# Patient Record
Sex: FEMALE | Race: WHITE | NOT HISPANIC OR LATINO | Employment: STUDENT | URBAN - NONMETROPOLITAN AREA
[De-identification: names, ages, dates, MRNs, and addresses within clinical notes are randomized per-mention and may not be internally consistent; named-entity substitution may affect disease eponyms.]

---
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
## 2019-04-18 ENCOUNTER — OFFICE VISIT (OUTPATIENT)
Dept: URGENT CARE | Facility: CLINIC | Age: 19
End: 2019-04-18
Payer: COMMERCIAL

## 2019-04-18 ENCOUNTER — APPOINTMENT (OUTPATIENT)
Dept: RADIOLOGY | Facility: CLINIC | Age: 19
End: 2019-04-18
Payer: COMMERCIAL

## 2019-04-18 VITALS
OXYGEN SATURATION: 95 % | BODY MASS INDEX: 31.1 KG/M2 | HEART RATE: 95 BPM | RESPIRATION RATE: 18 BRPM | HEIGHT: 64 IN | TEMPERATURE: 99.2 F | SYSTOLIC BLOOD PRESSURE: 121 MMHG | DIASTOLIC BLOOD PRESSURE: 82 MMHG | WEIGHT: 182.2 LBS

## 2019-04-18 DIAGNOSIS — J06.9 URI (UPPER RESPIRATORY INFECTION): ICD-10-CM

## 2019-04-18 DIAGNOSIS — R05.9 COUGH: ICD-10-CM

## 2019-04-18 DIAGNOSIS — J20.8 VIRAL BRONCHITIS: Primary | ICD-10-CM

## 2019-04-18 PROCEDURE — 71046 X-RAY EXAM CHEST 2 VIEWS: CPT

## 2019-04-18 PROCEDURE — 99213 OFFICE O/P EST LOW 20 MIN: CPT | Performed by: PHYSICIAN ASSISTANT

## 2019-04-18 RX ORDER — ALBUTEROL SULFATE 90 UG/1
2 AEROSOL, METERED RESPIRATORY (INHALATION) EVERY 4 HOURS PRN
Qty: 1 INHALER | Refills: 0 | Status: SHIPPED | OUTPATIENT
Start: 2019-04-18 | End: 2020-06-16

## 2019-05-22 ENCOUNTER — OFFICE VISIT (OUTPATIENT)
Dept: FAMILY MEDICINE CLINIC | Facility: CLINIC | Age: 19
End: 2019-05-22
Payer: COMMERCIAL

## 2019-05-22 VITALS
HEART RATE: 68 BPM | WEIGHT: 184.2 LBS | DIASTOLIC BLOOD PRESSURE: 70 MMHG | BODY MASS INDEX: 31.45 KG/M2 | TEMPERATURE: 97.5 F | HEIGHT: 64 IN | SYSTOLIC BLOOD PRESSURE: 118 MMHG

## 2019-05-22 DIAGNOSIS — Z00.00 WELL ADULT EXAM: Primary | ICD-10-CM

## 2019-05-22 DIAGNOSIS — L73.9 FOLLICULITIS: ICD-10-CM

## 2019-05-22 DIAGNOSIS — E07.9 THYROID DISEASE: ICD-10-CM

## 2019-05-22 DIAGNOSIS — K13.0 CRACKED LIP: ICD-10-CM

## 2019-05-22 PROBLEM — E03.9 HYPOTHYROIDISM: Status: ACTIVE | Noted: 2018-07-18

## 2019-05-22 PROCEDURE — 99395 PREV VISIT EST AGE 18-39: CPT | Performed by: PHYSICIAN ASSISTANT

## 2019-05-22 RX ORDER — LEVOTHYROXINE SODIUM 0.03 MG/1
25 TABLET ORAL DAILY
Qty: 90 TABLET | Refills: 3 | Status: SHIPPED | OUTPATIENT
Start: 2019-05-22 | End: 2019-08-10 | Stop reason: SDUPTHER

## 2019-05-22 RX ORDER — CLOTRIMAZOLE AND BETAMETHASONE DIPROPIONATE 10; .64 MG/G; MG/G
CREAM TOPICAL 2 TIMES DAILY
Qty: 30 G | Refills: 0 | Status: SHIPPED | OUTPATIENT
Start: 2019-05-22 | End: 2020-06-16

## 2019-05-24 DIAGNOSIS — F32.4 MAJOR DEPRESSIVE DISORDER WITH SINGLE EPISODE, IN PARTIAL REMISSION (HCC): Primary | ICD-10-CM

## 2019-05-24 RX ORDER — TRAZODONE HYDROCHLORIDE 50 MG/1
50 TABLET ORAL
Qty: 30 TABLET | Refills: 1 | Status: SHIPPED | OUTPATIENT
Start: 2019-05-24 | End: 2019-09-17 | Stop reason: SDUPTHER

## 2019-08-08 ENCOUNTER — OFFICE VISIT (OUTPATIENT)
Dept: FAMILY MEDICINE CLINIC | Facility: CLINIC | Age: 19
End: 2019-08-08
Payer: COMMERCIAL

## 2019-08-08 VITALS
OXYGEN SATURATION: 99 % | BODY MASS INDEX: 30.01 KG/M2 | HEART RATE: 98 BPM | SYSTOLIC BLOOD PRESSURE: 121 MMHG | WEIGHT: 175.8 LBS | HEIGHT: 64 IN | DIASTOLIC BLOOD PRESSURE: 78 MMHG

## 2019-08-08 DIAGNOSIS — F41.1 ANXIETY STATE: ICD-10-CM

## 2019-08-08 DIAGNOSIS — E16.2 HYPOGLYCEMIA, UNSPECIFIED: ICD-10-CM

## 2019-08-08 DIAGNOSIS — F32.4 MAJOR DEPRESSIVE DISORDER WITH SINGLE EPISODE, IN PARTIAL REMISSION (HCC): ICD-10-CM

## 2019-08-08 DIAGNOSIS — L74.9 SWEATING ABNORMALITY: ICD-10-CM

## 2019-08-08 DIAGNOSIS — R63.1 POLYDIPSIA: ICD-10-CM

## 2019-08-08 DIAGNOSIS — L21.9 SEBORRHEIC DERMATITIS OF SCALP: ICD-10-CM

## 2019-08-08 DIAGNOSIS — E03.9 ACQUIRED HYPOTHYROIDISM: Primary | ICD-10-CM

## 2019-08-08 LAB — SL AMB POCT GLUCOSE BLD: 55

## 2019-08-08 PROCEDURE — 99214 OFFICE O/P EST MOD 30 MIN: CPT | Performed by: PHYSICIAN ASSISTANT

## 2019-08-08 PROCEDURE — 3008F BODY MASS INDEX DOCD: CPT | Performed by: PHYSICIAN ASSISTANT

## 2019-08-08 PROCEDURE — 1036F TOBACCO NON-USER: CPT | Performed by: PHYSICIAN ASSISTANT

## 2019-08-08 PROCEDURE — 82948 REAGENT STRIP/BLOOD GLUCOSE: CPT | Performed by: PHYSICIAN ASSISTANT

## 2019-08-08 RX ORDER — CLOBETASOL PROPIONATE 0.46 MG/ML
SOLUTION TOPICAL 2 TIMES DAILY
Qty: 50 ML | Refills: 0 | Status: SHIPPED | OUTPATIENT
Start: 2019-08-08 | End: 2020-03-24

## 2019-08-08 RX ORDER — SERTRALINE HYDROCHLORIDE 25 MG/1
25 TABLET, FILM COATED ORAL DAILY
Qty: 60 TABLET | Refills: 0 | Status: SHIPPED | OUTPATIENT
Start: 2019-08-08 | End: 2019-09-17 | Stop reason: SDUPTHER

## 2019-08-09 ENCOUNTER — TRANSCRIBE ORDERS (OUTPATIENT)
Dept: RADIOLOGY | Facility: MEDICAL CENTER | Age: 19
End: 2019-08-09

## 2019-08-09 ENCOUNTER — APPOINTMENT (OUTPATIENT)
Dept: LAB | Facility: MEDICAL CENTER | Age: 19
End: 2019-08-09
Payer: COMMERCIAL

## 2019-08-09 DIAGNOSIS — L74.9 SWEATING ABNORMALITY: ICD-10-CM

## 2019-08-09 DIAGNOSIS — E07.9 THYROID DISEASE: ICD-10-CM

## 2019-08-09 DIAGNOSIS — E16.2 HYPOGLYCEMIA, UNSPECIFIED: ICD-10-CM

## 2019-08-09 LAB
ALBUMIN SERPL BCP-MCNC: 4 G/DL (ref 3.5–5)
ALP SERPL-CCNC: 70 U/L (ref 46–384)
ALT SERPL W P-5'-P-CCNC: 21 U/L (ref 12–78)
ANION GAP SERPL CALCULATED.3IONS-SCNC: 9 MMOL/L (ref 4–13)
AST SERPL W P-5'-P-CCNC: 19 U/L (ref 5–45)
BILIRUB SERPL-MCNC: 0.24 MG/DL (ref 0.2–1)
BUN SERPL-MCNC: 7 MG/DL (ref 5–25)
CALCIUM SERPL-MCNC: 9.1 MG/DL (ref 8.3–10.1)
CHLORIDE SERPL-SCNC: 108 MMOL/L (ref 100–108)
CO2 SERPL-SCNC: 24 MMOL/L (ref 21–32)
CREAT SERPL-MCNC: 0.75 MG/DL (ref 0.6–1.3)
EST. AVERAGE GLUCOSE BLD GHB EST-MCNC: 97 MG/DL
GFR SERPL CREATININE-BSD FRML MDRD: 116 ML/MIN/1.73SQ M
GLUCOSE P FAST SERPL-MCNC: 80 MG/DL (ref 65–99)
HBA1C MFR BLD: 5 % (ref 4.2–6.3)
POTASSIUM SERPL-SCNC: 3.7 MMOL/L (ref 3.5–5.3)
PROT SERPL-MCNC: 7.6 G/DL (ref 6.4–8.2)
SODIUM SERPL-SCNC: 141 MMOL/L (ref 136–145)
TSH SERPL DL<=0.05 MIU/L-ACNC: 2.47 UIU/ML (ref 0.46–3.98)

## 2019-08-09 PROCEDURE — 36415 COLL VENOUS BLD VENIPUNCTURE: CPT

## 2019-08-09 PROCEDURE — 83036 HEMOGLOBIN GLYCOSYLATED A1C: CPT

## 2019-08-09 PROCEDURE — 84443 ASSAY THYROID STIM HORMONE: CPT

## 2019-08-09 PROCEDURE — 80053 COMPREHEN METABOLIC PANEL: CPT

## 2019-08-09 NOTE — PROGRESS NOTES
Assessment/Plan:    Problem List Items Addressed This Visit        Endocrine    Hypothyroidism - Primary       Other    Anxiety state, unspecified    Major depressive disorder with single episode, in partial remission (HCC)    Relevant Medications    sertraline (ZOLOFT) 25 mg tablet      Other Visit Diagnoses     Seborrheic dermatitis of scalp        Relevant Medications    clobetasol (TEMOVATE) 0 05 % external solution    Sweating abnormality        Relevant Orders    Comprehensive metabolic panel    POCT blood glucose (Completed)    Hypoglycemia, unspecified        Relevant Orders    HEMOGLOBIN A1C W/ EAG ESTIMATION    Polydipsia               Diagnoses and all orders for this visit:    Acquired hypothyroidism    Seborrheic dermatitis of scalp  -     clobetasol (TEMOVATE) 0 05 % external solution; Apply topically 2 (two) times a day    Sweating abnormality  -     Comprehensive metabolic panel; Future  -     POCT blood glucose    Anxiety state, unspecified    Major depressive disorder with single episode, in partial remission (HCC)  -     Discontinue: sertraline (ZOLOFT) 50 mg tablet; Take 0 5 tablets (25 mg total) by mouth daily  -     sertraline (ZOLOFT) 25 mg tablet; Take 1 tablet (25 mg total) by mouth daily    Hypoglycemia, unspecified  -     HEMOGLOBIN A1C W/ EAG ESTIMATION; Future    Polydipsia        Patient became symptomatic during appointment  POCT finger stick glucose was 55  She was given chocolate and symptoms did improve  I believe she is having hypoglycemic episodes  Recommended that she eat small, frequent meals  Will get labs  Recommended referral to endocrine  She will call when she gets her college schedule  Asked patient to call insurance to see which glucometer they will cover  I would like her to check sugars when she becomes symptomatic  Will recheck thyroid level  Gave directions on tapering down on zoloft  Script for clobetasol scalp treatment    Follow-up in 1-2 months or sooner if needed  Subjective:      Patient ID: Bharat Loo is a 22 y o  female  Yakelinemeka Taylor is a pleasant 22year old female who presents today with multiple concerns  First, she would like to taper off of her dose of zoloft  She feels as though her moods have been good  She was seeing a counselor and believes she has found ways to cope with her anxiety and depression  She is also concerned that her thyroid medication may be too high  She has noticed that she has looser bowel movements, she skipped her last period, and has been very sweaty  She has episodes where she feels sweaty, shaky, and develops numbness and tingling in her legs  She has not had any syncopal episodes, chest pains, or shortness of breath  She has found that if she eats something it helps improve the symptoms  She is also concerned because she has an increased amount of dandruff  She was prescribed clobetasol in the past, and is asking for a refill  The following portions of the patient's history were reviewed and updated as appropriate:   She has a past medical history of Environmental allergies  ,  does not have any pertinent problems on file  ,   has a past surgical history that includes Colonoscopy; Myringotomy; Tooth extraction; and Tonsillectomy  ,  family history includes Diabetes in her mother  ,   reports that she has never smoked  She has never used smokeless tobacco  She reports that she drinks alcohol  She reports that she does not use drugs  ,  has No Known Allergies     Current Outpatient Medications   Medication Sig Dispense Refill    albuterol (PROVENTIL HFA,VENTOLIN HFA) 90 mcg/act inhaler Inhale 2 puffs every 4 (four) hours as needed for wheezing 1 Inhaler 0    CAMRESE 0 15-0 03 &0 01 MG TABS   0    Cetirizine HCl (ZYRTEC ALLERGY) 10 MG CAPS Take by mouth      clotrimazole-betamethasone (LOTRISONE) 1-0 05 % cream Apply topically 2 (two) times a day 30 g 0    levothyroxine 25 mcg tablet Take 1 tablet (25 mcg total) by mouth daily 90 tablet 3    sertraline (ZOLOFT) 25 mg tablet Take 1 tablet (25 mg total) by mouth daily 60 tablet 0    traZODone (DESYREL) 50 mg tablet Take 1 tablet (50 mg total) by mouth daily at bedtime 30 tablet 1    clobetasol (TEMOVATE) 0 05 % external solution Apply topically 2 (two) times a day 50 mL 0    fluticasone (FLONASE) 50 mcg/act nasal spray 1 spray into each nostril daily       No current facility-administered medications for this visit  Review of Systems   Constitutional: Positive for diaphoresis  Negative for chills, fatigue and fever  HENT: Negative for congestion, ear pain, postnasal drip, rhinorrhea, sneezing, sore throat and trouble swallowing  Eyes: Negative for pain and visual disturbance  Respiratory: Negative for apnea, cough, shortness of breath and wheezing  Cardiovascular: Negative for chest pain and palpitations  Gastrointestinal: Positive for diarrhea  Negative for abdominal pain, constipation, nausea and vomiting  Endocrine: Positive for polydipsia and polyuria  Negative for polyphagia  Genitourinary: Negative for dysuria and hematuria  Musculoskeletal: Negative for arthralgias, gait problem and myalgias  Skin:        Dry scalp   Neurological: Negative for dizziness, syncope, weakness, light-headedness, numbness and headaches  Episodic shakiness   Psychiatric/Behavioral: Negative for suicidal ideas  The patient is not nervous/anxious  Objective:  Vitals:    08/08/19 1525   BP: 121/78   Pulse: 98   SpO2: 99%   Weight: 79 7 kg (175 lb 12 8 oz)   Height: 5' 4" (1 626 m)     Body mass index is 30 18 kg/m²  Physical Exam   Constitutional: She is oriented to person, place, and time  She appears well-developed and well-nourished  HENT:   Head: Normocephalic and atraumatic     Right Ear: Tympanic membrane, external ear and ear canal normal    Left Ear: Tympanic membrane, external ear and ear canal normal    Nose: Nose normal    Mouth/Throat: Oropharynx is clear and moist and mucous membranes are normal  No oropharyngeal exudate, posterior oropharyngeal edema or posterior oropharyngeal erythema  Eyes: Pupils are equal, round, and reactive to light  EOM are normal    Neck: Normal range of motion  Neck supple  Cardiovascular: Normal rate, regular rhythm and normal heart sounds  Exam reveals no gallop and no friction rub  No murmur heard  Pulmonary/Chest: Effort normal and breath sounds normal  No respiratory distress  She has no wheezes  She has no rales  Abdominal: Soft  Bowel sounds are normal  There is no tenderness  There is no rebound and no guarding  Musculoskeletal: Normal range of motion  Lymphadenopathy:     She has no cervical adenopathy  Neurological: She is alert and oriented to person, place, and time  Skin: Skin is warm  She is diaphoretic  Psychiatric: She has a normal mood and affect  Her behavior is normal  Judgment and thought content normal    Vitals reviewed

## 2019-08-10 DIAGNOSIS — E07.9 THYROID DISEASE: ICD-10-CM

## 2019-08-12 DIAGNOSIS — E16.2 HYPOGLYCEMIA, UNSPECIFIED: Primary | ICD-10-CM

## 2019-08-12 RX ORDER — LEVOTHYROXINE SODIUM 0.03 MG/1
TABLET ORAL
Qty: 90 TABLET | Refills: 3 | Status: SHIPPED | OUTPATIENT
Start: 2019-08-12 | End: 2020-08-06

## 2019-08-13 DIAGNOSIS — E16.2 HYPOGLYCEMIA, UNSPECIFIED: Primary | ICD-10-CM

## 2019-08-13 RX ORDER — BLOOD-GLUCOSE METER
EACH MISCELLANEOUS
Qty: 1 KIT | Refills: 0 | Status: SHIPPED | OUTPATIENT
Start: 2019-08-13 | End: 2019-08-14 | Stop reason: SDUPTHER

## 2019-08-14 ENCOUNTER — TELEPHONE (OUTPATIENT)
Dept: FAMILY MEDICINE CLINIC | Facility: CLINIC | Age: 19
End: 2019-08-14

## 2019-08-14 DIAGNOSIS — E16.2 HYPOGLYCEMIA, UNSPECIFIED: ICD-10-CM

## 2019-08-14 RX ORDER — BLOOD-GLUCOSE METER
EACH MISCELLANEOUS
Qty: 1 KIT | Refills: 0 | Status: SHIPPED | OUTPATIENT
Start: 2019-08-14 | End: 2020-08-20 | Stop reason: ALTCHOICE

## 2019-08-14 NOTE — TELEPHONE ENCOUNTER
PT CALLED FOR ACCU CHECK CHELA PLU, TEST STRIPS, AND LANCETS  WAS SENT TO [facility], SHE IS IN [place] AT SCHOOL  SHE CANCELED THE ORDER AND WANTS IT SENT TO [facility] IN [facility]  I ADDED THAT TO HER CHART IF YOU CAN SEND THEM IN FOR HER

## 2019-08-15 ENCOUNTER — TELEPHONE (OUTPATIENT)
Dept: FAMILY MEDICINE CLINIC | Facility: CLINIC | Age: 19
End: 2019-08-15

## 2019-09-12 ENCOUNTER — TELEPHONE (OUTPATIENT)
Dept: FAMILY MEDICINE CLINIC | Facility: CLINIC | Age: 19
End: 2019-09-12

## 2019-09-12 NOTE — TELEPHONE ENCOUNTER
PT DISCUSSED WITH YOU WEANING OFF ZOLOFT AND TO LET YOU KNOW IF SHE CHANGES HER MIND  SHE DOES NOT WANT TO GO OFF

## 2019-09-16 DIAGNOSIS — F32.4 MAJOR DEPRESSIVE DISORDER WITH SINGLE EPISODE, IN PARTIAL REMISSION (HCC): ICD-10-CM

## 2019-09-16 RX ORDER — SERTRALINE HYDROCHLORIDE 25 MG/1
25 TABLET, FILM COATED ORAL DAILY
Qty: 60 TABLET | Refills: 0 | Status: CANCELLED | OUTPATIENT
Start: 2019-09-16

## 2019-09-17 ENCOUNTER — TELEPHONE (OUTPATIENT)
Dept: FAMILY MEDICINE CLINIC | Facility: CLINIC | Age: 19
End: 2019-09-17

## 2019-09-17 DIAGNOSIS — F32.4 MAJOR DEPRESSIVE DISORDER WITH SINGLE EPISODE, IN PARTIAL REMISSION (HCC): ICD-10-CM

## 2019-09-17 RX ORDER — SERTRALINE HYDROCHLORIDE 25 MG/1
25 TABLET, FILM COATED ORAL DAILY
Qty: 90 TABLET | Refills: 0 | Status: SHIPPED | OUTPATIENT
Start: 2019-09-17 | End: 2019-12-20 | Stop reason: SDUPTHER

## 2019-09-17 RX ORDER — TRAZODONE HYDROCHLORIDE 50 MG/1
50 TABLET ORAL
Qty: 30 TABLET | Refills: 1 | Status: SHIPPED | OUTPATIENT
Start: 2019-09-17 | End: 2019-12-20 | Stop reason: SDUPTHER

## 2019-09-17 NOTE — TELEPHONE ENCOUNTER
Patient is going on to stay on the Zoloft  Asking for refill be sent to [facility]  Also asking if you can send a script in for Trazadone as well?

## 2019-09-18 DIAGNOSIS — F32.4 MAJOR DEPRESSIVE DISORDER WITH SINGLE EPISODE, IN PARTIAL REMISSION (HCC): ICD-10-CM

## 2019-09-20 ENCOUNTER — CONSULT (OUTPATIENT)
Dept: ENDOCRINOLOGY | Facility: CLINIC | Age: 19
End: 2019-09-20
Payer: COMMERCIAL

## 2019-09-20 VITALS
BODY MASS INDEX: 30.56 KG/M2 | SYSTOLIC BLOOD PRESSURE: 116 MMHG | HEART RATE: 85 BPM | HEIGHT: 64 IN | WEIGHT: 179 LBS | DIASTOLIC BLOOD PRESSURE: 70 MMHG

## 2019-09-20 DIAGNOSIS — E03.9 ACQUIRED HYPOTHYROIDISM: ICD-10-CM

## 2019-09-20 DIAGNOSIS — E16.2 HYPOGLYCEMIA, UNSPECIFIED: Primary | ICD-10-CM

## 2019-09-20 PROCEDURE — 99244 OFF/OP CNSLTJ NEW/EST MOD 40: CPT | Performed by: INTERNAL MEDICINE

## 2019-09-20 NOTE — PATIENT INSTRUCTIONS
Non-diabetic Hypoglycemia   WHAT YOU NEED TO KNOW:   What is non-diabetic hypoglycemia? Non-diabetic hypoglycemia is a condition that causes the sugar (glucose) in your blood to drop too low  This can happen in people who do not have diabetes  The 2 types of non-diabetic hypoglycemia are fasting hypoglycemia and reactive hypoglycemia  Fasting hypoglycemia often happens after the person goes without food for 8 hours or longer  Reactive hypoglycemia usually happens about 2 to 4 hours after a meal  When your blood sugar level is low, your muscles and brain cells do not have enough energy to work well  What causes non-diabetic hypoglycemia? · Fasting hypoglycemia:      ¨ Certain medicines or herbal supplements such as fenugreek, ginseng, or cinnamon    ¨ Alcohol     ¨ Exercise    ¨ Medical conditions such as liver disease, hypothyroidism, and tumors    ¨ Eating disorders or malnutrition    ¨ Stomach surgery or hemodialysis    · Reactive hypoglycemia:  The causes of reactive hypoglycemia may be unknown  ¨ Hyperinsulinism    ¨ Meals high in refined carbohydrates such as white bread or foods high in sugar    ¨ Prediabetes    ¨ Any surgery of the digestive system  What are the signs and symptoms of non-diabetic hypoglycemia? · Blurred vision or changes in vision    · Dizziness, lightheadedness, or shakiness    · Fatigue and weakness    · Fast or pounding heartbeat    · Sweating more than usual    · Headache     · Nausea or hunger    · Anxiety, Irritability, or confusion  How is non-diabetic hypoglycemia diagnosed? · Blood tests  are done to measure your blood sugar levels  These tests may also be done to find the cause of your hypoglycemia  · Fasting tests  may be done  You may have an overnight fasting test or a 72-hour fasting test  After you have fasted overnight, your blood sugar levels will be tested 2 times  For a 72-hour fasting test, you will not be given food for a period of up to 72 hours   During this time, healthcare providers will check to see if your blood sugar drops to a certain level  · An oral glucose tolerance test  may be done  After you have fasted for 8 hours, your blood sugar level is tested  You are then given a glucose drink  Your blood sugar level is checked after 1 hour and again after 2 hours  Healthcare providers look at how much your blood sugar level increases from the first check  How is non-diabetic hypoglycemia treated? · Keep food or drinks that contain carbohydrates on hand  Carbohydrates will raise your blood sugar level when you have hypoglycemia  Carbohydrates are found in bread, rice, cereal, fruits, juice, and milk  If you cannot eat or drink, your healthcare provider will give you glucose through an IV  An IV is a small tube placed in your vein  You may also receive a hormone called glucagon that helps raise your blood sugar level  · Treatment will depend on the cause of the hypoglycemia  For example, if a medicine you take is causing hypoglycemia, healthcare providers may change or stop giving you the medicine  If hypoglycemia is caused by low hormone levels, you may need to take hormones  How can I prevent hypoglycemia? You may need to change what and when you eat to prevent low blood sugar levels  Follow the meal plan that you and the dietitian have planned  The following guidelines may help you keep your blood sugar levels under control  · Eat 5 to 6 small meals each day instead of 3 large meals  Eat the same amount of carbohydrate at meals and snacks each day  Most people need about 3 to 4 servings of carbohydrate at meals and 1 to 2 servings for snacks  Do not skip meals  Carbohydrate counting can be used plan your meals  Ask your healthcare provider or dietitian for information about carbohydrate counting  · Limit refined carbohydrates  Examples are white bread, pastries (pies and cakes), regular sodas, syrups, and candy      · Do not have drinks or foods that contain caffeine  Examples are coffee, tea, and certain types of sodas  Caffeine may cause you to have the same symptoms as hypoglycemia, and may cause you to feel worse  · Limit or do not drink alcohol  Women should limit alcohol to 1 drink a day  Men should limit alcohol to 2 drinks a day  A drink of alcohol is 12 ounces of beer, 5 ounces of wine, or 1½ ounces of liquor  Do not drink alcohol on an empty stomach  Drink alcohol with meals to avoid hypoglycemia  · Include protein foods and vegetables in your meals  Some foods that are high in protein include beef, pork, fish, poultry (chicken and turkey), beans, and nuts  Eat a variety of vegetables with your meals  When should I contact my healthcare provider? · You have blurred vision or vision changes  · You feel very tired and weak  · You are sweating more than usual for you  · You have a fast heartbeat  · You feel dizzy, lightheaded, and shaky  · You have questions about your condition or care  When should I seek immediate care or call 911? · You have symptoms of hypoglycemia and cannot eat  · You have trouble thinking clearly  · You have a seizure or faint  CARE AGREEMENT:   You have the right to help plan your care  Learn about your health condition and how it may be treated  Discuss treatment options with your caregivers to decide what care you want to receive  You always have the right to refuse treatment  The above information is an  only  It is not intended as medical advice for individual conditions or treatments  Talk to your doctor, nurse or pharmacist before following any medical regimen to see if it is safe and effective for you  © 2017 [employer]  Information is for End User's use only and may not be sold, redistributed or otherwise used for commercial purposes   All illustrations and images included in CareNotes® are the copyrighted property of [employer]  or [employer]

## 2019-09-20 NOTE — PROGRESS NOTES
Rusty Waggoner 20 y o  female MRN: [id number]    Encounter: [id number]      Assessment/Plan     Assessment: This is a 20y o -year-old female with hypoglycemia and hypothyroidism  Plan:  1  Hypoglycemia-I suspect this is reactive  Have ordered a continuous glucose monitor to better understand the excursions in her blood sugar  If she truly has hypoglycemia, I will plan to send her to see a dietitian to make changes in her diet  2  Hypothyroidism-continue thyroid hormone supplementation  CC:   Hypoglycemia    History of Present Illness     HPI:  79-year-old woman with complaint of excessive sweating, tremor and not feeling well which improves with eating  She has had her blood sugar checked during one of these episodes at a physician's office and it was 55  She states that these episodes occur about twice a day  She has not noticed any relationship to eating  She denies any family history of hypoglycemia although there is family history of type 2 diabetes  Today in the office, she had her usual symptoms and blood sugar was in the 70s  She also has hypothyroidism for which she is on levothyroxine  She denies any symptoms of hypo or hyperthyroidism  Review of Systems   Constitutional: Negative for chills and fever  Respiratory: Negative for shortness of breath  Cardiovascular: Negative for chest pain  Gastrointestinal: Negative for constipation, diarrhea, nausea and vomiting  All other systems reviewed and are negative        Historical Information   Past Medical History:   Diagnosis Date    Environmental allergies     Last assessed: 1/18/17     Past Surgical History:   Procedure Laterality Date    COLONOSCOPY      Fiberoptic    MYRINGOTOMY      TONSILLECTOMY      TOOTH EXTRACTION       Social History   Social History     Substance and Sexual Activity   Alcohol Use Yes    Frequency: 2-3 times a week    Comment: occasionally      Social History     Substance and Sexual Activity   Drug Use No     Social History     Tobacco Use   Smoking Status Current Some Day Smoker   Smokeless Tobacco Never Used   Tobacco Comment    vape occasionally      Family History:   Family History   Problem Relation Age of Onset    Diabetes Mother         due to underlying condition with both eyes affected by proliferative retinopathy and traction retinal detatchments involving maculae, without long term use of insulin       Meds/Allergies   Current Outpatient Medications   Medication Sig Dispense Refill    albuterol (PROVENTIL HFA,VENTOLIN HFA) 90 mcg/act inhaler Inhale 2 puffs every 4 (four) hours as needed for wheezing 1 Inhaler 0    CAMRESE 0 15-0 03 &0 01 MG TABS   0    Cetirizine HCl (ZYRTEC ALLERGY) 10 MG CAPS Take by mouth      clobetasol (TEMOVATE) 0 05 % external solution Apply topically 2 (two) times a day 50 mL 0    clotrimazole-betamethasone (LOTRISONE) 1-0 05 % cream Apply topically 2 (two) times a day 30 g 0    levothyroxine 25 mcg tablet TAKE 1 TABLET DAILY 90 tablet 3    sertraline (ZOLOFT) 25 mg tablet Take 1 tablet (25 mg total) by mouth daily 90 tablet 0    traZODone (DESYREL) 50 mg tablet Take 1 tablet (50 mg total) by mouth daily at bedtime 30 tablet 1    Blood Glucose Monitoring Suppl (ACCU-CHEK CHELA PLUS) w/Device KIT Test blood sugars up to 3 times daily or as needed for fluctuating blood sugars  (Patient not taking: Reported on 9/20/2019) 1 kit 0    fluticasone (FLONASE) 50 mcg/act nasal spray 1 spray into each nostril daily      glucose blood (ACCU-CHEK CHELA PLUS) test strip Test blood sugars up to 3 times daily or as needed for fluctuating blood sugars  (Patient not taking: Reported on 9/20/2019) 100 each 2     No current facility-administered medications for this visit  No Known Allergies    Objective   Vitals: Blood pressure 116/70, pulse 85, height 5' 4" (1 626 m), weight 81 2 kg (179 lb)  Physical Exam   Constitutional: She is oriented to person, place, and time   She appears well-developed and well-nourished  No distress  HENT:   Head: Normocephalic and atraumatic  Mouth/Throat: Oropharynx is clear and moist and mucous membranes are normal  No oropharyngeal exudate  Eyes: Conjunctivae, EOM and lids are normal  Right eye exhibits no discharge  Left eye exhibits no discharge  No scleral icterus  Neck: Neck supple  No thyromegaly present  Cardiovascular: Normal rate, regular rhythm and normal heart sounds  Exam reveals no gallop and no friction rub  No murmur heard  Pulmonary/Chest: Effort normal and breath sounds normal  No respiratory distress  She has no wheezes  Abdominal: Soft  Bowel sounds are normal  She exhibits no distension  There is no tenderness  Musculoskeletal: Normal range of motion  She exhibits no edema, tenderness or deformity  Lymphadenopathy:        Head (right side): No occipital adenopathy present  Head (left side): No occipital adenopathy present  Right: No supraclavicular adenopathy present  Left: No supraclavicular adenopathy present  Neurological: She is alert and oriented to person, place, and time  No cranial nerve deficit  Skin: Skin is warm and intact  No rash noted  She is not diaphoretic  No erythema  Psychiatric: She has a normal mood and affect  Her behavior is normal    Vitals reviewed  The history was obtained from the review of the chart, patient  Lab Results:   Lab Results   Component Value Date/Time    [address] 2 470 08/09/2019 08:20 AM    [address] 2 510 12/18/2018 10:39 AM         I have personally reviewed pertinent reports  Portions of the record may have been created with voice recognition software  Occasional wrong word or "sound a like" substitutions may have occurred due to the inherent limitations of voice recognition software  Read the chart carefully and recognize, using context, where substitutions have occurred

## 2019-10-09 ENCOUNTER — OFFICE VISIT (OUTPATIENT)
Dept: DIABETES SERVICES | Facility: CLINIC | Age: 19
End: 2019-10-09

## 2019-10-09 ENCOUNTER — OFFICE VISIT (OUTPATIENT)
Dept: ENDOCRINOLOGY | Facility: CLINIC | Age: 19
End: 2019-10-09
Payer: COMMERCIAL

## 2019-10-09 DIAGNOSIS — E16.2 HYPOGLYCEMIA: ICD-10-CM

## 2019-10-09 DIAGNOSIS — E16.2 HYPOGLYCEMIA, UNSPECIFIED: ICD-10-CM

## 2019-10-09 PROCEDURE — 95250 CONT GLUC MNTR PHYS/QHP EQP: CPT | Performed by: INTERNAL MEDICINE

## 2019-10-09 PROCEDURE — DEXON

## 2019-10-09 NOTE — PROGRESS NOTES
Dexcom Professional Training    Met with Joan Cooks for [employer]  Training today included:    - Explained procedure to [facility] [address]  - Patient agreement signed  - Checked sensor expiration date; Sensor lot number: [id number], Transmitter Id: [id number]  - Patient has a blood glucose meter and strips  -  has been charged and unblinded  - Transmitter has been cleaned with alcohol and dried  - Discussed site selection; identified insertion site: left upper abdomin  - Cleansed the skin with alcohol  - Inserted sensor per instructions: smooth tape on the skin, insert sensor, withdraw needle, remove insertion tool  - Snapped in grey transmitter  - Verified transmitter fully snapped in on both sides (2clicks)  - Removed transmitter latch  - Disposed insertion tool in sharps container  - Started Sensor and verified communication with  (antenna symbol)    Patient instructions reviewed with patient:    - Perform 2 start up finger stick BG's 2 hours after insertion and calibrate  - Calibrate  every 12 hours using BG meter readings  - Fill out log sheets, one for each day  - Sensor is waterproof for showering and swimming, remove for hot tub, MRI  -  is not waterproof  - Remove if redness, bleeding, swelling, discomfort at site  - Removal is in 1 week with return instructions  Sensor inserted by: Michelle Arzola RN, Ron Watts will return in one week for sensor removal and data download        Maude Hernandez RN  [address] [place] [phone]

## 2019-10-09 NOTE — PATIENT INSTRUCTIONS
Patient instructions reviewed with patient:    - Perform 2 start up finger stick BG's 2 hours after insertion and calibrate  - Calibrate  every 12 hours using BG meter readings  - Fill out log sheets, one for each day  - Sensor is waterproof for showering and swimming, remove for hot tub, MRI  -  is not waterproof  - Remove if redness, bleeding, swelling, discomfort at site  - Removal is in 1 week with return instructions

## 2019-10-15 ENCOUNTER — OFFICE VISIT (OUTPATIENT)
Dept: DIABETES SERVICES | Facility: CLINIC | Age: 19
End: 2019-10-15

## 2019-10-15 DIAGNOSIS — E16.2 HYPOGLYCEMIA: ICD-10-CM

## 2019-10-15 PROCEDURE — DEXOFF

## 2019-10-15 NOTE — PROGRESS NOTES
Dexcom Professional Removal     [facility]  via [address] [facility] to provider for review   - Cleared Data    - Sanitized transmitter,  and case    Sensor Removal and     Download Performed by:  Pranay Garrison RN,CDE

## 2019-10-24 ENCOUNTER — TELEPHONE (OUTPATIENT)
Dept: ENDOCRINOLOGY | Facility: CLINIC | Age: 19
End: 2019-10-24

## 2019-10-24 DIAGNOSIS — E16.2 HYPOGLYCEMIA, UNSPECIFIED: Primary | ICD-10-CM

## 2019-10-24 NOTE — TELEPHONE ENCOUNTER
Pt notified that she needs to see a nutritionist   Pt is eating to many carbs    I will put in a referral to one of our nutritionist

## 2019-10-25 DIAGNOSIS — E03.9 ACQUIRED HYPOTHYROIDISM: Primary | ICD-10-CM

## 2019-10-25 DIAGNOSIS — E16.2 HYPOGLYCEMIA, UNSPECIFIED: ICD-10-CM

## 2019-11-04 DIAGNOSIS — E16.2 HYPOGLYCEMIA, UNSPECIFIED: ICD-10-CM

## 2019-11-04 NOTE — PROGRESS NOTES
Continous glucose monitoring dexcom placement     Date/Time 11/4/2019 2:16 PM     Performed by  [facility]     Authorized by Rue Cooks, MD      Universal Protocol Consent: Verbal consent obtained  Written consent obtained    Consent given by: patient  Patient understanding: patient states understanding of the procedure being performed  Patient consent: the patient's understanding of the procedure matches consent given  Procedure consent: procedure consent matches procedure scheduled  Relevant documents: relevant documents present and verified  Test results: test results available and properly labeled  Site marked: the operative site was marked  Patient identity confirmed: verbally with patient        Local anesthesia used: no     Anesthesia   Local anesthesia used: no     Sedation   Patient sedated: no        Specimen: no    Culture: no   Procedure Details   Procedure Notes: Dexcom placed  Patient tolerance: Patient tolerated the procedure well with no immediate complications

## 2019-11-29 ENCOUNTER — OFFICE VISIT (OUTPATIENT)
Dept: URGENT CARE | Facility: CLINIC | Age: 19
End: 2019-11-29
Payer: COMMERCIAL

## 2019-11-29 VITALS
WEIGHT: 176 LBS | HEIGHT: 64 IN | HEART RATE: 88 BPM | SYSTOLIC BLOOD PRESSURE: 135 MMHG | OXYGEN SATURATION: 99 % | DIASTOLIC BLOOD PRESSURE: 87 MMHG | BODY MASS INDEX: 30.05 KG/M2 | RESPIRATION RATE: 20 BRPM | TEMPERATURE: 98.6 F

## 2019-11-29 DIAGNOSIS — J06.9 ACUTE URI: ICD-10-CM

## 2019-11-29 DIAGNOSIS — R05.9 COUGH: Primary | ICD-10-CM

## 2019-11-29 PROCEDURE — 99214 OFFICE O/P EST MOD 30 MIN: CPT | Performed by: NURSE PRACTITIONER

## 2019-11-29 PROCEDURE — 87801 DETECT AGNT MULT DNA AMPLI: CPT | Performed by: NURSE PRACTITIONER

## 2019-11-29 RX ORDER — AZITHROMYCIN 250 MG/1
TABLET, FILM COATED ORAL
Qty: 6 TABLET | Refills: 0 | Status: SHIPPED | OUTPATIENT
Start: 2019-11-29 | End: 2019-12-03

## 2019-11-29 NOTE — PATIENT INSTRUCTIONS
You are being prescribed azithromycin - take as prescribed  You have an acute upper respiratory infection  You are to continue the over the counter relief symptoms as discussed  You have a pertussis culture pending - you will be notified if it is positive   If it is positive, it will be reported to the state as a communicable disease  Follow up with your PCP  Go to the ED if symptoms worsen    Pertussis   WHAT YOU NEED TO KNOW:   Pertussis, or whooping cough, is an infection of the nose, throat, and lungs  Your air passages get plugged with thick mucus, which causes coughing spells  Pertussis is usually less serious in adults and most serious in babies and young children  Pertussis is caused by bacteria  It is easily spread in the air when someone with pertussis coughs or sneezes  DISCHARGE INSTRUCTIONS:   Call 911 for the following:   · You have trouble breathing  Contact your healthcare provider if:   · You have a fever  · Your cough is getting worse  · You are vomiting and cannot keep anything down  · You are not sleeping or resting because of the cough  · You have a headache, dizziness, or confusion  · You have dry mouth or increased thirst     · You are urinating little or not at all  · You have questions or concerns about your condition or care  Medicines: You may need any of the following:  · NSAIDs , such as ibuprofen, help decrease swelling, pain, and fever  This medicine is available with or without a doctor's order  NSAIDs can cause stomach bleeding or kidney problems in certain people  If you take blood thinner medicine, always ask your healthcare provider if NSAIDs are safe for you  Always read the medicine label and follow directions  · Acetaminophen  decreases pain and fever  It is available without a doctor's order  Ask how much to take and how often to take it  Follow directions   Read the labels of all other medicines you are using to see if they also contain acetaminophen, or ask your doctor or pharmacist  Acetaminophen can cause liver damage if not taken correctly  Do not use more than 4 grams (4,000 milligrams) total of acetaminophen in one day  · Antibiotics  help treat or prevent a bacterial infection  · Take your medicine as directed  Contact your healthcare provider if you think your medicine is not helping or if you have side effects  Tell him or her if you are allergic to any medicine  Keep a list of the medicines, vitamins, and herbs you take  Include the amounts, and when and why you take them  Bring the list or the pill bottles to follow-up visits  Carry your medicine list with you in case of an emergency  Manage your symptoms:   · Drink liquids as directed  Ask how much liquid to drink each day and which liquids are best for you  You may need to drink small amounts of liquid every hour when awake  This will help prevent dehydration  Good liquids to drink are water, fruit juices, or sports drinks  Limit caffeine  · Eat a variety of healthy foods  Healthy foods include fruits, vegetables, whole-grain breads, low-fat dairy products, beans, lean meats, and fish  If you are not hungry, eat smaller amounts more often  Healthy foods may give you energy and help you feel better  · Rest  as much as possible until you begin to feel better  · Use a cool mist humidifier  to increase air moisture in your home  This may make it easier for you to breathe and help decrease your cough  · Do not smoke  or be around anyone who smokes  Your breathing and coughing may get worse if you are near smoke  Ask your healthcare provider for information if you currently smoke and need help quitting  Prevent the spread of pertussis:  Stay away from others to help prevent the spread of pertussis  Do not return to work until your healthcare provider says it is okay  Get vaccines as directed  Vaccines help protect you and others around you from infection     Follow up with your healthcare provider as directed:  Write down your questions so you remember to ask them during your visits  © 2017 [facility] Information is for End User's use only and may not be sold, redistributed or otherwise used for commercial purposes  All illustrations and images included in CareNotes® are the copyrighted property of [employer]  or [employer] Mckeon  The above information is an  only  It is not intended as medical advice for individual conditions or treatments  Talk to your doctor, nurse or pharmacist before following any medical regimen to see if it is safe and effective for you  Upper Respiratory Infection   WHAT YOU NEED TO KNOW:   An upper respiratory infection is also called the common cold  It is an infection that can affect your nose, throat, ears, and sinuses  For healthy people, the common cold is usually not serious and does not need special treatment  Cold symptoms are usually worst for the first 3 to 5 days  Most people get better in 7 to 14 days  You may continue to cough for 2 to 3 weeks  Colds are caused by viruses and do not get better with antibiotics  DISCHARGE INSTRUCTIONS:   Return to the emergency department if:   · You have chest pain or trouble breathing  Contact your healthcare provider if:   · You have a fever over 102ºF (39°C)  · Your sore throat gets worse or you see white or yellow spots in your throat  · Your symptoms get worse after 3 to 5 days or your cold is not better in 14 days  · You have a rash anywhere on your skin  · You have large, tender lumps in your neck  · You have thick, green or yellow drainage from your nose  · You cough up thick yellow, green, or bloody mucus  · You have vomiting for more than 24 hours and cannot keep fluids down  · You have a bad earache  · You have questions or concerns about your condition or care  Medicines:   You may need any of the following:  · Decongestants  help reduce nasal congestion and help you breathe more easily  If you take decongestant pills, they may make you feel restless or cause problems with your sleep  Do not use decongestant sprays for more than a few days  · Cough suppressants  help reduce coughing  Ask your healthcare provider which type of cough medicine is best for you  · NSAIDs , such as ibuprofen, help decrease swelling, pain, and fever  NSAIDs can cause stomach bleeding or kidney problems in certain people  If you take blood thinner medicine, always ask your healthcare provider if NSAIDs are safe for you  Always read the medicine label and follow directions  · Acetaminophen  decreases pain and fever  It is available without a doctor's order  Ask how much to take and how often to take it  Follow directions  Read the labels of all other medicines you are using to see if they also contain acetaminophen, or ask your doctor or pharmacist  Acetaminophen can cause liver damage if not taken correctly  Do not use more than 4 grams (4,000 milligrams) total of acetaminophen in one day  · Take your medicine as directed  Contact your healthcare provider if you think your medicine is not helping or if you have side effects  Tell him or her if you are allergic to any medicine  Keep a list of the medicines, vitamins, and herbs you take  Include the amounts, and when and why you take them  Bring the list or the pill bottles to follow-up visits  Carry your medicine list with you in case of an emergency  Follow up with your healthcare provider as directed:  Write down your questions so you remember to ask them during your visits  Self-care:   · Rest as much as possible  Slowly start to do more each day  · Drink more liquids as directed  Liquids will help thin and loosen mucus so you can cough it up  Liquids will also help prevent dehydration   Liquids that help prevent dehydration include water, fruit juice, and broth  Do not drink liquids that contain caffeine  Caffeine can increase your risk for dehydration  Ask your healthcare provider how much liquid to drink each day  · Soothe a sore throat  Gargle with warm salt water  This helps your sore throat feel better  Make salt water by dissolving ¼ teaspoon salt in 1 cup warm water  You may also suck on hard candy or throat lozenges  You may use a sore throat spray  · Use a humidifier or vaporizer  Use a cool mist humidifier or a vaporizer to increase air moisture in your home  This may make it easier for you to breathe and help decrease your cough  · Use saline nasal drops as directed  These help relieve congestion  · Apply petroleum-based jelly around the outside of your nostrils  This can decrease irritation from blowing your nose  · Do not smoke  Nicotine and other chemicals in cigarettes and cigars can make your symptoms worse  They can also cause infections such as bronchitis or pneumonia  Ask your healthcare provider for information if you currently smoke and need help to quit  E-cigarettes or smokeless tobacco still contain nicotine  Talk to your healthcare provider before you use these products  Prevent spreading your cold to others:   · Try to stay away from other people during the first 2 to 3 days of your cold when it is more easily spread  · Do not share food or drinks  · Do not share hand towels with household members  · Wash your hands often, especially after you blow your nose  Turn away from other people and cover your mouth and nose with a tissue when you sneeze or cough  © 2017 [address] Information is for End User's use only and may not be sold, redistributed or otherwise used for commercial purposes  All illustrations and images included in CareNotes® are the copyrighted property of A [employer]  or Albert Mckeon  The above information is an  only   It is not intended as medical advice for individual conditions or treatments  Talk to your doctor, nurse or pharmacist before following any medical regimen to see if it is safe and effective for you

## 2019-11-29 NOTE — PROGRESS NOTES
[facility]        NAME: Jenny Molina is a 20 y o  female  : 2000    MRN: [id number]  DATE: 2019  TIME: 2:27 PM    Assessment and Plan   Cough [R05]  1  Cough  Bordetella pertussis / parapertussis PCR    azithromycin (ZITHROMAX) 250 mg tablet    Bordetella pertussis / parapertussis PCR   2  Acute URI           Patient Instructions       Follow up with PCP in 3-5 days  Proceed to  ER if symptoms worsen  You are being prescribed azithromycin - take as prescribed  You have an acute upper respiratory infection  You are to continue the over the counter relief symptoms as discussed  You have a pertussis culture pending - you will be notified if it is positive   If it is positive, it will be reported to the state as a communicable disease  Follow up with your PCP  Go to the ED if symptoms worsen            Chief Complaint     Chief Complaint   Patient presents with    Cold Like Symptoms     x 2 weeks    Cough    Headache    Earache     C/O pressure in ears         History of Present Illness       This is a 20year 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 [employer] and states has been coughing x > 30 days  She states that she has has yellow, green mucous she is coughing up  She has body aches, chills, nausea, headache, ear pressure  She denies getting a flu shot this year     Cough   Associated symptoms include ear pain and headaches  Headache    Associated symptoms include coughing and ear pain  Earache    Associated symptoms include coughing and headaches  Review of Systems   Review of Systems   Constitutional: Positive for activity change and appetite change  HENT: Positive for congestion and ear pain  Eyes: Negative  Respiratory: Positive for cough  Cardiovascular: Negative  Gastrointestinal: Negative  Endocrine: Negative  Genitourinary: Negative  Musculoskeletal: Negative  Skin: Negative  Allergic/Immunologic: Negative      Neurological: Positive for headaches  Hematological: Negative  Psychiatric/Behavioral: Negative  Current Medications       Current Outpatient Medications:     albuterol (PROVENTIL HFA,VENTOLIN HFA) 90 mcg/act inhaler, Inhale 2 puffs every 4 (four) hours as needed for wheezing, Disp: 1 Inhaler, Rfl: 0    Blood Glucose Monitoring Suppl (ACCU-CHEK CHELA PLUS) w/Device KIT, Test blood sugars up to 3 times daily or as needed for fluctuating blood sugars  , Disp: 1 kit, Rfl: 0    CAMRESE 0 15-0 03 &0 01 MG TABS, , Disp: , Rfl: 0    Cetirizine HCl (ZYRTEC ALLERGY) 10 MG CAPS, Take by mouth, Disp: , Rfl:     clobetasol (TEMOVATE) 0 05 % external solution, Apply topically 2 (two) times a day, Disp: 50 mL, Rfl: 0    clotrimazole-betamethasone (LOTRISONE) 1-0 05 % cream, Apply topically 2 (two) times a day, Disp: 30 g, Rfl: 0    fluticasone (FLONASE) 50 mcg/act nasal spray, 1 spray into each nostril daily, Disp: , Rfl:     levothyroxine 25 mcg tablet, TAKE 1 TABLET DAILY, Disp: 90 tablet, Rfl: 3    sertraline (ZOLOFT) 25 mg tablet, Take 1 tablet (25 mg total) by mouth daily, Disp: 90 tablet, Rfl: 0    traZODone (DESYREL) 50 mg tablet, Take 1 tablet (50 mg total) by mouth daily at bedtime, Disp: 30 tablet, Rfl: 1    azithromycin (ZITHROMAX) 250 mg tablet, Take 2 tablets today then 1 tablet daily x 4 days, Disp: 6 tablet, Rfl: 0    glucose blood (ACCU-CHEK CHELA PLUS) test strip, Test blood sugars up to 3 times daily or as needed for fluctuating blood sugars   (Patient not taking: Reported on 9/20/2019), Disp: 100 each, Rfl: 2    Current Allergies     Allergies as of 11/29/2019    (No Known Allergies)            The following portions of the patient's history were reviewed and updated as appropriate: allergies, current medications, past family history, past medical history, past social history, past surgical history and problem list      Past Medical History:   Diagnosis Date    Anxiety     Depression     Environmental allergies     Last assessed: 1/18/17    Hypothyroidism        Past Surgical History:   Procedure Laterality Date    COLONOSCOPY      Fiberoptic    MYRINGOTOMY      TONSILLECTOMY      TOOTH EXTRACTION         Family History   Problem Relation Age of Onset    Diabetes Mother         due to underlying condition with both eyes affected by proliferative retinopathy and traction retinal detatchments involving maculae, without long term use of insulin    Sleep apnea Father          Medications have been verified  Objective   /87 (BP Location: Right arm, Patient Position: Sitting, Cuff Size: Standard)   Pulse 88   Temp 98 6 °F (37 °C) (Tympanic)   Resp 20   Ht 5' 4" (1 626 m)   Wt 79 8 kg (176 lb)   SpO2 99%   BMI 30 21 kg/m²        Physical Exam     Physical Exam   Constitutional: She is oriented to person, place, and time  She appears well-developed and well-nourished  No distress  HENT:   Head: Normocephalic and atraumatic  Right Ear: External ear normal    Left Ear: External ear normal    Nose: Nose normal    Mouth/Throat: Oropharynx is clear and moist    Oropharyngeal erythema and injected  B/L nares inflamed    Eyes: Pupils are equal, round, and reactive to light  EOM are normal    Neck: Normal range of motion  Neck supple  Cardiovascular: Normal rate, regular rhythm and normal heart sounds  Pulmonary/Chest: Effort normal and breath sounds normal    Abdominal: Soft  Bowel sounds are normal    Musculoskeletal: Normal range of motion  Neurological: She is alert and oriented to person, place, and time  Skin: Skin is warm and dry  Capillary refill takes less than 2 seconds  She is not diaphoretic  Psychiatric: She has a normal mood and affect  Her behavior is normal  Judgment and thought content normal    Nursing note and vitals reviewed

## 2019-12-01 LAB
B PARAPERT DNA SPEC QL NAA+PROBE: NOT DETECTED
B PERT DNA SPEC QL NAA+PROBE: NOT DETECTED

## 2019-12-18 ENCOUNTER — CLINICAL SUPPORT (OUTPATIENT)
Dept: NUTRITION | Facility: HOSPITAL | Age: 19
End: 2019-12-18
Attending: INTERNAL MEDICINE
Payer: COMMERCIAL

## 2019-12-18 DIAGNOSIS — E03.9 ACQUIRED HYPOTHYROIDISM: ICD-10-CM

## 2019-12-18 DIAGNOSIS — E16.2 HYPOGLYCEMIA, UNSPECIFIED: ICD-10-CM

## 2019-12-18 PROCEDURE — 97802 MEDICAL NUTRITION INDIV IN: CPT

## 2019-12-20 ENCOUNTER — OFFICE VISIT (OUTPATIENT)
Dept: FAMILY MEDICINE CLINIC | Facility: CLINIC | Age: 19
End: 2019-12-20
Payer: COMMERCIAL

## 2019-12-20 VITALS
HEART RATE: 103 BPM | BODY MASS INDEX: 30.63 KG/M2 | SYSTOLIC BLOOD PRESSURE: 118 MMHG | DIASTOLIC BLOOD PRESSURE: 72 MMHG | TEMPERATURE: 98.3 F | HEIGHT: 64 IN | OXYGEN SATURATION: 98 % | WEIGHT: 179.4 LBS

## 2019-12-20 VITALS — HEIGHT: 64 IN | BODY MASS INDEX: 30.61 KG/M2 | WEIGHT: 179.3 LBS

## 2019-12-20 DIAGNOSIS — H65.191 ACUTE EFFUSION OF RIGHT EAR: ICD-10-CM

## 2019-12-20 DIAGNOSIS — Z02.4 DRIVER'S PERMIT PHYSICAL EXAMINATION: Primary | ICD-10-CM

## 2019-12-20 DIAGNOSIS — F32.4 MAJOR DEPRESSIVE DISORDER WITH SINGLE EPISODE, IN PARTIAL REMISSION (HCC): ICD-10-CM

## 2019-12-20 PROCEDURE — 99214 OFFICE O/P EST MOD 30 MIN: CPT | Performed by: PHYSICIAN ASSISTANT

## 2019-12-20 RX ORDER — TRAZODONE HYDROCHLORIDE 50 MG/1
50 TABLET ORAL
Qty: 90 TABLET | Refills: 0 | Status: SHIPPED | OUTPATIENT
Start: 2019-12-20 | End: 2020-04-16 | Stop reason: SDUPTHER

## 2019-12-20 NOTE — PROGRESS NOTES
Assessment/Plan:    Problem List Items Addressed This Visit        Other    Major depressive disorder with single episode, in partial remission (HCC)    Relevant Medications    sertraline (ZOLOFT) 50 mg tablet    traZODone (DESYREL) 50 mg tablet      Other Visit Diagnoses     's permit physical examination    -  Primary         Diagnoses and all orders for this visit:    's permit physical examination    Major depressive disorder with single episode, in partial remission (HCC)  -     sertraline (ZOLOFT) 50 mg tablet; Take 1 tablet (50 mg total) by mouth daily  -     traZODone (DESYREL) 50 mg tablet; Take 1 tablet (50 mg total) by mouth daily at bedtime        She will continue same medications  Follow-up in 3 months or sooner if needed  Subjective:      Patient ID: Jaz Brenner is a 20 y o  female  Jacquie Fothergill is a pleasant 20year old female who is here for a 's license physical and to review her medications  She was considering stopping her zoloft, but school got stressful  She decided to continue it, and has been doing well  She is asking for a refill  She denies any suicidal or homicidal thoughts  She is also having some pressure behind her right ear  She was recently sick with an URI  She was put on an antibiotic at urgent care and is feeling better  She saw endocrinology because she was having hypoglycemic episodes  She met with a nutritionist, and has been doing well since she changed her diet to high protein with low carbs  She is up to date with her eye exams  She needs to schedule a dental cleaning  She is up to date with PAP smears  She does not have any other concerns or questions  The following portions of the patient's history were reviewed and updated as appropriate:   She has a past medical history of Anxiety, Depression, Environmental allergies, and Hypothyroidism  ,  does not have any pertinent problems on file  ,   has a past surgical history that includes Colonoscopy; Myringotomy; Tooth extraction; and Tonsillectomy  ,  family history includes Diabetes in her mother; Sleep apnea in her father  ,   reports that she has quit smoking  She has never used smokeless tobacco  She reports that she drank alcohol  She reports that she does not use drugs  ,  has No Known Allergies     Current Outpatient Medications   Medication Sig Dispense Refill    albuterol (PROVENTIL HFA,VENTOLIN HFA) 90 mcg/act inhaler Inhale 2 puffs every 4 (four) hours as needed for wheezing 1 Inhaler 0    CAMRESE 0 15-0 03 &0 01 MG TABS   0    Cetirizine HCl (ZYRTEC ALLERGY) 10 MG CAPS Take by mouth      clobetasol (TEMOVATE) 0 05 % external solution Apply topically 2 (two) times a day 50 mL 0    clotrimazole-betamethasone (LOTRISONE) 1-0 05 % cream Apply topically 2 (two) times a day 30 g 0    fluticasone (FLONASE) 50 mcg/act nasal spray 1 spray into each nostril daily      levothyroxine 25 mcg tablet TAKE 1 TABLET DAILY 90 tablet 3    sertraline (ZOLOFT) 50 mg tablet Take 1 tablet (50 mg total) by mouth daily 90 tablet 0    traZODone (DESYREL) 50 mg tablet Take 1 tablet (50 mg total) by mouth daily at bedtime 90 tablet 0    Blood Glucose Monitoring Suppl (ACCU-CHEK CHELA PLUS) w/Device KIT Test blood sugars up to 3 times daily or as needed for fluctuating blood sugars  (Patient not taking: Reported on 12/20/2019) 1 kit 0    glucose blood (ACCU-CHEK CHELA PLUS) test strip Test blood sugars up to 3 times daily or as needed for fluctuating blood sugars  (Patient not taking: Reported on 9/20/2019) 100 each 2     No current facility-administered medications for this visit  Review of Systems   Constitutional: Negative for chills, diaphoresis, fatigue and fever  HENT: Negative for congestion, ear pain, postnasal drip, rhinorrhea, sneezing, sore throat and trouble swallowing  Right sided ear pressure   Eyes: Negative for pain and visual disturbance     Respiratory: Negative for apnea, cough, shortness of breath and wheezing  Cardiovascular: Negative for chest pain and palpitations  Gastrointestinal: Negative for abdominal pain, constipation, diarrhea, nausea and vomiting  Genitourinary: Negative for dysuria and hematuria  Musculoskeletal: Negative for arthralgias, gait problem and myalgias  Neurological: Negative for dizziness, syncope, weakness, light-headedness, numbness and headaches  Psychiatric/Behavioral: Negative for suicidal ideas  The patient is nervous/anxious (improved)  Objective:  Vitals:    12/20/19 0819   BP: 118/72   Pulse: 103   Temp: 98 3 °F (36 8 °C)   SpO2: 98%   Weight: 81 4 kg (179 lb 6 4 oz)   Height: 5' 4" (1 626 m)     Body mass index is 30 79 kg/m²  Physical Exam   Constitutional: She is oriented to person, place, and time  She appears well-developed and well-nourished  HENT:   Head: Normocephalic and atraumatic  Right Ear: External ear and ear canal normal  A middle ear effusion is present  Left Ear: Tympanic membrane, external ear and ear canal normal    Nose: Nose normal    Mouth/Throat: Oropharynx is clear and moist and mucous membranes are normal  No oropharyngeal exudate, posterior oropharyngeal edema or posterior oropharyngeal erythema  Eyes: Pupils are equal, round, and reactive to light  EOM are normal    Neck: Normal range of motion  Neck supple  Cardiovascular: Normal rate, regular rhythm and normal heart sounds  Exam reveals no gallop and no friction rub  No murmur heard  Pulmonary/Chest: Effort normal and breath sounds normal  No respiratory distress  She has no wheezes  She has no rales  Abdominal: Soft  Bowel sounds are normal  There is no tenderness  There is no rebound and no guarding  Musculoskeletal: Normal range of motion  Lymphadenopathy:     She has no cervical adenopathy  Neurological: She is alert and oriented to person, place, and time  Skin: Skin is warm and dry     Psychiatric: She has a normal mood and affect  Her behavior is normal  Judgment and thought content normal    Vitals reviewed

## 2020-01-08 ENCOUNTER — TELEPHONE (OUTPATIENT)
Dept: FAMILY MEDICINE CLINIC | Facility: CLINIC | Age: 20
End: 2020-01-08

## 2020-01-08 NOTE — TELEPHONE ENCOUNTER
Patient called to report she went to take her drivers test and was advised because of being hypo-glycemic she needs addt'l paperwork filled out  Patient asking if she needs to schedule another appt or if she can just drop the forms off

## 2020-01-08 NOTE — TELEPHONE ENCOUNTER
That paperwork likely needs to be filled out by her specialist  She should call her endocriniologist

## 2020-01-16 ENCOUNTER — TELEPHONE (OUTPATIENT)
Dept: FAMILY MEDICINE CLINIC | Facility: CLINIC | Age: 20
End: 2020-01-16

## 2020-01-16 NOTE — TELEPHONE ENCOUNTER
Spoke to them, they said you are able to fill them out due to you did the physical  Please advise   Forms on your desk

## 2020-01-16 NOTE — TELEPHONE ENCOUNTER
I can't fill out half of the form  Dr Omar Nuñez needs to be the ones to complete it  If they don't want to drive down there and take time off they can always fax it to their office

## 2020-01-16 NOTE — TELEPHONE ENCOUNTER
Dr Mag Sanchez filled out the DM form but the other two forms were left blank due to he did not feel they needed to be filled out bc that was never an issue  She went to the [facility] and they wont accept them unless they are crossed out with "NA" written on them and sign by a provider  Mother is asking if you can do that so they do not have to miss any more work or school  Please advise

## 2020-03-11 ENCOUNTER — CONSULT (OUTPATIENT)
Dept: SURGERY | Facility: CLINIC | Age: 20
End: 2020-03-11
Payer: COMMERCIAL

## 2020-03-11 VITALS
WEIGHT: 180 LBS | DIASTOLIC BLOOD PRESSURE: 78 MMHG | HEIGHT: 64 IN | TEMPERATURE: 98.7 F | BODY MASS INDEX: 30.73 KG/M2 | HEART RATE: 106 BPM | SYSTOLIC BLOOD PRESSURE: 134 MMHG

## 2020-03-11 DIAGNOSIS — K59.1 FUNCTIONAL DIARRHEA: Primary | ICD-10-CM

## 2020-03-11 DIAGNOSIS — R10.30 LOWER ABDOMINAL PAIN: ICD-10-CM

## 2020-03-11 DIAGNOSIS — R10.9 ABDOMINAL PAIN: ICD-10-CM

## 2020-03-11 DIAGNOSIS — K80.20 CALCULUS OF GALLBLADDER WITHOUT CHOLECYSTITIS WITHOUT OBSTRUCTION: ICD-10-CM

## 2020-03-11 PROCEDURE — 99204 OFFICE O/P NEW MOD 45 MIN: CPT | Performed by: SURGERY

## 2020-03-11 RX ORDER — DICYCLOMINE HCL 20 MG
20 TABLET ORAL EVERY 6 HOURS
Status: CANCELLED | OUTPATIENT
Start: 2020-03-11

## 2020-03-11 RX ORDER — DICYCLOMINE HCL 20 MG
20 TABLET ORAL EVERY 6 HOURS
Qty: 60 TABLET | Refills: 2 | Status: SHIPPED | OUTPATIENT
Start: 2020-03-11 | End: 2020-08-03 | Stop reason: ALTCHOICE

## 2020-03-11 NOTE — PROGRESS NOTES
Assessment/Plan:    Lower abdominal pain  Hypogastric abdominal pain associated diarrhea of unknown etiology  Denies family history of ulcer colitis or Crohn's  Does admit to some urgency with her bowel movements after eating  Could be an element of irritable bowel syndrome  Will start her on Bentyl for the time being  Will also refer to GI for further workup  Her last colonoscopy was in the last 2 years which was unremarkable  Functional diarrhea  Unclear etiology of diarrhea  This is more less her main concern associated with abdominal pain  Recent celiac status was unremarkable  Recent colonoscopy in the last 2 years also unremarkable as per the patient  Does admit to some anal bleeding which is found only until the tissue suspect this is probably from irritation from excessive bowel movements and wiping versus potential hemorrhoids  Again will refer to GI for further workup  Calculus of gallbladder without cholecystitis without obstruction  Noted gallstone on recent ultrasound  Lab work unremarkable  Patient's in history physical examination do not necessarily correlate with gallbladder disease  Majority of her pain is lower abdomen and she also has significant diarrhea  Although she did have some mild right upper quadrant pain on palpation ultrasound did not show any evidence of acute cholecystitis  She states again that her pain is mostly lower abdomen after she eats  She also complains of some significant belching and potential heartburn symptoms  She is going to be started on antacid medication by her PCP  At this point will hold off on gallbladder removal as I am not completely convinced this is the source of her symptoms  However follow-up with me in 1 month to see how things are going  Diagnoses and all orders for this visit:    Functional diarrhea  -     Ambulatory referral to Gastroenterology; Future  -     dicyclomine (BENTYL) 20 mg tablet;  Take 1 tablet (20 mg total) by mouth every 6 (six) hours    Abdominal pain  -     Ambulatory referral to Gastroenterology; Future    Lower abdominal pain    Calculus of gallbladder without cholecystitis without obstruction    Other orders  -     Cancel: dicyclomine (BENTYL) 20 mg tablet; Take 1 tablet (20 mg total) by mouth every 6 (six) hours          Subjective:      Patient ID: Sonya Boggs is a 20 y o  female  A 20year-old very pleasant female who presents today for evaluation of abdominal pain associated diarrhea in addition to recent findings of cholelithiasis on ultrasound  Patient states for the last few months in fact even as far back in the summer she has been having intermittent diarrhea after eating  However recent months she states that the really has worsened she can be going anywhere from 8 to times a day  She denies any significant blood or mucus in the diarrhea  She does have occasional anal bleeding with wiping which she attributes to hemorrhoids versus just irritation from excessive wiping  She describes her pain is more crampy in nature and is mostly in the lower abdomen  The pain comes on about 15-20 minutes after eating and then is associated with diarrhea which can sometimes be very urgent causing the patient to run to the bathroom  Denies any specific foods that make the symptoms better or worse  Denies any right upper quadrant or upper abdominal pain  She also states that she feels bloated a lot after she eats  She also admits to some significant belching as well  It does complain of some heartburn in fact was supposed to be started on some anti acid medication by her PCP  She states she has seen a GI physician in the past and had a colonoscopy and EGD a few years back which did not show anything significant as per the patient  Currently denies any nausea vomiting  Denies any fevers  Does have some chills occasionally  No significant unintentional weight loss    No significant family history of Crohn's or ulcerative colitis, although she did say 1 of her family members had some sort of GI issue  Patient is currently a college student states that things right now are very stressful school  The following portions of the patient's history were reviewed and updated as appropriate:   She  has a past medical history of Anxiety, Depression, Environmental allergies, and Hypothyroidism  She   Patient Active Problem List    Diagnosis Date Noted    Functional diarrhea 03/11/2020    Calculus of gallbladder without cholecystitis without obstruction 03/11/2020    Dyslipidemia 12/19/2018    [facility] disorder with single episode[facility] 12/18/2018    Hypothyroidism 07/18/2018    Lower abdominal pain 05/24/2018    Other specified hypothyroidism 12/18/2017    Psoriasis 12/15/2017    Iron deficiency anemia 04/22/2015    Anxiety state, unspecified 11/19/2012    Allergic rhinitis 10/12/2010     She  has a past surgical history that includes Colonoscopy; Myringotomy; Tooth extraction; and Tonsillectomy  Her family history includes Diabetes in her mother; Sleep apnea in her father  She  reports that she has quit smoking  She has never used smokeless tobacco  She reports that she drank alcohol  She reports that she does not use drugs    Current Outpatient Medications   Medication Sig Dispense Refill    albuterol (PROVENTIL HFA,VENTOLIN HFA) 90 mcg/act inhaler Inhale 2 puffs every 4 (four) hours as needed for wheezing 1 Inhaler 0    CAMRESE 0 15-0 03 &0 01 MG TABS   0    Cetirizine HCl (ZYRTEC ALLERGY) 10 MG CAPS Take by mouth      clobetasol (TEMOVATE) 0 05 % external solution Apply topically 2 (two) times a day 50 mL 0    clotrimazole-betamethasone (LOTRISONE) 1-0 05 % cream Apply topically 2 (two) times a day 30 g 0    fluticasone (FLONASE) 50 mcg/act nasal spray 1 spray into each nostril daily      levothyroxine 25 mcg tablet TAKE 1 TABLET DAILY 90 tablet 3    sertraline (ZOLOFT) 50 mg tablet Take 1 tablet (50 mg total) by mouth daily 90 tablet 0    traZODone (DESYREL) 50 mg tablet Take 1 tablet (50 mg total) by mouth daily at bedtime 90 tablet 0    Blood Glucose Monitoring Suppl (ACCU-CHEK CHELA PLUS) w/Device KIT Test blood sugars up to 3 times daily or as needed for fluctuating blood sugars  (Patient not taking: Reported on 3/11/2020) 1 kit 0    dicyclomine (BENTYL) 20 mg tablet Take 1 tablet (20 mg total) by mouth every 6 (six) hours 60 tablet 2    glucose blood (ACCU-CHEK CHELA PLUS) test strip Test blood sugars up to 3 times daily or as needed for fluctuating blood sugars  (Patient not taking: Reported on 9/20/2019) 100 each 2     No current facility-administered medications for this visit  She has No Known Allergies       Review of Systems      A review of systems was completed, all negative except as noted above HPI  Objective:      /78   Pulse (!) 106   Temp 98 7 °F (37 1 °C)   Ht 5' 4" (1 626 m)   Wt 81 6 kg (180 lb)   BMI 30 90 kg/m²          Physical Exam   Constitutional: She is oriented to person, place, and time  She appears well-developed and well-nourished  No distress  HENT:   Head: Normocephalic and atraumatic  Eyes: No scleral icterus  Neck: Normal range of motion  No tracheal deviation present  Cardiovascular: Normal rate, regular rhythm and normal heart sounds  Exam reveals no gallop and no friction rub  No murmur heard  Pulmonary/Chest: Effort normal and breath sounds normal  No stridor  No respiratory distress  She has no wheezes  She has no rales  She exhibits no tenderness  Abdominal: Soft  She exhibits no distension and no mass  There is tenderness (Mild tenderness in the lower abdomen addition to the right upper quadrant on palpation  No rebound or guarding)  There is no rebound and no guarding  No hernia  Musculoskeletal: Normal range of motion  She exhibits no edema, tenderness or deformity     Neurological: She is alert and oriented to person, place, and time  No cranial nerve deficit  Skin: Skin is warm  No rash noted  She is not diaphoretic  No erythema  No pallor  Psychiatric: She has a normal mood and affect  Her behavior is normal    Vitals reviewed

## 2020-03-11 NOTE — ASSESSMENT & PLAN NOTE
Noted gallstone on recent ultrasound  Lab work unremarkable  Patient's in history physical examination do not necessarily correlate with gallbladder disease  Majority of her pain is lower abdomen and she also has significant diarrhea  Although she did have some mild right upper quadrant pain on palpation ultrasound did not show any evidence of acute cholecystitis  She states again that her pain is mostly lower abdomen after she eats  She also complains of some significant belching and potential heartburn symptoms  She is going to be started on antacid medication by her PCP  At this point will hold off on gallbladder removal as I am not completely convinced this is the source of her symptoms  However follow-up with me in 1 month to see how things are going

## 2020-03-11 NOTE — ASSESSMENT & PLAN NOTE
Hypogastric abdominal pain associated diarrhea of unknown etiology  Denies family history of ulcer colitis or Crohn's  Does admit to some urgency with her bowel movements after eating  Could be an element of irritable bowel syndrome  Will start her on Bentyl for the time being  Will also refer to GI for further workup  Her last colonoscopy was in the last 2 years which was unremarkable

## 2020-03-11 NOTE — ASSESSMENT & PLAN NOTE
Unclear etiology of diarrhea  This is more less her main concern associated with abdominal pain  Recent celiac status was unremarkable  Recent colonoscopy in the last 2 years also unremarkable as per the patient  Does admit to some anal bleeding which is found only until the tissue suspect this is probably from irritation from excessive bowel movements and wiping versus potential hemorrhoids  Again will refer to GI for further workup

## 2020-03-22 ENCOUNTER — NURSE TRIAGE (OUTPATIENT)
Dept: OTHER | Facility: OTHER | Age: 20
End: 2020-03-22

## 2020-03-22 NOTE — TELEPHONE ENCOUNTER
Regarding: Coronavirus  ----- Message from Usama Nickerson sent at 3/22/2020  2:40 PM EDT -----  Been feeling sick since January  Coughing up phlegm  SOB   No Fever

## 2020-03-22 NOTE — TELEPHONE ENCOUNTER
Reason for Disposition   [1] No COVID-19 EXPOSURE BUT [2] questions about    Additional Information   Negative: Severe difficulty breathing (e g , struggling for each breath, speak in single words, bluish lips)   Negative: Sounds like a life-threatening emergency to the triager   Negative: [1] Difficulty breathing (shortness of breath) occurs AND [2] onset > 14 days after COVID-19 EXPOSURE (Close Contact)   Negative: [1] Dry cough occurs AND [2] onset > 14 days after COVID-19 EXPOSURE   Negative: [1] Wet cough (i e , white-yellow, yellow, green, or maria colored sputum) AND [2] onset > 14 days after COVID-19 EXPOSURE   Negative: [1] Common cold symptoms AND [2] onset > 14 days after COVID-19 EXPOSURE   Negative: [1] Difficulty breathing occurs AND [2] within 14 days of COVID-19 EXPOSURE (Close Contact)   Negative: Patient sounds very sick or weak to the triager   Negative: [1] Fever or feeling feverish AND [2] within 14 Days of COVID-19 EXPOSURE (Close Contact)   Negative: [1] Cough occurs AND [2] within 14 days of COVID-19 EXPOSURE   Negative: [1] Fever (or feeling feverish) OR cough occurs AND [2] travel from or living in high risk area (identified by CDC) AND [3] within last 14 days   Negative: [1] COVID-19 EXPOSURE within last 14 days AND [2] mild body aches, chills, diarrhea, headache, runny nose, or sore throat occur   Negative: [1] COVID-19 EXPOSURE within last 14 days AND [2] NO cough, fever, or breathing difficulty AND [3] 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 recommended personal protective equipment (i e , a respirator-N95 mask, eye protection, gloves, and gown)   Negative: [1] COVID-19 EXPOSURE (Close Contact) within last 14 days AND [2] NO cough, fever, or breathing difficulty   Negative: [1] Travel from or living in high risk area (identified by CDC) AND [2] within last 14 days AND [3] NO cough or fever or breathing difficulty    Answer Assessment - Initial Assessment Questions  1  CONFIRMED CASE: "Who is the person with the confirmed COVID-19 infection that you were exposed to?"      none  2  PLACE of CONTACT: "Where were you when you were exposed to COVID-19  (coronavirus disease 2019)?" (e g , city, state, country)      none  3  TYPE of CONTACT: "How much contact was there?" (e g , live in same house, work in same office, same school)      none  4  DATE of CONTACT: "When did you have contact with a coronavirus patient?" (e g , days)      none  5  DURATION of CONTACT: "How long were you in contact with the COVID-19 (coronavirus disease) patient?" (e g , a few seconds, passed by person, a few minutes, live with the patient)      none  6  SYMPTOMS: "Do you have any symptoms?" (e g , fever, cough, breathing difficulty)      Cough, headache, sob, gastro symptoms getting worse  No fever  Caller does not appear sob  7  PREGNANCY OR POSTPARTUM: "Is there any chance you are pregnant?" "When was your last menstrual period?" "Did you deliver in the last 2 weeks?"      No, jan 1st, no  8  HIGH RISK: "Do you have any heart or lung problems?  Do you have a weakened immune system?" (e g , CHF, COPD, asthma, HIV positive, chemotherapy, renal failure, diabetes mellitus, sickle cell anemia)        Bronchitis, low blood sugar    Protocols used: CORONAVIRUS (COVID-19) EXPOSURE-ADULT-

## 2020-03-23 ENCOUNTER — TELEPHONE (OUTPATIENT)
Dept: FAMILY MEDICINE CLINIC | Facility: CLINIC | Age: 20
End: 2020-03-23

## 2020-03-23 NOTE — TELEPHONE ENCOUNTER
WANTS TO BE TESTED FOR COVID, DIARRHEA HAS BEEN ADDED TO THE LIST OF SYMPTOMS OF COVID  SHE HAS HAD DIARRHEA FOR TWO MONTHS AND HER APPT WITH GASTRO HAS BEEN CANCELED  SHE HAS HAD A COUGH FOR TWO MONTHS ALSO  NO FEVER HAD SOME SWEATING  SHE IS HOME FROM [employer] AND ONE OF HER FRIENDS IS BEING TESTED

## 2020-03-24 ENCOUNTER — TELEPHONE (OUTPATIENT)
Dept: FAMILY MEDICINE CLINIC | Facility: CLINIC | Age: 20
End: 2020-03-24

## 2020-03-24 ENCOUNTER — OFFICE VISIT (OUTPATIENT)
Dept: FAMILY MEDICINE CLINIC | Facility: CLINIC | Age: 20
End: 2020-03-24
Payer: COMMERCIAL

## 2020-03-24 VITALS
OXYGEN SATURATION: 98 % | SYSTOLIC BLOOD PRESSURE: 126 MMHG | HEIGHT: 64 IN | BODY MASS INDEX: 29.81 KG/M2 | HEART RATE: 112 BPM | WEIGHT: 174.6 LBS | DIASTOLIC BLOOD PRESSURE: 70 MMHG | TEMPERATURE: 99.1 F

## 2020-03-24 DIAGNOSIS — J30.9 ALLERGIC RHINITIS, UNSPECIFIED SEASONALITY, UNSPECIFIED TRIGGER: ICD-10-CM

## 2020-03-24 DIAGNOSIS — K21.9 GASTROESOPHAGEAL REFLUX DISEASE WITHOUT ESOPHAGITIS: Primary | ICD-10-CM

## 2020-03-24 DIAGNOSIS — R82.998 DARK URINE: ICD-10-CM

## 2020-03-24 LAB
SL AMB  POCT GLUCOSE, UA: NORMAL
SL AMB LEUKOCYTE ESTERASE,UA: NORMAL
SL AMB POCT BILIRUBIN,UA: NORMAL
SL AMB POCT BLOOD,UA: NORMAL
SL AMB POCT CLARITY,UA: CLEAR
SL AMB POCT COLOR,UA: CLEAR
SL AMB POCT KETONES,UA: NORMAL
SL AMB POCT NITRITE,UA: NORMAL
SL AMB POCT PH,UA: 6
SL AMB POCT SPECIFIC GRAVITY,UA: 1.01
SL AMB POCT URINE PROTEIN: NORMAL
SL AMB POCT UROBILINOGEN: NORMAL

## 2020-03-24 PROCEDURE — 1036F TOBACCO NON-USER: CPT | Performed by: PHYSICIAN ASSISTANT

## 2020-03-24 PROCEDURE — 3008F BODY MASS INDEX DOCD: CPT | Performed by: PHYSICIAN ASSISTANT

## 2020-03-24 PROCEDURE — 81002 URINALYSIS NONAUTO W/O SCOPE: CPT | Performed by: PHYSICIAN ASSISTANT

## 2020-03-24 PROCEDURE — 99213 OFFICE O/P EST LOW 20 MIN: CPT | Performed by: PHYSICIAN ASSISTANT

## 2020-03-24 RX ORDER — PANTOPRAZOLE SODIUM 40 MG/1
40 TABLET, DELAYED RELEASE ORAL DAILY
COMMUNITY
Start: 2020-03-17 | End: 2020-05-26 | Stop reason: SDUPTHER

## 2020-03-24 NOTE — TELEPHONE ENCOUNTER
Patient c/o ear ache, coughing up green phlegm, dizzy and fatigue and dark urine  Asking if something can be called in or if patient needs an appt?

## 2020-03-24 NOTE — PROGRESS NOTES
Assessment/Plan:    Problem List Items Addressed This Visit        Respiratory    Allergic rhinitis      Other Visit Diagnoses     Dark urine    -  Primary    Relevant Orders    POCT urine dip (Completed)    Gastroesophageal reflux disease without esophagitis        Relevant Medications    pantoprazole (PROTONIX) 40 mg tablet           Diagnoses and all orders for this visit:    Dark urine  -     POCT urine dip    Allergic rhinitis, unspecified seasonality, unspecified trigger    Gastroesophageal reflux disease without esophagitis    Other orders  -     pantoprazole (PROTONIX) 40 mg tablet; Take 40 mg by mouth daily        POCT urine dip was normal    Explained that symptoms are likely a combination of PND from allergies and may also be related to her GERD  I advised her to try switching from claritin to zyrtec  I encouraged her to reschedule her visit with Gastroenterology  She will let us know if symptoms do not improve or worsen  Subjective:      Patient ID: Mark Rivas is a 20 y o  female  Meño Chen is a 20year old female who is here today with multiple complaints  In December, she was seen at an Urgent Care at school for cold-like symptoms and tested for flu and pertussis, which were both negative  She was placed on zithromax  She states that the Zithromax did provide some relief  However, she is still coughing  The cough is occasionally productive  She quit smoking one week ago  She also suffers from allergies, which have been worse over the past few weeks  She is taking Claritin daily  She is also complaining of acid reflux  She saw a doctor at school who did labs and stool studies  She states that they started her on bentyl and Protonix and wanted her to follow up with GI  She states that her symptoms are worse in the middle of the night or after eating  She was supposed to see GI yesterday, but cancelled because she was scared with the virus   She is also complaining that she gets sweaty and clammy at times  She will sometimes wake up in the middle of the night sweating  She also admits that her anxiety has been worse since moving home with her family  She was living at school, but moved home when the virus epidemic started  She is supposed to start both individual and family counseling next week  She denies any fevers, chills, chest pains, or shortness of breath  The following portions of the patient's history were reviewed and updated as appropriate:   She has a past medical history of Anxiety, Depression, Environmental allergies, and Hypothyroidism  ,  does not have any pertinent problems on file  ,   has a past surgical history that includes Colonoscopy; Myringotomy; Tooth extraction; and Tonsillectomy  ,  family history includes Diabetes in her mother; Sleep apnea in her father  ,   reports that she has quit smoking  She has never used smokeless tobacco  She reports that she drank alcohol  She reports that she does not use drugs  ,  has No Known Allergies     Current Outpatient Medications   Medication Sig Dispense Refill    CAMRESE 0 15-0 03 &0 01 MG TABS   0    Cetirizine HCl (ZYRTEC ALLERGY) 10 MG CAPS Take by mouth      dicyclomine (BENTYL) 20 mg tablet Take 1 tablet (20 mg total) by mouth every 6 (six) hours 60 tablet 2    fluticasone (FLONASE) 50 mcg/act nasal spray 1 spray into each nostril daily      levothyroxine 25 mcg tablet TAKE 1 TABLET DAILY 90 tablet 3    pantoprazole (PROTONIX) 40 mg tablet Take 40 mg by mouth daily      sertraline (ZOLOFT) 50 mg tablet Take 1 tablet (50 mg total) by mouth daily 90 tablet 0    traZODone (DESYREL) 50 mg tablet Take 1 tablet (50 mg total) by mouth daily at bedtime 90 tablet 0    albuterol (PROVENTIL HFA,VENTOLIN HFA) 90 mcg/act inhaler Inhale 2 puffs every 4 (four) hours as needed for wheezing (Patient not taking: Reported on 3/24/2020) 1 Inhaler 0    Blood Glucose Monitoring Suppl (ACCU-CHEK CHELA PLUS) w/Device KIT Test blood sugars up to 3 times daily or as needed for fluctuating blood sugars  (Patient not taking: Reported on 3/11/2020) 1 kit 0    clotrimazole-betamethasone (LOTRISONE) 1-0 05 % cream Apply topically 2 (two) times a day (Patient not taking: Reported on 3/24/2020) 30 g 0    glucose blood (ACCU-CHEK CHELA PLUS) test strip Test blood sugars up to 3 times daily or as needed for fluctuating blood sugars  (Patient not taking: Reported on 9/20/2019) 100 each 2     No current facility-administered medications for this visit  Review of Systems   Constitutional: Positive for diaphoresis and fatigue  Negative for chills and fever  HENT: Positive for congestion  Negative for ear pain, postnasal drip, rhinorrhea, sneezing, sore throat and trouble swallowing  Eyes: Negative for pain and visual disturbance  Respiratory: Positive for cough  Negative for apnea, shortness of breath and wheezing  Cardiovascular: Negative for chest pain and palpitations  Gastrointestinal: Negative for abdominal pain, constipation, diarrhea, nausea and vomiting  +Loose stools  +Acid Reflux  +Sour taste in mouth   Genitourinary: Negative for dysuria and hematuria  +Dark urine   Musculoskeletal: Negative for arthralgias, gait problem and myalgias  Allergic/Immunologic: Positive for environmental allergies  Neurological: Negative for dizziness, syncope, weakness, light-headedness, numbness and headaches  Psychiatric/Behavioral: Positive for sleep disturbance  Negative for suicidal ideas  The patient is nervous/anxious  Objective:  Vitals:    03/24/20 1437   BP: 126/70   Pulse: (!) 112   Temp: 99 1 °F (37 3 °C)   SpO2: 98%   Weight: 79 2 kg (174 lb 9 6 oz)   Height: 5' 4" (1 626 m)     Body mass index is 29 97 kg/m²  Physical Exam   Constitutional: She is oriented to person, place, and time  She appears well-developed and well-nourished  HENT:   Head: Normocephalic and atraumatic     Right Ear: Tympanic membrane, external ear and ear canal normal    Left Ear: Tympanic membrane, external ear and ear canal normal    Nose: Nose normal    Mouth/Throat: Oropharynx is clear and moist and mucous membranes are normal  No oropharyngeal exudate, posterior oropharyngeal edema or posterior oropharyngeal erythema  Nasal mucosa is hyperemic   Eyes: Pupils are equal, round, and reactive to light  EOM are normal    Neck: Normal range of motion  Neck supple  Cardiovascular: Normal rate, regular rhythm and normal heart sounds  Exam reveals no gallop and no friction rub  No murmur heard  Pulmonary/Chest: Effort normal and breath sounds normal  No respiratory distress  She has no wheezes  She has no rales  Abdominal: Soft  Bowel sounds are normal  There is no tenderness  There is no rebound and no guarding  Musculoskeletal: Normal range of motion  Lymphadenopathy:     She has no cervical adenopathy  Neurological: She is alert and oriented to person, place, and time  Skin: Skin is warm and dry  Psychiatric: Her behavior is normal  Judgment and thought content normal  Her mood appears anxious  Vitals reviewed

## 2020-04-02 ENCOUNTER — TELEPHONE (OUTPATIENT)
Dept: PSYCHIATRY | Facility: CLINIC | Age: 20
End: 2020-04-02

## 2020-04-07 ENCOUNTER — TELEMEDICINE (OUTPATIENT)
Dept: GASTROENTEROLOGY | Facility: CLINIC | Age: 20
End: 2020-04-07
Payer: COMMERCIAL

## 2020-04-07 VITALS — BODY MASS INDEX: 29.71 KG/M2 | TEMPERATURE: 97.9 F | HEIGHT: 64 IN | WEIGHT: 174 LBS | HEART RATE: 68 BPM

## 2020-04-07 DIAGNOSIS — K80.20 CALCULUS OF GALLBLADDER WITHOUT CHOLECYSTITIS WITHOUT OBSTRUCTION: ICD-10-CM

## 2020-04-07 DIAGNOSIS — R10.9 ABDOMINAL PAIN: ICD-10-CM

## 2020-04-07 DIAGNOSIS — K21.9 GASTROESOPHAGEAL REFLUX DISEASE, ESOPHAGITIS PRESENCE NOT SPECIFIED: ICD-10-CM

## 2020-04-07 DIAGNOSIS — K59.1 FUNCTIONAL DIARRHEA: Primary | ICD-10-CM

## 2020-04-07 PROCEDURE — 99203 OFFICE O/P NEW LOW 30 MIN: CPT | Performed by: INTERNAL MEDICINE

## 2020-04-07 RX ORDER — FAMOTIDINE 40 MG/1
40 TABLET, FILM COATED ORAL
Qty: 30 TABLET | Refills: 3 | Status: SHIPPED | OUTPATIENT
Start: 2020-04-07 | End: 2021-08-25 | Stop reason: ALTCHOICE

## 2020-04-08 ENCOUNTER — TELEPHONE (OUTPATIENT)
Dept: GASTROENTEROLOGY | Facility: CLINIC | Age: 20
End: 2020-04-08

## 2020-04-09 DIAGNOSIS — F32.4 MAJOR DEPRESSIVE DISORDER WITH SINGLE EPISODE, IN PARTIAL REMISSION (HCC): ICD-10-CM

## 2020-04-16 ENCOUNTER — TELEMEDICINE (OUTPATIENT)
Dept: PSYCHIATRY | Facility: CLINIC | Age: 20
End: 2020-04-16
Payer: COMMERCIAL

## 2020-04-16 DIAGNOSIS — F32.4 MAJOR DEPRESSIVE DISORDER WITH SINGLE EPISODE, IN PARTIAL REMISSION (HCC): ICD-10-CM

## 2020-04-16 PROCEDURE — 99243 OFF/OP CNSLTJ NEW/EST LOW 30: CPT | Performed by: HOSPITALIST

## 2020-04-16 RX ORDER — TRAZODONE HYDROCHLORIDE 100 MG/1
100 TABLET ORAL
Qty: 30 TABLET | Refills: 2 | Status: SHIPPED | OUTPATIENT
Start: 2020-04-16 | End: 2020-07-21

## 2020-04-16 RX ORDER — SERTRALINE HYDROCHLORIDE 100 MG/1
100 TABLET, FILM COATED ORAL DAILY
Qty: 30 TABLET | Refills: 2 | Status: SHIPPED | OUTPATIENT
Start: 2020-04-16 | End: 2020-07-21 | Stop reason: SDUPTHER

## 2020-04-23 ENCOUNTER — LAB (OUTPATIENT)
Dept: LAB | Facility: MEDICAL CENTER | Age: 20
End: 2020-04-23
Payer: COMMERCIAL

## 2020-04-23 DIAGNOSIS — K59.1 FUNCTIONAL DIARRHEA: ICD-10-CM

## 2020-04-23 LAB
CRP SERPL QL: 4.4 MG/L
FOLATE SERPL-MCNC: >20 NG/ML (ref 3.1–17.5)
TSH SERPL DL<=0.05 MIU/L-ACNC: 1.71 UIU/ML (ref 0.46–3.98)
VIT B12 SERPL-MCNC: 357 PG/ML (ref 100–900)

## 2020-04-23 PROCEDURE — 82607 VITAMIN B-12: CPT

## 2020-04-23 PROCEDURE — 36415 COLL VENOUS BLD VENIPUNCTURE: CPT

## 2020-04-23 PROCEDURE — 84443 ASSAY THYROID STIM HORMONE: CPT

## 2020-04-23 PROCEDURE — 82746 ASSAY OF FOLIC ACID SERUM: CPT

## 2020-04-23 PROCEDURE — 86140 C-REACTIVE PROTEIN: CPT

## 2020-04-27 ENCOUNTER — TELEPHONE (OUTPATIENT)
Dept: GASTROENTEROLOGY | Facility: MEDICAL CENTER | Age: 20
End: 2020-04-27

## 2020-04-29 ENCOUNTER — TRANSCRIBE ORDERS (OUTPATIENT)
Dept: LAB | Facility: MEDICAL CENTER | Age: 20
End: 2020-04-29

## 2020-04-29 ENCOUNTER — APPOINTMENT (OUTPATIENT)
Dept: LAB | Facility: MEDICAL CENTER | Age: 20
End: 2020-04-29
Payer: COMMERCIAL

## 2020-04-29 ENCOUNTER — TRANSCRIBE ORDERS (OUTPATIENT)
Dept: ADMINISTRATIVE | Facility: HOSPITAL | Age: 20
End: 2020-04-29

## 2020-04-29 ENCOUNTER — TELEMEDICINE (OUTPATIENT)
Dept: FAMILY MEDICINE CLINIC | Facility: CLINIC | Age: 20
End: 2020-04-29
Payer: COMMERCIAL

## 2020-04-29 VITALS — BODY MASS INDEX: 29.88 KG/M2 | TEMPERATURE: 97.5 F | HEIGHT: 64 IN | WEIGHT: 175 LBS

## 2020-04-29 DIAGNOSIS — S01.331A COMPLICATION OF RIGHT EAR PIERCING, INITIAL ENCOUNTER: Primary | ICD-10-CM

## 2020-04-29 DIAGNOSIS — K59.1 FUNCTIONAL DIARRHEA: ICD-10-CM

## 2020-04-29 DIAGNOSIS — K59.1 FUNCTIONAL DIARRHEA: Primary | ICD-10-CM

## 2020-04-29 PROCEDURE — 82656 EL-1 FECAL QUAL/SEMIQ: CPT

## 2020-04-29 PROCEDURE — 87329 GIARDIA AG IA: CPT

## 2020-04-29 PROCEDURE — 87206 SMEAR FLUORESCENT/ACID STAI: CPT

## 2020-04-29 PROCEDURE — 82705 FATS/LIPIDS FECES QUAL: CPT

## 2020-04-29 PROCEDURE — 99213 OFFICE O/P EST LOW 20 MIN: CPT | Performed by: PHYSICIAN ASSISTANT

## 2020-04-29 PROCEDURE — 89055 LEUKOCYTE ASSESSMENT FECAL: CPT

## 2020-04-29 PROCEDURE — 87015 SPECIMEN INFECT AGNT CONCNTJ: CPT

## 2020-04-29 RX ORDER — CEPHALEXIN 500 MG/1
500 CAPSULE ORAL EVERY 8 HOURS SCHEDULED
Qty: 21 CAPSULE | Refills: 0 | Status: SHIPPED | OUTPATIENT
Start: 2020-04-29 | End: 2020-05-06

## 2020-05-01 LAB — G LAMBLIA AG STL QL IA: NEGATIVE

## 2020-05-04 LAB — ELASTASE PANC STL-MCNT: 428 UG ELAST./G

## 2020-05-14 ENCOUNTER — TELEMEDICINE (OUTPATIENT)
Dept: GASTROENTEROLOGY | Facility: CLINIC | Age: 20
End: 2020-05-14
Payer: COMMERCIAL

## 2020-05-14 ENCOUNTER — TELEMEDICINE (OUTPATIENT)
Dept: PSYCHIATRY | Facility: CLINIC | Age: 20
End: 2020-05-14
Payer: COMMERCIAL

## 2020-05-14 VITALS — TEMPERATURE: 97.9 F | HEIGHT: 64 IN | BODY MASS INDEX: 29.88 KG/M2 | WEIGHT: 175 LBS

## 2020-05-14 DIAGNOSIS — R10.13 DYSPEPSIA: ICD-10-CM

## 2020-05-14 DIAGNOSIS — K59.1 FUNCTIONAL DIARRHEA: Primary | ICD-10-CM

## 2020-05-14 DIAGNOSIS — K21.9 GASTROESOPHAGEAL REFLUX DISEASE, ESOPHAGITIS PRESENCE NOT SPECIFIED: ICD-10-CM

## 2020-05-14 DIAGNOSIS — F32.4 MAJOR DEPRESSIVE DISORDER WITH SINGLE EPISODE, IN PARTIAL REMISSION (HCC): Primary | ICD-10-CM

## 2020-05-14 DIAGNOSIS — F41.1 ANXIETY STATE: ICD-10-CM

## 2020-05-14 PROCEDURE — 3008F BODY MASS INDEX DOCD: CPT | Performed by: NURSE PRACTITIONER

## 2020-05-14 PROCEDURE — 99214 OFFICE O/P EST MOD 30 MIN: CPT | Performed by: INTERNAL MEDICINE

## 2020-05-14 PROCEDURE — 99214 OFFICE O/P EST MOD 30 MIN: CPT | Performed by: HOSPITALIST

## 2020-05-14 RX ORDER — LAMOTRIGINE 25 MG/1
TABLET ORAL
Qty: 60 TABLET | Refills: 1 | Status: SHIPPED | OUTPATIENT
Start: 2020-05-14 | End: 2020-06-30 | Stop reason: SINTOL

## 2020-05-14 RX ORDER — DEXLANSOPRAZOLE 60 MG/1
60 CAPSULE, DELAYED RELEASE ORAL DAILY
Qty: 90 CAPSULE | Refills: 3 | Status: SHIPPED | OUTPATIENT
Start: 2020-05-14 | End: 2020-08-03

## 2020-05-19 ENCOUNTER — TELEPHONE (OUTPATIENT)
Dept: GASTROENTEROLOGY | Facility: MEDICAL CENTER | Age: 20
End: 2020-05-19

## 2020-05-21 ENCOUNTER — TELEPHONE (OUTPATIENT)
Dept: GASTROENTEROLOGY | Facility: CLINIC | Age: 20
End: 2020-05-21

## 2020-05-26 ENCOUNTER — TELEPHONE (OUTPATIENT)
Dept: GASTROENTEROLOGY | Facility: CLINIC | Age: 20
End: 2020-05-26

## 2020-05-26 DIAGNOSIS — K21.9 GASTROESOPHAGEAL REFLUX DISEASE, ESOPHAGITIS PRESENCE NOT SPECIFIED: Primary | ICD-10-CM

## 2020-05-26 RX ORDER — OMEPRAZOLE 40 MG/1
40 CAPSULE, DELAYED RELEASE ORAL 2 TIMES DAILY
Qty: 60 CAPSULE | Refills: 1 | Status: CANCELLED | OUTPATIENT
Start: 2020-05-26

## 2020-05-26 RX ORDER — PANTOPRAZOLE SODIUM 40 MG/1
40 TABLET, DELAYED RELEASE ORAL
Qty: 60 TABLET | Refills: 3 | Status: SHIPPED | OUTPATIENT
Start: 2020-05-26 | End: 2022-06-07

## 2020-05-28 ENCOUNTER — TELEPHONE (OUTPATIENT)
Dept: PSYCHIATRY | Facility: CLINIC | Age: 20
End: 2020-05-28

## 2020-05-29 ENCOUNTER — TELEPHONE (OUTPATIENT)
Dept: FAMILY MEDICINE CLINIC | Facility: CLINIC | Age: 20
End: 2020-05-29

## 2020-06-04 ENCOUNTER — TELEMEDICINE (OUTPATIENT)
Dept: PSYCHIATRY | Facility: CLINIC | Age: 20
End: 2020-06-04
Payer: COMMERCIAL

## 2020-06-04 DIAGNOSIS — F32.4 MAJOR DEPRESSIVE DISORDER WITH SINGLE EPISODE, IN PARTIAL REMISSION (HCC): Primary | ICD-10-CM

## 2020-06-04 DIAGNOSIS — F41.1 ANXIETY STATE: Primary | ICD-10-CM

## 2020-06-04 DIAGNOSIS — F32.4 MAJOR DEPRESSIVE DISORDER WITH SINGLE EPISODE, IN PARTIAL REMISSION (HCC): ICD-10-CM

## 2020-06-04 PROCEDURE — 1036F TOBACCO NON-USER: CPT | Performed by: NURSE PRACTITIONER

## 2020-06-04 PROCEDURE — NOSHOW: Performed by: HOSPITALIST

## 2020-06-04 PROCEDURE — 99213 OFFICE O/P EST LOW 20 MIN: CPT | Performed by: NURSE PRACTITIONER

## 2020-06-04 RX ORDER — BENZTROPINE MESYLATE 0.5 MG/1
0.5 TABLET ORAL 2 TIMES DAILY
Qty: 30 TABLET | Refills: 0 | Status: SHIPPED | OUTPATIENT
Start: 2020-06-04 | End: 2021-08-25 | Stop reason: ALTCHOICE

## 2020-06-10 DIAGNOSIS — R10.13 DYSPEPSIA: ICD-10-CM

## 2020-06-10 DIAGNOSIS — K21.9 GASTROESOPHAGEAL REFLUX DISEASE, ESOPHAGITIS PRESENCE NOT SPECIFIED: ICD-10-CM

## 2020-06-10 PROCEDURE — U0003 INFECTIOUS AGENT DETECTION BY NUCLEIC ACID (DNA OR RNA); SEVERE ACUTE RESPIRATORY SYNDROME CORONAVIRUS 2 (SARS-COV-2) (CORONAVIRUS DISEASE [COVID-19]), AMPLIFIED PROBE TECHNIQUE, MAKING USE OF HIGH THROUGHPUT TECHNOLOGIES AS DESCRIBED BY CMS-2020-01-R: HCPCS

## 2020-06-12 LAB — SARS-COV-2 RNA SPEC QL NAA+PROBE: NOT DETECTED

## 2020-06-16 ENCOUNTER — ANESTHESIA EVENT (OUTPATIENT)
Dept: GASTROENTEROLOGY | Facility: HOSPITAL | Age: 20
End: 2020-06-16

## 2020-06-16 ENCOUNTER — HOSPITAL ENCOUNTER (OUTPATIENT)
Dept: GASTROENTEROLOGY | Facility: HOSPITAL | Age: 20
Setting detail: OUTPATIENT SURGERY
Discharge: HOME | End: 2020-06-16
Attending: INTERNAL MEDICINE | Admitting: INTERNAL MEDICINE
Payer: COMMERCIAL

## 2020-06-16 ENCOUNTER — ANESTHESIA (OUTPATIENT)
Dept: GASTROENTEROLOGY | Facility: HOSPITAL | Age: 20
End: 2020-06-16

## 2020-06-16 VITALS
OXYGEN SATURATION: 99 % | BODY MASS INDEX: 30.73 KG/M2 | TEMPERATURE: 99.9 F | SYSTOLIC BLOOD PRESSURE: 100 MMHG | RESPIRATION RATE: 18 BRPM | HEIGHT: 64 IN | DIASTOLIC BLOOD PRESSURE: 57 MMHG | WEIGHT: 180 LBS | HEART RATE: 93 BPM

## 2020-06-16 DIAGNOSIS — K21.9 GASTROESOPHAGEAL REFLUX DISEASE WITHOUT ESOPHAGITIS: ICD-10-CM

## 2020-06-16 DIAGNOSIS — R10.13 DYSPEPSIA: ICD-10-CM

## 2020-06-16 DIAGNOSIS — K21.9 GASTROESOPHAGEAL REFLUX DISEASE, ESOPHAGITIS PRESENCE NOT SPECIFIED: ICD-10-CM

## 2020-06-16 DIAGNOSIS — K31.89 RETAINED FOOD IN STOMACH: Primary | ICD-10-CM

## 2020-06-16 LAB
EXT PREGNANCY TEST URINE: NEGATIVE
EXT. CONTROL: NORMAL

## 2020-06-16 PROCEDURE — 43239 EGD BIOPSY SINGLE/MULTIPLE: CPT | Performed by: INTERNAL MEDICINE

## 2020-06-16 PROCEDURE — 88305 TISSUE EXAM BY PATHOLOGIST: CPT | Performed by: PATHOLOGY

## 2020-06-16 PROCEDURE — 81025 URINE PREGNANCY TEST: CPT | Performed by: INTERNAL MEDICINE

## 2020-06-16 RX ORDER — SODIUM CHLORIDE, SODIUM LACTATE, POTASSIUM CHLORIDE, CALCIUM CHLORIDE 600; 310; 30; 20 MG/100ML; MG/100ML; MG/100ML; MG/100ML
125 INJECTION, SOLUTION INTRAVENOUS CONTINUOUS
Status: DISCONTINUED | OUTPATIENT
Start: 2020-06-16 | End: 2020-06-20 | Stop reason: HOSPADM

## 2020-06-16 RX ORDER — LIDOCAINE HYDROCHLORIDE 20 MG/ML
SOLUTION OROPHARYNGEAL CODE/TRAUMA/SEDATION MEDICATION
Status: COMPLETED | OUTPATIENT
Start: 2020-06-16 | End: 2020-06-16

## 2020-06-16 RX ORDER — LIDOCAINE HYDROCHLORIDE 20 MG/ML
15 SOLUTION OROPHARYNGEAL DAILY
Status: DISCONTINUED | OUTPATIENT
Start: 2020-06-16 | End: 2020-06-17 | Stop reason: HOSPADM

## 2020-06-16 RX ORDER — LIDOCAINE HYDROCHLORIDE 20 MG/ML
15 SOLUTION OROPHARYNGEAL 4 TIMES DAILY PRN
Status: DISCONTINUED | OUTPATIENT
Start: 2020-06-16 | End: 2020-06-16

## 2020-06-16 RX ORDER — LIDOCAINE HYDROCHLORIDE 20 MG/ML
15 SOLUTION OROPHARYNGEAL 4 TIMES DAILY PRN
Status: DISCONTINUED | OUTPATIENT
Start: 2020-06-16 | End: 2020-06-20 | Stop reason: HOSPADM

## 2020-06-16 RX ORDER — LIDOCAINE HYDROCHLORIDE 20 MG/ML
INJECTION, SOLUTION EPIDURAL; INFILTRATION; INTRACAUDAL; PERINEURAL AS NEEDED
Status: DISCONTINUED | OUTPATIENT
Start: 2020-06-16 | End: 2020-06-16 | Stop reason: SURG

## 2020-06-16 RX ORDER — PROPOFOL 10 MG/ML
INJECTION, EMULSION INTRAVENOUS AS NEEDED
Status: DISCONTINUED | OUTPATIENT
Start: 2020-06-16 | End: 2020-06-16 | Stop reason: SURG

## 2020-06-16 RX ADMIN — LIDOCAINE HYDROCHLORIDE 15 ML: 20 SOLUTION ORAL; TOPICAL at 09:08

## 2020-06-16 RX ADMIN — SODIUM CHLORIDE, SODIUM LACTATE, POTASSIUM CHLORIDE, AND CALCIUM CHLORIDE 125 ML/HR: .6; .31; .03; .02 INJECTION, SOLUTION INTRAVENOUS at 08:44

## 2020-06-16 RX ADMIN — PROPOFOL 30 MG: 10 INJECTION, EMULSION INTRAVENOUS at 09:17

## 2020-06-16 RX ADMIN — PROPOFOL 30 MG: 10 INJECTION, EMULSION INTRAVENOUS at 09:15

## 2020-06-16 RX ADMIN — LIDOCAINE HYDROCHLORIDE 100 MG: 20 INJECTION, SOLUTION EPIDURAL; INFILTRATION; INTRACAUDAL; PERINEURAL at 09:13

## 2020-06-16 RX ADMIN — LIDOCAINE HYDROCHLORIDE 30 MG: 20 INJECTION, SOLUTION EPIDURAL; INFILTRATION; INTRACAUDAL; PERINEURAL at 09:15

## 2020-06-16 RX ADMIN — PROPOFOL 130 MG: 10 INJECTION, EMULSION INTRAVENOUS at 09:13

## 2020-06-16 RX ADMIN — LIDOCAINE HYDROCHLORIDE 30 MG: 20 INJECTION, SOLUTION EPIDURAL; INFILTRATION; INTRACAUDAL; PERINEURAL at 09:17

## 2020-06-17 ENCOUNTER — TELEPHONE (OUTPATIENT)
Dept: GASTROENTEROLOGY | Facility: CLINIC | Age: 20
End: 2020-06-17

## 2020-06-30 ENCOUNTER — HOSPITAL ENCOUNTER (OUTPATIENT)
Dept: RADIOLOGY | Facility: HOSPITAL | Age: 20
Discharge: HOME | End: 2020-06-30
Attending: INTERNAL MEDICINE
Payer: COMMERCIAL

## 2020-06-30 ENCOUNTER — TELEMEDICINE (OUTPATIENT)
Dept: PSYCHIATRY | Facility: CLINIC | Age: 20
End: 2020-06-30
Payer: COMMERCIAL

## 2020-06-30 DIAGNOSIS — K21.9 GASTROESOPHAGEAL REFLUX DISEASE WITHOUT ESOPHAGITIS: ICD-10-CM

## 2020-06-30 DIAGNOSIS — K31.89 RETAINED FOOD IN STOMACH: ICD-10-CM

## 2020-06-30 DIAGNOSIS — F32.0 CURRENT MILD EPISODE OF MAJOR DEPRESSIVE DISORDER WITHOUT PRIOR EPISODE (HCC): Primary | ICD-10-CM

## 2020-06-30 DIAGNOSIS — F32.4 MAJOR DEPRESSIVE DISORDER WITH SINGLE EPISODE, IN PARTIAL REMISSION (HCC): ICD-10-CM

## 2020-06-30 PROCEDURE — A9541 TC99M SULFUR COLLOID: HCPCS

## 2020-06-30 PROCEDURE — 78264 GASTRIC EMPTYING IMG STUDY: CPT

## 2020-06-30 PROCEDURE — 99213 OFFICE O/P EST LOW 20 MIN: CPT | Performed by: NURSE PRACTITIONER

## 2020-06-30 PROCEDURE — 1036F TOBACCO NON-USER: CPT | Performed by: NURSE PRACTITIONER

## 2020-06-30 RX ORDER — QUETIAPINE FUMARATE 25 MG/1
25 TABLET, FILM COATED ORAL
Qty: 30 TABLET | Refills: 0 | Status: SHIPPED | OUTPATIENT
Start: 2020-06-30 | End: 2020-07-21 | Stop reason: ALTCHOICE

## 2020-07-21 ENCOUNTER — TELEMEDICINE (OUTPATIENT)
Dept: PSYCHIATRY | Facility: CLINIC | Age: 20
End: 2020-07-21
Payer: COMMERCIAL

## 2020-07-21 DIAGNOSIS — F32.4 MAJOR DEPRESSIVE DISORDER WITH SINGLE EPISODE, IN PARTIAL REMISSION (HCC): Primary | ICD-10-CM

## 2020-07-21 PROCEDURE — 99213 OFFICE O/P EST LOW 20 MIN: CPT | Performed by: NURSE PRACTITIONER

## 2020-07-21 PROCEDURE — 1036F TOBACCO NON-USER: CPT | Performed by: NURSE PRACTITIONER

## 2020-07-21 RX ORDER — TRAZODONE HYDROCHLORIDE 100 MG/1
150 TABLET ORAL
Qty: 45 TABLET | Refills: 2 | Status: SHIPPED | OUTPATIENT
Start: 2020-07-21 | End: 2020-10-15 | Stop reason: SDUPTHER

## 2020-07-21 RX ORDER — ARIPIPRAZOLE 5 MG/1
5 TABLET ORAL
Qty: 30 TABLET | Refills: 0 | Status: SHIPPED | OUTPATIENT
Start: 2020-07-21 | End: 2020-08-27 | Stop reason: SDUPTHER

## 2020-07-21 RX ORDER — SERTRALINE HYDROCHLORIDE 100 MG/1
100 TABLET, FILM COATED ORAL DAILY
Qty: 30 TABLET | Refills: 2 | Status: SHIPPED | OUTPATIENT
Start: 2020-07-21 | End: 2020-10-15 | Stop reason: SDUPTHER

## 2020-07-21 NOTE — PSYCH
MEDICATION MANAGEMENT NOTE        [address]      Name and Date of Birth:  Catrachita Bowens 20 y o  2000 MRN: [id number]    Date of Visit: July 22, 2020    SUBJECTIVE (HPI):    Prince Bean is seen today for a follow up for depression and anxiety  She relates she never decreased her Zoloft because she does feel somewhat less depressed than she did before she started taking it  As far as anxiety, Seroquel was not helpful although she relates she has been sleeping better  Her appetite is within normal limits  She denies any suicidal or homicidal ideations at this time  She still wishes to decrease her anxiety and improve her mood  She is planning to return to campus at the end of August   She is looking for to being able to see her therapist at school again as well as her friends  She is overall no better or worse since her last visit and wishes to discontinue Seroquel  HPI ROS:               Medication Side Effects:  None     Depression (10 worst):  6/10    Anxiety (10 worst):  6/10    Safety concerns (SI, HI, etc):  No LUIS/AVH    Sleep:  good    Energy:  low    Appetite:  slightly increased    Weight Change:  gained 4 lbs        Review Of Systems:    Comprehensive physical health review of systems negative except those noted in HPI      Constitutional negative   Cardiovascular negative   Respiratory negative   Gastrointestinal negative   Musculoskeletal negative   Neurological negative   Endocrine negative       The following portions of the patient's history were reviewed and updated as appropriate: past family history, past medical history, past social history, past surgical history and problem list     Past Psychiatric History:         Past Medical History:    Past Medical History:   Diagnosis Date    Anxiety     Depression     Environmental allergies     Last assessed: 1/18/17    GERD (gastroesophageal reflux disease)     Hypothyroidism No past medical history pertinent negatives    Past Surgical History:   Procedure Laterality Date    COLONOSCOPY  2016 & 2018    Fiberoptic    EGD  2016    MYRINGOTOMY      TONSILLECTOMY      TOOTH EXTRACTION       No Known Allergies    Substance Abuse History:    Social History     Substance and Sexual Activity   Alcohol Use Not Currently    Comment: occasionally      Social History     Substance and Sexual Activity   Drug Use Yes    Frequency: 4 0 times per week    Types: Marijuana    Comment: recreational        Social History:    Social History     Socioeconomic History    Marital status: Single     Spouse name: Not on file    Number of children: Not on file    Years of education: Not on file    Highest education level: Not on file   Occupational History    Not on file   Social Needs    Financial resource strain: Not on file    Food insecurity:     Worry: Not on file     Inability: Not on file    Transportation needs:     Medical: Not on file     Non-medical: Not on file   Tobacco Use    Smoking status: Former Smoker    Smokeless tobacco: Never Used   Substance and Sexual Activity    Alcohol use: Not Currently     Comment: occasionally     Drug use: Yes     Frequency: 4 0 times per week     Types: Marijuana     Comment: recreational     Sexual activity: Not on file   Lifestyle    Physical activity:     Days per week: Not on file     Minutes per session: Not on file    Stress: Not on file   Relationships    Social connections:     Talks on phone: Not on file     Gets together: Not on file     Attends Church service: Not on file     Active member of club or organization: Not on file     Attends meetings of clubs or organizations: Not on file     Relationship status: Not on file    Intimate partner violence:     Fear of current or ex partner: Not on file     Emotionally abused: Not on file     Physically abused: Not on file     Forced sexual activity: Not on file   Other Topics Concern  Not on file   Social History Narrative    Not on file       Family Psychiatric History:     Family History   Problem Relation Age of Onset    Diabetes Mother         due to underlying condition with both eyes affected by proliferative retinopathy and traction retinal detatchments involving maculae, without long term use of insulin    Sleep apnea Father     Crohn's disease Paternal Grandfather                 OBJECTIVE:     Vital signs in last 24 hours: There were no vitals filed for this visit  Mental Status Evaluation:    Appearance age appropriate, casually dressed   Behavior cooperative, calm   Speech normal rate, normal volume, normal pitch   Mood depressed, anxious   Affect normal range and intensity, appropriate   Thought Processes organized, goal directed   Associations intact associations   Thought Content normal, no overt delusions   Perceptual Disturbances: no auditory hallucinations, no visual hallucinations   Abnormal Thoughts  Risk Potential Suicidal ideation - None  Homicidal ideation - None  Potential for aggression - No   Orientation oriented to person, place, time/date and situation   Memory recent and remote memory grossly intact   Consciousness alert and awake   Attention Span Concentration Span attention span and concentration are age appropriate   Intellect appears to be of average intelligence   Insight intact   Judgement intact       Motor activity no abnormal movements   Language no difficulty naming common objects, no difficulty repeating a phrase, no difficulty writing a sentence   Fund of Knowledge adequate knowledge of current events  adequate fund of knowledge regarding past history  adequate fund of knowledge regarding vocabulary    Pain none   Pain Scale 0       Laboratory Results: I have personally reviewed all pertinent laboratory/tests results  No results found for this or any previous visit        Confidential Assessment:    Major depressive disorder Assessment/Plan:       Diagnoses and all orders for this visit:    Major depressive disorder with single episode, in partial remission (HCC)  -     sertraline (ZOLOFT) 100 mg tablet; Take 1 tablet (100 mg total) by mouth daily  -     traZODone (DESYREL) 100 mg tablet; Take 1 5 tablets (150 mg total) by mouth daily at bedtime  -     ARIPiprazole (ABILIFY) 5 mg tablet; Take 1 tablet (5 mg total) by mouth daily at bedtime          Treatment Recommendations/Precautions/Plan:    Patient has been educated about their diagnosis and treatment modalities  They voiced understanding and agreement with the following plan:      Aware of 24 hour and weekend coverage for urgent situations accessed by calling [facility] main practice number    -Discussed self monitoring of symptoms, and symptom monitoring tools     -Patient has been informed of 24 hours and weekend coverage for urgent situations accessed by calling the main clinic phone number      -Completed and signed during the session: Not applicable - Treatment Plan not due at this session    Risks/Benefits      Risks, Benefits And Possible Side Effects Of Medications:    Risks, benefits, and possible side effects of medications explained to Sharon Thornton and she verbalizes understanding and agreement for treatment  Controlled Medication Discussion:     Not applicable    Psychotherapy Provided:     Individual psychotherapy provided: TRE Ortega 07/22/20    I have spent 15 minutes with Patient  today in which greater than 50% of this time was spent in counseling/coordination of care regarding Impressions

## 2020-08-02 NOTE — PROGRESS NOTES
Virtual Regular Visit      Assessment/Plan:    Problem List Items Addressed This Visit        Digestive    Functional diarrhea - Primary     All her diarrhea is completely resolved  She is now having 1-2 formed bowel movements per day  Other    Lower abdominal pain     She has had some lower abdominal cramping preceding a bowel movement relieved with a bowel movement  This is likely related to irritable bowel  She will keep an eye on her food choices  She is not using fiber at this time  I will send a prescription for hyoscyamine 0 125 mg sublingually or orally every 6 hours as needed for abdominal cramping  She does not need to take this on a daily basis  This may lead to dry eyes, dry mouth, and make her feel tired  Dyspepsia     Improved to some degree although still has some eructation with eating  Gastric emptying scan as noted below  4 hour emptying was normal,  Emptying in the 1st 3 hours was somewhat delayed  However overall normal emptying after 4 hours  I did suggested she try to eat smaller more frequent meals  Reason for visit is   Chief Complaint   Patient presents with    Virtual Regular Visit        Encounter provider Deloris Starkey DO    Provider located at [address] [place] [id number] [phone]      Recent Visits  No visits were found meeting these conditions  Showing recent visits within past 7 days and meeting all other requirements     Today's Visits  Date Type Provider Dept   08/03/20 Telemedicine DO Faisal De Anda   Showing today's visits and meeting all other requirements     Future Appointments  No visits were found meeting these conditions  Showing future appointments within next 150 days and meeting all other requirements        The patient was identified by name and date of birth   Babatunde Cobian was informed that this is a telemedicine visit and that the visit is being conducted through [facility] [place] and patient was informed that this is not a secure, HIPAA-complaint platform  She agrees to proceed     My office door was closed  No one else was in the room  She acknowledged consent and understanding of privacy and security of the video platform  The patient has agreed to participate and understands they can discontinue the visit at any time  Patient is aware this is a billable service  Rudi Leavitt is a 20 y o  female who I am seeing for a virtual tele health visit in follow-up of reflux as well as functional diarrhea and dyspeptic symptoms  Patient has moved back to [facility]  She is using pantoprazole 40 mg once in the morning and famotidine 20 mg in the evening  She is not really having much lot of acid reflux or heartburn  She does report frequent eructation especially after meals  She freely states she does seem to eat fast   She did have an episode of vomiting after fairly large meal and drinking some [place]  There is no hematemesis  She may occasionally have some nausea  She is no longer experiencing diarrhea  She is now having formed stool 1-2 bowel movements per day  She has been having though over last couple weeks some significant abdominal cramping preceding the bowel movement  Typically relieved with a bowel movement  She feels that this could be related to some dietary indiscretion  She feels he is not making good food choices at this time  There has been no unintentional weight loss  No significant diarrhea  No dysphagia  Chart reviewed noted below  06/16/2020 upper endoscopy  Duodenum was normal to the 2nd portion  Biopsy obtained  Biopsies were negative for celiac disease  Mild erythema in the antrum  Biopsies were obtained and were negative for H pylori  Mild chronic inactive gastritis noted    There was a large amount of food in the stomach which I cannot completely removed  This did limit the examination to a significant degree  Z-line appeared irregular at 40 cm  This irregularity did extend less than 1 cm  Esophagus otherwise normal      06/30/2020 Gastric emptying scan revealed decreased early rate of gastric emptying however this was normalized at 4 hours  Gastric emptying at 1/2 hour was 2% (normal less than 30%)  Gastric emptying at 1 hour  was 5% (normal 10-70%)  Gastric emptying 2 hours 16% (normal greater than 40%)  Gastric emptying 3 hours 49% (normal greater than 90%)  Gastric emptying at 4 hours 96 % (normal greater than 90%      HPI please see above    Past Medical History:   Diagnosis Date    Anxiety     Depression     Environmental allergies     Last assessed: 1/18/17    GERD (gastroesophageal reflux disease)     Hypoglycemia     Hypothyroidism        Past Surgical History:   Procedure Laterality Date    COLONOSCOPY  2016 & 2018    Fiberoptic    EGD  2016    EGD  06/2016    MYRINGOTOMY      TONSILLECTOMY      TOOTH EXTRACTION         Current Outpatient Medications   Medication Sig Dispense Refill    ARIPiprazole (ABILIFY) 5 mg tablet Take 1 tablet (5 mg total) by mouth daily at bedtime 30 tablet 0    Blood Glucose Monitoring Suppl (ACCU-CHEK CHELA PLUS) w/Device KIT Test blood sugars up to 3 times daily or as needed for fluctuating blood sugars  1 kit 0    CAMRESE 0 15-0 03 &0 01 MG TABS   0    Cetirizine HCl (ZYRTEC ALLERGY) 10 MG CAPS Take by mouth daily       famotidine (PEPCID) 40 MG tablet Take 1 tablet (40 mg total) by mouth daily at bedtime 30 tablet 3    fluticasone (FLONASE) 50 mcg/act nasal spray 1 spray into each nostril daily      glucose blood (ACCU-CHEK CHELA PLUS) test strip Test blood sugars up to 3 times daily or as needed for fluctuating blood sugars   100 each 2    levothyroxine 25 mcg tablet TAKE 1 TABLET DAILY 90 tablet 3    pantoprazole (PROTONIX) 40 mg tablet Take 1 tablet (40 mg total) by mouth 2 (two) times a day before meals 60 tablet 3    Probiotic Product (PROBIOTIC DAILY PO) Take by mouth      sertraline (ZOLOFT) 100 mg tablet Take 1 tablet (100 mg total) by mouth daily 30 tablet 2    traZODone (DESYREL) 100 mg tablet Take 1 5 tablets (150 mg total) by mouth daily at bedtime 45 tablet 2    benztropine (COGENTIN) 0 5 mg tablet Take 1 tablet (0 5 mg total) by mouth 2 (two) times a day for 15 days (Patient not taking: Reported on 8/3/2020) 30 tablet 0     No current facility-administered medications for this visit  No Known Allergies    Review of Systems   REVIEW OF SYSTEMS:    CONSTITUTIONAL: Denies any fever, chills, rigors, and weight loss  HEENT: No earache or tinnitus  Denies hearing loss or visual disturbances  CARDIOVASCULAR: No chest pain or palpitations  RESPIRATORY:  Occasional cough which she feels may be related to marijuana use   GASTROINTESTINAL: As noted in the History of Present Illness  GENITOURINARY: No problems with urination  Denies any hematuria or dysuria  NEUROLOGIC: No dizziness or vertigo, denies headaches  MUSCULOSKELETAL:  She does report some back pain, this is not new   SKIN: Denies skin rashes or itching  ENDOCRINE: Denies excessive thirst  Denies intolerance to heat or cold  PSYCHOSOCIAL:  She does report anxiety and depression that is well managed  Video Exam  PHYSICAL EXAMINATION:  Appearance and vitals taken from home devices    General Appearance:   Alert, cooperative, no distress   HEENT:  Normocephalic, atraumatic, anicteric  Neck supple, symmetrical, trachea midline  Lungs:   Equal chest rise and unlabored breathing, normal effort, no coughing  Cardiovascular:   Unable to assess   Abdomen:   No abdominal distension  Nontender when she palpates  Skin:   No jaundice, otherwise unable to assess   Musculoskeletal:   Normal range of motion visualized  Psych:  Normal affect and normal insight  Neuro:  Alert and appropriate           There were no vitals filed for this visit  Physical Exam please see above    I spent 30 minutes directly with the patient during this visit      VIRTUAL VISIT DISCLAIMER    Best Mcdonough acknowledges that she has consented to an online visit or consultation  She understands that the online visit is based solely on information provided by her, and that, in the absence of a face-to-face physical evaluation by the physician, the diagnosis she receives is both limited and provisional in terms of accuracy and completeness  This is not intended to replace a full medical face-to-face evaluation by the physician  Best Sharonda understands and accepts these terms

## 2020-08-03 ENCOUNTER — TELEMEDICINE (OUTPATIENT)
Dept: GASTROENTEROLOGY | Facility: CLINIC | Age: 20
End: 2020-08-03
Payer: COMMERCIAL

## 2020-08-03 DIAGNOSIS — R10.13 DYSPEPSIA: ICD-10-CM

## 2020-08-03 DIAGNOSIS — K59.1 FUNCTIONAL DIARRHEA: Primary | ICD-10-CM

## 2020-08-03 DIAGNOSIS — R10.30 LOWER ABDOMINAL PAIN: ICD-10-CM

## 2020-08-03 PROCEDURE — 1036F TOBACCO NON-USER: CPT | Performed by: INTERNAL MEDICINE

## 2020-08-03 PROCEDURE — 99214 OFFICE O/P EST MOD 30 MIN: CPT | Performed by: INTERNAL MEDICINE

## 2020-08-03 NOTE — ASSESSMENT & PLAN NOTE
Reflux imaging under very good control  She has managed to decrease her pantoprazole to 40 mg once a day  Continue famotidine 20 up to 40 mg about 1-2 hours prior to bed  Lifestyle modifications for gastroesophageal reflux disease were discussed and include limiting fried and fatty foods, mints, chocolates, carbonated and caffeinated beverages , and alcohol, etc   Avoid lying down for 2-3 hours after meals  If you have nighttime symptoms consider raising the head of the bed up on 4-6 inch blocks  Pillows typically are not useful  If you are overweight, weight loss will be helpful

## 2020-08-03 NOTE — ASSESSMENT & PLAN NOTE
She has had some lower abdominal cramping preceding a bowel movement relieved with a bowel movement  This is likely related to irritable bowel  She will keep an eye on her food choices  She is not using fiber at this time  I will send a prescription for hyoscyamine 0 125 mg sublingually or orally every 6 hours as needed for abdominal cramping  She does not need to take this on a daily basis  This may lead to dry eyes, dry mouth, and make her feel tired

## 2020-08-03 NOTE — ASSESSMENT & PLAN NOTE
Improved to some degree although still has some eructation with eating  Gastric emptying scan as noted below  4 hour emptying was normal,  Emptying in the 1st 3 hours was somewhat delayed  However overall normal emptying after 4 hours  I did suggested she try to eat smaller more frequent meals

## 2020-08-06 DIAGNOSIS — E07.9 THYROID DISEASE: ICD-10-CM

## 2020-08-06 RX ORDER — LEVOTHYROXINE SODIUM 0.03 MG/1
TABLET ORAL
Qty: 90 TABLET | Refills: 3 | Status: SHIPPED | OUTPATIENT
Start: 2020-08-06 | End: 2021-08-01 | Stop reason: SDUPTHER

## 2020-08-20 ENCOUNTER — TELEMEDICINE (OUTPATIENT)
Dept: PSYCHIATRY | Facility: CLINIC | Age: 20
End: 2020-08-20
Payer: COMMERCIAL

## 2020-08-20 DIAGNOSIS — F32.4 MAJOR DEPRESSIVE DISORDER WITH SINGLE EPISODE, IN PARTIAL REMISSION (HCC): ICD-10-CM

## 2020-08-20 DIAGNOSIS — F41.1 ANXIETY STATE: Primary | ICD-10-CM

## 2020-08-20 PROCEDURE — 99213 OFFICE O/P EST LOW 20 MIN: CPT | Performed by: NURSE PRACTITIONER

## 2020-08-20 PROCEDURE — 1036F TOBACCO NON-USER: CPT | Performed by: NURSE PRACTITIONER

## 2020-08-20 NOTE — PSYCH
MEDICATION MANAGEMENT NOTE        [address]      Name and Date of Birth:  Nikita Luong 23 y o  2000 MRN: [id number]    Date of Visit: August 20, 2020    SUBJECTIVE (HPI):    Romie Hendricks is seen today for a follow up for Major Depressive Disorder and anxiety  She is doing well since her last visit  She relates since beginning Abilify she is much less anxious and depressed  Her appetite and sleep are good  She denies any side effects  She will be returning to [facility] tomorrow and is looking forward to seeing her friends  She also intends to seek therapy because her [employer] provides free mental health services for students  Overall, she has improved  Her affect is bright  She wishes to continue virtual visits when she is away at college  HPI ROS:             ('was' notes: recent => remote)  Medication Side Effects:  None     Depression (10 worst):  3/10 Was   6/10   Anxiety (10 worst):  3/10 Was   6/10   Safety concerns (SI, HI, etc):  No LUIS/AVH  No change   Sleep:  Good  No change   Energy:  Good  No change   Appetite:  Good  No change   Weight Change:  Denies  Denies       Review Of Systems:    Pertinent symptoms are noted in the HPI  Comprehensive physical health review of systems negative  Constitutional negative   Cardiovascular negative   Respiratory negative   Gastrointestinal negative   Musculoskeletal negative   Neurological negative   Endocrine negative       The following portions of the patient's history were reviewed and updated as appropriate: past family history, past medical history, past social history, past surgical history and problem list     Past Psychiatric History:     Reviewed  No changes at this time      Past Medical History:    Past Medical History:   Diagnosis Date    Anxiety     Depression     Environmental allergies     Last assessed: 1/18/17    GERD (gastroesophageal reflux disease)     Hypoglycemia     Hypothyroidism      No past medical history pertinent negatives    Past Surgical History:   Procedure Laterality Date    COLONOSCOPY  2016 & 2018    Fiberoptic    EGD  2016    EGD  06/2016    MYRINGOTOMY      TONSILLECTOMY      TOOTH EXTRACTION       No Known Allergies    Substance Abuse History:    Social History     Substance and Sexual Activity   Alcohol Use Not Currently    Comment: occasionally      Social History     Substance and Sexual Activity   Drug Use Yes    Frequency: 4 0 times per week    Types: Marijuana    Comment: recreational        Social History:    Social History     Socioeconomic History    Marital status: Single     Spouse name: Not on file    Number of children: Not on file    Years of education: Not on file    Highest education level: Not on file   Occupational History    Not on file   Social Needs    Financial resource strain: Not on file    Food insecurity     Worry: Not on file     Inability: Not on file   [employer] needs     Medical: Not on file     Non-medical: Not on file   Tobacco Use    Smoking status: Former Smoker    Smokeless tobacco: Never Used   Substance and Sexual Activity    Alcohol use: Not Currently     Comment: occasionally     Drug use: Yes     Frequency: 4 0 times per week     Types: Marijuana     Comment: recreational     Sexual activity: Not on file   Lifestyle    Physical activity     Days per week: Not on file     Minutes per session: Not on file    Stress: Not on file   Relationships    Social connections     Talks on phone: Not on file     Gets together: Not on file     Attends Oriental orthodox service: Not on file     Active member of club or organization: Not on file     Attends meetings of clubs or organizations: Not on file     Relationship status: Not on file    Intimate partner violence     Fear of current or ex partner: Not on file     Emotionally abused: Not on file     Physically abused: Not on file     Forced sexual activity: Not on file   Other Topics Concern    Not on file   Social History Narrative    Not on file       Family Psychiatric History:     Family History   Problem Relation Age of Onset    Diabetes Mother         due to underlying condition with both eyes affected by proliferative retinopathy and traction retinal detatchments involving maculae, without long term use of insulin    Sleep apnea Father     Crohn's disease Paternal Grandfather                 OBJECTIVE:     Vital signs in last 24 hours: There were no vitals filed for this visit      Mental Status Evaluation:    Appearance age appropriate, casually dressed, bearded   Behavior cooperative, calm   Speech normal rate, normal volume, normal pitch   Mood improved, euthymic   Affect normal range and intensity, appropriate   Thought Processes organized, goal directed   Associations intact associations   Thought Content no overt delusions   Perceptual Disturbances: no auditory hallucinations, no visual hallucinations   Abnormal Thoughts  Risk Potential Suicidal ideation - None  Homicidal ideation - None  Potential for aggression - No   Orientation oriented to person, place, time/date and situation   Memory recent and remote memory grossly intact   Consciousness alert and awake   Attention Span Concentration Span attention span and concentration are age appropriate   Intellect appears to be of average intelligence   Insight intact   Judgement intact   Muscle Strength and  Gait normal muscle strength and normal muscle tone, normal gait and normal balance   Motor activity no abnormal movements   Language no difficulty naming common objects, no difficulty repeating a phrase, no difficulty writing a sentence   Fund of Knowledge adequate knowledge of current events  adequate fund of knowledge regarding past history  adequate fund of knowledge regarding vocabulary    Pain none   Pain Scale 0       Laboratory Results: I have personally reviewed all pertinent laboratory/tests results  No results found for this or any previous visit  Confidential Assessment:    Treatment Recommendations/Precautions/Plan:    Patient has been educated about their diagnosis and treatment modalities  They voiced understanding and agreement with the following plan:    Continue Zoloft 100 mg daily  Continue Abilify 5 mg daily  Continue trazodone 150 mg q h s   Follow-up medication check in 6-8 weeks/sooner if there are issues or concerns  Aware of 24 hour and weekend coverage for urgent situations accessed by calling [facility] main practice number    -Discussed self monitoring of symptoms, and symptom monitoring tools     -Patient has been informed of 24 hours and weekend coverage for urgent situations accessed by calling the main clinic phone number      -Completed and signed during the session: Not applicable - Treatment Plan not due at this session    Risks/Benefits      Risks, Benefits And Possible Side Effects Of Medications:    Risks, benefits, and possible side effects of medications explained to KELSEA Sheffield 19 and she verbalizes understanding and agreement for treatment  Controlled Medication Discussion:     Not applicable    Psychotherapy Provided:     Individual psychotherapy provided: No     [address] 08/20/20    I have spent 15 minutes with Patient  today in which greater than 50% of this time was spent in counseling/coordination of care regarding Impressions

## 2020-08-27 DIAGNOSIS — F32.4 MAJOR DEPRESSIVE DISORDER WITH SINGLE EPISODE, IN PARTIAL REMISSION (HCC): ICD-10-CM

## 2020-08-27 RX ORDER — ARIPIPRAZOLE 5 MG/1
5 TABLET ORAL
Qty: 30 TABLET | Refills: 1 | Status: SHIPPED | OUTPATIENT
Start: 2020-08-27 | End: 2020-10-15 | Stop reason: SDUPTHER

## 2020-10-13 ENCOUNTER — TELEPHONE (OUTPATIENT)
Dept: PSYCHIATRY | Facility: CLINIC | Age: 20
End: 2020-10-13

## 2020-10-15 ENCOUNTER — TELEMEDICINE (OUTPATIENT)
Dept: PSYCHIATRY | Facility: CLINIC | Age: 20
End: 2020-10-15
Payer: COMMERCIAL

## 2020-10-15 DIAGNOSIS — F32.4 MAJOR DEPRESSIVE DISORDER WITH SINGLE EPISODE, IN PARTIAL REMISSION (HCC): ICD-10-CM

## 2020-10-15 PROCEDURE — 99213 OFFICE O/P EST LOW 20 MIN: CPT | Performed by: NURSE PRACTITIONER

## 2020-10-15 RX ORDER — SERTRALINE HYDROCHLORIDE 100 MG/1
100 TABLET, FILM COATED ORAL DAILY
Qty: 30 TABLET | Refills: 2 | Status: SHIPPED | OUTPATIENT
Start: 2020-10-15 | End: 2020-12-31 | Stop reason: SDUPTHER

## 2020-10-15 RX ORDER — ARIPIPRAZOLE 5 MG/1
5 TABLET ORAL
Qty: 30 TABLET | Refills: 1 | Status: SHIPPED | OUTPATIENT
Start: 2020-10-15 | End: 2020-12-31 | Stop reason: SDUPTHER

## 2020-10-15 RX ORDER — TRAZODONE HYDROCHLORIDE 100 MG/1
150 TABLET ORAL
Qty: 45 TABLET | Refills: 2 | Status: SHIPPED | OUTPATIENT
Start: 2020-10-15 | End: 2020-12-31 | Stop reason: SDUPTHER

## 2020-11-09 ENCOUNTER — OFFICE VISIT (OUTPATIENT)
Dept: URGENT CARE | Facility: CLINIC | Age: 20
End: 2020-11-09
Payer: COMMERCIAL

## 2020-11-09 VITALS
HEIGHT: 64 IN | WEIGHT: 190 LBS | OXYGEN SATURATION: 96 % | BODY MASS INDEX: 32.44 KG/M2 | TEMPERATURE: 97.6 F | HEART RATE: 90 BPM | RESPIRATION RATE: 18 BRPM

## 2020-11-09 DIAGNOSIS — Z20.822 ENCOUNTER FOR LABORATORY TESTING FOR COVID-19 VIRUS: ICD-10-CM

## 2020-11-09 DIAGNOSIS — J06.9 VIRAL URI: Primary | ICD-10-CM

## 2020-11-09 PROCEDURE — U0003 INFECTIOUS AGENT DETECTION BY NUCLEIC ACID (DNA OR RNA); SEVERE ACUTE RESPIRATORY SYNDROME CORONAVIRUS 2 (SARS-COV-2) (CORONAVIRUS DISEASE [COVID-19]), AMPLIFIED PROBE TECHNIQUE, MAKING USE OF HIGH THROUGHPUT TECHNOLOGIES AS DESCRIBED BY CMS-2020-01-R: HCPCS | Performed by: EMERGENCY MEDICINE

## 2020-11-09 PROCEDURE — 99213 OFFICE O/P EST LOW 20 MIN: CPT | Performed by: EMERGENCY MEDICINE

## 2020-11-10 LAB — SARS-COV-2 RNA SPEC QL NAA+PROBE: NOT DETECTED

## 2020-12-24 ENCOUNTER — TELEPHONE (OUTPATIENT)
Dept: FAMILY MEDICINE CLINIC | Facility: CLINIC | Age: 20
End: 2020-12-24

## 2020-12-24 DIAGNOSIS — B34.9 VIRAL INFECTION, UNSPECIFIED: ICD-10-CM

## 2020-12-24 DIAGNOSIS — Z03.818 ENCOUNTER FOR OBSERVATION FOR SUSPECTED EXPOSURE TO OTHER BIOLOGICAL AGENTS RULED OUT: ICD-10-CM

## 2020-12-24 PROCEDURE — U0003 INFECTIOUS AGENT DETECTION BY NUCLEIC ACID (DNA OR RNA); SEVERE ACUTE RESPIRATORY SYNDROME CORONAVIRUS 2 (SARS-COV-2) (CORONAVIRUS DISEASE [COVID-19]), AMPLIFIED PROBE TECHNIQUE, MAKING USE OF HIGH THROUGHPUT TECHNOLOGIES AS DESCRIBED BY CMS-2020-01-R: HCPCS | Performed by: FAMILY MEDICINE

## 2020-12-27 LAB — SARS-COV-2 RNA SPEC QL NAA+PROBE: NOT DETECTED

## 2020-12-31 ENCOUNTER — TELEMEDICINE (OUTPATIENT)
Dept: PSYCHIATRY | Facility: CLINIC | Age: 20
End: 2020-12-31
Payer: COMMERCIAL

## 2020-12-31 DIAGNOSIS — F32.4 MAJOR DEPRESSIVE DISORDER WITH SINGLE EPISODE, IN PARTIAL REMISSION (HCC): ICD-10-CM

## 2020-12-31 DIAGNOSIS — F41.1 ANXIETY STATE: Primary | ICD-10-CM

## 2020-12-31 PROCEDURE — 99213 OFFICE O/P EST LOW 20 MIN: CPT | Performed by: NURSE PRACTITIONER

## 2020-12-31 RX ORDER — TRAZODONE HYDROCHLORIDE 100 MG/1
150 TABLET ORAL
Qty: 45 TABLET | Refills: 2 | Status: SHIPPED | OUTPATIENT
Start: 2020-12-31 | End: 2021-08-10 | Stop reason: SDUPTHER

## 2020-12-31 RX ORDER — ARIPIPRAZOLE 5 MG/1
5 TABLET ORAL
Qty: 30 TABLET | Refills: 2 | Status: SHIPPED | OUTPATIENT
Start: 2020-12-31 | End: 2021-08-25 | Stop reason: ALTCHOICE

## 2020-12-31 RX ORDER — SERTRALINE HYDROCHLORIDE 100 MG/1
100 TABLET, FILM COATED ORAL DAILY
Qty: 30 TABLET | Refills: 2 | Status: SHIPPED | OUTPATIENT
Start: 2020-12-31 | End: 2021-08-10 | Stop reason: SDUPTHER

## 2021-01-07 NOTE — TELEPHONE ENCOUNTER
Pt aware Requested Prescriptions     Pending Prescriptions Disp Refills    JANUMET  MG per tablet [Pharmacy Med Name: JANUMET 50-1,000 MG TABLET] 60 tablet 0     Sig: Take 1 tablet by mouth 2 times daily (with meals)     Patient does not want to go to [facility] and requests Rx to be sent to [facility]. Please resend. Rx canceled at [facility].

## 2021-08-01 DIAGNOSIS — E07.9 THYROID DISEASE: ICD-10-CM

## 2021-08-01 RX ORDER — LEVOTHYROXINE SODIUM 0.03 MG/1
TABLET ORAL
Qty: 90 TABLET | Refills: 3 | Status: SHIPPED | OUTPATIENT
Start: 2021-08-01

## 2021-08-09 DIAGNOSIS — F32.4 MAJOR DEPRESSIVE DISORDER WITH SINGLE EPISODE, IN PARTIAL REMISSION (HCC): ICD-10-CM

## 2021-08-09 RX ORDER — SERTRALINE HYDROCHLORIDE 100 MG/1
100 TABLET, FILM COATED ORAL DAILY
Qty: 30 TABLET | Refills: 2 | Status: CANCELLED | OUTPATIENT
Start: 2021-08-09

## 2021-08-09 RX ORDER — TRAZODONE HYDROCHLORIDE 100 MG/1
150 TABLET ORAL
Qty: 45 TABLET | Refills: 2 | Status: CANCELLED | OUTPATIENT
Start: 2021-08-09

## 2021-08-09 NOTE — TELEPHONE ENCOUNTER
This patient saw Olivia Deal    I have her scheduled to see Layla Lui on the 25th of August but she will be out of the pills before then

## 2021-08-10 ENCOUNTER — TELEPHONE (OUTPATIENT)
Dept: PSYCHIATRY | Facility: CLINIC | Age: 21
End: 2021-08-10

## 2021-08-10 DIAGNOSIS — F32.4 MAJOR DEPRESSIVE DISORDER WITH SINGLE EPISODE, IN PARTIAL REMISSION (HCC): ICD-10-CM

## 2021-08-10 RX ORDER — TRAZODONE HYDROCHLORIDE 100 MG/1
150 TABLET ORAL
Qty: 25 TABLET | Refills: 0 | Status: SHIPPED | OUTPATIENT
Start: 2021-08-10 | End: 2021-11-15 | Stop reason: SDUPTHER

## 2021-08-10 RX ORDER — SERTRALINE HYDROCHLORIDE 100 MG/1
100 TABLET, FILM COATED ORAL DAILY
Qty: 15 TABLET | Refills: 0 | Status: SHIPPED | OUTPATIENT
Start: 2021-08-10 | End: 2021-08-25 | Stop reason: SDUPTHER

## 2021-08-10 NOTE — TELEPHONE ENCOUNTER
Ana Valverde returned Pa-C's call  Ana Valverde last seen by Angie Dec 2020 and was given 2 refills on meds  States she is unsure how she is getting it but still taking zoloft and trazodone  Stopped abilify some time ago  Asking for zoloft and trazodone refill  Will give enough until appt 8/25/21 with Pabon Lady

## 2021-08-25 ENCOUNTER — OFFICE VISIT (OUTPATIENT)
Dept: PSYCHIATRY | Facility: CLINIC | Age: 21
End: 2021-08-25
Payer: COMMERCIAL

## 2021-08-25 DIAGNOSIS — F32.4 MAJOR DEPRESSIVE DISORDER WITH SINGLE EPISODE, IN PARTIAL REMISSION (HCC): ICD-10-CM

## 2021-08-25 PROCEDURE — 99214 OFFICE O/P EST MOD 30 MIN: CPT

## 2021-08-25 PROCEDURE — 1036F TOBACCO NON-USER: CPT

## 2021-08-25 RX ORDER — ARIPIPRAZOLE 5 MG/1
5 TABLET ORAL
Qty: 30 TABLET | Refills: 0 | Status: SHIPPED | OUTPATIENT
Start: 2021-08-25 | End: 2021-11-15 | Stop reason: SDUPTHER

## 2021-08-25 RX ORDER — SERTRALINE HYDROCHLORIDE 100 MG/1
100 TABLET, FILM COATED ORAL DAILY
Qty: 30 TABLET | Refills: 0 | Status: SHIPPED | OUTPATIENT
Start: 2021-08-25 | End: 2021-11-15 | Stop reason: SDUPTHER

## 2021-08-25 NOTE — BH TREATMENT PLAN
TREATMENT PLAN (Med)        [facility]    Name and Date of Birth:  Ami Pratt 24 y o  2000  Date of Treatment Plan: August 25, 2021  Diagnosis/Diagnoses:    1  Major depressive disorder with single episode, in partial remission [facility])      Strengths/Personal Resources for Self-Care: supportive family  Area/Areas of need (in own words): depressive symptoms  1  Long Term Goal: alleviate depression  Target Date:6 months - 2/25/2022  Person/Persons responsible for completion of goal: Marlena  2  Short Term Objective (s) - How will we reach this goal?:   A  Provider new recommended medication/dosage changes and/or continue medication(s): Pt stopped taking abilify, wants to restart it, continue all other medications Abilify  B  Avoid drugs   C  Eat a healthy diet  and monitor weight  Target Date:6 months - 2/25/2022  Person/Persons Responsible for Completion of Goal: Marlena and Oral Floor CRNP  Progress Towards Goals: continuing treatment  Treatment Modality: medication management every 3 weeks  Review due 180 days from date of this plan: 6 months - 2/25/2022  Expected length of service: ongoing treatment  My Physician/PA/NP and I have developed this plan together and I agree to work on the goals and objectives  I understand the treatment goals that were developed for my treatment

## 2021-08-25 NOTE — PSYCH
Regular Visit    Problem List Items Addressed This Visit        Other    Major depressive disorder with single episode, in partial remission ([facility] [address] )    Relevant Medications    ARIPiprazole (ABILIFY) 5 mg tablet    sertraline (ZOLOFT) 100 mg tablet             Encounter provider Marylen Manzanilla, CRNP    Provider located at   [address] [address] [phone]    Recent Visits  No visits were found meeting these conditions  Showing recent visits within past 7 days and meeting all other requirements  Today's Visits  Date Type Provider Dept   08/25/21 Office Visit Marylen Manzanilla, CRNP  [facility]   Showing today's visits and meeting all other requirements  Future Appointments  No visits were found meeting these conditions  Showing future appointments within next 150 days and meeting all other requirements       HPI     Current Outpatient Medications   Medication Sig Dispense Refill    ARIPiprazole (ABILIFY) 5 mg tablet Take 1 tablet (5 mg total) by mouth daily at bedtime 30 tablet 0    CAMRESE 0 15-0 03 &0 01 MG TABS   0    Cetirizine HCl (ZYRTEC ALLERGY) 10 MG CAPS Take by mouth daily       fluticasone (FLONASE) 50 mcg/act nasal spray 1 spray into each nostril daily      glucose blood (ACCU-CHEK CHELA PLUS) test strip Test blood sugars up to 3 times daily or as needed for fluctuating blood sugars   100 each 2    hyoscyamine (LEVSIN/SL) 0 125 mg SL tablet Take 1 tablet (0 125 mg total) by mouth every 6 (six) hours as needed for cramping (Take as needed for abdominal cramping) (Patient not taking: Reported on 11/9/2020) 30 tablet 3    levothyroxine 25 mcg tablet TAKE 1 TABLET DAILY 90 tablet 3    pantoprazole (PROTONIX) 40 mg tablet Take 1 tablet (40 mg total) by mouth 2 (two) times a day before meals 60 tablet 3    Probiotic Product (PROBIOTIC DAILY PO) Take by mouth      sertraline (ZOLOFT) 100 mg tablet Take 1 tablet (100 mg total) by mouth daily 30 tablet 0    traZODone (DESYREL) 100 mg tablet Take 1 5 tablets (150 mg total) by mouth daily at bedtime 25 tablet 0     No current facility-administered medications for this visit  Review of Systems    I spent 25 minutes directly with the patient during this visit      [address] [place] [address]    Name and Date of Birth:  Wes Russo 24 y o  2000 MRN: [id number]    Date of Visit: August 25, 2021    No Known Allergies  SUBJECTIVE:    Vonne Cushing is seen today for a follow up for Major Depressive Disorder  She reports that she has decompensated slightly since the last visit  Since last visit in December 2020, she has lost her therapist and stopped taking her prescribed Abilify because her friend told her it will make her fat  Pt reports feeling more depression with decreased energy, focus, motivation, and mood  Reports her low mood is worse in the morning  She rates her depression an 8/10 currently  She has not been working for the past several months and reports spending a lot of time in the house lately  She is starting her job back up with [employer] next week and is hopeful this will give her motivation to get out of bed and have a scheduled routine  She would like to start therapy again, states she benefited from therapy previously  Therapy referral placed and given information for other therapy options that could begin sooner  She also wanted to try Abilify again to augment Zoloft as she admitted she only tried it for a week or so when it was first prescribed  Pt educated on Abilify and its side effects  Discussed importance of a healthy diet and regular exercise as pt reports a 20 lb weight increase over the past several months  Patient has a goal to eat healthier and reduce her weight  She denies SI/HI and sabrina symptoms       She denies any side effects from medications  PLAN:  Re-start taking the prescribed Abilify 5mg daily  Continue Zoloft 100mg daily  Continue Trazodone 150mg daily at hs  Referral to therapy placed    Aware of 24 hour and weekend coverage for urgent situations accessed by calling [facility] main practice number  Referral for individual psychotherapy    Diagnoses and all orders for this visit:    Major depressive disorder with single episode, in partial remission (HCC)  -     ARIPiprazole (ABILIFY) 5 mg tablet; Take 1 tablet (5 mg total) by mouth daily at bedtime  -     sertraline (ZOLOFT) 100 mg tablet; Take 1 tablet (100 mg total) by mouth daily        Current Outpatient Medications on File Prior to Visit   Medication Sig Dispense Refill    CAMRESE 0 15-0 03 &0 01 MG TABS   0    Cetirizine HCl (ZYRTEC ALLERGY) 10 MG CAPS Take by mouth daily       fluticasone (FLONASE) 50 mcg/act nasal spray 1 spray into each nostril daily      glucose blood (ACCU-CHEK CHELA PLUS) test strip Test blood sugars up to 3 times daily or as needed for fluctuating blood sugars   100 each 2    hyoscyamine (LEVSIN/SL) 0 125 mg SL tablet Take 1 tablet (0 125 mg total) by mouth every 6 (six) hours as needed for cramping (Take as needed for abdominal cramping) (Patient not taking: Reported on 11/9/2020) 30 tablet 3    levothyroxine 25 mcg tablet TAKE 1 TABLET DAILY 90 tablet 3    pantoprazole (PROTONIX) 40 mg tablet Take 1 tablet (40 mg total) by mouth 2 (two) times a day before meals 60 tablet 3    Probiotic Product (PROBIOTIC DAILY PO) Take by mouth      traZODone (DESYREL) 100 mg tablet Take 1 5 tablets (150 mg total) by mouth daily at bedtime 25 tablet 0    [DISCONTINUED] ARIPiprazole (ABILIFY) 5 mg tablet Take 1 tablet (5 mg total) by mouth daily at bedtime 30 tablet 2    [DISCONTINUED] benztropine (COGENTIN) 0 5 mg tablet Take 1 tablet (0 5 mg total) by mouth 2 (two) times a day for 15 days (Patient not taking: Reported on 8/3/2020) 30 tablet 0    [DISCONTINUED] famotidine (PEPCID) 40 MG tablet Take 1 tablet (40 mg total) by mouth daily at bedtime (Patient not taking: Reported on 11/9/2020) 30 tablet 3    [DISCONTINUED] levothyroxine 25 mcg tablet TAKE 1 TABLET DAILY 90 tablet 3    [DISCONTINUED] sertraline (ZOLOFT) 100 mg tablet Take 1 tablet (100 mg total) by mouth daily 15 tablet 0     No current facility-administered medications on file prior to visit  Psychotherapy Provided:     Individual psychotherapy provided: Yes  Supportive counseling provided  Medication changes discussed with Marlena  Coping strategies reviewed with Marlena  Importance of medication and treatment compliance reviewed with Marlena  Educated on importance of medication and treatment compliance  Educated on Abilify and the need to take it as prescribed  HPI ROS Appetite Changes and Sleep:     She reports normal sleep, increased appetite, low energy   Patient denies suicidal or homicidal ideation    Review Of Systems:      HPI ROS:               Medication Side Effects:  denied     Depression (10 worst): 8/10 (Was )   Anxiety (10 worst): 6/10 (Was )   Safety concerns (SI, HI, etc): Denies (Was )   Sleep: Reports 8 hrs  (Was )   Energy: Low  (Was )   Appetite: Increased (Was)   Weight Change: Reports 20lb weight gain      General appetite disturbances   Personality no change in personality   Constitutional negative   ENT negative   Cardiovascular negative   Respiratory negative   Gastrointestinal hx GERD   Genitourinary negative   Musculoskeletal negative   Integumentary negative   Neurological negative   Endocrine negative   Other Symptoms none, all other systems are negative     Mental Status Evaluation:    Appearance Adequate hygiene and grooming   Behavior calm and cooperative   Mood depressed  Depression Scale - 8 of 10 (0 = No depression)  Anxiety Scale - 5 of 10 (0 = No anxiety)   Speech Normal rate and volume   Affect appropriate   Thought Processes Goal directed and coherent   Thought Content Does not verbalize delusional material   Associations Tightly connected   Perceptual Disturbances Denies hallucinations and does not appear to be responding to internal stimuli   Risk Potential Suicidal/Homicidal Ideation - No evidence of suicidal or homicidal ideation and patient does not verbalize suicidal or homicidal ideation  Risk of Violence - No evidence of risk for violence found on assessment  Risk of Self Mutilation - No evidence of risk for self mutilation found on assessment   Orientation oriented to person, place, time/date and situation   Memory recent and remote memory grossly intact   Consciousness alert and awake   Attention/Concentration attention span and concentration are age appropriate   Insight intact   Judgement intact   Muscle Strength and Gait normal muscle strength and normal muscle tone, normal gait/station and normal balance   Motor Activity no abnormal movements   Language no difficulty naming common objects, no difficulty repeating a phrase, no difficulty writing a sentence   Fund of Knowledge adequate knowledge of current events  adequate fund of knowledge regarding past history  adequate fund of knowledge regarding vocabulary      Past Psychiatric History Update:     Inpatient Psychiatric Admission Since Last Encounter:   no  Changes to Outpatient Psychiatric Treatment Team:    no  Suicide Attempt Or Self Mutilation Since Last Encounter:   no  Incidence of Violent Behavior Since Last Encounter:   no    Traumatic History Update:     New Onset of Abuse Since Last Encounter:   no  Traumatic Events Since Last Encounter:   no    Past Medical History:    Past Medical History:   Diagnosis Date    Anxiety     Depression     Environmental allergies     Last assessed: 1/18/17    GERD (gastroesophageal reflux disease)     Hypoglycemia     Hypothyroidism      No past medical history pertinent negatives    Past Surgical History:   Procedure Laterality Date    COLONOSCOPY  2016 & 2018    Fiberoptic    EGD  2016    EGD  06/2016    MYRINGOTOMY      TONSILLECTOMY      TOOTH EXTRACTION       No Known Allergies  Substance Abuse History:    Social History     Substance and Sexual Activity   Alcohol Use Not Currently    Comment: occasionally      Social History     Substance and Sexual Activity   Drug Use Yes    Frequency: 4 0 times per week    Types: Marijuana    Comment: recreational      Social History:    Social History     Socioeconomic History    Marital status: Single     Spouse name: Not on file    Number of children: Not on file    Years of education: Not on file    Highest education level: Not on file   Occupational History    Not on file   Tobacco Use    Smoking status: Former Smoker    Smokeless tobacco: Never Used   Vaping Use    Vaping Use: Former   Substance and Sexual Activity    Alcohol use: Not Currently     Comment: occasionally     Drug use: Yes     Frequency: 4 0 times per week     Types: Marijuana     Comment: recreational     Sexual activity: Not on file   Other Topics Concern    Not on file   Social History Narrative    Not on file     Social Determinants of Health     Financial Resource Strain:     Difficulty of Paying Living Expenses:    Food Insecurity:     Worried About Running Out of Food in the Last Year:     Ran Out of Food in the Last Year:    Transportation Needs:     Lack of Transportation (Medical):      Lack of Transportation (Non-Medical):    Physical Activity:     Days of Exercise per Week:     Minutes of Exercise per Session:    Stress:     Feeling of Stress :    Social Connections:     Frequency of Communication with Friends and Family:     Frequency of Social Gatherings with Friends and Family:     Attends Advent Services:     Active Member of Clubs or Organizations:     Attends Club or Organization Meetings:     Marital Status:    Intimate Partner Violence:     Fear of Current or Ex-Partner:     Emotionally Abused:     Physically Abused:     Sexually Abused:      Family Psychiatric History:     Family History   Problem Relation Age of Onset    Diabetes Mother         due to underlying condition with both eyes affected by proliferative retinopathy and traction retinal detatchments involving maculae, without long term use of insulin    Sleep apnea Father     Crohn's disease Paternal Grandfather      History Review: The following portions of the patient's history were reviewed and updated as appropriate: allergies, current medications, past family history, past medical history, past social history, past surgical history and problem list     OBJECTIVE:     Vital signs in last 24 hours: There were no vitals filed for this visit  Laboratory Results: I have personally reviewed all pertinent laboratory/tests results  Suicide/Homicide Risk Assessment:    Risk of Harm to Self:  The following ratings are based on assessment at the time of the interview  Based on today's assessment, Marlena presents the following risk of harm to self: low    Risk of Harm to Others: The following ratings are based on assessment at the time of the interview  Based on today's assessment, Marlena presents the following risk of harm to others: none    The following interventions are recommended: referral for psychotherapy    Medications Risks/Benefits:      Risks, Benefits And Possible Side Effects Of Medications:    Discussed risks and benefits of treatment with patient including risk of suicidality, serotonin syndrome and SIADH related to treatment with antidepressants; Risk of induction of manic symptoms in certain patient populations     Controlled Medication Discussion:     Not applicable - controlled prescriptions are not prescribed by this practice    Treatment Plan:    Due for update/Updated:   yes  Last treatment plan done on 8/25 by Donna Byrne  Treatment Plan due on 2/25/21      TRE Cardona 08/25/21    This note was shared with patient

## 2021-09-15 ENCOUNTER — TELEMEDICINE (OUTPATIENT)
Dept: PSYCHIATRY | Facility: CLINIC | Age: 21
End: 2021-09-15
Payer: COMMERCIAL

## 2021-09-15 ENCOUNTER — TELEPHONE (OUTPATIENT)
Dept: PSYCHIATRY | Facility: CLINIC | Age: 21
End: 2021-09-15

## 2021-09-15 DIAGNOSIS — F32.4 MAJOR DEPRESSIVE DISORDER WITH SINGLE EPISODE, IN PARTIAL REMISSION (HCC): Primary | ICD-10-CM

## 2021-09-15 PROCEDURE — 99213 OFFICE O/P EST LOW 20 MIN: CPT

## 2021-09-15 NOTE — PSYCH
Virtual Regular Visit    Verification of patient location:    Patient is located in the following state in which I hold an active license [place]    Problem List Items Addressed This Visit     None             Encounter provider TRE Ku    Provider located at   [address] [address] [phone]    Recent Visits  No visits were found meeting these conditions  Showing recent visits within past 7 days and meeting all other requirements  Future Appointments  No visits were found meeting these conditions  Showing future appointments within next 150 days and meeting all other requirements         The patient was identified by name and date of birth  Jhony Kohli was informed that this is a telemedicine visit and that the visit is being conducted through[employer] and patient was informed that this is a secure, HIPAA-compliant platform  She agrees to proceed     My office door was closed  No one else was in the room  She acknowledged consent and understanding of privacy and security of the video platform  The patient has agreed to participate and understands they can discontinue the visit at any time  Patient is aware this is a billable service  HPI     Current Outpatient Medications   Medication Sig Dispense Refill    ARIPiprazole (ABILIFY) 5 mg tablet Take 1 tablet (5 mg total) by mouth daily at bedtime 30 tablet 0    CAMRESE 0 15-0 03 &0 01 MG TABS   0    Cetirizine HCl (ZYRTEC ALLERGY) 10 MG CAPS Take by mouth daily       fluticasone (FLONASE) 50 mcg/act nasal spray 1 spray into each nostril daily      glucose blood (ACCU-CHEK CHELA PLUS) test strip Test blood sugars up to 3 times daily or as needed for fluctuating blood sugars   100 each 2    hyoscyamine (LEVSIN/SL) 0 125 mg SL tablet Take 1 tablet (0 125 mg total) by mouth every 6 (six) hours as needed for cramping (Take as needed for abdominal cramping) (Patient not taking: Reported on 11/9/2020) 30 tablet 3    levothyroxine 25 mcg tablet TAKE 1 TABLET DAILY 90 tablet 3    pantoprazole (PROTONIX) 40 mg tablet Take 1 tablet (40 mg total) by mouth 2 (two) times a day before meals 60 tablet 3    Probiotic Product (PROBIOTIC DAILY PO) Take by mouth      sertraline (ZOLOFT) 100 mg tablet Take 1 tablet (100 mg total) by mouth daily 30 tablet 0    traZODone (DESYREL) 100 mg tablet Take 1 5 tablets (150 mg total) by mouth daily at bedtime 25 tablet 0     No current facility-administered medications for this visit  Review of Systems  Video Exam    There were no vitals filed for this visit  Physical Exam   As a result of this visit, I have referred the patient for further respiratory evaluation  No    I spent 22 minutes directly with the patient during this visit  VIRTUAL VISIT DISCLAIMER    Jimmy Jean acknowledges that she has consented to an online visit or consultation  She understands that the online visit is based solely on information provided by her, and that, in the absence of a face-to-face physical evaluation by the physician, the diagnosis she receives is both limited and provisional in terms of accuracy and completeness  This is not intended to replace a full medical face-to-face evaluation by the physician  Jimmy Daylinky understands and accepts these terms  MEDICATION MANAGEMENT NOTE        [facility]    Name and Date of Birth:  Jimmy Jean 24 y o  2000 MRN: [id number]    Date of Visit: September 15, 2021    No Known Allergies  SUBJECTIVE:    Yuko Taylor is seen today for a follow up for Major Depressive Disorder  She continues to improve slowly since the last visit  Pt has been taking the prescribed 5mg of abilify everyday since the last visit and reports improved mood, rates her depression 4/10, was 8/10 last month   She is getting out of bed everyday, reporting increased motivation, increased interest in hobbies  She also left her job at [employer] that was causing much of her anxiety and contributing to depressive symptoms  She is starting a new job at a  today and is looking forward to it  Pt smiling and talking about how relieved she is to be done with the previous job and begin the new job  Appears to be in much better spirit overall and would like to continue her medication regimen  She denies any side effects from medications  PLAN:  Continue Abilify 5mg  Continue Zoloft 100mg daily  Continue Trazodone 150mg daily at hs    Aware of 24 hour and weekend coverage for urgent situations accessed by calling [facility] main practice number    There are no diagnoses linked to this encounter  Current Outpatient Medications on File Prior to Visit   Medication Sig Dispense Refill    ARIPiprazole (ABILIFY) 5 mg tablet Take 1 tablet (5 mg total) by mouth daily at bedtime 30 tablet 0    CAMRESE 0 15-0 03 &0 01 MG TABS   0    Cetirizine HCl (ZYRTEC ALLERGY) 10 MG CAPS Take by mouth daily       fluticasone (FLONASE) 50 mcg/act nasal spray 1 spray into each nostril daily      glucose blood (ACCU-CHEK CHELA PLUS) test strip Test blood sugars up to 3 times daily or as needed for fluctuating blood sugars   100 each 2    hyoscyamine (LEVSIN/SL) 0 125 mg SL tablet Take 1 tablet (0 125 mg total) by mouth every 6 (six) hours as needed for cramping (Take as needed for abdominal cramping) (Patient not taking: Reported on 11/9/2020) 30 tablet 3    levothyroxine 25 mcg tablet TAKE 1 TABLET DAILY 90 tablet 3    pantoprazole (PROTONIX) 40 mg tablet Take 1 tablet (40 mg total) by mouth 2 (two) times a day before meals 60 tablet 3    Probiotic Product (PROBIOTIC DAILY PO) Take by mouth      sertraline (ZOLOFT) 100 mg tablet Take 1 tablet (100 mg total) by mouth daily 30 tablet 0    traZODone (DESYREL) 100 mg tablet Take 1 5 tablets (150 mg total) by mouth daily at bedtime 25 tablet 0    [DISCONTINUED] levothyroxine 25 mcg tablet TAKE 1 TABLET DAILY 90 tablet 3     No current facility-administered medications on file prior to visit  Psychotherapy Provided:     Individual psychotherapy provided: Yes  Supportive counseling provided  HPI ROS Appetite Changes and Sleep:     She reports adequate number of sleep hours (8hrs hours), adequate appetite, adequate energy level   Patient denies suicidal or homicidal ideation    Review Of Systems:      HPI ROS:               Medication Side Effects:  denies     Depression (10 worst): 4 (Was 8)   Anxiety (10 worst): 4 (Was 7)   Safety concerns (SI, HI, etc): Denies SI/HI (Was denies)   Sleep: 8hrs (Was 8hrs)   Energy: adequate (Was low)   Appetite: adequate (Was higher)   Weight Change: denies      General normal    Personality no change in personality   Constitutional negative   ENT negative   Cardiovascular negative   Respiratory negative   Gastrointestinal negative   Genitourinary negative   Musculoskeletal negative   Integumentary negative   Neurological negative   Endocrine negative   Other Symptoms none, all other systems are negative     Mental Status Evaluation:    Appearance Adequate hygiene and grooming   Behavior cooperative   Mood euthymic  Depression Scale - 4 of 10 (0 = No depression)  Anxiety Scale - 4 of 10 (0 = No anxiety)   Speech Normal rate and volume   Affect appropriate   Thought Processes Goal directed and coherent   Thought Content Does not verbalize delusional material   Associations Tightly connected   Perceptual Disturbances Denies hallucinations and does not appear to be responding to internal stimuli   Risk Potential Suicidal/Homicidal Ideation - No evidence of suicidal or homicidal ideation and patient does not verbalize suicidal or homicidal ideation  Risk of Violence - No evidence of risk for violence found on assessment  Risk of Self Mutilation - No evidence of risk for self mutilation found on assessment   Orientation oriented to person, place, time/date and situation   Memory recent and remote memory grossly intact   Consciousness alert and awake   Attention/Concentration attention span and concentration are age appropriate   Insight improving   Judgement improving   Muscle Strength and Gait normal muscle strength and normal muscle tone, normal gait/station and normal balance   Motor Activity no abnormal movements   Language no difficulty naming common objects, no difficulty repeating a phrase, no difficulty writing a sentence   Fund of Knowledge adequate knowledge of current events  adequate fund of knowledge regarding past history  adequate fund of knowledge regarding vocabulary      Past Psychiatric History Update:     Inpatient Psychiatric Admission Since Last Encounter:   no  Changes to Outpatient Psychiatric Treatment Team:    no  Suicide Attempt Or Self Mutilation Since Last Encounter:   no  Incidence of Violent Behavior Since Last Encounter:   no    Traumatic History Update:     New Onset of Abuse Since Last Encounter:   no  Traumatic Events Since Last Encounter:   no    Past Medical History:    Past Medical History:   Diagnosis Date    Anxiety     Depression     Environmental allergies     Last assessed: 1/18/17    GERD (gastroesophageal reflux disease)     Hypoglycemia     Hypothyroidism      No past medical history pertinent negatives    Past Surgical History:   Procedure Laterality Date    COLONOSCOPY  2016 & 2018    Fiberoptic    EGD  2016    EGD  06/2016    MYRINGOTOMY      TONSILLECTOMY      TOOTH EXTRACTION       No Known Allergies  Substance Abuse History:    Social History     Substance and Sexual Activity   Alcohol Use Not Currently    Comment: occasionally      Social History     Substance and Sexual Activity   Drug Use Yes    Frequency: 4 0 times per week    Types: Marijuana    Comment: recreational      Social History:    Social History     Socioeconomic History    Marital status: Single     Spouse name: Not on file    Number of children: Not on file    Years of education: Not on file    Highest education level: Not on file   Occupational History    Not on file   Tobacco Use    Smoking status: Former Smoker    Smokeless tobacco: Never Used   Vaping Use    Vaping Use: Former   Substance and Sexual Activity    Alcohol use: Not Currently     Comment: occasionally     Drug use: Yes     Frequency: 4 0 times per week     Types: Marijuana     Comment: recreational     Sexual activity: Not on file   Other Topics Concern    Not on file   Social History Narrative    Not on file     Social Determinants of Health     Financial Resource Strain:     Difficulty of Paying Living Expenses:    Food Insecurity:     Worried About [address] in the Last Year:     [address] in the Last Year:    Transportation Needs:     Lack of Transportation (Medical):  Lack of Transportation (Non-Medical):    Physical Activity:     Days of Exercise per Week:     Minutes of Exercise per Session:    Stress:     Feeling of Stress :    Social Connections:     Frequency of Communication with Friends and Family:     Frequency of Social Gatherings with Friends and Family:     Attends Voodoo Services:     Active Member of Clubs or Organizations:     Attends Club or Organization Meetings:     Marital Status:    Intimate Partner Violence:     Fear of Current or Ex-Partner:     Emotionally Abused:     Physically Abused:     Sexually Abused:      Family Psychiatric History:     Family History   Problem Relation Age of Onset    Diabetes Mother         due to underlying condition with both eyes affected by proliferative retinopathy and traction retinal detatchments involving maculae, without long term use of insulin    Sleep apnea Father     Crohn's disease Paternal Grandfather      History Review: The following portions of the patient's history were reviewed and updated as appropriate: allergies, current medications, past family history, past medical history, past social history, past surgical history and problem list     OBJECTIVE:     Vital signs in last 24 hours: There were no vitals filed for this visit  Laboratory Results: I have personally reviewed all pertinent laboratory/tests results  Suicide/Homicide Risk Assessment:    Risk of Harm to Self:  Recent Specific Risk Factors include: current depressive symptoms, current anxiety symptoms  Protective Factors: no current suicidal ideation, compliant with medications, compliant with mental health treatment, contact with caregivers, effective coping skills, effective decision-making skills  Based on today's assessment, Marlena presents the following risk of harm to self: minimal    Risk of Harm to Others:  Based on today's assessment, Marlena presents the following risk of harm to others: none    The following interventions are recommended: no intervention changes needed    Medications Risks/Benefits:      Risks, Benefits And Possible Side Effects Of Medications:    Discussed risks and benefits of treatment with patient including risk of suicidality, serotonin syndrome and SIADH related to treatment with antidepressants; Risk of induction of manic symptoms in certain patient populations and risk of parkinsonian symptoms, metabolic syndrome, tardive dyskinesia and neuroleptic malignant syndrome related to treatment with antipsychotic medications     Controlled Medication Discussion:     Not applicable    Treatment Plan:    Due for update/Updated:   no  Last treatment plan done 8/25/21 by avel mckenzie  Treatment Plan due on 2/25/22    TRE Bailey 09/15/21    This note was shared with patient

## 2021-09-15 NOTE — TELEPHONE ENCOUNTER
Left message for patient to call the office to schedule her 6 week follow up  She did call back and she is scheduled for 10/27/2021

## 2021-11-15 DIAGNOSIS — F32.4 MAJOR DEPRESSIVE DISORDER WITH SINGLE EPISODE, IN PARTIAL REMISSION (HCC): ICD-10-CM

## 2021-11-15 RX ORDER — TRAZODONE HYDROCHLORIDE 100 MG/1
150 TABLET ORAL
Qty: 25 TABLET | Refills: 0 | Status: SHIPPED | OUTPATIENT
Start: 2021-11-15 | End: 2022-01-04 | Stop reason: SDUPTHER

## 2021-11-15 RX ORDER — SERTRALINE HYDROCHLORIDE 100 MG/1
100 TABLET, FILM COATED ORAL DAILY
Qty: 30 TABLET | Refills: 0 | Status: SHIPPED | OUTPATIENT
Start: 2021-11-15 | End: 2021-12-02 | Stop reason: SDUPTHER

## 2021-11-15 RX ORDER — ARIPIPRAZOLE 5 MG/1
5 TABLET ORAL
Qty: 30 TABLET | Refills: 0 | Status: SHIPPED | OUTPATIENT
Start: 2021-11-15 | End: 2022-01-04 | Stop reason: ALTCHOICE

## 2021-12-02 ENCOUNTER — TELEMEDICINE (OUTPATIENT)
Dept: PSYCHIATRY | Facility: CLINIC | Age: 21
End: 2021-12-02
Payer: COMMERCIAL

## 2021-12-02 DIAGNOSIS — F33.2 SEVERE EPISODE OF RECURRENT MAJOR DEPRESSIVE DISORDER, WITHOUT PSYCHOTIC FEATURES (HCC): Primary | ICD-10-CM

## 2021-12-02 DIAGNOSIS — F32.4 MAJOR DEPRESSIVE DISORDER WITH SINGLE EPISODE, IN PARTIAL REMISSION (HCC): ICD-10-CM

## 2021-12-02 PROCEDURE — 99213 OFFICE O/P EST LOW 20 MIN: CPT

## 2021-12-02 RX ORDER — SERTRALINE HYDROCHLORIDE 100 MG/1
150 TABLET, FILM COATED ORAL DAILY
Qty: 45 TABLET | Refills: 0 | Status: SHIPPED | OUTPATIENT
Start: 2021-12-02 | End: 2021-12-17 | Stop reason: SDUPTHER

## 2021-12-02 RX ORDER — HYDROXYZINE HYDROCHLORIDE 25 MG/1
25 TABLET, FILM COATED ORAL 2 TIMES DAILY PRN
Qty: 60 TABLET | Refills: 0 | Status: SHIPPED | OUTPATIENT
Start: 2021-12-02 | End: 2022-05-18 | Stop reason: SDUPTHER

## 2021-12-17 ENCOUNTER — TELEPHONE (OUTPATIENT)
Dept: PSYCHIATRY | Facility: CLINIC | Age: 21
End: 2021-12-17

## 2021-12-17 DIAGNOSIS — F32.4 MAJOR DEPRESSIVE DISORDER WITH SINGLE EPISODE, IN PARTIAL REMISSION (HCC): ICD-10-CM

## 2021-12-17 RX ORDER — SERTRALINE HYDROCHLORIDE 100 MG/1
150 TABLET, FILM COATED ORAL DAILY
Qty: 45 TABLET | Refills: 0 | Status: SHIPPED | OUTPATIENT
Start: 2021-12-17 | End: 2022-02-18 | Stop reason: SDUPTHER

## 2022-01-04 ENCOUNTER — TELEMEDICINE (OUTPATIENT)
Dept: PSYCHIATRY | Facility: CLINIC | Age: 22
End: 2022-01-04
Payer: COMMERCIAL

## 2022-01-04 DIAGNOSIS — F32.4 MAJOR DEPRESSIVE DISORDER WITH SINGLE EPISODE, IN PARTIAL REMISSION (HCC): ICD-10-CM

## 2022-01-04 DIAGNOSIS — F41.1 ANXIETY STATE: ICD-10-CM

## 2022-01-04 DIAGNOSIS — F39 MOOD DISORDER (HCC): Primary | ICD-10-CM

## 2022-01-04 PROCEDURE — 1036F TOBACCO NON-USER: CPT

## 2022-01-04 PROCEDURE — 99214 OFFICE O/P EST MOD 30 MIN: CPT

## 2022-01-04 RX ORDER — TRAZODONE HYDROCHLORIDE 100 MG/1
150 TABLET ORAL
Qty: 25 TABLET | Refills: 0 | Status: SHIPPED | OUTPATIENT
Start: 2022-01-04 | End: 2022-03-10

## 2022-01-04 RX ORDER — LAMOTRIGINE 25 MG/1
25 TABLET ORAL DAILY
Qty: 30 TABLET | Refills: 0 | Status: SHIPPED | OUTPATIENT
Start: 2022-01-04 | End: 2022-02-18 | Stop reason: SDUPTHER

## 2022-01-04 NOTE — PSYCH
Virtual Regular Visit    Verification of patient location:    Patient is located in the following state in which I hold an active license [place]    Problem List Items Addressed This Visit        Other    Anxiety state, unspecified    Relevant Medications    lamoTRIgine (LaMICtal) 25 mg tablet    Major depressive disorder with single episode, in partial remission ([facility] [address] )      Other Visit Diagnoses     Mood disorder ([facility] )    -  Primary    Relevant Medications    lamoTRIgine (LaMICtal) 25 mg tablet             Encounter provider TRE Dobson    Provider located at   [address] [address] [phone]    Recent Visits  No visits were found meeting these conditions  Showing recent visits within past 7 days and meeting all other requirements  Today's Visits  Date Type Provider Dept   01/04/22 Telemedicine TRE Dobson [facility]   Showing today's visits and meeting all other requirements  Future Appointments  No visits were found meeting these conditions  Showing future appointments within next 150 days and meeting all other requirements         The patient was identified by name and date of birth  Elza Gomez was informed that this is a telemedicine visit and that the visit is being conducted throughInstapageic Embedded and patient was informed this is a secure, HIPAA-complaint platform  She agrees to proceed     My office door was closed  No one else was in the room  She acknowledged consent and understanding of privacy and security of the video platform  The patient has agreed to participate and understands they can discontinue the visit at any time  Patient is aware this is a billable service       HPI     Current Outpatient Medications   Medication Sig Dispense Refill    CAMRESE 0 15-0 03 &0 01 MG TABS   0    Cetirizine HCl (ZYRTEC ALLERGY) 10 MG CAPS Take by mouth daily       fluticasone (FLONASE) 50 mcg/act nasal spray 1 spray into each nostril daily      glucose blood (ACCU-CHEK CHELA PLUS) test strip Test blood sugars up to 3 times daily or as needed for fluctuating blood sugars  100 each 2    hydrOXYzine HCL (ATARAX) 25 mg tablet Take 1 tablet (25 mg total) by mouth 2 (two) times a day as needed for anxiety 60 tablet 0    hyoscyamine (LEVSIN/SL) 0 125 mg SL tablet Take 1 tablet (0 125 mg total) by mouth every 6 (six) hours as needed for cramping (Take as needed for abdominal cramping) (Patient not taking: Reported on 11/9/2020) 30 tablet 3    lamoTRIgine (LaMICtal) 25 mg tablet Take 1 tablet (25 mg total) by mouth daily 30 tablet 0    levothyroxine 25 mcg tablet TAKE 1 TABLET DAILY 90 tablet 3    pantoprazole (PROTONIX) 40 mg tablet Take 1 tablet (40 mg total) by mouth 2 (two) times a day before meals 60 tablet 3    Probiotic Product (PROBIOTIC DAILY PO) Take by mouth      sertraline (ZOLOFT) 100 mg tablet Take 1 5 tablets (150 mg total) by mouth daily 45 tablet 0    traZODone (DESYREL) 100 mg tablet Take 1 5 tablets (150 mg total) by mouth daily at bedtime 25 tablet 0     No current facility-administered medications for this visit  Review of Systems  Video Exam    There were no vitals filed for this visit  Physical Exam   As a result of this visit, I have referred the patient for further respiratory evaluation  No    I spent 30 minutes directly with the patient during this visit  VIRTUAL VISIT DISCLAIMER    Eva Kong acknowledges that she has consented to an online visit or consultation  She understands that the online visit is based solely on information provided by her, and that, in the absence of a face-to-face physical evaluation by the physician, the diagnosis she receives is both limited and provisional in terms of accuracy and completeness  This is not intended to replace a full medical face-to-face evaluation by the physician   Eva Kong understands and accepts these terms  MEDICATION MANAGEMENT NOTE        [facility]    Name and Date of Birth:  Susanna Stone 24 y o  2000 MRN: [id number]    Date of Visit: January 4, 2022    No Known Allergies  SUBJECTIVE:    Christa Batista is seen today for a follow up for mood disorder  She reports that she continues to experience ongoing symptoms since the last visit  Patient continues to report 9/10 depression symptoms including lack of energy, low motivation, dysphoria  Patient reports a main stressor contributing to her depression is her current living situation, lives in apartment with 2 roommates who she does not get along with  She is trying to work at the  and save enough money to be able to move out and she believes this will improve her mental health  We discussed her increased wine intake during last visit and patient reports she decreased her alcohol intake to 3 days a week and is smoking marijuana about 4 times a week which is also a decrease from last visit  She was given resources to drug and alcohol rehab by this provider last visit and she was recommended the partial program   She has thought about both options but states she does not have the time in her life to attend either the partial or drug rehab at this time due to work commitments and having to earn paycheck to pay her bills  We discussed the possibility of moving back in with her parents who she reports are supportive but she reports she does not always get along with them either and does not prefer that option  Supportive counseling given and recommended making her mental health a priority in her life which she agreed to  Today, she admits to mood symptoms such as increased self-esteem, mood swings, periods of talking more than usual, irritability, and periods of sleeping 4 hours a night and feeling rested    States these symptoms last occurred about a month ago, due to mood symptoms described during today's visit Lamictal 25 mg was started and patient agreed to this plan  Explained to her the risk of a rash and to call provider immediately if this occurs  Patient denies suicidal thoughts  She denies any side effects from medications  PLAN:    Begin Lamictal 25mg for mood symptoms mentioned above, discussed side effects including a rash and pt will call provider if this occurs  Continue Zoloft to 150 mg daily  Continue Atarax 25 mg p r n  as needed for severe anxiety  Continue Trazodone 150mg daily      Aware of 24 hour and weekend coverage for urgent situations accessed by calling [facility] main practice number  Referral for individual psychotherapy    Diagnoses and all orders for this visit:    Mood disorder ([facility] [address] )  -     lamoTRIgine (LaMICtal) 25 mg tablet; Take 1 tablet (25 mg total) by mouth daily    Anxiety state, unspecified  -     lamoTRIgine (LaMICtal) 25 mg tablet; Take 1 tablet (25 mg total) by mouth daily    Major depressive disorder with single episode, in partial remission (HCC)        Current Outpatient Medications on File Prior to Visit   Medication Sig Dispense Refill    CAMRESE 0 15-0 03 &0 01 MG TABS   0    Cetirizine HCl (ZYRTEC ALLERGY) 10 MG CAPS Take by mouth daily       fluticasone (FLONASE) 50 mcg/act nasal spray 1 spray into each nostril daily      glucose blood (ACCU-CHEK CHELA PLUS) test strip Test blood sugars up to 3 times daily or as needed for fluctuating blood sugars   100 each 2    hydrOXYzine HCL (ATARAX) 25 mg tablet Take 1 tablet (25 mg total) by mouth 2 (two) times a day as needed for anxiety 60 tablet 0    hyoscyamine (LEVSIN/SL) 0 125 mg SL tablet Take 1 tablet (0 125 mg total) by mouth every 6 (six) hours as needed for cramping (Take as needed for abdominal cramping) (Patient not taking: Reported on 11/9/2020) 30 tablet 3    levothyroxine 25 mcg tablet TAKE 1 TABLET DAILY 90 tablet 3    pantoprazole (PROTONIX) 40 mg tablet Take 1 tablet (40 mg total) by mouth 2 (two) times a day before meals 60 tablet 3    Probiotic Product (PROBIOTIC DAILY PO) Take by mouth      sertraline (ZOLOFT) 100 mg tablet Take 1 5 tablets (150 mg total) by mouth daily 45 tablet 0    traZODone (DESYREL) 100 mg tablet Take 1 5 tablets (150 mg total) by mouth daily at bedtime 25 tablet 0    [DISCONTINUED] ARIPiprazole (ABILIFY) 5 mg tablet Take 1 tablet (5 mg total) by mouth daily at bedtime 30 tablet 0    [DISCONTINUED] levothyroxine 25 mcg tablet TAKE 1 TABLET DAILY 90 tablet 3     No current facility-administered medications on file prior to visit  Psychotherapy Provided:     Individual psychotherapy provided: Yes  Counseling was provided during the session today for 16 minutes  Supportive counseling provided  HPI ROS Appetite Changes and Sleep:     She reports adequate number of sleep hours (6-8 hrs hours), adequate appetite, low energy   Denies homicidal ideation, denies suicidal ideation    Review Of Systems:      HPI ROS:               Medication Side Effects:  denies    Depression (10 worst): 9/10    Anxiety (10 worst): 5/10    Safety concerns (SI, HI, etc): Denies SI    Sleep: Good with trazodone    Energy: low    Appetite: fair    Weight Change: denies        Mental Status Evaluation:    Appearance Adequate hygiene and grooming   Behavior calm and cooperative   Mood depressed  Depression Scale - 9 of 10 (0 = No depression)  Anxiety Scale - 5 of 10 (0 = No anxiety)   Speech Normal rate and volume   Affect appropriate   Thought Processes Goal directed and coherent   Thought Content Does not verbalize delusional material   Associations Tightly connected   Perceptual Disturbances Denies hallucinations and does not appear to be responding to internal stimuli   Risk Potential Suicidal/Homicidal Ideation - No evidence of suicidal or homicidal ideation and patient does not verbalize suicidal or homicidal ideation  Risk of Violence - No evidence of risk for violence found on assessment  Risk of Self Mutilation - No evidence of risk for self mutilation found on assessment   Orientation oriented to person, place, time/date and situation   Memory recent and remote memory grossly intact   Consciousness alert and awake   Attention/Concentration attention span and concentration are age appropriate   Insight partial   Judgement partial   Muscle Strength and Gait normal muscle strength and normal muscle tone, normal gait/station and normal balance   Motor Activity no abnormal movements   Language no difficulty naming common objects, no difficulty repeating a phrase, no difficulty writing a sentence   Fund of Knowledge adequate knowledge of current events  adequate fund of knowledge regarding past history  adequate fund of knowledge regarding vocabulary      Past Psychiatric History Update:     Inpatient Psychiatric Admission Since Last Encounter:   no  Changes to Outpatient Psychiatric Treatment Team:    no  Suicide Attempt Or Self Mutilation Since Last Encounter:   no  Incidence of Violent Behavior Since Last Encounter:   no    Traumatic History Update:     New Onset of Abuse Since Last Encounter:   no  Traumatic Events Since Last Encounter:   no    Past Medical History:    Past Medical History:   Diagnosis Date    Anxiety     Depression     Environmental allergies     Last assessed: 1/18/17    GERD (gastroesophageal reflux disease)     Hypoglycemia     Hypothyroidism      No past medical history pertinent negatives    Past Surgical History:   Procedure Laterality Date    COLONOSCOPY  2016 & 2018    Fiberoptic    EGD  2016    EGD  06/2016    MYRINGOTOMY      TONSILLECTOMY      TOOTH EXTRACTION       No Known Allergies  Substance Abuse History:    Social History     Substance and Sexual Activity   Alcohol Use Not Currently    Comment: occasionally      Social History     Substance and Sexual Activity   Drug Use Yes    Frequency: 4 0 times per week    Types: Marijuana    Comment: recreational      Social History:    Social History     Socioeconomic History    Marital status: Single     Spouse name: Not on file    Number of children: Not on file    Years of education: Not on file    Highest education level: Not on file   Occupational History    Not on file   Tobacco Use    Smoking status: Former Smoker    Smokeless tobacco: Never Used   Vaping Use    Vaping Use: Former   Substance and Sexual Activity    Alcohol use: Not Currently     Comment: occasionally     Drug use: Yes     Frequency: 4 0 times per week     Types: Marijuana     Comment: recreational     Sexual activity: Not on file   Other Topics Concern    Not on file   Social History Narrative    Not on file     Social Determinants of Health     Financial Resource Strain: Not on file   Food Insecurity: Not on file   Transportation Needs: Not on file   Physical Activity: Not on file   Stress: Not on file   Social Connections: Not on file   Intimate Partner Violence: Not on file   Housing Stability: Not on file     Family Psychiatric History:     Family History   Problem Relation Age of Onset    Diabetes Mother         due to underlying condition with both eyes affected by proliferative retinopathy and traction retinal detatchments involving maculae, without long term use of insulin    Sleep apnea Father     Crohn's disease Paternal Grandfather      History Review: The following portions of the patient's history were reviewed and updated as appropriate: allergies, current medications, past family history, past medical history, past social history, past surgical history and problem list     OBJECTIVE:     Vital signs in last 24 hours: There were no vitals filed for this visit  Laboratory Results: I have personally reviewed all pertinent laboratory/tests results      Suicide/Homicide Risk Assessment:    Risk of Harm to Self:  Protective Factors: no current suicidal ideation, access to mental health treatment, compliant with medications, compliant with mental health treatment  Based on today's assessment, Marlena presents the following risk of harm to self: minimal    Risk of Harm to Others:  Based on today's assessment, Marlena presents the following risk of harm to others: none    The following interventions are recommended: no intervention changes needed    Medications Risks/Benefits:      Risks, Benefits And Possible Side Effects Of Medications:    Discussed risks and benefits of treatment with patient including risk of suicidality, serotonin syndrome, increased QTc interval and SIADH related to treatment with antidepressants; Risk of induction of manic symptoms in certain patient populations and risk of rash related to treatment with Lamictal     Controlled Medication Discussion:     Not applicable    Treatment Plan:    Due for update/Updated:   no  Last treatment plan done 8/25/21 by Vi Bean  Treatment Plan due on 2/25/22  TRE Gomes 01/04/22    This note was shared with patient

## 2022-02-18 DIAGNOSIS — F41.1 ANXIETY STATE: ICD-10-CM

## 2022-02-18 DIAGNOSIS — F39 MOOD DISORDER (HCC): ICD-10-CM

## 2022-02-18 DIAGNOSIS — F32.4 MAJOR DEPRESSIVE DISORDER WITH SINGLE EPISODE, IN PARTIAL REMISSION (HCC): ICD-10-CM

## 2022-02-18 RX ORDER — SERTRALINE HYDROCHLORIDE 100 MG/1
150 TABLET, FILM COATED ORAL DAILY
Qty: 45 TABLET | Refills: 0 | Status: SHIPPED | OUTPATIENT
Start: 2022-02-18 | End: 2022-03-10 | Stop reason: SDUPTHER

## 2022-02-18 RX ORDER — LAMOTRIGINE 25 MG/1
25 TABLET ORAL DAILY
Qty: 30 TABLET | Refills: 0 | Status: SHIPPED | OUTPATIENT
Start: 2022-02-18 | End: 2022-03-10

## 2022-03-10 ENCOUNTER — TELEMEDICINE (OUTPATIENT)
Dept: PSYCHIATRY | Facility: CLINIC | Age: 22
End: 2022-03-10
Payer: COMMERCIAL

## 2022-03-10 DIAGNOSIS — F39 MOOD DISORDER (HCC): Primary | ICD-10-CM

## 2022-03-10 DIAGNOSIS — F41.1 ANXIETY STATE: ICD-10-CM

## 2022-03-10 DIAGNOSIS — F32.4 MAJOR DEPRESSIVE DISORDER WITH SINGLE EPISODE, IN PARTIAL REMISSION (HCC): ICD-10-CM

## 2022-03-10 PROCEDURE — 99213 OFFICE O/P EST LOW 20 MIN: CPT

## 2022-03-10 PROCEDURE — 1036F TOBACCO NON-USER: CPT

## 2022-03-10 RX ORDER — LAMOTRIGINE 25 MG/1
50 TABLET ORAL DAILY
Qty: 60 TABLET | Refills: 0 | Status: SHIPPED | OUTPATIENT
Start: 2022-03-10 | End: 2022-05-18 | Stop reason: SDUPTHER

## 2022-03-10 RX ORDER — TRAZODONE HYDROCHLORIDE 100 MG/1
100 TABLET ORAL
Qty: 30 TABLET | Refills: 1 | Status: SHIPPED | OUTPATIENT
Start: 2022-03-10 | End: 2022-05-18 | Stop reason: SDUPTHER

## 2022-03-10 RX ORDER — SERTRALINE HYDROCHLORIDE 100 MG/1
150 TABLET, FILM COATED ORAL DAILY
Qty: 45 TABLET | Refills: 0 | Status: SHIPPED | OUTPATIENT
Start: 2022-03-10 | End: 2022-05-18 | Stop reason: SDUPTHER

## 2022-03-10 NOTE — PSYCH
Virtual Regular Visit    Verification of patient location:    Patient is located in the following state in which I hold an active license [place]    Problem List Items Addressed This Visit        Other    Anxiety state, unspecified    Relevant Medications    lamoTRIgine (LaMICtal) 25 mg tablet    Major depressive disorder with single episode, in partial remission (HCC)    Relevant Medications    traZODone (DESYREL) 100 mg tablet    sertraline (ZOLOFT) 100 mg tablet      Other Visit Diagnoses     Mood disorder ([facility] [address] )    -  Primary    Relevant Medications    lamoTRIgine (LaMICtal) 25 mg tablet    traZODone (DESYREL) 100 mg tablet    sertraline (ZOLOFT) 100 mg tablet             Encounter provider TRE Mendez    Provider located at   [address] [address] [phone]    Recent Visits  No visits were found meeting these conditions  Showing recent visits within past 7 days and meeting all other requirements  Today's Visits  Date Type Provider Dept   03/10/22 Telemedicine TRE Mendez  [facility]   Showing today's visits and meeting all other requirements  Future Appointments  No visits were found meeting these conditions  Showing future appointments within next 150 days and meeting all other requirements         The patient was identified by name and date of birth  Hayden Verdugo was informed that this is a telemedicine visit and that [facility] and patient was informed this is a secure, HIPAA-complaint platform  She agrees to proceed     My office door was closed  No one else was in the room  She acknowledged consent and understanding of privacy and security of the video platform  The patient has agreed to participate and understands they can discontinue the visit at any time  Patient is aware this is a billable service       HPI     Current Outpatient Medications   Medication Sig Dispense Refill    CAMRESE 0 15-0 03 &0 01 MG TABS   0    Cetirizine HCl (ZYRTEC ALLERGY) 10 MG CAPS Take by mouth daily       fluticasone (FLONASE) 50 mcg/act nasal spray 1 spray into each nostril daily      glucose blood (ACCU-CHEK CHELA PLUS) test strip Test blood sugars up to 3 times daily or as needed for fluctuating blood sugars  100 each 2    hydrOXYzine HCL (ATARAX) 25 mg tablet Take 1 tablet (25 mg total) by mouth 2 (two) times a day as needed for anxiety 60 tablet 0    hyoscyamine (LEVSIN/SL) 0 125 mg SL tablet Take 1 tablet (0 125 mg total) by mouth every 6 (six) hours as needed for cramping (Take as needed for abdominal cramping) (Patient not taking: Reported on 11/9/2020) 30 tablet 3    lamoTRIgine (LaMICtal) 25 mg tablet Take 2 tablets (50 mg total) by mouth daily 60 tablet 0    levothyroxine 25 mcg tablet TAKE 1 TABLET DAILY 90 tablet 3    pantoprazole (PROTONIX) 40 mg tablet Take 1 tablet (40 mg total) by mouth 2 (two) times a day before meals 60 tablet 3    Probiotic Product (PROBIOTIC DAILY PO) Take by mouth      sertraline (ZOLOFT) 100 mg tablet Take 1 5 tablets (150 mg total) by mouth daily 45 tablet 0    traZODone (DESYREL) 100 mg tablet Take 1 tablet (100 mg total) by mouth daily at bedtime 30 tablet 1     No current facility-administered medications for this visit  Review of Systems  Video Exam    There were no vitals filed for this visit  Physical Exam   As a result of this visit, I have referred the patient for further respiratory evaluation  No    I spent 20 minutes directly with the patient during this visit  VIRTUAL VISIT DISCLAIMER    Palomo Gurrola acknowledges that she has consented to an online visit or consultation   She understands that the online visit is based solely on information provided by her, and that, in the absence of a face-to-face physical evaluation by the physician, the diagnosis she receives is both limited and provisional in terms of accuracy and completeness  This is not intended to replace a full medical face-to-face evaluation by the physician  Margi Haokayli understands and accepts these terms  MEDICATION MANAGEMENT NOTE        [address]    Name and Date of Birth:  Margi Krause 24 y o  2000 MRN: [id number]    Date of Visit: March 10, 2022    No Known Allergies  SUBJECTIVE:    Ailyn Hutton is seen today for a follow up for depression, mood disorder and anxiety  She states that she continues to do relatively well since the last visit  Patient feels the Lamictal 25 mg initiated during previous visit for irritability and mood swings has been helpful and feels she is doing much better at this time  She was struggling with decreased sleep, increased irritability, significant up and down in her moods, and increased marijuana and alcohol use  States most of the symptoms were related to stressors regarding her job at the  and her toxic living situation with her roommate in the apartment  Since this time, she has been promoted at the  and is now the  which has led to significant decrease in stress and anxiety for patient  Living situation remains same but states her lease is up in June and she is looking forward to moving out  Denies all side effects from the Lamictal including a rash and dosage was increased to 50 mg daily today  Patient continues to use marijuana about 4 times a week and has a glass of wine about 5 times a week but has decreased her binge drinking significantly over the past 2 months  Continued to encourage patient to cut down on her alcohol use which she agreed to  Patient was suggested the partial program in the past but has never been able to commit to it due to time constraints  Patient denies thoughts of self-harm or suicidal ideation  She denies any side effects from medications      PLAN:    Increase Lamictal to 50mg for mood symptoms mentioned above, discussed side effects including a rash and pt will call provider if this occurs  Continue Zoloft to 150 mg daily  Continue Atarax 25 mg p r n  as needed for severe anxiety  Continue Trazodone 100mg daily        Aware of 24 hour and weekend coverage for urgent situations accessed by calling [facility] main practice number  Referral for individual psychotherapy    Diagnoses and all orders for this visit:    Mood disorder ([address] )  -     lamoTRIgine (LaMICtal) 25 mg tablet; Take 2 tablets (50 mg total) by mouth daily    Anxiety state, unspecified  -     lamoTRIgine (LaMICtal) 25 mg tablet; Take 2 tablets (50 mg total) by mouth daily    Major depressive disorder with single episode, in partial remission (HCC)  -     traZODone (DESYREL) 100 mg tablet; Take 1 tablet (100 mg total) by mouth daily at bedtime  -     sertraline (ZOLOFT) 100 mg tablet; Take 1 5 tablets (150 mg total) by mouth daily        Current Outpatient Medications on File Prior to Visit   Medication Sig Dispense Refill    CAMRESE 0 15-0 03 &0 01 MG TABS   0    Cetirizine HCl (ZYRTEC ALLERGY) 10 MG CAPS Take by mouth daily       fluticasone (FLONASE) 50 mcg/act nasal spray 1 spray into each nostril daily      glucose blood (ACCU-CHEK CHELA PLUS) test strip Test blood sugars up to 3 times daily or as needed for fluctuating blood sugars   100 each 2    hydrOXYzine HCL (ATARAX) 25 mg tablet Take 1 tablet (25 mg total) by mouth 2 (two) times a day as needed for anxiety 60 tablet 0    hyoscyamine (LEVSIN/SL) 0 125 mg SL tablet Take 1 tablet (0 125 mg total) by mouth every 6 (six) hours as needed for cramping (Take as needed for abdominal cramping) (Patient not taking: Reported on 11/9/2020) 30 tablet 3    levothyroxine 25 mcg tablet TAKE 1 TABLET DAILY 90 tablet 3    pantoprazole (PROTONIX) 40 mg tablet Take 1 tablet (40 mg total) by mouth 2 (two) times a day before meals 60 tablet 3    Probiotic Product (PROBIOTIC DAILY PO) Take by mouth      [DISCONTINUED] lamoTRIgine (LaMICtal) 25 mg tablet Take 1 tablet (25 mg total) by mouth daily 30 tablet 0    [DISCONTINUED] sertraline (ZOLOFT) 100 mg tablet Take 1 5 tablets (150 mg total) by mouth daily 45 tablet 0    [DISCONTINUED] traZODone (DESYREL) 100 mg tablet Take 1 5 tablets (150 mg total) by mouth daily at bedtime 25 tablet 0     No current facility-administered medications on file prior to visit  Psychotherapy Provided:     Individual psychotherapy provided: Yes  Counseling was provided during the session today for 16 minutes  Supportive counseling provided  HPI ROS Appetite Changes and Sleep:     She reports difficulty falling asleep, adequate appetite, fluctuating energy levels   Denies homicidal ideation, denies suicidal ideation    Review Of Systems:      HPI ROS:               Medication Side Effects:  denies    Depression (10 worst): 5/10    Anxiety (10 worst): 5/10    Safety concerns (SI, HI, etc): Denies si    Sleep: Fair but fluctuates    Energy: fair    Appetite: good    Weight Change: denies        Mental Status Evaluation:    Appearance Adequate hygiene and grooming   Behavior calm and cooperative   Mood anxious and depressed  Depression Scale - 5 of 10 (0 = No depression)  Anxiety Scale - 5 of 10 (0 = No anxiety)   Speech Normal rate and volume   Affect appropriate   Thought Processes Goal directed and coherent   Thought Content Does not verbalize delusional material   Associations Tightly connected   Perceptual Disturbances Denies hallucinations and does not appear to be responding to internal stimuli   Risk Potential Suicidal/Homicidal Ideation - No evidence of suicidal or homicidal ideation and patient does not verbalize suicidal or homicidal ideation  Risk of Violence - No evidence of risk for violence found on assessment  Risk of Self Mutilation - No evidence of risk for self mutilation found on assessment   Orientation oriented to person, place, time/date and situation   Memory recent and remote memory grossly intact   Consciousness alert and awake   Attention/Concentration attention span and concentration are age appropriate   Insight partial   Judgement partial   Muscle Strength and Gait normal muscle strength and normal muscle tone, normal gait/station and normal balance   Motor Activity no abnormal movements   Language no difficulty naming common objects, no difficulty repeating a phrase, no difficulty writing a sentence   Fund of Knowledge adequate knowledge of current events  adequate fund of knowledge regarding past history  adequate fund of knowledge regarding vocabulary      Past Psychiatric History Update:     Inpatient Psychiatric Admission Since Last Encounter:   no  Changes to Outpatient Psychiatric Treatment Team:    no  Suicide Attempt Or Self Mutilation Since Last Encounter:   no  Incidence of Violent Behavior Since Last Encounter:   no    Traumatic History Update:     New Onset of Abuse Since Last Encounter:   no  Traumatic Events Since Last Encounter:   no    Past Medical History:    Past Medical History:   Diagnosis Date    Anxiety     Depression     Environmental allergies     Last assessed: 1/18/17    GERD (gastroesophageal reflux disease)     Hypoglycemia     Hypothyroidism      No past medical history pertinent negatives    Past Surgical History:   Procedure Laterality Date    COLONOSCOPY  2016 & 2018    Fiberoptic    EGD  2016    EGD  06/2016    MYRINGOTOMY      TONSILLECTOMY      TOOTH EXTRACTION       No Known Allergies  Substance Abuse History:    Social History     Substance and Sexual Activity   Alcohol Use Not Currently    Comment: occasionally      Social History     Substance and Sexual Activity   Drug Use Yes    Frequency: 4 0 times per week    Types: Marijuana    Comment: recreational      Social History:    Social History     Socioeconomic History    Marital status: Single Spouse name: Not on file    Number of children: Not on file    Years of education: Not on file    Highest education level: Not on file   Occupational History    Not on file   Tobacco Use    Smoking status: Former Smoker    Smokeless tobacco: Never Used   Vaping Use    Vaping Use: Former   Substance and Sexual Activity    Alcohol use: Not Currently     Comment: occasionally     Drug use: Yes     Frequency: 4 0 times per week     Types: Marijuana     Comment: recreational     Sexual activity: Not on file   Other Topics Concern    Not on file   Social History Narrative    Not on file     Social Determinants of Health     Financial Resource Strain: Not on file   Food Insecurity: Not on file   Transportation Needs: Not on file   Physical Activity: Not on file   Stress: Not on file   Social Connections: Not on file   Intimate Partner Violence: Not on file   Housing Stability: Not on file     Family Psychiatric History:     Family History   Problem Relation Age of Onset    Diabetes Mother         due to underlying condition with both eyes affected by proliferative retinopathy and traction retinal detatchments involving maculae, without long term use of insulin    Sleep apnea Father     Crohn's disease Paternal Grandfather      History Review: The following portions of the patient's history were reviewed and updated as appropriate: allergies, current medications, past family history, past medical history, past social history, past surgical history and problem list     OBJECTIVE:     Vital signs in last 24 hours: There were no vitals filed for this visit  Laboratory Results:   Recent Labs (last 2 months):   No visits with results within 2 Month(s) from this visit  Latest known visit with results is:   Orders Only on 12/24/2020   Component Date Value    SARS-CoV-2  12/24/2020 Not Detected      I have personally reviewed all pertinent laboratory/tests results      Suicide/Homicide Risk Assessment:    Risk of Harm to Self:  Protective Factors: no current suicidal ideation, access to mental health treatment, compliant with medications, compliant with mental health treatment, connection to community  Based on today's assessment, Marlena presents the following risk of harm to self: minimal    Risk of Harm to Others:  Based on today's assessment, Marlena presents the following risk of harm to others: minimal    The following interventions are recommended: referral for psychotherapy    Medications Risks/Benefits:      Risks, Benefits And Possible Side Effects Of Medications:    Discussed risks and benefits of treatment with patient including risk of suicidality, serotonin syndrome, increased QTc interval and SIADH related to treatment with antidepressants; Risk of induction of manic symptoms in certain patient populations and risk of rash related to treatment with Lamictal     Controlled Medication Discussion:     Not applicable    Treatment Plan:    Due for update/Updated:   no  Last treatment plan done 3/10/22 by Shelby Wei  Treatment Plan due on 9/10/22  TRE Marc 03/10/22    This note was shared with patient

## 2022-03-18 ENCOUNTER — TELEPHONE (OUTPATIENT)
Dept: PSYCHIATRY | Facility: CLINIC | Age: 22
End: 2022-03-18

## 2022-03-18 NOTE — TELEPHONE ENCOUNTER
LVM for pt to call intake dept to schedule for therapy   Did state in VM that appointments might not be availabe when she calls back

## 2022-05-18 DIAGNOSIS — F41.1 ANXIETY STATE: ICD-10-CM

## 2022-05-18 DIAGNOSIS — F39 MOOD DISORDER (HCC): ICD-10-CM

## 2022-05-18 DIAGNOSIS — F32.4 MAJOR DEPRESSIVE DISORDER WITH SINGLE EPISODE, IN PARTIAL REMISSION (HCC): ICD-10-CM

## 2022-05-18 RX ORDER — HYDROXYZINE HYDROCHLORIDE 25 MG/1
25 TABLET, FILM COATED ORAL 2 TIMES DAILY PRN
Qty: 60 TABLET | Refills: 0 | Status: SHIPPED | OUTPATIENT
Start: 2022-05-18 | End: 2022-07-01 | Stop reason: SDUPTHER

## 2022-05-18 RX ORDER — LAMOTRIGINE 25 MG/1
50 TABLET ORAL DAILY
Qty: 60 TABLET | Refills: 0 | Status: SHIPPED | OUTPATIENT
Start: 2022-05-18 | End: 2022-07-01

## 2022-05-18 RX ORDER — SERTRALINE HYDROCHLORIDE 100 MG/1
150 TABLET, FILM COATED ORAL DAILY
Qty: 45 TABLET | Refills: 0 | Status: SHIPPED | OUTPATIENT
Start: 2022-05-18 | End: 2022-07-01 | Stop reason: SDUPTHER

## 2022-05-18 RX ORDER — TRAZODONE HYDROCHLORIDE 100 MG/1
100 TABLET ORAL
Qty: 30 TABLET | Refills: 1 | Status: SHIPPED | OUTPATIENT
Start: 2022-05-18 | End: 2022-07-01 | Stop reason: SDUPTHER

## 2022-06-01 ENCOUNTER — TELEMEDICINE (OUTPATIENT)
Dept: PSYCHIATRY | Facility: CLINIC | Age: 22
End: 2022-06-01
Payer: COMMERCIAL

## 2022-06-01 DIAGNOSIS — F12.90 MARIJUANA USE, CONTINUOUS: ICD-10-CM

## 2022-06-01 DIAGNOSIS — F39 MOOD DISORDER (HCC): Primary | ICD-10-CM

## 2022-06-01 DIAGNOSIS — F32.4 MAJOR DEPRESSIVE DISORDER WITH SINGLE EPISODE, IN PARTIAL REMISSION (HCC): ICD-10-CM

## 2022-06-01 PROCEDURE — 99214 OFFICE O/P EST MOD 30 MIN: CPT

## 2022-06-01 NOTE — PSYCH
[address]    Name and Date of Birth:  Erick Mendoza 24 y o  2000    Date of Referral: June 1, 2022    Presenting Symptoms and Stressors:      Symptoms:  depression, depressive symptoms, worsening depression, anxiety, anxiety symptoms, mood swings, mood instability, unstable mood, increased irritability, alcohol abuse and substance abuse  Stressors:  drug use problems, alcohol use problems, occupational problems, job loss, job stress, problems at work, stress at work and difficulty holding job    Access to [employer]:  No    Smoking Status: occasionally    Substance Use:  Alcohol use: moderate: 3x/week  Cannabis: uses daily    Suicidal Ideation: None    Homicidal Ideation: None    Depressed Mood: Yes, depressed mood, anhedonia, sadness, hopelessness, helplessness, low energy, low motivation, decreased interest    Carine/Hypomania: increased irritability, mood swings, racing thoughts, poor concentration, difficulty sleeping, decreased need for sleep, difficulty controlling anger, agitation    Psychosis: None    Agitation: No    Appetite Changes: increased appetite    Sleep Disturbance: normal sleep, adequate with trazodone    Diagnoses:  1   Mood disorder     Current Psychiatrist or Therapist:    Psychiatrist: Nurse Practitioner Shandra Valencia with [address] [address] in [facility]  Therapist: None    Do they Require Ambulatory Assistance: No    Communication Assistance: not required     Legal Issues: None        TRE Weeks

## 2022-06-01 NOTE — BH TREATMENT PLAN
TREATMENT PLAN (Medication Management Only)        [facility]    Name and Date of Birth:  Maria Esther Malik 24 y o  2000  Date of Treatment Plan: June 1, 2022  Diagnosis/Diagnoses:    1  Mood disorder ([facility] )    2  Major depressive disorder with single episode, in partial remission ([facility] )    3  Marijuana use, continuous      Strengths/Personal Resources for Self-Care: supportive family  Area/Areas of need (in own words): depression, mood instability, mood swings, sleep problems  1  Long Term Goal: alleviate mood stability  Target Date:6 months - 12/1/2022  Person/Persons responsible for completion of goal: Marlena  2  Short Term Objective (s) - How will we reach this goal?:   A  Provider new recommended medication/dosage changes and/or continue medication(s): continue current medications as prescribed  B  Take psychiatric medications responsibly  Angela Jordan all scheduled appointments  Target Date:6 months - 12/1/2022  Person/Persons Responsible for Completion of Goal: Marlena and avel mckenzie  Progress Towards Goals: continuing treatment  Treatment Modality: medication management every 1 months  Review due 180 days from date of this plan: 6 months - 12/1/2022  Expected length of service: maintenance  My Physician/PA/NP and I have developed this plan together and I agree to work on the goals and objectives  I understand the treatment goals that were developed for my treatment

## 2022-06-01 NOTE — PSYCH
Virtual Regular Visit    Verification of patient location:    Patient is located in the following state in which I hold an active license [place]    Problem List Items Addressed This Visit        Other    Major depressive disorder with single episode, in partial remission ([facility] )      Other Visit Diagnoses     Mood disorder ([facility] )    -  Primary             Encounter provider TRE Garcia    Provider located at   [address] [address] [phone]    Recent Visits  No visits were found meeting these conditions  Showing recent visits within past 7 days and meeting all other requirements  Today's Visits  Date Type Provider Dept   06/01/22 Telemedicine TRE Garcia [facility]   Showing today's visits and meeting all other requirements  Future Appointments  No visits were found meeting these conditions  Showing future appointments within next 150 days and meeting all other requirements         The patient was identified by name and date of birth  Joan [place] was informed that this is a telemedicine visit and that the visit is being conducted [email or web address] Embedded and patient was informed this is a secure, HIPAA-complaint platform  She agrees to proceed     My office door was closed  The patient was notified the following individuals were present in the room Emeka Barnhart NP student  She acknowledged consent and understanding of privacy and security of the video platform  The patient has agreed to participate and understands they can discontinue the visit at any time  Patient is aware this is a billable service       HPI     Current Outpatient Medications   Medication Sig Dispense Refill    CAMRESE 0 15-0 03 &0 01 MG TABS   0    Cetirizine HCl (ZYRTEC ALLERGY) 10 MG CAPS Take by mouth daily       fluticasone (FLONASE) 50 mcg/act nasal spray 1 spray into each nostril daily      glucose blood (ACCU-CHEK CHELA PLUS) test strip Test blood sugars up to 3 times daily or as needed for fluctuating blood sugars  100 each 2    hydrOXYzine HCL (ATARAX) 25 mg tablet Take 1 tablet (25 mg total) by mouth as needed in the morning and 1 tablet (25 mg total) as needed in the evening for anxiety  60 tablet 0    hyoscyamine (LEVSIN/SL) 0 125 mg SL tablet Take 1 tablet (0 125 mg total) by mouth every 6 (six) hours as needed for cramping (Take as needed for abdominal cramping) (Patient not taking: Reported on 11/9/2020) 30 tablet 3    lamoTRIgine (LaMICtal) 25 mg tablet Take 2 tablets (50 mg total) by mouth in the morning  60 tablet 0    levothyroxine 25 mcg tablet TAKE 1 TABLET DAILY 90 tablet 3    pantoprazole (PROTONIX) 40 mg tablet Take 1 tablet (40 mg total) by mouth 2 (two) times a day before meals 60 tablet 3    Probiotic Product (PROBIOTIC DAILY PO) Take by mouth      sertraline (ZOLOFT) 100 mg tablet Take 1 5 tablets (150 mg total) by mouth in the morning  45 tablet 0    traZODone (DESYREL) 100 mg tablet Take 1 tablet (100 mg total) by mouth daily at bedtime 30 tablet 1     No current facility-administered medications for this visit  Review of Systems  Video Exam    There were no vitals filed for this visit  Physical Exam   As a result of this visit, I have referred the patient for further respiratory evaluation  No    I spent 28 minutes directly with the patient during this visit  VIRTUAL VISIT DISCLAIMER    King De Paz acknowledges that she has consented to an online visit or consultation  She understands that the online visit is based solely on information provided by her, and that, in the absence of a face-to-face physical evaluation by the physician, the diagnosis she receives is both limited and provisional in terms of accuracy and completeness  This is not intended to replace a full medical face-to-face evaluation by the physician   King De Paz understands and accepts these terms     MEDICATION MANAGEMENT NOTE        [facility]    Name and Date of Birth:  Merlin Thao 24 y o  2000 MRN: [id number]    Date of Visit: June 1, 2022    No Known Allergies  SUBJECTIVE:    Holly Arellano is seen today for a follow up for depression and mood disorder  She states that she continues to experience ongoing symptoms since the last visit  Patient continues to struggle with mental health, reports poor medication compliance, often forgets to take her medication on daily basis  She was initiated on Lamictal 25 mg back In January and was scheduled to increase the dosage to 50 mg back in March and states she has yet to do this  She has difficulty understanding why she is forgetting to take her medication, did state her parents do not necessarily want her on medications and that may be the reason she has not been compliant with daily medications but at the same time realizes she needs them and she would like to take them daily  Reports the trazodone at HS is very effective for insomnia when she does decide to take it  Reports p r n  Atarax for breakthrough anxiety is effective for anxiety when she takes it  Continue to struggle significantly with her emotions, mood lability, frustration, irritability which were previously improved with Lamictal 25 mg until she stopped taking daily and symptoms have returned  Recent mood symptoms include periods of increased self-esteem, mood swings, periods of talking more than usual, irritability, impulsivity with alcohol use, and would sleep sleeping 3 hours a night and feeling rested  New stressors in the past month include, not getting along with coworkers at the  she used to work at, she decided to quit the job and currently remains without work  States she is living off of her tax returns and is seeking a job but is not sure what she wants in life   States "I dont know what makes me happy and I am trying to find it"   Admits she has no daily routine or schedule in her life at this time which is contributing to dysphoria  Reports persistent chronic 5/10 depression symptoms of fatigue, lack of energy, lack of motivation, dysphoria, and fluctuating appetite where she is comfort eating  Feels her anxiety is managed for the most part with p r n  Atarax  Encouraged and educated patient to be compliant with daily medications and Lamictal was increased to 50 mg daily for mood stabilization which patient agreed to and feels medication was helpful in the past when she took it a daily basis  Patient was strongly recommended the partial program back in December 2021 for severe unrelieved depression associated with daily marijuana and alcohol use  She was given resources regarding the partial program but decided not to follow through, she was also given resources and numbers for alcohol anonymous meetings and states she really wanted to participate in these programs but never followed through  Was previously drinking on a daily basis, currently reports drinking "a few glasses of wine" 3x/week  Continue to encourage and educate patient regarding daily marijuana and frequent alcohol use and their effect on mental health symptoms  She agreed to continue cutting back on alcohol use  Today, she agrees she needs to take action and make an effort to improve her mental health, states she will attempt the partial program and was referred today  Patient has been trying to get into psychotherapy for an extended period time, she was called by intake back on March 18th to set up therapy appointments however patient never returned the voicemail  Continued to encourage patient to take an active role in her mental health which she agreed to  Patient denies suicidal thoughts  She denies any side effects from medications  PLAN:    Referred to partial program    Iincrease Lamictal to 50 mg for further mood stabilization   Lamotrigine PARQ completed including dizziness, headaches, ataxia, vision problems, somnolence, sleep changes, cognitive difficulties, rash (including Hall-Derek rash), and others, risk of teratogenicity for females  Continue Zoloft to 150 mg daily  Continue Atarax 25 mg p r n  as needed for severe anxiety  Continue Trazodone 150mg daily for insomnia          Aware of 24 hour and weekend coverage for urgent situations accessed by calling [facility] main practice number  Referral for individual psychotherapy  referred to partial    Diagnoses and all orders for this visit:    Mood disorder ([facility] )    Major depressive disorder with single episode, in partial remission ([facility] )        Current Outpatient Medications on File Prior to Visit   Medication Sig Dispense Refill    CAMRESE 0 15-0 03 &0 01 MG TABS   0    Cetirizine HCl (ZYRTEC ALLERGY) 10 MG CAPS Take by mouth daily       fluticasone (FLONASE) 50 mcg/act nasal spray 1 spray into each nostril daily      glucose blood (ACCU-CHEK CHELA PLUS) test strip Test blood sugars up to 3 times daily or as needed for fluctuating blood sugars  100 each 2    hydrOXYzine HCL (ATARAX) 25 mg tablet Take 1 tablet (25 mg total) by mouth as needed in the morning and 1 tablet (25 mg total) as needed in the evening for anxiety  60 tablet 0    hyoscyamine (LEVSIN/SL) 0 125 mg SL tablet Take 1 tablet (0 125 mg total) by mouth every 6 (six) hours as needed for cramping (Take as needed for abdominal cramping) (Patient not taking: Reported on 11/9/2020) 30 tablet 3    lamoTRIgine (LaMICtal) 25 mg tablet Take 2 tablets (50 mg total) by mouth in the morning   60 tablet 0    levothyroxine 25 mcg tablet TAKE 1 TABLET DAILY 90 tablet 3    pantoprazole (PROTONIX) 40 mg tablet Take 1 tablet (40 mg total) by mouth 2 (two) times a day before meals 60 tablet 3    Probiotic Product (PROBIOTIC DAILY PO) Take by mouth      sertraline (ZOLOFT) 100 mg tablet Take 1 5 tablets (150 mg total) by mouth in the morning  45 tablet 0    traZODone (DESYREL) 100 mg tablet Take 1 tablet (100 mg total) by mouth daily at bedtime 30 tablet 1     No current facility-administered medications on file prior to visit  Psychotherapy Provided:     Individual psychotherapy provided: Yes  Counseling was provided during the session today for 16 minutes  Supportive counseling provided  Medications, treatment progress and treatment plan reviewed with Marlena  Medication changes discussed with Marlena  Medication education provided to [facility]  Recent stressor including drug use problems and alcohol use problems discussed with Marlena  Importance of medication and treatment compliance reviewed with Marlena  HPI ROS Appetite Changes and Sleep:     She reports Improved sleep with trazodone, fluctuating appetite, low energy   Denies homicidal ideation, denies suicidal ideation    Review Of Systems:      HPI ROS:               Medication Side Effects:  denies    Depression (10 worst): 5/10    Anxiety (10 worst): 4/10    Safety concerns (SI, HI, etc): Denies si and hi    Sleep: Adequate with trazodone    Energy: Low    Appetite: Fluctuates, periods of comfort eating    Weight Change: denies        Mental Status Evaluation:    Appearance Marginal/poor hygiene   Behavior cooperative   Mood anxious and depressed  Depression Scale - 5 of 10 (0 = No depression)  Anxiety Scale - 4 of 10 (0 = No anxiety)   Speech Normal rate and volume   Affect constricted   Thought Processes Goal directed and coherent   Thought Content Does not verbalize delusional material   Associations Tightly connected   Perceptual Disturbances Denies hallucinations and does not appear to be responding to internal stimuli   Risk Potential Suicidal/Homicidal Ideation - No evidence of suicidal or homicidal ideation and patient does not verbalize suicidal or homicidal ideation  Risk of Violence - No evidence of risk for violence found on assessment  Risk of Self Mutilation - No evidence of risk for self mutilation found on assessment   Orientation oriented to person, place, time/date and situation   Memory recent and remote memory grossly intact   Consciousness alert and awake   Attention/Concentration attention span and concentration appear shorter than expected for age   Insight fair   Judgement fair   Muscle Strength and Gait normal muscle strength and normal muscle tone, normal gait/station and normal balance   Motor Activity no abnormal movements   Language no difficulty naming common objects, no difficulty repeating a phrase, no difficulty writing a sentence   Fund of Knowledge adequate knowledge of current events  adequate fund of knowledge regarding past history  adequate fund of knowledge regarding vocabulary      Past Psychiatric History Update:     Inpatient Psychiatric Admission Since Last Encounter:   no  Changes to Outpatient Psychiatric Treatment Team:    no  Suicide Attempt Or Self Mutilation Since Last Encounter:   no  Incidence of Violent Behavior Since Last Encounter:   no    Traumatic History Update:     New Onset of Abuse Since Last Encounter:   no  Traumatic Events Since Last Encounter:   no    Past Medical History:    Past Medical History:   Diagnosis Date    Anxiety     Depression     Environmental allergies     Last assessed: 1/18/17    GERD (gastroesophageal reflux disease)     Hypoglycemia     Hypothyroidism      No past medical history pertinent negatives    Past Surgical History:   Procedure Laterality Date    COLONOSCOPY  2016 & 2018    Fiberoptic    EGD  2016    EGD  06/2016    MYRINGOTOMY      TONSILLECTOMY      TOOTH EXTRACTION       No Known Allergies  Substance Abuse History:    Social History     Substance and Sexual Activity   Alcohol Use Not Currently    Comment: occasionally      Social History     Substance and Sexual Activity   Drug Use Yes    Frequency: 4 0 times per week    Types: Marijuana    Comment: recreational Social History:    Social History     Socioeconomic History    Marital status: Single     Spouse name: Not on file    Number of children: Not on file    Years of education: Not on file    Highest education level: Not on file   Occupational History    Not on file   Tobacco Use    Smoking status: Former Smoker    Smokeless tobacco: Never Used   Vaping Use    Vaping Use: Former   Substance and Sexual Activity    Alcohol use: Not Currently     Comment: occasionally     Drug use: Yes     Frequency: 4 0 times per week     Types: Marijuana     Comment: recreational     Sexual activity: Not on file   Other Topics Concern    Not on file   Social History Narrative    Not on file     Social Determinants of Health     Financial Resource Strain: Not on file   Food Insecurity: Not on file   Transportation Needs: Not on file   Physical Activity: Not on file   Stress: Not on file   Social Connections: Not on file   Intimate Partner Violence: Not on file   Housing Stability: Not on file     Family Psychiatric History:     Family History   Problem Relation Age of Onset    Diabetes Mother         due to underlying condition with both eyes affected by proliferative retinopathy and traction retinal detatchments involving maculae, without long term use of insulin    Sleep apnea Father     Crohn's disease Paternal Grandfather      History Review: The following portions of the patient's history were reviewed and updated as appropriate: allergies, current medications, past family history, past medical history, past social history, past surgical history and problem list     OBJECTIVE:     Vital signs in last 24 hours: There were no vitals filed for this visit  Laboratory Results:   Recent Labs (last 2 months):   No visits with results within 2 Month(s) from this visit     Latest known visit with results is:   Orders Only on 12/24/2020   Component Date Value    SARS-CoV-2  12/24/2020 Not Detected      I have personally reviewed all pertinent laboratory/tests results  Suicide/Homicide Risk Assessment:    Risk of Harm to Self:  Protective Factors: no current suicidal ideation, access to mental health treatment, compliant with medications, compliant with mental health treatment  Based on today's assessment, Marlena presents the following risk of harm to self: minimal    Risk of Harm to Others:  Based on today's assessment, Marlena presents the following risk of harm to others: minimal    The following interventions are recommended: referral for psychotherapy, refrred to Partial Program for intensive psychiatric monitoring    Medications Risks/Benefits:      Risks, Benefits And Possible Side Effects Of Medications:    Discussed risks and benefits of treatment with patient including risk of suicidality, serotonin syndrome, increased QTc interval and SIADH related to treatment with antidepressants; Risk of induction of manic symptoms in certain patient populations and risk of rash related to treatment with Lamictal     Controlled Medication Discussion:     Not applicable    Treatment Plan:    Due for update/Updated:   no  Last treatment plan done 6/1/22 by avel mckenzie  Treatment Plan due on 12/1/22  TRE Justice 06/01/22    This note was shared with patient

## 2022-06-07 ENCOUNTER — OFFICE VISIT (OUTPATIENT)
Dept: PSYCHOLOGY | Facility: CLINIC | Age: 22
End: 2022-06-07
Payer: COMMERCIAL

## 2022-06-07 ENCOUNTER — TELEMEDICINE (OUTPATIENT)
Dept: PSYCHIATRY | Facility: CLINIC | Age: 22
End: 2022-06-07
Payer: COMMERCIAL

## 2022-06-07 DIAGNOSIS — F33.2 SEVERE EPISODE OF RECURRENT MAJOR DEPRESSIVE DISORDER, WITHOUT PSYCHOTIC FEATURES (HCC): Primary | ICD-10-CM

## 2022-06-07 DIAGNOSIS — F41.1 ANXIETY STATE: ICD-10-CM

## 2022-06-07 PROCEDURE — S0201 PARTIAL HOSPITALIZATION SERV: HCPCS

## 2022-06-07 PROCEDURE — 90792 PSYCH DIAG EVAL W/MED SRVCS: CPT | Performed by: PSYCHIATRY & NEUROLOGY

## 2022-06-07 PROCEDURE — 90791 PSYCH DIAGNOSTIC EVALUATION: CPT

## 2022-06-07 RX ORDER — CLOTRIMAZOLE AND BETAMETHASONE DIPROPIONATE 10; .64 MG/G; MG/G
CREAM TOPICAL
COMMUNITY
Start: 2022-03-04

## 2022-06-07 RX ORDER — TRAZODONE HYDROCHLORIDE 50 MG/1
150 TABLET ORAL
COMMUNITY
End: 2022-07-01 | Stop reason: DRUGHIGH

## 2022-06-07 RX ORDER — AZELASTINE HYDROCHLORIDE 137 UG/1
SPRAY, METERED NASAL
COMMUNITY
Start: 2022-04-04

## 2022-06-07 NOTE — PSYCH
Virtual Regular Visit    Verification of patient location:    Patient is located in the following state in which I hold an active license [place]      Assessment/Plan:    Problem List Items Addressed This Visit        Other    Anxiety state, unspecified      Other Visit Diagnoses     Severe episode of recurrent major depressive disorder, without psychotic features ([facility] )    -  Primary          Goals addressed in session:           Reason for visit is PHP VIRTUAL GROUP DUE TO COVID-19      Encounter provider [address] PARTIAL [address]    Provider located at [address]  [address] [facility] [address]      Recent Visits  No visits were found meeting these conditions  Showing recent visits within past 7 days and meeting all other requirements  Today's Visits  Date Type Provider Dept   06/07/22 Office Visit [address] PARTIAL PSYCH GROUP THERAPY [facility]   Showing today's visits and meeting all other requirements  Future Appointments  No visits were found meeting these conditions  Showing future appointments within next 150 days and meeting all other requirements       The patient was identified by name and date of birth  Mark Rivas was informed that this is a telemedicine visit and that the visit is being conducted throughMicrosoft Teams and patient was informed that this is a secure, HIPAA-compliant platform  She agrees to proceed     My office door was closed  No one else was in the room  She acknowledged consent and understanding of privacy and security of the video platform  The patient has agreed to participate and understands they can discontinue the visit at any time  Patient is aware this is a billable service  Jevon Bonilla is a 24 y o  female         HPI     Past Medical History:   Diagnosis Date    Anxiety     Depression     Environmental allergies     Last assessed: 1/18/17    GERD (gastroesophageal reflux disease)     Hypoglycemia     Hypothyroidism        Past Surgical History:   Procedure Laterality Date    COLONOSCOPY  2016 & 2018    Fiberoptic    EGD  2016    EGD  06/2016    MYRINGOTOMY      TONSILLECTOMY      TOOTH EXTRACTION         Current Outpatient Medications   Medication Sig Dispense Refill    Azelastine HCl 137 MCG/SPRAY SOLN USE 1 TO 2 SPRAYS IN EACH NOSTRIL 2 TIMES A DAY FOR 14 DAYS AS DIRECTED      CAMRESE 0 15-0 03 &0 01 MG TABS   0    clotrimazole-betamethasone (LOTRISONE) 1-0 05 % cream       glucose blood (ACCU-CHEK CHELA PLUS) test strip Test blood sugars up to 3 times daily or as needed for fluctuating blood sugars  100 each 2    hydrOXYzine HCL (ATARAX) 25 mg tablet Take 1 tablet (25 mg total) by mouth as needed in the morning and 1 tablet (25 mg total) as needed in the evening for anxiety  60 tablet 0    lamoTRIgine (LaMICtal) 25 mg tablet Take 2 tablets (50 mg total) by mouth in the morning  60 tablet 0    levothyroxine 25 mcg tablet TAKE 1 TABLET DAILY 90 tablet 3    sertraline (ZOLOFT) 100 mg tablet Take 1 5 tablets (150 mg total) by mouth in the morning  45 tablet 0    traZODone (DESYREL) 100 mg tablet Take 1 tablet (100 mg total) by mouth daily at bedtime 30 tablet 1    traZODone (DESYREL) 50 mg tablet Take 150 mg by mouth       No current facility-administered medications for this visit  No Known Allergies    Review of Systems    Video Exam    There were no vitals filed for this visit  Physical Exam     I spent ONE GROUP HOURS PLUS CASE MANAGEMENT minutes with patient today in which greater than 50% of the time was spent in counseling/coordination of care regarding PHP - SEE NOTES  VIRTUAL VISIT DISCLAIMER    Kendybeto Rivera verbally agrees to participate in [employer]   Pt is aware that [employer] could be limited without vital signs or the ability to perform a full hands-on physical Jackson Carranza understands she or the provider may request at any time to terminate the video visit and request the patient to seek care or treatment in person

## 2022-06-07 NOTE — PSYCH
Assessment/Plan:      Diagnoses and all orders for this visit:    Severe episode of recurrent major depressive disorder, without psychotic features ([facility] [address] )    Anxiety state, unspecified          Subjective:     Patient ID: Concepcion Martinez is a 24 y o  female  [facility] Plan   AREAS OF NEED: Depression and anxiety as evidenced by lack of motivation and energy, +helplessness, intrusive thoughts, impulsive, sleep disturbance, +worrying, appetite disturbances due to financial, career, family, household, and weight gain stressors     Date Initiated: 06/07/22    Strengths: "communication, singing, compassionate, empathetic, creative"     LONG TERM GOAL:   Date Initiated: 06/07/22  1 0 I will identify and share three ways in which my day-to-day functioning has improved since attending program   Target Date: 07/05/2022  Completion Date:       SHORT TERM OBJECTIVES:     Date Initiated: 06/07/22  1 1 I will learn and practice daily a new coping technique to improve my day-to-day functioning  Revision Date:   Target Date: 06/17/2022  Completion Date:     Date Initiated: 06/07/22  1 2 I will identify and record three mindfulness and/or self-monitoring strategies that I can utilize on a daily basis to make rational and safe decisions, rather than impulsive  Revision Date:   Target Date: 06/17/2022  Completion Date:    Date Initiated: 06/07/22  1 3 I will take medications as prescribed and share questions and concerns if arise  Revision Date:  Target Date: 06/17/2022  Completion Date:     Date Initiated: 06/07/22  1 4 I will identify 3 ways my supports can assist in my recovery and agree to staff/support contact as indicated      Revision Date:  Target Date: 06/17/2022  Completion Date:          7 DAY REVISION:    Date Initiated:  Revision Date:   Target Date:   Completion Date:      PSYCHIATRY:  Date Initiated:  06/07/22  Medication Management and Education       Revision Date:       The person(s) responsible for carrying out the plan is Laine Sinclair MD; Sue Whiteside PA-C    NURSING/SYMPTOM EDUCATION:   Date Initiated: 06/07/22  1 1, 1 2  1 3, 1 4 This RN/Or Therapist will provide wellness/symptoms and skill education groups three to five days weekly to educate Sneha Connor on signs and symptoms of diagnoses, skills to manage, and medication questions that will be addressed by the treatment team     Revision date: The person(s) responsible for carrying out the plan is Natividad Diaz RN, BSN    PSYCHOLOGY:   Date Initiated: 06/07/22       1 1, 1 2, 1 4 Provide psychotherapy group 5 times per week to allow opportunity for Sneha Connor  to explore stressors and ways of coping  Revision Date:   The person(s) responsible for carrying out the plan is TOM Childress,LSW    ALLIED THERAPY:   Date Initiated: 06/07/22  1 1,1 2 Engage Sneha Connor in AT group 5 times weekly to encourage development and use of wellness tools to decrease symptoms and promote recovery through meaningful activity  Revision Date:       The person(s) responsible for carrying out the plan is BROCK Lopez     CASE MANAGEMENT:   Date Initiated: 06/07/22      1 0 This  will meet with Sneha Connor  3-4 times weekly to assess treatment progress, discharge planning, connection to community supports and UR as indicated  Revision Date:   The person(s) responsible for carrying out the plan is Connor STEVENSN RN    TREATMENT REVIEW/COMMENTS:     DISCHARGE CRITERIA: Identify 3 signs of progress and complete relapse prevention plan  DISCHARGE PLAN: Connect with identified outpatient providers  Estimated Length of Stay: 10 treatment days          Diagnosis and Treatment Plan explained to Austin Jason relates understanding diagnosis and is agreeable to Treatment Plan           CLIENT COMMENTS / Please share your thoughts, feelings, need and/or experiences regarding your treatment plan: _____________________________________________________________________________________________________________________________________________________________________________________________________________________________________________________________________________________________________________________ Date/Time: ______________     Patient Signature: _________________________________     Date/Time: ______________      Signature: _________________________________     Date/Time: ______________

## 2022-06-07 NOTE — PSYCH
Subjective:     Patient ID: Yanick Patel is a 24 y o  female  [facility] Clinical Progress Notes      Specialized Services Documentation  Therapist must complete separate progress note for each specific clinical activity in which the individual participated during the day  Group Psychotherapy  Life Skills (7524-1836) - Yanick Patel actively engaged in group focused on gratitude which was facilitated virtually in a private office using HIPAA Compliant and Approved Microsoft Teams  Marlena consented to the use of tele-video modality of treatment and was virtually present for group psychotherapy today  Group members discussed why it is important to think about what we are grateful for  Group members worked on a gratefulness worksheet and shared their responses in group  Clients also learned about the G L A D  technique for practice gratefulness  Ivory Bragg stated that she is grateful for her car and her grandparents   Group watched a short video about the 365 grateful project with speaker Jesús Soriano  We discussed the benefits of thinking, reflecting and talking about what they are grateful for  Ivory Bragg continues to make progress towards goals through verbal participation in group; to accomplish long term goals continue to utilize skills learned in programming  Continue with psychotherapy to educate and encourage use of wellness tools  Tx Plan Objective: 1 1,1 2 Therapist: Fabiola Jeff    Case Management Note    TOM Swann    Current suicide risk : Low     1401- Attempted case management to review treatment plan and discuss first day  Requested return phone call   Medications changes/added/denied? No    Treatment session number: 1    Individual Case Management Visit provided today?  Yes    [facility] follow up physician's orders: none at this time
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
## 2022-06-07 NOTE — PSYCH
Subjective:     Patient ID: Avril Garcia is a 24 y o  female  [facility] Clinical Progress Notes      Specialized Services Documentation  Therapist must complete separate progress note for each specific clinical activity in which the individual participated during the day  Subjective:     Patient ID: Avril Garcia is a 24 y o  female  [facility] Clinical Progress Notes      Specialized Services Documentation  Therapist must complete separate progress note for each specific clinical activity in which the individual participated during the day  Case Management Note    Fanta JEAN RN    Current suicide risk : Low        100-1100 Met with Avrilchuck Garcia via TEAMS  Reviewed program, initial paperwork reviewed: Consent for Treatment, PHP handbook, HIPPA, General Consent, Client Bill of Rights, and Smoking/Drug and Alcohol Policy  Release of Information obtained for emergency contact - Marcos Pacheco (mother) [phone] and PCP and Health Care Coordination Form  Avril Garcia has hard copies of all paperwork and verbally gave consent  Reviewed and given on call number  PCP notified of admission and health care coordination form sent  Completed initial psycho-social evaluation and initial treatment goals discussed  Medications changes/added/denied? No - See Dr Kasi Whelan Note    Treatment session number: Assessment only    Individual Case Management Visit provided today?  yes    [facility] follow up physician's orders: Admit to PHP - See Dr Kasi Whelan Note
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
## 2022-06-08 ENCOUNTER — OFFICE VISIT (OUTPATIENT)
Dept: PSYCHOLOGY | Facility: CLINIC | Age: 22
End: 2022-06-08
Payer: COMMERCIAL

## 2022-06-08 DIAGNOSIS — F41.1 ANXIETY STATE: ICD-10-CM

## 2022-06-08 DIAGNOSIS — F33.2 SEVERE EPISODE OF RECURRENT MAJOR DEPRESSIVE DISORDER, WITHOUT PSYCHOTIC FEATURES (HCC): Primary | ICD-10-CM

## 2022-06-08 PROCEDURE — S0201 PARTIAL HOSPITALIZATION SERV: HCPCS

## 2022-06-08 PROCEDURE — G0177 OPPS/PHP; TRAIN & EDUC SERV: HCPCS

## 2022-06-08 PROCEDURE — G0410 GRP PSYCH PARTIAL HOSP 45-50: HCPCS

## 2022-06-08 PROCEDURE — G0176 OPPS/PHP;ACTIVITY THERAPY: HCPCS

## 2022-06-08 NOTE — PSYCH
Virtual Regular Visit    Verification of patient location:    Patient is located in the following state in which I hold an active license [place]      Assessment/Plan:    Problem List Items Addressed This Visit        Other    Anxiety state, unspecified    Severe episode of recurrent major depressive disorder, without psychotic features ([facility] [address] ) - Primary          Goals addressed in session: Goal 1          Reason for visit is No chief complaint on file  Encounter provider [facility] PARTIAL PSYCH PROGRAM    Provider located at [address] [place] [address]   [id number] [place] [id number]      Recent Visits  Date Type Provider Dept   06/07/22 Office Visit [facility] PARTIAL PSYCH GROUP THERAPY [facility]   Showing recent visits within past 7 days and meeting all other requirements  Today's Visits  Date Type Provider Dept   06/08/22 Office Visit [facility] PARTIAL PSYCH GROUP THERAPY [facility]   Showing today's visits and meeting all other requirements  Future Appointments  No visits were found meeting these conditions  Showing future appointments within next 150 days and meeting all other requirements       The patient was identified by name and date of birth  Xavier Stefany was informed that this is a telemedicine visit and that the visit is being conducted throughMicrosoft Teams and patient was informed that this is a secure, HIPAA-compliant platform  She agrees to proceed     My office door was closed  No one else was in the room  She acknowledged consent and understanding of privacy and security of the video platform  The patient has agreed to participate and understands they can discontinue the visit at any time  Patient is aware this is a billable service  Channing Leon is a 24 y o  female         HPI     Past Medical History:   Diagnosis Date    Anxiety     Depression     Environmental allergies     Last assessed: 1/18/17  GERD (gastroesophageal reflux disease)     Hypoglycemia     Hypothyroidism        Past Surgical History:   Procedure Laterality Date    COLONOSCOPY  2016 & 2018    Fiberoptic    EGD  2016    EGD  06/2016    MYRINGOTOMY      TONSILLECTOMY      TOOTH EXTRACTION         Current Outpatient Medications   Medication Sig Dispense Refill    Azelastine HCl 137 MCG/SPRAY SOLN USE 1 TO 2 SPRAYS IN EACH NOSTRIL 2 TIMES A DAY FOR 14 DAYS AS DIRECTED      CAMRESE 0 15-0 03 &0 01 MG TABS   0    clotrimazole-betamethasone (LOTRISONE) 1-0 05 % cream       glucose blood (ACCU-CHEK CHELA PLUS) test strip Test blood sugars up to 3 times daily or as needed for fluctuating blood sugars  100 each 2    hydrOXYzine HCL (ATARAX) 25 mg tablet Take 1 tablet (25 mg total) by mouth as needed in the morning and 1 tablet (25 mg total) as needed in the evening for anxiety  60 tablet 0    lamoTRIgine (LaMICtal) 25 mg tablet Take 2 tablets (50 mg total) by mouth in the morning  60 tablet 0    levothyroxine 25 mcg tablet TAKE 1 TABLET DAILY 90 tablet 3    sertraline (ZOLOFT) 100 mg tablet Take 1 5 tablets (150 mg total) by mouth in the morning  45 tablet 0    traZODone (DESYREL) 100 mg tablet Take 1 tablet (100 mg total) by mouth daily at bedtime 30 tablet 1    traZODone (DESYREL) 50 mg tablet Take 150 mg by mouth       No current facility-administered medications for this visit  No Known Allergies    Review of Systems    Video Exam    There were no vitals filed for this visit  Physical Exam     I spent FOUR GROUP HOURS + CASE MANAGEMENT minutes directly with the patient during this visit    VIRTUAL VISIT DISCLAIMER    Ana Mraia verbally agrees to participate in [facility]   Pt is aware that [facility] could be limited without vital signs or the ability to perform a full hands-on physical Ninicarie Pardeep understands she or the provider may request at any time to terminate the video visit and request the patient to seek care or treatment in person

## 2022-06-08 NOTE — PSYCH
Subjective:     Patient ID: Cristhian Bolton is a 24 y o  female  [facility] Clinical Progress Notes      Specialized Services Documentation  Therapist must complete separate progress note for each specific clinical activity in which the individual participated during the day  Allied Therapy   Group was facilitated virtually in a private office using HIPAA compliant and approved [employer] Teams  Cristhian Bolton consented to the use of tele-video modality of treatment and was virtually present for group allied therapy  [id number] Cristhian Bolton shared in [facility] group focused on leisure skills  The group identified current leisure skills as well as learned how to determine between healthy and unhealthy leisure skills  The group engaged in a song discussion as well as a fill in the blank song writing exercise  Marlena discussed ways to incorporate leisure into daily life and will practice do yoga and stretch as a leisure skill  Good progress was made toward treatment goal  Continue AT to encourage development and practice of leisure skills     Tx Plan Objective: 1 1,1 2,1 4, Therapist:  BROCK Nathan

## 2022-06-08 NOTE — PSYCH
Subjective:     Patient ID: Avril Garcia is a 24 y o  female  [facility] Clinical Progress Notes      Specialized Services Documentation  Therapist must complete separate progress note for each specific clinical activity in which the individual participated during the day  GROUP PSYCHOTHERAPY ([id number]) Group was facilitated virtually in a private office using HIPAA Compliant and Approved Microsoft Teams  Marlena consented to the use of tele-video modality of treatment and was virtually present for group psychotherapy today  Group members engaged in processing the different ways that their attitudes impact their mental health  The PowerPoint presentation highlighted some of the common themes of harmful attitudes that may hinder individuals growth process  Different themes of attitude include: Know it all, Pessixtentialist, Everyones the problem, not me, I MUST resist, Exceptioner, and Should-ers   Members identified different ways their attitudes have negatively affected parts of their lives  We ended the session with a discussion on different ways to improve our attitudes  Jace Jose seemed very engaged throughout most of the group session  They discussed how she has started integrating more hopeful thoughts and replacing resisting negative thoughts as a way to help her attitude  Jace Jose is encouraged to make progress towards goals/objectives and will continue to attend psychotherapy group  TX Plan Objectives: 1 1, 1 2, 1 4   Therapist: Negro Beverly MA    Education Therapy   [id number] Avril Garcia actively shared in morning assessment and goal review  Presented as Receptive related to readiness to learn  Today is Marlena Wade's first day of program  did not present with any barriers to learning  [phone] Avril Garcia engaged throughout the treatment day  Was engaged in learning related to Illness, Aftercare and Wellness Tools  Staff utilized Verbal and A/V teaching methods    Avril Garcia shared area of learning and set a goal for outside of program to clean her room        Tx Plan Objective: 1 4, Therapist:  Karma Mireles MA

## 2022-06-09 ENCOUNTER — OFFICE VISIT (OUTPATIENT)
Dept: PSYCHOLOGY | Facility: CLINIC | Age: 22
End: 2022-06-09
Payer: COMMERCIAL

## 2022-06-09 DIAGNOSIS — F41.1 ANXIETY STATE: ICD-10-CM

## 2022-06-09 DIAGNOSIS — F32.4 MAJOR DEPRESSIVE DISORDER WITH SINGLE EPISODE, IN PARTIAL REMISSION (HCC): ICD-10-CM

## 2022-06-09 DIAGNOSIS — F33.2 SEVERE EPISODE OF RECURRENT MAJOR DEPRESSIVE DISORDER, WITHOUT PSYCHOTIC FEATURES (HCC): Primary | ICD-10-CM

## 2022-06-09 PROCEDURE — S0201 PARTIAL HOSPITALIZATION SERV: HCPCS

## 2022-06-09 PROCEDURE — G0410 GRP PSYCH PARTIAL HOSP 45-50: HCPCS

## 2022-06-09 PROCEDURE — G0176 OPPS/PHP;ACTIVITY THERAPY: HCPCS

## 2022-06-09 PROCEDURE — G0177 OPPS/PHP; TRAIN & EDUC SERV: HCPCS

## 2022-06-09 NOTE — PSYCH
Subjective:     Patient ID: Lorraine Hilario is a 24 y o  female  [facility] Clinical Progress Notes      Specialized Services Documentation  Therapist must complete separate progress note for each specific clinical activity in which the individual participated during the day  Group Psychotherapy   This group was facilitated virtually in a private office using HIPAA Compliant and Approved [employer] Teams  Lorraine Hilario consented to the use of tele-video modality of treatment  [address] [facility]  actively shared in [facility] psychotherapy group focused on managing anger and emotional regulation skills  Marlena engaged in task exploring effective versus ineffective ways in addition to skills to refocus in the moment  She voiced taking a new perspective of anger away from the session including that she often becomes angry related to situations that do not impact her  Group explored the benefits of positive choices with intense emotion and factors that increase emotional vulnerability  Some work toward goal noted   Continue psychotherapy to explore personal patterns, wellness tool awareness and practice     Tx Plan Objective: 1 2, Therapist:  Leeann Espinosa MT-BC

## 2022-06-09 NOTE — PSYCH
Subjective:     Patient ID: Amanda Hagen is a 24 y o  female  [facility] Clinical Progress Notes      Specialized Services Documentation  Therapist must complete separate progress note for each specific clinical activity in which the individual participated during the day  Group Psychotherapy Life Skills ([id number]) Amanda Hagen actively engaged in group focused on the labels which others put on us and the labels we put on ourselves which was facilitated virtually in a private office using HIPAA Compliant and Approved Microsoft Teams  Marlena consented to the use of tele-video modality of treatment and was virtually present for group psychotherapy today  Group members watched a ANGELA talk, The Power of Labels to Change Your Story  During the group members did an activity about the labels that people put on us and that we put on ourselves  Group members discussed the impact these labels have on their lives  The group explored which labels they want to get rid of and which they would like to believe more  Terence Reis stated the negative label they would like to give up and a positive label they would like to believe  Amanda Hagen would like to believe the label of being driven  We discussed how to get rid of the negative labels and how their lives would change if they stopped believing them  Amanda Xiao continues to make progress towards goals through verbal participation in group; to accomplish long term goals continue to utilize skills learned in programming  Continue with psychotherapy to educate and encourage use of wellness tools  Tx Plan Objective: 1 1,1 2 Therapist: Lolis Maciel       Education Therapy   [phone] Amanda Hagen actively shared in morning assessment and goal review  Presented as Receptive related to readiness to learn  Amanda How did complete goal from last treatment day identifying gaining responsibility  did not present with any barriers to learning       [id number] Amanda Hagen engaged throughout the treatment day  Was engaged in learning related to Illness, Medication, Aftercare, and Wellness Tools  Staff utilized Verbal, Written, A/V, and Demonstration teaching methods  Cindia Shoulders shared area of learning and set a goal for outside of program to make a crockpot dinner  Tx Plan Objective: 1 1,1 2,1 4 Therapist:  TOM Breen    Case Management Note    TOM Breen    Current suicide risk : [facility] in [place]     [phone]- Marlena reported liking groups and thinks have a schedule and routine is helpful and would like to come up with a routine prior to discharge  She reports taking a lot of notes while in group  Reviewed her treatment plan  Holly Arellano was agreeable and did not have any questions  Informed that  will reach out to her tomorrow  Medications changes/added/denied? No    Treatment session number: 2    Individual Case Management Visit provided today?  Yes     [facility] follow up physician's orders: None at this time

## 2022-06-09 NOTE — PSYCH
Subjective:     Patient ID: Carlita Rocha is a 24 y o  female  [facility] Clinical Progress Notes      Specialized Services Documentation  Therapist must complete separate progress note for each specific clinical activity in which the individual participated during the day  Allied Therapy   Group was facilitated virtually in a private office using HIPAA compliant and approved [employer] Teams  Carlita Rocha consented to the use of tele-video modality of treatment and was virtually present for group allied therapy  [id number] Carlita Rocha  participated in the [facility] group focused on being able to identify the difference between rational and irrational  Chantal Dawkins engaged in a paul analysis as well as a guided visualization  Group explored healthy ways to overcome anxiety related to irrational fears  Marlena shared irrational thoughts and ways discredit them and learned a five step process of overcoming anxiety relating to irrational fears  Good effort noted toward treatment goal  Continue AT to encourage the development and practice of challenging irrational fears     Tx Plan Objective: 1 1,1 2,1 4, Therapist:  BROCK Borja

## 2022-06-09 NOTE — PSYCH
Virtual Regular Visit    Verification of patient location:    Patient is located in the following state in which I hold an active license [place]      Assessment/Plan:    Problem List Items Addressed This Visit        Other    Anxiety state, unspecified    Major depressive disorder with single episode, in partial remission ([facility])    Severe episode of recurrent major depressive disorder, without psychotic features ([facility] [address] ) - Primary          Goals addressed in session: Goal 1          Reason for visit is No chief complaint on file  Encounter provider [facility] PARTIAL PSYCH PROGRAM    Provider located at [address] [place] [address]   [address] [facility] [id number]      Recent Visits  Date Type Provider Dept   06/08/22 Office Visit [facility] PARTIAL PSYCH GROUP THERAPY [facility]   06/07/22 Office Visit [facility] PARTIAL PSYCH GROUP TH[facility]APY [facility]   Showing recent visits within past 7 days and meeting all other requirements  Today's Visits  Date Type Provider Dept   06/09/22 Office Visit [facility] PARTIAL PSYCH GROUP THERAPY [facility]   Showing today's visits and meeting all other requirements  Future Appointments  No visits were found meeting these conditions  Showing future appointments within next 150 days and meeting all other requirements       The patient was identified by name and date of birth  Merlin Thao was informed that this is a telemedicine visit and that the visit is being conducted throughMicrosoft Teams and patient was informed that this is a secure, HIPAA-compliant platform  She agrees to proceed     My office door was closed  No one else was in the room  She acknowledged consent and understanding of privacy and security of the video platform  The patient has agreed to participate and understands they can discontinue the visit at any time  Patient is aware this is a billable service       Subjective  Merlin Rickettss is a 24 y o  female  HPI     Past Medical History:   Diagnosis Date    Anxiety     Depression     Environmental allergies     Last assessed: 1/18/17    GERD (gastroesophageal reflux disease)     Hypoglycemia     Hypothyroidism        Past Surgical History:   Procedure Laterality Date    COLONOSCOPY  2016 & 2018    Fiberoptic    EGD  2016    EGD  06/2016    MYRINGOTOMY      TONSILLECTOMY      TOOTH EXTRACTION         Current Outpatient Medications   Medication Sig Dispense Refill    Azelastine HCl 137 MCG/SPRAY SOLN USE 1 TO 2 SPRAYS IN EACH NOSTRIL 2 TIMES A DAY FOR 14 DAYS AS DIRECTED      CAMRESE 0 15-0 03 &0 01 MG TABS   0    clotrimazole-betamethasone (LOTRISONE) 1-0 05 % cream       glucose blood (ACCU-CHEK CHELA PLUS) test strip Test blood sugars up to 3 times daily or as needed for fluctuating blood sugars  100 each 2    hydrOXYzine HCL (ATARAX) 25 mg tablet Take 1 tablet (25 mg total) by mouth as needed in the morning and 1 tablet (25 mg total) as needed in the evening for anxiety  60 tablet 0    lamoTRIgine (LaMICtal) 25 mg tablet Take 2 tablets (50 mg total) by mouth in the morning  60 tablet 0    levothyroxine 25 mcg tablet TAKE 1 TABLET DAILY 90 tablet 3    sertraline (ZOLOFT) 100 mg tablet Take 1 5 tablets (150 mg total) by mouth in the morning  45 tablet 0    traZODone (DESYREL) 100 mg tablet Take 1 tablet (100 mg total) by mouth daily at bedtime 30 tablet 1    traZODone (DESYREL) 50 mg tablet Take 150 mg by mouth       No current facility-administered medications for this visit  No Known Allergies    Review of Systems    Video Exam    There were no vitals filed for this visit  Physical Exam     I spent FOUR GROUP HOURS + CASE MANAGEMENT minutes directly with the patient during this visit    VIRTUAL VISIT DISCLAIMER    Beti Gil verbally agrees to participate in [employer]   Pt is aware that Virtual Care Services could be limited without vital signs or the ability to perform a full hands-on physical America Duckworth understands she or the provider may request at any time to terminate the video visit and request the patient to seek care or treatment in person

## 2022-06-10 ENCOUNTER — OFFICE VISIT (OUTPATIENT)
Dept: PSYCHOLOGY | Facility: CLINIC | Age: 22
End: 2022-06-10
Payer: COMMERCIAL

## 2022-06-10 DIAGNOSIS — F33.2 SEVERE EPISODE OF RECURRENT MAJOR DEPRESSIVE DISORDER, WITHOUT PSYCHOTIC FEATURES (HCC): Primary | ICD-10-CM

## 2022-06-10 DIAGNOSIS — F41.1 ANXIETY STATE: ICD-10-CM

## 2022-06-10 PROCEDURE — S0201 PARTIAL HOSPITALIZATION SERV: HCPCS

## 2022-06-10 PROCEDURE — G0177 OPPS/PHP; TRAIN & EDUC SERV: HCPCS

## 2022-06-10 PROCEDURE — G0410 GRP PSYCH PARTIAL HOSP 45-50: HCPCS

## 2022-06-10 PROCEDURE — G0176 OPPS/PHP;ACTIVITY THERAPY: HCPCS

## 2022-06-10 NOTE — PSYCH
Subjective:     Patient ID: Elvira Pratt is a 24 y o  female  [facility] Clinical Progress Notes      Specialized Services Documentation  Therapist must complete separate progress note for each specific clinical activity in which the individual participated during the day  Group Psychotherapy(10:45-11:45) Group was facilitated virtually in a private office using HIPAA Compliant and Approved Microsoft Teams  Marlena Nathan consented to the use of tele-video modality of treatment and was virtually present for group psychotherapy today  This group was on making changes and the group began with a reflection about major life changes those in the group made in the past  Group members shared what factors contributed to making changes and what was helpful  The group then reflected on a passage, "Autobiography in [facility] [place]," by Uche Ennis, and what meaning it had to their lives, and habits  Lastly the group discussed some common defense mechanisms that contribute to resisting change  Erick Goyal discussed making a change by quitting a job where she was unhappy and how she is currently on working on not falling into the same habits that got her there in the first place       Tx Goal Objective: 1 1,1 2  Therapist: TOM Pineda

## 2022-06-10 NOTE — PSYCH
Subjective:     Patient ID: Ana Sifuentes is a 24 y o  female  [facility] Clinical Progress Notes      Specialized Services Documentation  Therapist must complete separate progress note for each specific clinical activity in which the individual participated during the day  Education Therapy   [id number] Ana Sifuentes actively shared in morning assessment and goal review  Presented as Receptive related to readiness to learn  Ana Sifuentes did complete goal from last treatment day identifying gaining responsibility   did not present with any barriers to learning  [id number] Ana Sifuentes engaged throughout the treatment day  Was engaged in learning related to Illness, Medication, Aftercare, and Wellness Tools  Staff utilized Verbal, Written, A/V, and Demonstration teaching methods  Ana Sifuentes shared area of learning and set a goal for outside of program to apply to more jobs        Tx Plan Objective: 1 1,1 2,1 4 Therapist:  TOM Young

## 2022-06-10 NOTE — PSYCH
Subjective:     Patient ID: Lee Ann Osorio is a 24 y o  female  [facility] Clinical Progress Notes      Specialized Services Documentation  Therapist must complete separate progress note for each specific clinical activity in which the individual participated during the day  GROUP PSYCHOTHERAPY (5045-1471) Group was facilitated virtually in a private office using HIPAA Compliant and Approved Microsoft Teams  Marlena consented to the use of tele-video modality of treatment and was virtually present for group psychotherapy today  The group engaged in education about codependency and what it entails  Each member filled out a codependency questionnaire to help them gain insight on whether their relationship patterns  Members learned the difference between the enabler and the dependent in a codependent relationship  They processed challenges they have faced due to being in codependent relationships and discussed the different factors that keep the codependent cycle going  The group then answered the following questions:  1  What have you learned about codependency? 2  Would you say you have more dependent or enabling tendencies? Why?  Reagan Borges seemed very engaged throughout the group session and asked questions  Reagan Borges stated that they have more enabling tendencies  Reagan Borges is encouraged to make progress towards goals and objectives through group participation and will continue to attend psychotherapy group    Tx plan objective: 1 1, 1 2   Therapist: Jake Adames MA

## 2022-06-10 NOTE — PSYCH
Virtual Regular Visit    Verification of patient location:    Patient is located in the following state in which I hold an active license [place]      Assessment/Plan:    Problem List Items Addressed This Visit        Other    Anxiety state, unspecified    Severe episode of recurrent major depressive disorder, without psychotic features ([facility] [address] ) - Primary          Goals addressed in session: Goal 1          Reason for visit is No chief complaint on file  Encounter provider [facility] PARTIAL PSYCH PROGRAM    Provider located at [address] [place] [address]   [address] [facility] [id number]      Recent Visits  Date Type Provider Dept   06/09/22 Office Visit [facility] PARTIAL PSYCH GROUP THERAPY [facility]   06/08/22 Office Visit [facility] PARTIAL PSYCH GROUP THERAPY [facility]   06/07/22 Office Visit [facility] PARTIAL PSYCH GROUP THERAP[facility]   Showing recent visits within past 7 days and meeting all other requirements  Today's Visits  Date Type Provider Dept   06/10/22 Office Visit [facility] PARTIAL PSYCH GROUP THERAPY [facility]   Showing today's visits and meeting all other requirements  Future Appointments  No visits were found meeting these conditions  Showing future appointments within next 150 days and meeting all other requirements       The patient was identified by name and date of birth  Yanick Patel was informed that this is a telemedicine visit and that the visit is being conducted throughMicrosoft Teams and patient was informed that this is a secure, HIPAA-compliant platform  She agrees to proceed     My office door was closed  No one else was in the room  She acknowledged consent and understanding of privacy and security of the video platform  The patient has agreed to participate and understands they can discontinue the visit at any time  Patient is aware this is a billable service       Subjective  Yanick Patel is a 24 y o  female   HPI     Past Medical History:   Diagnosis Date    Anxiety     Depression     Environmental allergies     Last assessed: 1/18/17    GERD (gastroesophageal reflux disease)     Hypoglycemia     Hypothyroidism        Past Surgical History:   Procedure Laterality Date    COLONOSCOPY  2016 & 2018    Fiberoptic    EGD  2016    EGD  06/2016    MYRINGOTOMY      TONSILLECTOMY      TOOTH EXTRACTION         Current Outpatient Medications   Medication Sig Dispense Refill    Azelastine HCl 137 MCG/SPRAY SOLN USE 1 TO 2 SPRAYS IN EACH NOSTRIL 2 TIMES A DAY FOR 14 DAYS AS DIRECTED      CAMRESE 0 15-0 03 &0 01 MG TABS   0    clotrimazole-betamethasone (LOTRISONE) 1-0 05 % cream       glucose blood (ACCU-CHEK CHELA PLUS) test strip Test blood sugars up to 3 times daily or as needed for fluctuating blood sugars  100 each 2    hydrOXYzine HCL (ATARAX) 25 mg tablet Take 1 tablet (25 mg total) by mouth as needed in the morning and 1 tablet (25 mg total) as needed in the evening for anxiety  60 tablet 0    lamoTRIgine (LaMICtal) 25 mg tablet Take 2 tablets (50 mg total) by mouth in the morning  60 tablet 0    levothyroxine 25 mcg tablet TAKE 1 TABLET DAILY 90 tablet 3    sertraline (ZOLOFT) 100 mg tablet Take 1 5 tablets (150 mg total) by mouth in the morning  45 tablet 0    traZODone (DESYREL) 100 mg tablet Take 1 tablet (100 mg total) by mouth daily at bedtime 30 tablet 1    traZODone (DESYREL) 50 mg tablet Take 150 mg by mouth       No current facility-administered medications for this visit  No Known Allergies    Review of Systems    Video Exam    There were no vitals filed for this visit  Physical Exam     I spent FOUR GROUP HOURS + CASE MANAGEMENT minutes directly with the patient during this visit    VIRTUAL VISIT DISCLAIMER    Davidfox Hamptonnery verbally agrees to participate in [employer]   Pt is aware that Virtual Care Services could be limited without vital signs or the ability to perform a full hands-on physical Ana Carpio understands she or the provider may request at any time to terminate the video visit and request the patient to seek care or treatment in person

## 2022-06-10 NOTE — PSYCH
Subjective:     Patient ID: Pau Mcadams is a 24 y o  female  [facility] Clinical Progress Notes      Specialized Services Documentation  Therapist must complete separate progress note for each specific clinical activity in which the individual participated during the day  Group Psychotherapy  This group was facilitated virtually in a private office using HIPAA Compliant and Approved [employer] Teams  Pau Mcadams consented to the use of tele-video modality of treatment  ([id number]) Pau Mcadams attended group on the Wellness Recovery Action Plan  Group members were educated on the background of the WRAP  Members were informed WRAP was emailed to them this AM   Writer explained the benefit of utilizing the WRAP prior to members initiating it  Members then focused on the WRAP: the wellness toolbox, daily plan, identification of triggers and stressors  Next the group began developing a wellness tool box for home and one for work  Group members shared pieces of information from this section and identified their importance  Writer encouraged the members of group to continue utilizing the packet to develop plans inside and outside of program  Good effort made displayed through participation and engagement in topic  Treatment Plan Objectives: 1 1, 1 2  Therapist: Bushra JEAN RN      Case Management Note  This case management session was facilitated virtually in a private office using HIPAA Compliant and Approved [employer] Teams  Pau Mcadams consented to the use of tele-video modality of treatment  Bushra JEAN RN    Current suicide risk : Low     [id number] Met with Pau Mcadams  Reported she was feeling ill today with fever and runny nose  Shared she enjoys group and is learning a lot, provides her with good structure, and feels accomplishment at end of day   She also share because of things she has learned in group she unblocked her father from her phone, set boundaries with him, shared with him the mental health struggles she's been dealing with, what she is learning in group and having better conversations with him being compassionate towards her  Progress noted in that KELSEA Garcia participates in group and is engaged  Barriers continue to be joblesshowever has put in 15 applications to jobs  Reviewed med check date and time for next week  Medications changes/added/denied? No    Treatment session number: 3    Individual Case Management Visit provided today?  No    [facility] follow up physician's orders: None at this time

## 2022-06-13 ENCOUNTER — OFFICE VISIT (OUTPATIENT)
Dept: PSYCHOLOGY | Facility: CLINIC | Age: 22
End: 2022-06-13
Payer: COMMERCIAL

## 2022-06-13 DIAGNOSIS — F33.2 SEVERE EPISODE OF RECURRENT MAJOR DEPRESSIVE DISORDER, WITHOUT PSYCHOTIC FEATURES (HCC): Primary | ICD-10-CM

## 2022-06-13 DIAGNOSIS — F41.1 ANXIETY STATE: ICD-10-CM

## 2022-06-13 PROCEDURE — S0201 PARTIAL HOSPITALIZATION SERV: HCPCS

## 2022-06-13 PROCEDURE — G0177 OPPS/PHP; TRAIN & EDUC SERV: HCPCS

## 2022-06-13 PROCEDURE — G0176 OPPS/PHP;ACTIVITY THERAPY: HCPCS

## 2022-06-13 PROCEDURE — G0410 GRP PSYCH PARTIAL HOSP 45-50: HCPCS

## 2022-06-13 NOTE — PSYCH
Subjective:     Patient ID: Kavita Cullen is a 24 y o  female  [facility] Clinical Progress Notes      Specialized Services Documentation  Therapist must complete separate progress note for each specific clinical activity in which the individual participated during the day  GROUP PSYCHOTHERAPY ([id number]) Group was facilitated virtually in a private office using HIPAA Compliant and Approved Microsoft Teams  Marlena consented to the use of tele-video modality of treatment and was virtually present for group psychotherapy today  The group engaged in education about self-sabotaging behavior  We specifically focused on self-sabotaging habits related to how they approach change  Each member was asked to answer the following questions after reading the following bullets: You expect yourself to succeed in making life changes without designating any time or mental space to accomplish them  You see your capacity to change as being dependent on other peoples behavior  For example, youd exercise more or make better spending choices if your spouse was more supportive and on board  Babatunde Courts a perfectionist who is dismissive of incremental improvements, and youre only satisfied when 100 percent of a problem is fixed  Which of the statements resonated with you the most? Why? How can you work on this? Jenn Salcido seemed very engaged throughout the group session  Marlena picked the third statement  Jenn Omari is encouraged to make progress towards goals and objectives through group participation and will continue to attend psychotherapy group       Tx plan objective: 1 1, 1 2   Therapist: John Banks MA

## 2022-06-13 NOTE — PSYCH
Virtual Regular Visit    Verification of patient location:    Patient is located in the following state in which I hold an active license [place]      Assessment/Plan:    Problem List Items Addressed This Visit        Other    Anxiety state, unspecified    Severe episode of recurrent major depressive disorder, without psychotic features ([facility] [address] ) - Primary          Goals addressed in session: Goal 1          Reason for visit is No chief complaint on file  Encounter provider [facility] PARTIAL PSYCH PROGRAM    Provider located at [address] [place] [address]   [address] [facility] [id number]      Recent Visits  Date Type Provider Dept   06/10/22 Office Visit [facility] PARTIAL PSYCH GROUP THERAPY [facility]   06/09/22 Office Visit [facility] PARTIAL PSYCH GROUP THERAP[facility]   06/08/22 Office Visit [facility] PARTIAL PSYCH GROUP THERAP[facility]   06/07/22 Office Visit [facility] PARTIAL PSYCH GROUP THERAP[facility]   Showing recent visits within past 7 days and meeting all other requirements  Today's Visits  Date Type Provider Dept   06/13/22 Office Visit [facility] PARTIAL PSYCH GROUP THERAPY [facility]   Showing today's visits and meeting all other requirements  Future Appointments  No visits were found meeting these conditions  Showing future appointments within next 150 days and meeting all other requirements       The patient was identified by name and date of birth  Kendy Rivera was informed that this is a telemedicine visit and that the visit is being conducted throughMicrosoft Teams and patient was informed that this is a secure, HIPAA-compliant platform  She agrees to proceed     My office door was closed  No one else was in the room  She acknowledged consent and understanding of privacy and security of the video platform   The patient has agreed to participate and understands they can discontinue the visit at any time     Patient is aware this is a billable service  Jevon Bonilla is a 24 y o  female  HPI     Past Medical History:   Diagnosis Date    Anxiety     Depression     Environmental allergies     Last assessed: 1/18/17    GERD (gastroesophageal reflux disease)     Hypoglycemia     Hypothyroidism        Past Surgical History:   Procedure Laterality Date    COLONOSCOPY  2016 & 2018    Fiberoptic    EGD  2016    EGD  06/2016    MYRINGOTOMY      TONSILLECTOMY      TOOTH EXTRACTION         Current Outpatient Medications   Medication Sig Dispense Refill    Azelastine HCl 137 MCG/SPRAY SOLN USE 1 TO 2 SPRAYS IN EACH NOSTRIL 2 TIMES A DAY FOR 14 DAYS AS DIRECTED      CAMRESE 0 15-0 03 &0 01 MG TABS   0    clotrimazole-betamethasone (LOTRISONE) 1-0 05 % cream       glucose blood (ACCU-CHEK CHELA PLUS) test strip Test blood sugars up to 3 times daily or as needed for fluctuating blood sugars  100 each 2    hydrOXYzine HCL (ATARAX) 25 mg tablet Take 1 tablet (25 mg total) by mouth as needed in the morning and 1 tablet (25 mg total) as needed in the evening for anxiety  60 tablet 0    lamoTRIgine (LaMICtal) 25 mg tablet Take 2 tablets (50 mg total) by mouth in the morning  60 tablet 0    levothyroxine 25 mcg tablet TAKE 1 TABLET DAILY 90 tablet 3    sertraline (ZOLOFT) 100 mg tablet Take 1 5 tablets (150 mg total) by mouth in the morning  45 tablet 0    traZODone (DESYREL) 100 mg tablet Take 1 tablet (100 mg total) by mouth daily at bedtime 30 tablet 1    traZODone (DESYREL) 50 mg tablet Take 150 mg by mouth       No current facility-administered medications for this visit  No Known Allergies    Review of Systems    Video Exam    There were no vitals filed for this visit  Physical Exam     I spent FOUR GROUP HOURS + CASE MANAGEMENT minutes directly with the patient during this visit    VIRTUAL VISIT DISCLAIMER    Mark Rivas verbally agrees to participate in [employer]  Pt is aware that [employer] could be limited without vital signs or the ability to perform a full hands-on physical Croft Barbour understands she or the provider may request at any time to terminate the video visit and request the patient to seek care or treatment in person

## 2022-06-13 NOTE — PSYCH
Subjective:     Patient ID: Amanda Hagen is a 24 y o  female  [facility] Clinical Progress Notes      Specialized Services Documentation  Therapist must complete separate progress note for each specific clinical activity in which the individual participated during the day  Group Psychotherapy Life Skills ([id number]) Amanda Hagen actively engaged in an open processing group which was facilitated virtually in a private office using HIPAA Compliant and Approved Microsoft Teams  Marlena consented to the use of tele-video modality of treatment and was virtually present for group psychotherapy today  The group discussed feeling overwhelmed, lack of motivation, and the struggle to implement coping skills and problem solved options  Terence Reis did share her thoughts related to the topics  Terence Reis continues to make progress towards goals through verbal participation in group; to accomplish long term goals continue to utilize skills learned in programming  Continue with psychotherapy to educate and encourage use of wellness tools   Tx Plan Objective: 1 1,1 2 Therapist: Lolis Maciel

## 2022-06-13 NOTE — PSYCH
How Severe Is Your Cyst?: moderate Subjective:     Patient ID: Jhon Felix is a 24 y o  female  [facility] Clinical Progress Notes      Specialized Services Documentation  Therapist must complete separate progress note for each specific clinical activity in which the individual participated during the day  Education Therapy   [id number] Jhon Felix engaged throughout the treatment day  Was engaged in learning related to Illness, Medication, Aftercare and Wellness Tools  Staff utilized Verbal, Written, A/V and Demonstration teaching methods  Jhon Felix shared area of learning and set a goal for outside of program to clean her room        Tx Plan Objective: 1 1,1 2,1 4, Therapist:  SUSY SuhBC Is This A New Presentation, Or A Follow-Up?: Cysts

## 2022-06-13 NOTE — PSYCH
Subjective:     Patient ID: Joan Mike is a 24 y o  female  [facility] Clinical Progress Notes      Specialized Services Documentation  Therapist must complete separate progress note for each specific clinical activity in which the individual participated during the day  Education Therapy   [id number] Joan Mike actively shared in morning assessment and goal review  Presented as Receptive related to readiness to learn  Joan Mike did complete goal from last treatment day identifying gaining hope, responsibility, and advocacy  did not present with any barriers to learning  Tx Plan Objective: 1 1,1 2,1 4 Therapist:  Chen JEAN RN       Case Management Note  This case management session was facilitated virtually in a private office using HIPAA Compliant and Approved [employer] Teams  Joan Mike consented to the use of tele-video modality of treatment  Chen Hendricks, RN, BSN    Current suicide risk : Low     [id number] Met with Joan Mike  Reported she had a good weekend and filled out more job applications, has housing and job interviews tomorrow and will be in group partial day   Progress noted in that Marlena participates and is working on relationship with parents, job and housing search  Blayne Pena is excited for the interviews and is hopeful  She stated she remains in contact with her father however she feels he doesn't think she needs therapy or medications and that she should "just be ok"  She has a realistic outlook that her father may never really understand her mental health and will try to not let that affect their relationship  Reviewed med check date and time for this week  Medications changes/added/denied? No    Treatment session number: 4    Individual Case Management Visit provided today?  Yes    [facility] follow up physician's orders: No new orders

## 2022-06-13 NOTE — PSYCH
Subjective:     Patient ID: Olympia Cooks is a 24 y o  female  [facility] Clinical Progress Notes      Specialized Services Documentation  Therapist must complete separate progress note for each specific clinical activity in which the individual participated during the day  Group Psychotherapy 10:45-11:45) This group was facilitated virtually in a private office using HIPAA Compliant and Approved [employer] Teams   Marlena Wade consented to the use of tele-video modality of treatment  This group was on self esteem  Participants read through a list of quotes together on self esteem selecting one that resonates with them and reflecting on it  Participants then participated in education and discussion on self esteem in relation to depression, signs, influences and impacts  Participants were asked to review a self esteem check up , rating each item on a scale of 1-lowest to 10 highest in regards to self esteem (I e  I Believe in myself, I respect myself, I like the way I look, I love myself even when others reject me  ) Participants were asked to share a lower item score and a higher item score with the group  Participants learned the F A S T  acronym on improving self esteem (Being Fair, not making Apologies, Sticking to one's values, and being Truthful)  Lastly participants shared responses to prompts related) to self esteem (I am proud of these traits    3 factors that crush my self esteem are    3 things I can do to handle the factors above  Guillermina Myles ) Marlena briefly read during the group but did not share in group discussion       Tx Goal Objective: 1 1,1 2  Therapist: TOM Gallardo

## 2022-06-14 ENCOUNTER — APPOINTMENT (OUTPATIENT)
Dept: PSYCHOLOGY | Facility: CLINIC | Age: 22
End: 2022-06-14
Payer: COMMERCIAL

## 2022-06-15 ENCOUNTER — OFFICE VISIT (OUTPATIENT)
Dept: PSYCHOLOGY | Facility: CLINIC | Age: 22
End: 2022-06-15
Payer: COMMERCIAL

## 2022-06-15 DIAGNOSIS — F33.2 SEVERE EPISODE OF RECURRENT MAJOR DEPRESSIVE DISORDER, WITHOUT PSYCHOTIC FEATURES (HCC): Primary | ICD-10-CM

## 2022-06-15 DIAGNOSIS — F41.1 ANXIETY STATE: ICD-10-CM

## 2022-06-15 PROCEDURE — S0201 PARTIAL HOSPITALIZATION SERV: HCPCS

## 2022-06-15 PROCEDURE — G0410 GRP PSYCH PARTIAL HOSP 45-50: HCPCS

## 2022-06-15 PROCEDURE — G0176 OPPS/PHP;ACTIVITY THERAPY: HCPCS

## 2022-06-15 NOTE — PSYCH
Subjective:     Patient ID: Herman Machado is a 24 y o  female  [facility] Clinical Progress Notes      Specialized Services Documentation  Therapist must complete separate progress note for each specific clinical activity in which the individual participated during the day  Group Psychotherapy  This group was facilitated virtually in a private office using HIPAA Compliant and Approved [employer] Teams  Herman Machado consented to the use of tele-video modality of treatment  ([id number]) Herman Machado actively engaged in psychoeducational group about medication management Part II reviewing    · Types of antidepressants  · Difference between antidepressants  · Antidepressant side effects  · Serotonin Syndrome  · Non-Pharmacological treatments for depression-Talk therapy, supplements, Electroconvulsive therapy, Transcranial magnetic stimulation    Group was encouraged to ask questions in an open forum at the end of group  Good effort displayed through engagement in topic  Treatment Plan Objective 1 1, 1 2, 1 3, 1 4 Therapist: Caro JEAN RN        Education Therapy     [id number] Herman Machado engaged throughout the treatment day  Was engaged in learning related to Illness, Medication, Aftercare, and Wellness Tools  Staff utilized Verbal, Written, A/V, and Demonstration teaching methods  Herman Machado shared area of learning and set a goal for outside of program to go to job interview  Tx Plan Objective: 1 1,1 2,1 4 Therapist:  Caro JEAN RN       Case Management Note  This case management session was facilitated virtually in a private office using HIPAA Compliant and Approved [employer] Teams  Herman Machado consented to the use of tele-video modality of treatment  Caro JEAN RN    Current suicide risk : Low     [id number] Met with Herman Machado  Reported she overslept this morning, she had her alarm set but must have turned it off   Yariel Granado reported she has a second job interview today to be a  also is awaiting call back regarding her housing interview from yesterday  She shared she continues to feel tired and is napping and sleeping longer during the night  Marlena and this CM discussed things she could do instead of napping  Wylesly De Paz agreed she will go for a mindful walk around town and possibly rejoin the gym  Reviewed healthy nutrition for mental health  Wylesly De Paz is going to look into healthy meals/recipe's that use nutrient dense foods  Wylesly Abbasiil reported she had a good conversation with her father yesterday, he was helping her to make up a budget  Progress noted in that Marlena participates in groups, acknowledges changes she needs to make in her life and is incrementally making changes and making better choices  Medications changes/added/denied? No    Treatment session number: 5    Individual Case Management Visit provided today?  Yes    [facility] follow up physician's orders: None at this time

## 2022-06-15 NOTE — PSYCH
Virtual Regular Visit    Verification of patient location:    Patient is located in the following state in which I hold an active license [place]      Assessment/Plan:    Problem List Items Addressed This Visit        Other    Anxiety state, unspecified    Severe episode of recurrent major depressive disorder, without psychotic features ([facility] [address] ) - Primary          Goals addressed in session:           Reason for visit is PHP VIRTUAL GROUP DUE TO COVID-19   Chief Complaint   Patient presents with    Virtual Regular Visit        Encounter provider Riley Jackson    Provider located at [address] [place] [id number]      Recent Visits  Date Type Provider Dept   06/13/22 Office Visit [address] PARTIAL PSYCH GROUP THERAPY [facility]   06/10/22 Office Visit [address] PARTIAL PSYCH GROUP THERAPY [facility]   06/09/22 Office Visit [address] PARTIAL PSYCH GROUP THERAPY [facility]   06/08/22 Office Visit [address] PARTIAL PSYCH GROUP THERAPY [facility]   Showing recent visits within past 7 days and meeting all other requirements  Today's Visits  Date Type Provider Dept   06/15/22 Office Visit [address] PARTIAL PSYCH GROUP THERAPY [facility]   Showing today's visits and meeting all other requirements  Future Appointments  No visits were found meeting these conditions  Showing future appointments within next 150 days and meeting all other requirements       The patient was identified by name and date of birth  Kavita Cullen was informed that this is a telemedicine visit and that the visit is being conducted throughMicrosoft Teams and patient was informed that this is a secure, HIPAA-compliant platform  She agrees to proceed     My office door was closed  No one else was in the room  She acknowledged consent and understanding of privacy and security of the video platform   The patient has agreed to participate and understands they can discontinue the visit at any time  Patient is aware this is a billable service  Elicia Garza is a 24 y o  female   HPI     Past Medical History:   Diagnosis Date    Anxiety     Depression     Environmental allergies     Last assessed: 1/18/17    GERD (gastroesophageal reflux disease)     Hypoglycemia     Hypothyroidism        Past Surgical History:   Procedure Laterality Date    COLONOSCOPY  2016 & 2018    Fiberoptic    EGD  2016    EGD  06/2016    MYRINGOTOMY      TONSILLECTOMY      TOOTH EXTRACTION         Current Outpatient Medications   Medication Sig Dispense Refill    Azelastine HCl 137 MCG/SPRAY SOLN USE 1 TO 2 SPRAYS IN EACH NOSTRIL 2 TIMES A DAY FOR 14 DAYS AS DIRECTED      CAMRESE 0 15-0 03 &0 01 MG TABS   0    clotrimazole-betamethasone (LOTRISONE) 1-0 05 % cream       glucose blood (ACCU-CHEK CHELA PLUS) test strip Test blood sugars up to 3 times daily or as needed for fluctuating blood sugars  100 each 2    hydrOXYzine HCL (ATARAX) 25 mg tablet Take 1 tablet (25 mg total) by mouth as needed in the morning and 1 tablet (25 mg total) as needed in the evening for anxiety  60 tablet 0    lamoTRIgine (LaMICtal) 25 mg tablet Take 2 tablets (50 mg total) by mouth in the morning  60 tablet 0    levothyroxine 25 mcg tablet TAKE 1 TABLET DAILY 90 tablet 3    sertraline (ZOLOFT) 100 mg tablet Take 1 5 tablets (150 mg total) by mouth in the morning  45 tablet 0    traZODone (DESYREL) 100 mg tablet Take 1 tablet (100 mg total) by mouth daily at bedtime 30 tablet 1    traZODone (DESYREL) 50 mg tablet Take 150 mg by mouth       No current facility-administered medications for this visit  No Known Allergies    Review of Systems    Video Exam    There were no vitals filed for this visit      Physical Exam     I spent FOUR GROUP HOURS PLUS CASE MANAGEMENT minutes with patient today in which greater than 50% of the time was spent in counseling/coordination of care regarding PHP - SEE NOTES  VIRTUAL VISIT DISCLAIMER    Lucas Gaines verbally agrees to participate in [employer]  Pt is aware that [employer] could be limited without vital signs or the ability to perform a full hands-on physical Dionisio Co understands she or the provider may request at any time to terminate the video visit and request the patient to seek care or treatment in person

## 2022-06-15 NOTE — PSYCH
Subjective:     Patient ID: Lee Ann Osorio is a 24 y o  female  [facility] Clinical Progress Notes      Specialized Services Documentation  Therapist must complete separate progress note for each specific clinical activity in which the individual participated during the day  GROUP PSYCHOTHERAPY ([id number]) Group was facilitated virtually in a private office using HIPAA Compliant and Approved [employer] Teams  Marlena consented to the use of tele-video modality of treatment and was virtually present for group psychotherapy today  The group engaged in education about anxiety and what anxiety consists of for each member  We evaluated different factors that trigger anxiety, physical symptoms, and thoughts experienced while members feel anxious  Group members were asked the following questions:  1  What are three things that trigger your anxiety? 2  What are three physical symptoms that you experience when you feel anxious? 3  What are three thoughts you tend to have when you feel anxious? 4  What are three things you can do to cope with your anxiety? Reagan Borges seemed very engaged throughout the group session  Reagan Borges stated that one of the things that trigger their anxiety is talking about finances or anything pertaining to money  Reagan Borges is encouraged to make progress towards goals and objectives through group participation and will continue to attend psychotherapy group       Tx plan objective: 1 1, 1 2   Therapist: Jake Adames MA

## 2022-06-15 NOTE — PSYCH
Subjective:     Patient ID: Beti Gil is a 24 y o  female  [facility] Clinical Progress Notes      Specialized Services Documentation  Therapist must complete separate progress note for each specific clinical activity in which the individual participated during the day  Group Psychotherapy [employer] ([id number])  Beti Gil actively engaged in group focused on core beliefs which was facilitated virtually in a private office using HIPAA Compliant and Approved [employer] Teams  Marlena consented to the use of tele-video modality of treatment and was virtually present for group psychotherapy today  Group members watched a short video, Healing Your Negative Core Beliefs  Clients learned and share about their core beliefs  The group discussed how core beliefs influence their thoughts and behaviors  During the activity the group learned about cognitive reframing and explored ways to reshape their negative core beliefs into positive beliefs  Group members wrote one of their negative core beliefs and had to provide three pieces of evidence that prove the negative core belief untrue  During the exercise, Chloe negative thoughts were challenged by three pieces of evidence , one of them being  I have the power to change the way I look   Discussed thought records and how to use them  Clients were asked to challenge one negative thought per day  Geraldine Gamino continues to make progress towards goals through verbal participation in group; to accomplish long term goals continue to utilize skills learned in programming  Continue with psychotherapy to educate and encourage use of wellness tools  Tx Plan Objective: 1 1,1 2 Therapist: Antoni Simon      Education Therapy   [id number] Beti Gil was not present for morning assessment           Tx Plan Objective: 1 1,1 2,1 4 Therapist:  TOM Mancera

## 2022-06-16 ENCOUNTER — TELEMEDICINE (OUTPATIENT)
Dept: PSYCHIATRY | Facility: CLINIC | Age: 22
End: 2022-06-16
Payer: COMMERCIAL

## 2022-06-16 ENCOUNTER — OFFICE VISIT (OUTPATIENT)
Dept: PSYCHOLOGY | Facility: CLINIC | Age: 22
End: 2022-06-16
Payer: COMMERCIAL

## 2022-06-16 DIAGNOSIS — F33.2 SEVERE EPISODE OF RECURRENT MAJOR DEPRESSIVE DISORDER, WITHOUT PSYCHOTIC FEATURES (HCC): Primary | ICD-10-CM

## 2022-06-16 DIAGNOSIS — F41.1 ANXIETY STATE: ICD-10-CM

## 2022-06-16 PROCEDURE — G0177 OPPS/PHP; TRAIN & EDUC SERV: HCPCS

## 2022-06-16 PROCEDURE — S0201 PARTIAL HOSPITALIZATION SERV: HCPCS

## 2022-06-16 PROCEDURE — G0176 OPPS/PHP;ACTIVITY THERAPY: HCPCS

## 2022-06-16 PROCEDURE — 99212 OFFICE O/P EST SF 10 MIN: CPT | Performed by: PHYSICIAN ASSISTANT

## 2022-06-16 PROCEDURE — G0410 GRP PSYCH PARTIAL HOSP 45-50: HCPCS

## 2022-06-16 NOTE — PSYCH
Virtual Regular Visit    Verification of patient location:    Patient is located in the following state in which I hold an active license [place]             Reason for visit is   Chief Complaint   Patient presents with    Virtual Regular Visit        Encounter provider Kathleen Ramirez PA-C    Provider located at [address]  [phone]      Recent Visits  No visits were found meeting these conditions  Showing recent visits within past 7 days and meeting all other requirements  Future Appointments  No visits were found meeting these conditions  Showing future appointments within next 150 days and meeting all other requirements       The patient was identified by name and date of birth  Lucas Gaines was informed that this is a telemedicine visit and that the visit is being conducted throughMicrosoft Teams and patient was informed that this is a secure, HIPAA-compliant platform  She agrees to proceed     My office door was closed  No one else was in the room  She acknowledged consent and understanding of privacy and security of the video platform  The patient has agreed to participate and understands they can discontinue the visit at any time  Patient is aware this is a billable service  VIRTUAL VISIT DISCLAIMER    Lucas Gaines verbally agrees to participate in [employer]  Pt is aware that [employer] could be limited without vital signs or the ability to perform a full hands-on physical Dionisio Co understands she or the provider may request at any time to terminate the video visit and request the patient to seek care or treatment in person  Progress Note - Behavioral Health   [facility], [facility]  Marlena Campos Hey 24 y o  female MRN: [id number]     Progress at partial program: some improvement  Marlena seen by Dr Arturo Campbell 6/7 at which time meds unchanged    She is still on lamictal 25 mg/d  Has been taking trazodone 100-150 mg 2-3x past week  Is taking zoloft 150 mg/d and prn atarax -sometimes uses atarax at bedtime instead of trazodone  Denies dizziness  Feels a little better- not as hopeless, more motivated and improved goal setting  Primary symptoms reported today: hypersomnia, fatigue, racing thoughts, need to stay busy; avoidance of thoughts and worries  Drinking alcohol 2-3 days of this past week- couple mixed drinks each time  Using THC daily  Denies blackouts                   ROS:   As above otherwise negative    Active Ambulatory Problems     Diagnosis Date Noted    Allergic rhinitis 10/12/2010    Other specified hypothyroidism 12/18/2017    Iron deficiency anemia 04/22/2015    Psoriasis 12/15/2017    Anxiety state, unspecified 11/19/2012    Lower abdominal pain 05/24/2018    Major depressive disorder with single episode, in partial remission ([address] [facility] ) 12/18/2018    Dyslipidemia 12/19/2018    Hypothyroidism 07/18/2018    Functional diarrhea 03/11/2020    Calculus of gallbladder without cholecystitis without obstruction 03/11/2020    GERD (gastroesophageal reflux disease) 04/07/2020    Dyspepsia 05/14/2020    Severe episode of recurrent major depressive disorder, without psychotic features ([address] ) 06/07/2022     Resolved Ambulatory Problems     Diagnosis Date Noted    No Resolved Ambulatory Problems     Past Medical History:   Diagnosis Date    Anxiety     Depression     Environmental allergies     Hypoglycemia      No Known Allergies       Mental Status Evaluation:    Appearance:  age appropriate   Behavior:  cooperative   Speech:  normal rate and volume   Mood:  mildly low   Affect:  mood-congruent   Thought Process:  goal directed   Associations: intact associations   Thought Content:  negative thinking   Perceptual Disturbances: none   Risk Potential: Suicidal ideation - None  Homicidal ideation - None   Sensorium:  oriented to person, place and time/date   Memory:  recent and remote memory grossly intact   Consciousness:  alert and awake   Attention: attention span and concentration are age appropriate   Insight:  intact   Judgment: intact   Gait/Station: unable to assess   Motor Activity: unable to assess today due to virtual visit       Laboratory results: no recent pertinent labs in EPIC      Assessment   Diagnoses and all orders for this visit:    Severe episode of recurrent major depressive disorder, without psychotic features ([address] )    Anxiety state, unspecified       Current Outpatient Medications   Medication Sig Dispense Refill    Azelastine HCl 137 MCG/SPRAY SOLN USE 1 TO 2 SPRAYS IN EACH NOSTRIL 2 TIMES A DAY FOR 14 DAYS AS DIRECTED      CAMRESE 0 15-0 03 &0 01 MG TABS   0    clotrimazole-betamethasone (LOTRISONE) 1-0 05 % cream       glucose blood (ACCU-CHEK CHELA PLUS) test strip Test blood sugars up to 3 times daily or as needed for fluctuating blood sugars  100 each 2    hydrOXYzine HCL (ATARAX) 25 mg tablet Take 1 tablet (25 mg total) by mouth as needed in the morning and 1 tablet (25 mg total) as needed in the evening for anxiety  60 tablet 0    lamoTRIgine (LaMICtal) 25 mg tablet Take 2 tablets (50 mg total) by mouth in the morning  60 tablet 0    levothyroxine 25 mcg tablet TAKE 1 TABLET DAILY 90 tablet 3    sertraline (ZOLOFT) 100 mg tablet Take 1 5 tablets (150 mg total) by mouth in the morning  45 tablet 0    traZODone (DESYREL) 100 mg tablet Take 1 tablet (100 mg total) by mouth daily at bedtime 30 tablet 1    traZODone (DESYREL) 50 mg tablet Take 150 mg by mouth       No current facility-administered medications for this visit  Plan    Planned medication and treatment changes:  Discussed need to stop alcohol and THC   No med change  All current active medications have been reviewed    Continue treatment with group therapy and partial program      Risks / Benefits of Treatment:    Risks, benefits, and possible side effects of medications explained to patient and patient verbalizes understanding and agrees to medications  Counseling / Coordination of Care:    Patient's progress discussed with staff in treatment team meeting  Medications, treatment progress and treatment plan reviewed with patient      Soheila Monsivais PA-C 06/16/22

## 2022-06-16 NOTE — PSYCH
Subjective:     Patient ID: Sonya Boggs is a 24 y o  female  [facility] Clinical Progress Notes      Specialized Services Documentation  Therapist must complete separate progress note for each specific clinical activity in which the individual participated during the day  Allied Therapy   Group was facilitated virtually in a private office using HIPAA compliant and approved [employer] Teams  Snoya Boggs  consented to the use of tele-video modality of treatment and was virtually present for group allied therapy  [address]  actively shared in [facility] group focused on stress management skills  Yoan Delgadoton was observed to be engaged in therapist led visualization as well as a progressive muscle relaxation  Group discussed symptoms of stress and seven tips for stress management with encouragement to use daily  Yoan Brothers identified visualizations as a stress management skill that they would implement  Good effort noted toward treatment goal  Continue AT to encourage development and practice of stress management skills     Tx Plan Objective: 1 1,1 2,1 4, Therapist:  BROCK Padilla

## 2022-06-16 NOTE — PSYCH
Subjective:     Patient ID: Sneha Connor is a 24 y o  female  [facility] Clinical Progress Notes      Specialized Services Documentation  Therapist must complete separate progress note for each specific clinical activity in which the individual participated during the day  Group Psychotherapy  This group was facilitated virtually in a private office using HIPAA Compliant and Approved [employer] Teams  Sneha Connor consented to the use of tele-video modality of treatment  ([id number]) Sneha Connor actively shared in psychotherapy group exploring the benefits of exercise to support mental wellness  Group explored healthy exercises, barriers to exercise, strategies to overcome barriers, and Qigong exercise for beginners  Someprogress toward goal noted  Continue psychotherapy to encourage self-awareness and home practice of skills to support wellness  Treatment Plan Objective: 1 1, 1 2   Therapist: Connor JEAN RN       Education Therapy   [id number] Sneha Connor actively shared in morning assessment and goal review  Presented as Receptive related to readiness to learn  Sneha Connor did complete goal from last treatment day identifying gaining responsibility  did not present with any barriers to learning  [id number] Sneha Connor engaged throughout the treatment day  Was engaged in learning related to Illness, Medication, Aftercare, and Wellness Tools  Staff utilized Verbal, Written, A/V, and Demonstration teaching methods  Sneha Connor shared area of learning and set a goal for outside of program to do visualization to help her sleep tonight  Tx Plan Objective: 1 1,1 2,1 4 Therapist:  Connor JEAN RN    Case Management Note    Connor JEAN RN    Current suicide risk : Low     A case management session is not scheduled today with Sneha Connor ; additionally, they did not request a CM meeting  Elenita Seals is aware of next 1:1     Medications changes/added/denied? No    Treatment session number: 6    Individual Case Management Visit provided today?  No    [facility] follow up physician's orders: None at this time

## 2022-06-16 NOTE — PSYCH
Subjective:     Patient ID: Marilyn Madden is a 24 y o  female  [facility] Clinical Progress Notes      Specialized Services Documentation  Therapist must complete separate progress note for each specific clinical activity in which the individual participated during the day  Group Psychotherapy (10:45-11:45) Marilyn Madden actively engaged in group focused on anxiety igniting thought which was facilitated virtually in a private office using HIPAA Compliant and Approved Microsoft Teams  Marlena consented to the use of tele-video modality of treatment and was virtually present for group psychotherapy today  This group began with a mindfulness meditation  Participants then dicussed anxiety overall and the impact of anxious thoughts on the brain  Participants then reviewed different types of anxious thinking, such as perfectionism, guilt/shame, obsessive thinking and pessimism  Participants discussed whether or not they struggle with each area and then completed an inventory scoring how many statements in each area they agreed with  Participants discussed ways of coping such as mindfulness, and coping statements  Dianna Ovalles was attentive and participated throughout this group  She read for the group and also shared that she thought of her gratitude towards her pet bunny and the iris he brings her during the meditation  Dianna Ovalles discussed how she guilherme with guilt and shame by extending forgiveness to others and seeks to show herself the same level of compassion  Dianna Ovalles is making good connections in group to meet her treatment goals      Tx Goal Objective: 1 1,1 2  Therapist: TOM Garcia

## 2022-06-16 NOTE — PSYCH
Virtual Regular Visit    Verification of patient location:    Patient is located in the following state in which I hold an active license [place]      Assessment/Plan:    Problem List Items Addressed This Visit        Other    Anxiety state, unspecified    Severe episode of recurrent major depressive disorder, without psychotic features ([facility] [address] ) - Primary          Goals addressed in session:           Reason for visit is PHP VIRTUAL GROUP DUE TO COVID-19      Encounter provider Riley Jackson    Provider located at [address] [place] [id number]      Recent Visits  Date Type Provider Dept   06/15/22 Office Visit [address] PARTIAL PSYCH GROUP THERAPY [facility]   06/13/22 Office Visit [address] PARTIAL PSYCH GROUP THERAPY [facility]   06/10/22 Office Visit [address] PARTIAL PSYCH GROUP THERAPY [facility]   06/09/22 Office Visit [address] PARTIAL PSYCH GROUP THERAPY [facility]   Showing recent visits within past 7 days and meeting all other requirements  Today's Visits  Date Type Provider Dept   06/16/22 Office Visit [address] PARTIAL PSYCH GROUP THERAPY [facility]   Showing today's visits and meeting all other requirements  Future Appointments  No visits were found meeting these conditions  Showing future appointments within next 150 days and meeting all other requirements       The patient was identified by name and date of birth  Marilyn Madden was informed that this is a telemedicine visit and that the visit is being conducted throughMicrosoft Teams and patient was informed that this is a secure, HIPAA-compliant platform  She agrees to proceed     My office door was closed  No one else was in the room  She acknowledged consent and understanding of privacy and security of the video platform   The patient has agreed to participate and understands they can discontinue the visit at any time     Patient is aware this is a billable service  Elicia Garza is a 24 y o  female   HPI     Past Medical History:   Diagnosis Date    Anxiety     Depression     Environmental allergies     Last assessed: 1/18/17    GERD (gastroesophageal reflux disease)     Hypoglycemia     Hypothyroidism        Past Surgical History:   Procedure Laterality Date    COLONOSCOPY  2016 & 2018    Fiberoptic    EGD  2016    EGD  06/2016    MYRINGOTOMY      TONSILLECTOMY      TOOTH EXTRACTION         Current Outpatient Medications   Medication Sig Dispense Refill    Azelastine HCl 137 MCG/SPRAY SOLN USE 1 TO 2 SPRAYS IN EACH NOSTRIL 2 TIMES A DAY FOR 14 DAYS AS DIRECTED      CAMRESE 0 15-0 03 &0 01 MG TABS   0    clotrimazole-betamethasone (LOTRISONE) 1-0 05 % cream       glucose blood (ACCU-CHEK CHELA PLUS) test strip Test blood sugars up to 3 times daily or as needed for fluctuating blood sugars  100 each 2    hydrOXYzine HCL (ATARAX) 25 mg tablet Take 1 tablet (25 mg total) by mouth as needed in the morning and 1 tablet (25 mg total) as needed in the evening for anxiety  60 tablet 0    lamoTRIgine (LaMICtal) 25 mg tablet Take 2 tablets (50 mg total) by mouth in the morning  60 tablet 0    levothyroxine 25 mcg tablet TAKE 1 TABLET DAILY 90 tablet 3    sertraline (ZOLOFT) 100 mg tablet Take 1 5 tablets (150 mg total) by mouth in the morning  45 tablet 0    traZODone (DESYREL) 100 mg tablet Take 1 tablet (100 mg total) by mouth daily at bedtime 30 tablet 1    traZODone (DESYREL) 50 mg tablet Take 150 mg by mouth       No current facility-administered medications for this visit  No Known Allergies    Review of Systems    Video Exam    There were no vitals filed for this visit  Physical Exam     I spent FOUR GROUP HOURS PLUS CASE MANAGEMENT minutes with patient today in which greater than 50% of the time was spent in counseling/coordination of care regarding PHP - SEE NOTES  VIRTUAL VISIT DISCLAIMER    Joan Mike verbally agrees to participate in [employer]  Pt is aware that [employer] could be limited without vital signs or the ability to perform a full hands-on physical Ana [facility] understands she or the provider may request at any time to terminate the video visit and request the patient to seek care or treatment in person

## 2022-06-17 ENCOUNTER — OFFICE VISIT (OUTPATIENT)
Dept: PSYCHOLOGY | Facility: CLINIC | Age: 22
End: 2022-06-17
Payer: COMMERCIAL

## 2022-06-17 DIAGNOSIS — F41.1 ANXIETY STATE: ICD-10-CM

## 2022-06-17 DIAGNOSIS — F33.2 SEVERE EPISODE OF RECURRENT MAJOR DEPRESSIVE DISORDER, WITHOUT PSYCHOTIC FEATURES (HCC): Primary | ICD-10-CM

## 2022-06-17 NOTE — PSYCH
Assessment/Plan:      Diagnoses and all orders for this visit:    Severe episode of recurrent major depressive disorder, without psychotic features ([facility] [address] )    Anxiety state, unspecified          Subjective:     Patient ID: Xavier Mccall is a 24 y o  female  [facility] Treatment Plan   AREAS OF NEED: Depression and anxiety as evidenced by lack of motivation and energy, +helplessness, intrusive thoughts, impulsive, sleep disturbance, +worrying, appetite disturbances due to financial, career, family, household, and weight gain stressors     Date Initiated: 06/07/22     Strengths: "communication, singing, compassionate, empathetic, creative"           LONG TERM GOAL:   Date Initiated: 06/07/22  1 0 I will identify and share three ways in which my day-to-day functioning has improved since attending program   Target Date: 07/05/2022  Completion Date:         SHORT TERM OBJECTIVES:      Date Initiated: 06/07/22  1 1 I will learn and practice daily a new coping technique to improve my day-to-day functioning  Revision Date: 06/17/22 continue  Target Date: 06/17/2022  Completion Date:      Date Initiated: 06/07/22  1 2 I will identify and record three mindfulness and/or self-monitoring strategies that I can utilize on a daily basis to make rational and safe decisions, rather than impulsive  Revision Date: 06/17/22 continue  Target Date: 06/17/2022  Completion Date:     Date Initiated: 06/07/22  1 3 I will take medications as prescribed and share questions and concerns if arise  Revision Date: 06/17/22 continue  Target Date: 06/17/2022  Completion Date:      Date Initiated: 06/07/22  1 4 I will identify 3 ways my supports can assist in my recovery and agree to staff/support contact as indicated  Revision Date: 06/17/22 continue  Target Date: 06/17/2022  Completion Date:            7 DAY REVISION:     Date Initiated: 06/17/22   1 5 I will make phone calls to set up outpatient provider appointment by 06/20/2022    Revision Date:   Target Date: 06/29/2022  Completion Date:        PSYCHIATRY:  Date Initiated:  06/07/22  Medication Management and Education       Revision Date: 06/17/22        1 3 Continue medication management        The person(s) responsible for carrying out the plan is Peter Lyn MD; Vinh Tabares PA-C     NURSING/SYMPTOM EDUCATION:   Date Initiated: 06/07/22  1 1, 1 2  1 3, 1 4 This RN/Or Therapist will provide wellness/symptoms and skill education groups three to five days weekly to educate Concepcion Martinez on signs and symptoms of diagnoses, skills to manage, and medication questions that will be addressed by the treatment team     Revision date: 06/17/22  1 1,1 2,1 3,1 4,1 5 Continue to encourage Concepcion Martinez to participate in wellness/symptoms and skills education groups daily to learn about symptoms, coping strategies and warning signs to promote relapse prevention  The person(s) responsible for carrying out the plan is Romi Mason RN, BSN     PSYCHOLOGY:   Date Initiated: 06/07/22       1 1, 1 2, 1 4 Provide psychotherapy group 5 times per week to allow opportunity for Concepcion Martinez  to explore stressors and ways of coping  Revision Date: 06/17/22   1 1,1 2,1 4,1 5  Continue to provide psychotherapy group daily to Concepcion Martinez and encourage sharing of stressors, skills and positive change  The person(s) responsible for carrying out the plan is TOM Duggan,LSW     ALLIED THERAPY:   Date Initiated: 06/07/22  1 1,1 2 Engage Concepcion Martinez in AT group 5 times weekly to encourage development and use of wellness tools to decrease symptoms and promote recovery through meaningful activity  Revision Date: 06/17/22       1 1,1 2,1 5 Continue to engage Concepcion Martinez to participate in AT group to practice wellness tools within program and transfer to home sharing successes and barriers through healthy      task involvement              The person(s) responsible for carrying out the plan is BROCK Rollins or Haydee Wei [facility]     CASE MANAGEMENT:   Date Initiated: 06/07/22      1 0 This  will meet with Jenny Molina  3-4 times weekly to assess treatment progress, discharge planning, connection to community supports and UR as indicated  Revision Date: 06/17/22   1 0 Continue to meet with Jenny Molina 3-4 times weekly to assess growth, work toward goals, continued treatment needs, dc planning and use of supports  The person(s) responsible for carrying out the plan is Dillan JEAN RN     TREATMENT REVIEW/COMMENTS:      DISCHARGE CRITERIA: Identify 3 signs of progress and complete relapse prevention plan  DISCHARGE PLAN: Connect with identified outpatient providers  Estimated Length of Stay: 10 treatment days        Diagnosis and Treatment Plan explained to Darlene Natalie relates understanding diagnosis and is agreeable to Treatment Plan           CLIENT COMMENTS / Please share your thoughts, feelings, need and/or experiences regarding your treatment plan: _____________________________________________________________________________________________________________________________________________________________________________________________________________________________________________________________________________________________________________________ Date/Time: ______________     Patient Signature: _________________________________     Date/Time: ______________      Signature: _________________________________     Date/Time: ______________

## 2022-06-17 NOTE — PSYCH
Subjective:     Patient ID: James Bazan is a 24 y o  female  [facility] Clinical Progress Notes      Specialized Services Documentation  Therapist must complete separate progress note for each specific clinical activity in which the individual participated during the day  Group Psychotherapy (9:30-10:30)Group was facilitated virtually in a private office using HIPAA compliant and approved Microsoft Teams  James Bazan  consented to the use of tele-video modality of treatment and was virtually present for this group  This  Group was on creating daily structure  Participants began with a reflection about what they spent the most time on the previous day during their time in group and whether or not it was planned or unplanned, if they are happy with how they spent their time or if they wished they would have spent their time differently incorporating something else  Participants then discussed the impacts of not having structure and shared times in life where the structure in their life may have changed and the impacts  Participants discussed how lack of structure can be associated with poor sleep, feelings of being emotionally and physically drained, increased worry, and physical deterioration  The benefits of structure for mental health and when in recovery from drugs and alcohol were discussed  Participants reflected on the negative impacts of boredom and how it could lead to negative emotions and unhealthy or risky behaviors  Participants discussed how structure can lead to a sense of control and predictability and increased calmness  The group discussed internal and external sources or distraction that can destroy structure, such as social media, TV programs, other people's interruptions (external) and one's own fears, doubts, reflection on what they should be doing, and dwelling on not knowing where to start (internal)   Participants discussed the importance of including components of a typical day and those more mundane tasks in their schedule  The group reflected on how those tasks, such as cooking and laundry, can seem overwhelming but the importance of allotting time for them to be more productive and free up time for things one enjoys or pursuit of goals  Lastly participants review an example time blocked schedule and produced their own for Saturday, including things they have scheduled already, things they want and need to get done  Participants were challenged to keep to the schedule as best as they could but more importantly log what actually occurs to observe how they may be distracted from their plans and where they may need to make adjustments  Reagan Borges was attentive and shared throughout the group  Reagan Borges shared she spent a lot of time the previous day sleeping  The group discussed allotting time for rest when needed and trying to schedule an amount of time for it so that other tasks can also get completed and it does not detail the day  Reagan Borges continues to engage in groups and is open to skills to meet her treatment goals     Tx Goal Objective: 1 1,1 2  Therapist: TOM Nassar

## 2022-06-17 NOTE — PSYCH
Subjective:     Patient ID: Maria Esther Malik is a 24 y o  female  [facility] Clinical Progress Notes      Specialized Services Documentation  Therapist must complete separate progress note for each specific clinical activity in which the individual participated during the day  GROUP PSYCHOTHERAPY ([id number]) Group was facilitated virtually in a private office using HIPAA Compliant and Approved Microsoft Teams  Marlena consented to the use of tele-video modality of treatment and was virtually present for group psychotherapy today  The group received education about adverse childhood experiences and the different ways these experiences impact physical and mental health in the long-term  Members watched a Jose A Talk by Dr Susan Bowen, who discusses the different physical conditions that people are possibly at a higher risk for when they face an increased amount of adversity  Members were asked to complete the ACEs questionnaire and then processed the different ways those early events have affected their current lives  Members were asked the following question:  1  What is one thing youve learned about adverse childhood experiences? Meño Hampton was not present during this session       [facility] Objectives: n/a  Therapist: Cat Inman MA

## 2022-06-17 NOTE — PSYCH
Subjective:     Patient ID: Marilyn Madden is a 24 y o  female  [facility] Clinical Progress Notes      Specialized Services Documentation  Therapist must complete separate progress note for each specific clinical activity in which the individual participated during the day  Allied Therapy   Group was facilitated virtually in a private office using HIPAA compliant and approved Microsoft Teams  Marilyn Madden  consented to the use of tele-video modality of treatment and was virtually present for group allied therapy  [address]  Did not attend group on wellness assessment, which evaluates progress on several different areas of wellness/wellbeing: physical, emotional, cognitive, vocational, social and spiritual  Clients rated their progress and discussed areas that need work  By completing and discussing areas of progress and challenges, members are connected and reminded that, in their mental health struggle, they are not alone  Topics of discussion revolved around positive experiences within each area of wellness as well as the challenging aspects to wellness within their past week  Minimal progress towards goals through participation in group activity and personal disclosures  Continue AT to encourage the development and practice of reflecting on their mental health journey  to alleviate symptoms and support wellness  Tx Plan Objective: 1 1,1 2,1 4, Therapist:  BROCK Gutierrez    Education Therapy   [id number] Marilyn Madden actively shared in morning assessment and goal review  Presented as Receptive related to readiness to learn  Marilyn Madden did not complete goal from last treatment day identifying the hope to gain peace  [facility] did not present with any barriers to learning  [id number] Marilyn Madden did not attend    Tx Plan Objective: 1 1,1 2,1 4, Therapist:  BROCK Gutierrez

## 2022-06-17 NOTE — PSYCH
Subjective:     Patient ID: Olympia Cooks is a 24 y o  female  [facility] Clinical Progress Notes      Specialized Services Documentation  Therapist must complete separate progress note for each specific clinical activity in which the individual participated during the day  Case Management Note  This case management session was facilitated virtually in a private office using HIPAA Compliant and Approved Microsoft Teams  Olympia Cooks consented to the use of tele-video modality of treatment  Kelvin Calvert RN, BSN    Current suicide risk : Low     [id number] Met with Olympia Cooks and she stated,  "I'm a bit down, I didn't get the job I was hoping for or the housing  Now I need to start looking for another apartment and job, I need to go to the store to have my computer looked at today, I have a lot to do"  Support and encouragement provided  Veloz Colleen stated, "Do you do any alcohol or drug abuse therapy? I think maybe I should see a therapist who does DBT as well"  Discussed provider list this CM sent to her earlier this week and Roselia Macias will begin to make phone calls to obtain a therapist by 06/21/2022  Drug and Alcohol provider list emailed to Roselia Mendiola I would assist in finding a therapist who does DBT  Progress noted in that Roselia Macias was able to advocate for herself for what it is she needs with therapy  Barriers continue to be chronic mental health, use of alcohol and marijuana  Reviewed treatment plan update  Veloz Colleen stated she felt encouraged to know there are resources she can look into  [phone] Received call from Crissy Braden, Olympia Cooks mother who also is her emergency contact  Yudi Hutton stated, "I just talked with Roselia Macias,  and that Velozfox Macias thinks there must be something other than depression and anxiety wrong with her, and that she just can't do this anymore, she doesn't feel she is getting any help with substance abuse and that she know's the coping skills discussed in groups with your facility"   Yudi Hutton shared she knows Mal Reyes would not harm her self and denied that Mal Reyes expressed any thoughts of SI or passive death wishes  Discussed DBT therapy with [facility] and shared I would share resources with Marlena  [facility] also asked if this writer thinks she should give $2000 to Mal Reyes for an apartment in [place] or have her move up near [place]- near [facility] and Marlena's father  I advised for them to have a family meeting to make that decision  [facility] also shared her past medical history and how Marlena could impact her health negatively  [facility] questioned drug and alcohol type rehab's- Email sent to [facility] with Drug and Alcohol provider list  [facility] is aware of Crisis and numbers if needed  [facility] was satisfied with discussion and outcome of discussion  At approximately [address] with Mal Reyes, regarding call from her mother and discussed her mothers concern  Mal Reyes was receptive to having a family meeting to discuss her housing concerns  Denied SI/HI or thoughts of self harm  Mal Reyes is future oriented  Mal Reyes stated she was going to [employer] to  a DBT workbook and plans to start working on it over the weekend  Additional resource, DBT workbook sent via email to Mal Reyes  Mal Reyes also shared she want's to apply to other jobs over the weekend  Medications changes/added/denied? No    Treatment session number: 7    Individual Case Management Visit provided today?  Yes     [facility] follow up physician's orders: No new orders

## 2022-06-17 NOTE — PSYCH
Virtual Regular Visit    Verification of patient location:    Patient is located in the following state in which I hold an active license [place]      Assessment/Plan:    Problem List Items Addressed This Visit        Other    Anxiety state, unspecified    Severe episode of recurrent major depressive disorder, without psychotic features ([facility] [address] ) - Primary          Goals addressed in session:           Reason for visit is PHP VIRTUAL GROUP DUE TO COVID-19      Encounter provider Riley Jackson    Provider located at [address] [place] [id number]      Recent Visits  Date Type Provider Dept   06/16/22 Office Visit [facility] PARTIAL PSYCH GROUP THERAPY [facility]   06/15/22 Office Visit [facility] PARTIAL PSYCH GROUP THERAPY [facility]   06/13/22 Office Visit [facility] PARTIAL PSYCH GROUP THERAPY [facility]   06/10/22 Office Visit [facility] PARTIAL PSYCH GROUP THERAPY [facility]   Showing recent visits within past 7 days and meeting all other requirements  Future Appointments  No visits were found meeting these conditions  Showing future appointments within next 150 days and meeting all other requirements       The patient was identified by name and date of birth  Williechelsey Downs was informed that this is a telemedicine visit and that the visit is being conducted throughMicrosoft Teams and patient was informed that this is a secure, HIPAA-compliant platform  She agrees to proceed     My office door was closed  No one else was in the room  She acknowledged consent and understanding of privacy and security of the video platform  The patient has agreed to participate and understands they can discontinue the visit at any time  Patient is aware this is a billable service  Nirav Yusuf is a 24 y o  female         HPI     Past Medical History:   Diagnosis Date    Anxiety     Depression     Environmental allergies     Last assessed: 1/18/17    GERD (gastroesophageal reflux disease)     Hypoglycemia     Hypothyroidism        Past Surgical History:   Procedure Laterality Date    COLONOSCOPY  2016 & 2018    Fiberoptic    EGD  2016    EGD  06/2016    MYRINGOTOMY      TONSILLECTOMY      TOOTH EXTRACTION         Current Outpatient Medications   Medication Sig Dispense Refill    Azelastine HCl 137 MCG/SPRAY SOLN USE 1 TO 2 SPRAYS IN EACH NOSTRIL 2 TIMES A DAY FOR 14 DAYS AS DIRECTED      CAMRESE 0 15-0 03 &0 01 MG TABS   0    clotrimazole-betamethasone (LOTRISONE) 1-0 05 % cream       glucose blood (ACCU-CHEK CHELA PLUS) test strip Test blood sugars up to 3 times daily or as needed for fluctuating blood sugars  100 each 2    hydrOXYzine HCL (ATARAX) 25 mg tablet Take 1 tablet (25 mg total) by mouth as needed in the morning and 1 tablet (25 mg total) as needed in the evening for anxiety  60 tablet 0    lamoTRIgine (LaMICtal) 25 mg tablet Take 2 tablets (50 mg total) by mouth in the morning  60 tablet 0    levothyroxine 25 mcg tablet TAKE 1 TABLET DAILY 90 tablet 3    sertraline (ZOLOFT) 100 mg tablet Take 1 5 tablets (150 mg total) by mouth in the morning  45 tablet 0    traZODone (DESYREL) 100 mg tablet Take 1 tablet (100 mg total) by mouth daily at bedtime 30 tablet 1    traZODone (DESYREL) 50 mg tablet Take 150 mg by mouth       No current facility-administered medications for this visit  No Known Allergies    Review of Systems    Video Exam    There were no vitals filed for this visit  Physical Exam     I spent FOUR GROUP HOURS PLUS CASE MANAGEMENT minutes with patient today in which greater than 50% of the time was spent in counseling/coordination of care regarding PHP - SEE NOTES  VIRTUAL VISIT DISCLAIMER    Pau Mcadams verbally agrees to participate in [employer]   Pt is aware that [employer] could be limited without vital signs or the ability to perform a full hands-on physical Emma Pham understands she or the provider may request at any time to terminate the video visit and request the patient to seek care or treatment in person

## 2022-06-20 ENCOUNTER — OFFICE VISIT (OUTPATIENT)
Dept: PSYCHOLOGY | Facility: CLINIC | Age: 22
End: 2022-06-20
Payer: COMMERCIAL

## 2022-06-20 DIAGNOSIS — F41.1 ANXIETY STATE: ICD-10-CM

## 2022-06-20 DIAGNOSIS — F33.2 SEVERE EPISODE OF RECURRENT MAJOR DEPRESSIVE DISORDER, WITHOUT PSYCHOTIC FEATURES (HCC): Primary | ICD-10-CM

## 2022-06-20 PROCEDURE — S0201 PARTIAL HOSPITALIZATION SERV: HCPCS

## 2022-06-20 PROCEDURE — G0410 GRP PSYCH PARTIAL HOSP 45-50: HCPCS

## 2022-06-20 PROCEDURE — G0177 OPPS/PHP; TRAIN & EDUC SERV: HCPCS

## 2022-06-20 NOTE — PSYCH
Subjective:     Patient ID: Jhon Felix is a 24 y o  female  [facility] Clinical Progress Notes      Specialized Services Documentation  Therapist must complete separate progress note for each specific clinical activity in which the individual participated during the day  Case Management Note  This case management session was facilitated virtually in a private office using HIPAA Compliant and Approved Microsoft Teams  Jhon Felix consented to the use of tele-video modality of treatment  Tatianna Bernard RN, BSN    Current suicide risk : Low     [id number] Met with Jhon Felix  Reported she was feeling better  Mal Reyes stated, most of her anxiety was coming from not having money to pay next months rent but parents now gave her the money  She also reports she had an interview today with a  and has a second interview tomorrow  Mal Reyes has not made any phone calls to make outpatient therapy appointments, a provider list was sent to her on 06/13/2022  She will call [facility] today to schedule an outpatient appointment  Mal Reyes has an reexisting appointment set up with Margareth TOLEDO/[facility] on 07/01/2022  Mal Reyes is requesting to be discharged [facility] (Tuesday 06/21/2022) as she knows most of what is being taught  Marlena reports, "I picked up a DBT workbook and since doing some of the exercises and readings I'm in a much better place"  Progress noted in that Mal Reyes is participating in groups and reports a decrease in her anxiety and depression  Mal Reyes denies SI/HI or thoughts of self harm  Medications changes/added/denied? No    Treatment session number: 8    Individual Case Management Visit provided today?  Yes     [facility] follow up physician's orders: No new orders

## 2022-06-20 NOTE — PSYCH
Subjective:     Patient ID: Beti Gil is a 24 y o  female  [facility] Clinical Progress Notes      Specialized Services Documentation  Therapist must complete separate progress note for each specific clinical activity in which the individual participated during the day  Allied Therapy   Group was facilitated virtually in a private office using HIPAA compliant and approved [employer] Teams  Beti Gil  consented to the use of tele-video modality of treatment and was virtually present for group allied therapy  [id number] Beti Gil  actively shared in [facility] group focused on triggers and warning signs  Geraldine Gamino was observed to be engaged in therapist led discussion on how these present themselves, recognizing physical and mental effects, and learning coping strategies when they do arise  Group engaged in a paul analysis and identified getting angry as a personal trigger or warning sign and going for a walk as a coping skill they could use  Good effort noted toward treatment goal  Continue AT to encourage development and practice of recognizing and coping with triggers and warning signs  Tx Plan Objective: 1 1,1 2,1 4, Therapist:  BROCK York    Education Therapy   [id number] Beti Gil actively shared in morning assessment and goal review  Presented as Receptive related to readiness to learn  Beti Gil did complete goal from last treatment day identifying gaining responsibility  Geraldine Gamino did not present with any barriers to learning  [id number] Beti Gil engaged throughout the treatment day  Was engaged in learning related to Illness, Medication, Aftercare and Wellness Tools  Staff utilized Verbal, Written, A/V and Demonstration teaching methods  Beti Gil shared area of learning and set a goal for outside of program to get colored pencils to start getting creative again        Tx Plan Objective: 1 1,1 2,1 4, Therapist:  BROCK York

## 2022-06-20 NOTE — PSYCH
Virtual Regular Visit    Verification of patient location:    Patient is located in the following state in which I hold an active license [place]      Assessment/Plan:    Problem List Items Addressed This Visit        Other    Anxiety state, unspecified    Severe episode of recurrent major depressive disorder, without psychotic features ([facility] [address] ) - Primary          Goals addressed in session:           Reason for visit is PHP VIRTUAL GROUP DUE TO COVID-19      Encounter provider Riley Jackson    Provider located at [address] [place] [phone]      Recent Visits  Date Type Provider Dept   06/17/22 Office Visit [facility] PARTIAL PSYCH GROUP THERAPY [facility]   06/16/22 Office Visit [facility] PARTIAL PSYCH GROUP THERAPY [facility]   06/15/22 Office Visit [facility] PARTIAL PSYCH GROUP THERAPY [facility]   06/13/22 Office Visit [facility] PARTIAL PSYCH GROUP THERAPY [facility]   Showing recent visits within past 7 days and meeting all other requirements  Future Appointments  No visits were found meeting these conditions  Showing future appointments within next 150 days and meeting all other requirements       The patient was identified by name and date of birth  Doris Hernandez was informed that this is a telemedicine visit and that the visit is being conducted throughMicrosoft Teams and patient was informed that this is a secure, HIPAA-compliant platform  She agrees to proceed     My office door was closed  No one else was in the room  She acknowledged consent and understanding of privacy and security of the video platform  The patient has agreed to participate and understands they can discontinue the visit at any time  Patient is aware this is a billable service  Donaldo Lozano is a 24 y o  female         HPI     Past Medical History:   Diagnosis Date    Anxiety     Depression     Environmental allergies     Last assessed: 1/18/17    GERD (gastroesophageal reflux disease)     Hypoglycemia     Hypothyroidism        Past Surgical History:   Procedure Laterality Date    COLONOSCOPY  2016 & 2018    Fiberoptic    EGD  2016    EGD  06/2016    MYRINGOTOMY      TONSILLECTOMY      TOOTH EXTRACTION         Current Outpatient Medications   Medication Sig Dispense Refill    Azelastine HCl 137 MCG/SPRAY SOLN USE 1 TO 2 SPRAYS IN EACH NOSTRIL 2 TIMES A DAY FOR 14 DAYS AS DIRECTED      CAMRESE 0 15-0 03 &0 01 MG TABS   0    clotrimazole-betamethasone (LOTRISONE) 1-0 05 % cream       glucose blood (ACCU-CHEK CHELA PLUS) test strip Test blood sugars up to 3 times daily or as needed for fluctuating blood sugars  100 each 2    hydrOXYzine HCL (ATARAX) 25 mg tablet Take 1 tablet (25 mg total) by mouth as needed in the morning and 1 tablet (25 mg total) as needed in the evening for anxiety  60 tablet 0    lamoTRIgine (LaMICtal) 25 mg tablet Take 2 tablets (50 mg total) by mouth in the morning  60 tablet 0    levothyroxine 25 mcg tablet TAKE 1 TABLET DAILY 90 tablet 3    sertraline (ZOLOFT) 100 mg tablet Take 1 5 tablets (150 mg total) by mouth in the morning  45 tablet 0    traZODone (DESYREL) 100 mg tablet Take 1 tablet (100 mg total) by mouth daily at bedtime 30 tablet 1    traZODone (DESYREL) 50 mg tablet Take 150 mg by mouth       No current facility-administered medications for this visit  No Known Allergies    Review of Systems    Video Exam    There were no vitals filed for this visit  Physical Exam     I spent FOUR GROUP HOURS PLUS CASE MANAGEMENT minutes with patient today in which greater than 50% of the time was spent in counseling/coordination of care regarding PHP - SEE NOTES  VIRTUAL VISIT DISCLAIMER    Pierce Mathis verbally agrees to participate in [employer]   Pt is aware that [employer] could be limited without vital signs or the ability to perform a full hands-on physical Carine Caldwellpao understands she or the provider may request at any time to terminate the video visit and request the patient to seek care or treatment in person

## 2022-06-20 NOTE — PSYCH
Subjective:     Patient ID: Doris Hernandez is a 24 y o  female  [facility] Clinical Progress Notes      Specialized Services Documentation  Therapist must complete separate progress note for each specific clinical activity in which the individual participated during the day  GROUP PSYCHOTHERAPY ([id number]) Group was facilitated virtually in a private office using HIPAA Compliant and Approved Microsoft Teams  Marlena consented to the use of tele-video modality of treatment and was virtually present for group psychotherapy today  The group engaged in education about the vicious cycle of mental health disorders  The worksheet discusses how a condition leads to symptoms, which then leads to compensatory behaviors, which then leads to reactions  Members processed their feelings regarding their own vicious cycle  Each member was asked to share what their cycle looks like and how their behaviors worsen their mental illness  Lanesborogladis Wigginsia seemed fairly attentive throughout the group session and seemed to be in agreement with things others were sharing  Keyur House stated that when they struggle with their mental health symptoms, their compensatory behavior is giving up on her goals  Keyur House is encouraged to make progress towards goals and objectives through group participation  They will continue to attend psychotherapy group       Tx plan objective: 1 1, 1 2   Therapist: [address], [place]

## 2022-06-20 NOTE — PSYCH
Assessment/Plan:      Diagnoses and all orders for this visit:    Severe episode of recurrent major depressive disorder, without psychotic features ([facility] [address] )    Anxiety state, unspecified          Subjective:     Patient ID: Jhon Felix is a 24 y o  female  [facility] Treatment Plan   AREAS OF NEED: Depression and anxiety as evidenced by lack of motivation and energy, +helplessness, intrusive thoughts, impulsive, sleep disturbance, +worrying, appetite disturbances due to financial, career, family, household, and weight gain stressors     Date Initiated: 06/07/22     Strengths: "communication, singing, compassionate, empathetic, creative"           LONG TERM GOAL:   Date Initiated: 06/07/22  1 0 I will identify and share three ways in which my day-to-day functioning has improved since attending program   Target Date: 07/05/2022  Completion Date: 06/21/2022 Discharged per patient request        SHORT TERM OBJECTIVES:      Date Initiated: 06/07/22  1 1 I will learn and practice daily a new coping technique to improve my day-to-day functioning  Revision Date: 06/17/22 continue  Target Date: 06/17/2022  Completion Date: 06/21/2022 Discharged per patient request     Date Initiated: 06/07/22  1 2 I will identify and record three mindfulness and/or self-monitoring strategies that I can utilize on a daily basis to make rational and safe decisions, rather than impulsive    Revision Date: 06/17/22 continue  Target Date: 06/17/2022  Completion Date: 06/21/2022 Discharged per patient request     Date Initiated: 06/07/22  1 3 I will take medications as prescribed and share questions and concerns if arise     Revision Date: 06/17/22 continue  Target Date: 06/17/2022  Completion Date: 06/21/2022 Discharged per patient request     Date Initiated: 06/07/22  1 4 I will identify 3 ways my supports can assist in my recovery and agree to staff/support contact as indicated     Revision Date: 06/17/22 continue  Target Date: 06/17/2022  Completion Date: 06/21/2022 Discharged per patient request            7 DAY REVISION:     Date Initiated: 06/17/22   1 5 I will make phone calls to set up outpatient provider appointment by 06/20/2022  Revision Date:   Target Date: 06/29/2022  Completion Date: 06/21/2022 Discharged per patient request        PSYCHIATRY:  Date Initiated:  06/07/22  Medication Management and Education       Revision Date: 06/17/22 06/21/2022 Discharged per patient request       1 3 Continue medication management        The person(s) responsible for carrying out the plan Xavier Ontiveros MD; Terrie Arguelles PA-C     NURSING/SYMPTOM EDUCATION:   Date Initiated: 06/07/22 06/21/2022 Discharged per patient request  1 1, 1 2  1 3, 1 4 This RN/Or Therapist will provide wellness/symptoms and skill education groups three to five days weekly to [facility] on signs and symptoms of diagnoses, skills to manage, and medication questions that will be addressed by the treatment team     Revision date: 06/17/22  1 1,1 2,1 3,1 4,1 5 Continue to encourage Willie Brisk to participate in wellness/symptoms and skills education groups daily to learn about symptoms, coping strategies and warning signs to promote relapse prevention  The person(s) responsible for carrying out the plan is Mare Gonzales RN, BSN     PSYCHOLOGY:   Date Initiated: 06/07/22       1 1, 1 2, 1 4 Provide psychotherapy group 5 times per week to allow opportunity for Newell Fleischer explore stressors and ways of coping  Revision Date: 06/17/22 06/21/2022 Discharged per patient request  1 1,1 2,1 4,1 5  Continue to provide psychotherapy group daily to Willie Brisk and encourage sharing of stressors, skills and positive change      The person(s) responsible for carrying out the plan is Ilir Abreu MSW,LSW     ALLIED THERAPY:   Date Initiated: 06/07/22  1 1,1 2 Engage Marlena Wade in AT group 5 times weekly to encourage development and use of wellness tools to decrease symptoms and promote recovery through meaningful activity  Revision Date: 06/17/22 06/21/2022 Discharged per patient request      1 1,1 2,1 5 Continue to engage Sukhjinder Mireles to participate in AT group to practice wellness tools within program and transfer to home sharing successes and barriers through healthy      task involvement             The person(s) responsible for carrying out the plan is BROCK lPunkett or Danna GUTIÉRREZ     CASE MANAGEMENT:   Date Initiated: 06/07/22      1 0 This  will meet with Samira Daigle times weekly to assess treatment progress, discharge planning, connection to community supports and UR as indicated  Revision Date: 06/17/22 06/21/2022 Discharged per patient request  1 0 Continue to meet with Sukhjinder Mireles 3-4 times weekly to assess growth, work toward goals, continued treatment needs, dc planning and use of supports  The person(s) responsible for carrying out the plan is Rashi JEAN RN     TREATMENT REVIEW/COMMENTS:      DISCHARGE CRITERIA: Identify 3 signs of progress and complete relapse prevention plan     DISCHARGE PLAN: Connect with identified outpatient providers              Estimated Length of Stay: 10 treatment days

## 2022-06-20 NOTE — PSYCH
Subjective:     Patient ID: Joan Mike is a 24 y o  female  [facility] Clinical Progress Notes      Specialized Services Documentation  Therapist must complete separate progress note for each specific clinical activity in which the individual participated during the day  Group Psychotherapy  (10:45-11:45)Group was facilitated virtually in a private office using HIPAA Compliant and Approved Microsoft Teams  Marlena consented to the use of tele-video modality of treatment She  Was not virtually present for group psychotherapy today  This group was on living a purposeful life  Participants participated in a brief mindfulness meditation about setting their intent for the day and the week  Participants then reflected on the importance of living with intent and purpose and on ways one's purpose in life can get shaken, lost of rejected  Participants were asked to think back to a time where their purpose felt more clear and how they were thinking, feeling and acting like at that time  Participants then completed prompts to assist them in identifying their core values, such as reflecting on those they respect most and their qualities, the things they dislike most in the world or about other people, and the values they think will be relevant 100 years from now  Participants were asked to reflect on any themes or things that stood out to them in this reflection and them moved on to choosing their own top six core values and or/push values those qualities that can get one to be motivated   Lastly participants took time to complete some journal prompts about living with purpose, reflecting on the things they love about life, their victories and successes, etc      Tx goal Objective: 1 1,1 2  Therapist: TOM Dickinson

## 2022-06-20 NOTE — PSYCH
[facility] Discharge Instructions: Discharge per patient request     Disposition: home  Address: [address] [place] [address]     Diagnosis:  1  Severe episode of recurrent major depressive disorder, without psychotic features ([address] )     2  Anxiety state, unspecified        Allergies (Drug/Food): No Known Allergies  Activity: No recommendations  Diet:no recommendations  Smoking Cessation: The best thing you can do to improve your health is to stop using tobacco  Diagnostic/Laboratory Orders: No labs or diagnostics ordered    Vaccines: If you received a vaccine, please notify your family physician on your next visit  For more information, please call [phone]  Follow-up appointments/Referrals:     Medication Management/Psych Appointment 07/01/2022 at 3:30 PM  [facility]  [address] [place] [address]  [address]  Phone: [phone]    Therapist Virtual appointment June 28, 2022 at [address] [place] [address] with Adrián Ocampo [address] [place] [address]  [address]  Phone: [phone]    PCP - Follow up as needed  Jorge Hagen DO   [address] [place] [address]   Phone: [phone]  Fax: [phone]         [address] [facility]: [facility] [phone]  Intake/Referral/Evaluation (Non-Emergency) *NON INSURED FOR FUNDING: [facility]: [phone], [facility]: [phone], [facility]: [phone] and [address] [place] [address]: [phone]  Crisis Intervention (Emergency) [address]: [facility] [phone]  National Crisis Intervention Hotline: 3-440.948.9478  National Suicide Crisis Hotline: 7-656.807.2409  I, the undersigned, have received and understand the above instructions          Patient/Rep Signature: __________________________________       Date/Time: ______________         Relationship: __________________________________________ Date/Time: ______________         Physician Signature: ____________________________________      Date/Time: ______________               Signature: ________________________________       Date/Time: ______________

## 2022-06-21 ENCOUNTER — OFFICE VISIT (OUTPATIENT)
Dept: PSYCHOLOGY | Facility: CLINIC | Age: 22
End: 2022-06-21
Payer: COMMERCIAL

## 2022-06-21 ENCOUNTER — TELEMEDICINE (OUTPATIENT)
Dept: PSYCHIATRY | Facility: CLINIC | Age: 22
End: 2022-06-21
Payer: COMMERCIAL

## 2022-06-21 DIAGNOSIS — F41.1 ANXIETY STATE: ICD-10-CM

## 2022-06-21 DIAGNOSIS — F33.2 SEVERE EPISODE OF RECURRENT MAJOR DEPRESSIVE DISORDER, WITHOUT PSYCHOTIC FEATURES (HCC): Primary | ICD-10-CM

## 2022-06-21 PROCEDURE — 99212 OFFICE O/P EST SF 10 MIN: CPT | Performed by: PHYSICIAN ASSISTANT

## 2022-06-21 PROCEDURE — G0410 GRP PSYCH PARTIAL HOSP 45-50: HCPCS

## 2022-06-21 PROCEDURE — S0201 PARTIAL HOSPITALIZATION SERV: HCPCS

## 2022-06-21 NOTE — PSYCH
Subjective:     Patient ID: Carlita Rocha is a 24 y o  female  [facility] Clinical Progress Notes      Specialized Services Documentation  Therapist must complete separate progress note for each specific clinical activity in which the individual participated during the day  Education Therapy   [id number] Carlita Rocha engaged throughout the treatment day  Was engaged in learning related to Illness, Medication, Aftercare and Wellness Tools  Staff utilized Verbal, Written, A/V and Demonstration teaching methods  Carlita Rocha did not share a goal as she was not in wrap up      Tx Plan Objective: 1 1,1 2,1 4, Therapist:  BROCK Gomez
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
## 2022-06-21 NOTE — PSYCH
Subjective:     Patient ID: Kary Juarez is a 24 y o  female  [facility] Clinical Progress Notes      Specialized Services Documentation  Therapist must complete separate progress note for each specific clinical activity in which the individual participated during the day  Group Psychotherapy  This group was facilitated virtually in a private office using HIPAA Compliant and Approved Microsoft Teams  Kary Juarez  consented to the use of tele-video modality of treatment  ([id number]) Kary Juarez quietly participated in psychoeducation group this afternoon which focused on sleep hygiene  Group then identified sleep hygiene habits that are currently effective and habits they wish to incorporate into their night time routine to promote sleep hygiene  This writer explained the importance of quality sleep in relation to wellness  Sleep diary and additional tips on sleep hygiene were discussed  Then, a video titled "How to Improve Your Sleep" was viewed  Good progress toward goal observed  Continue psychoeducation group to increase awareness of good sleeping habits to promote wellness  Tx Plan Objectives: 1 1, 1 2      Fanta JEAN RN        Education Therapy   [id number] Kary Juarez actively shared in morning assessment and goal review  Presented as Receptive related to readiness to learn  Kary Juarez did complete goal from last treatment day identifying gaining responsibility and advocacy  did not present with any barriers to learning  Tx Plan Objective: 1 1,1 2,1 4 Therapist:  Angie JEAN RN     Case Management Note    Angie JEAN RN    Current suicide risk : Low     ([id number]) CM reviewed Relapse Prevention Plan, discharge instructions, medication list and crisis information with Kary Juarez  See discharge summary for treatment details  Aftercare providers to receive discharge summary  Dayana Green denied any questions regarding discharge   Dayana Peter has an job interview at 2pm today  Medications changes/added/denied? No    Treatment session number: 9    Individual Case Management Visit provided today? Yes     [facility] follow up physician's orders: Discharge per patient request to outpatient care   Dr Sylvie Tompkins

## 2022-06-21 NOTE — PSYCH
Virtual Regular Visit    Verification of patient location:    Patient is located in the following state in which I hold an active license [place]             Reason for visit is   Chief Complaint   Patient presents with    Virtual Regular Visit        Encounter provider Vinh Tabares PA-C    Provider located at [address] [address] [phone]      Recent Visits  Date Type Provider Dept   06/16/22 Chrissy Reece PA-C [address] recent visits within past 7 days and meeting all other requirements  Future Appointments  No visits were found meeting these conditions  Showing future appointments within next 150 days and meeting all other requirements       The patient was identified by name and date of birth  Concepcion Martinez was informed that this is a telemedicine visit and that the visit is being conducted throughMicrosoft Teams and patient was informed that this is a secure, HIPAA-compliant platform  She agrees to proceed     My office door was closed  No one else was in the room  She acknowledged consent and understanding of privacy and security of the video platform  The patient has agreed to participate and understands they can discontinue the visit at any time  Patient is aware this is a billable service  VIRTUAL VISIT DISCLAIMER    Concepcion Martinez verbally agrees to participate in [employer]  Pt is aware that [employer] could be limited without vital signs or the ability to perform a full hands-on physical Augustus Clive understands she or the provider may request at any time to terminate the video visit and request the patient to seek care or treatment in person  Progress Note - Behavioral Health   [facility]  Marlena Haywood 24 y o  female MRN: [id number]     Progress at partial program: some improvement        Marlena barnes today for discharge  Feels she is ready  No meds were changed as Marlena was recommended to stop use of THC and alcohol first   Was given info for D&A f/u but has not called per tx team    Plans to f/u with Jennie for therapy and cont to f/u with Yomaira Suresh for meds     Does not need any refills -is only taking 25 mg lamictal   Denies SI          ROS:   As above otherwise negative    Active Ambulatory Problems     Diagnosis Date Noted    Allergic rhinitis 10/12/2010    Other specified hypothyroidism 12/18/2017    Iron deficiency anemia 04/22/2015    Psoriasis 12/15/2017    Anxiety state, unspecified 11/19/2012    Lower abdominal pain 05/24/2018    Major depressive disorder with single episode, [facility] partial [facility] 12/18/2018    Dyslipidemia 12/19/2018    Hypothyroidism 07/18/2018    Functional diarrhea 03/11/2020    Calculus of gallbladder without cholecystitis without obstruction 03/11/2020    GERD (gastroesophageal reflux disease) 04/07/2020    Dyspepsia 05/14/2020    Severe episode of recurrent major depressive disorder, without psychotic features ([facility] ) 06/07/2022     Resolved Ambulatory Problems     Diagnosis Date Noted    No Resolved Ambulatory Problems     Past Medical History:   Diagnosis Date    Anxiety     Depression     Environmental allergies     Hypoglycemia      No Known Allergies       Mental Status Evaluation:    Appearance:  age appropriate   Behavior:  cooperative   Speech:  normal rate and volume   Mood:  improved   Affect:  normal range and intensity   Thought Process:  goal directed   Associations: intact associations   Thought Content:  no overt delusions   Perceptual Disturbances: none   Risk Potential: Suicidal ideation - None  Homicidal ideation - None   Sensorium:  oriented to person, place and time/date   Memory:  recent and remote memory grossly intact   Consciousness:  alert and awake   Attention: attention span and concentration are age appropriate   Insight:  fair Judgment: intact   Gait/Station: unable to assess   Motor Activity: unable to assess today due to virtual visit       Laboratory results: I have personally reviewed all pertinent laboratory/tests results  Assessment   Diagnoses and all orders for this visit:    Severe episode of recurrent major depressive disorder, without psychotic features ([facility] )    Anxiety state, unspecified       Current Outpatient Medications   Medication Sig Dispense Refill    Azelastine HCl 137 MCG/SPRAY SOLN USE 1 TO 2 SPRAYS IN EACH NOSTRIL 2 TIMES A DAY FOR 14 DAYS AS DIRECTED      CAMRESE 0 15-0 03 &0 01 MG TABS   0    clotrimazole-betamethasone (LOTRISONE) 1-0 05 % cream       glucose blood (ACCU-CHEK CHELA PLUS) test strip Test blood sugars up to 3 times daily or as needed for fluctuating blood sugars  100 each 2    hydrOXYzine HCL (ATARAX) 25 mg tablet Take 1 tablet (25 mg total) by mouth as needed in the morning and 1 tablet (25 mg total) as needed in the evening for anxiety  60 tablet 0    lamoTRIgine (LaMICtal) 25 mg tablet Take 2 tablets (50 mg total) by mouth in the morning  60 tablet 0    levothyroxine 25 mcg tablet TAKE 1 TABLET DAILY 90 tablet 3    sertraline (ZOLOFT) 100 mg tablet Take 1 5 tablets (150 mg total) by mouth in the morning  45 tablet 0    traZODone (DESYREL) 100 mg tablet Take 1 tablet (100 mg total) by mouth daily at bedtime 30 tablet 1    traZODone (DESYREL) 50 mg tablet Take 150 mg by mouth       No current facility-administered medications for this visit  Plan    Planned medication and treatment changes:  No med change  [id number] Susie Pelayo for discharge  Will f/u with Shandra TOLEDO 7/1 and Jennie 6/28  Recommend D&A f/u    All current active medications have been reviewed  Discharge planning      Risks / Benefits of Treatment:    Risks, benefits, and possible side effects of medications explained to patient and patient verbalizes understanding and agrees to medications       Counseling / Coordination of Care:    Patient's progress discussed with staff in treatment team meeting  Medications, treatment progress and treatment plan reviewed with patient      Beto Brown PA-C 06/21/22

## 2022-06-21 NOTE — PSYCH
Subjective:     Patient ID: Herman Machado is a 24 y o  female  [facility] Clinical Progress Notes      Specialized Services Documentation  Therapist must complete separate progress note for each specific clinical activity in which the individual participated during the day  GROUP PSYCHOTHERAPY ([id number]) Group was facilitated virtually in a private office using HIPAA Compliant and Approved Microsoft Teams  Marlena consented to the use of tele-video modality of treatment and was virtually present for group psychotherapy today  The group engaged in education on the tasks of mourning, which include: accepting the reality of the loss, processing the pain of grief, adjusting to the world without the , and finding a way to remember the  while moving on in life  The Task of Mourning handout helps to normalize grief reactions and empower clients to view grief as an active process they can work through, rather than a passive process that happens to them  Members were asked to respond to the followin  When you think of the person or people youve lost, which task do you currently find yourself in?  2  Which of these tasks do you find to be the most difficult? Yariel Granado was not present in this session and will be discharged today       Tx plan objective: n/a  Therapist: Hema Sharif MA

## 2022-06-21 NOTE — PSYCH
Virtual Regular Visit    Verification of patient location:    Patient is located in the following state in which I hold an active license [place]      Assessment/Plan:    Problem List Items Addressed This Visit        Other    Anxiety state, unspecified    Severe episode of recurrent major depressive disorder, without psychotic features ([facility] ) - Primary          Goals addressed in session:           Reason for visit is PHP VIRTUAL GROUP DUE TO COVID-19      Encounter provider Riley Jackson    Provider located at [address] [place] [id number]      Recent Visits  Date Type Provider Dept   06/20/22 Office Visit [address] PARTIAL PSYCH GROUP THERAPY [facility]   06/17/22 Office Visit [address] PARTIAL PSYCH GROUP THERAPY [facility]   06/16/22 Office Visit [address] PARTIAL PSYCH GROUP THERAPY [facility]   06/15/22 Office Visit [address] PARTIAL PSYCH GROUP THERAPY [facility]   Showing recent visits within past 7 days and meeting all other requirements  Today's Visits  Date Type Provider Dept   06/21/22 Office Visit [address] PARTIAL PSYCH GROUP THERAPY [facility]   Showing today's visits and meeting all other requirements  Future Appointments  No visits were found meeting these conditions  Showing future appointments within next 150 days and meeting all other requirements       The patient was identified by name and date of birth  Jaz Brenner was informed that this is a telemedicine visit and that the visit is being conducted throughMicrosoft Teams and patient was informed that this is a secure, HIPAA-compliant platform  She agrees to proceed     My office door was closed  No one else was in the room  She acknowledged consent and understanding of privacy and security of the video platform   The patient has agreed to participate and understands they can discontinue the visit at any time     Patient is aware this is a billable service  Willim Gitelman is a 24 y o  female   HPI     Past Medical History:   Diagnosis Date    Anxiety     Depression     Environmental allergies     Last assessed: 1/18/17    GERD (gastroesophageal reflux disease)     Hypoglycemia     Hypothyroidism        Past Surgical History:   Procedure Laterality Date    COLONOSCOPY  2016 & 2018    Fiberoptic    EGD  2016    EGD  06/2016    MYRINGOTOMY      TONSILLECTOMY      TOOTH EXTRACTION         Current Outpatient Medications   Medication Sig Dispense Refill    Azelastine HCl 137 MCG/SPRAY SOLN USE 1 TO 2 SPRAYS IN EACH NOSTRIL 2 TIMES A DAY FOR 14 DAYS AS DIRECTED      CAMRESE 0 15-0 03 &0 01 MG TABS   0    clotrimazole-betamethasone (LOTRISONE) 1-0 05 % cream       glucose blood (ACCU-CHEK CHELA PLUS) test strip Test blood sugars up to 3 times daily or as needed for fluctuating blood sugars  100 each 2    hydrOXYzine HCL (ATARAX) 25 mg tablet Take 1 tablet (25 mg total) by mouth as needed in the morning and 1 tablet (25 mg total) as needed in the evening for anxiety  60 tablet 0    lamoTRIgine (LaMICtal) 25 mg tablet Take 2 tablets (50 mg total) by mouth in the morning  60 tablet 0    levothyroxine 25 mcg tablet TAKE 1 TABLET DAILY 90 tablet 3    sertraline (ZOLOFT) 100 mg tablet Take 1 5 tablets (150 mg total) by mouth in the morning  45 tablet 0    traZODone (DESYREL) 100 mg tablet Take 1 tablet (100 mg total) by mouth daily at bedtime 30 tablet 1    traZODone (DESYREL) 50 mg tablet Take 150 mg by mouth       No current facility-administered medications for this visit  No Known Allergies    Review of Systems    Video Exam    There were no vitals filed for this visit  Physical Exam     I spent FOUR GROUP HOURS PLUS CASE MANAGEMENT minutes with patient today in which greater than 50% of the time was spent in counseling/coordination of care regarding PHP - SEE NOTES  VIRTUAL VISIT DISCLAIMER    Herman Machado verbally agrees to participate in [employer]  Pt is aware that [employer] could be limited without vital signs or the ability to perform a full hands-on physical Smita Verduzco understands she or the provider may request at any time to terminate the video visit and request the patient to seek care or treatment in person

## 2022-06-22 ENCOUNTER — APPOINTMENT (OUTPATIENT)
Dept: PSYCHOLOGY | Facility: CLINIC | Age: 22
End: 2022-06-22
Payer: COMMERCIAL

## 2022-06-23 ENCOUNTER — APPOINTMENT (OUTPATIENT)
Dept: PSYCHOLOGY | Facility: CLINIC | Age: 22
End: 2022-06-23
Payer: COMMERCIAL

## 2022-06-24 ENCOUNTER — APPOINTMENT (OUTPATIENT)
Dept: PSYCHOLOGY | Facility: CLINIC | Age: 22
End: 2022-06-24
Payer: COMMERCIAL

## 2022-07-01 ENCOUNTER — TELEMEDICINE (OUTPATIENT)
Dept: PSYCHIATRY | Facility: CLINIC | Age: 22
End: 2022-07-01
Payer: COMMERCIAL

## 2022-07-01 DIAGNOSIS — F39 MOOD DISORDER (HCC): ICD-10-CM

## 2022-07-01 DIAGNOSIS — F32.4 MAJOR DEPRESSIVE DISORDER WITH SINGLE EPISODE, IN PARTIAL REMISSION (HCC): ICD-10-CM

## 2022-07-01 DIAGNOSIS — F12.90 MARIJUANA USE, CONTINUOUS: ICD-10-CM

## 2022-07-01 DIAGNOSIS — F33.2 SEVERE EPISODE OF RECURRENT MAJOR DEPRESSIVE DISORDER, WITHOUT PSYCHOTIC FEATURES (HCC): Primary | ICD-10-CM

## 2022-07-01 DIAGNOSIS — F41.1 ANXIETY STATE: ICD-10-CM

## 2022-07-01 PROCEDURE — 99214 OFFICE O/P EST MOD 30 MIN: CPT

## 2022-07-01 RX ORDER — TRAZODONE HYDROCHLORIDE 100 MG/1
100 TABLET ORAL
Qty: 90 TABLET | Refills: 0 | Status: SHIPPED | OUTPATIENT
Start: 2022-07-01 | End: 2022-10-12 | Stop reason: SDUPTHER

## 2022-07-01 RX ORDER — HYDROXYZINE HYDROCHLORIDE 25 MG/1
25 TABLET, FILM COATED ORAL 2 TIMES DAILY PRN
Qty: 90 TABLET | Refills: 0 | Status: SHIPPED | OUTPATIENT
Start: 2022-07-01 | End: 2022-10-12

## 2022-07-01 RX ORDER — LAMOTRIGINE 25 MG/1
25 TABLET ORAL DAILY
Qty: 90 TABLET | Refills: 0 | Status: SHIPPED | OUTPATIENT
Start: 2022-07-01 | End: 2022-10-12 | Stop reason: SDUPTHER

## 2022-07-01 RX ORDER — SERTRALINE HYDROCHLORIDE 100 MG/1
150 TABLET, FILM COATED ORAL DAILY
Qty: 135 TABLET | Refills: 0 | Status: SHIPPED | OUTPATIENT
Start: 2022-07-01 | End: 2022-10-12 | Stop reason: SDUPTHER

## 2022-07-01 NOTE — PSYCH
Virtual Regular Visit    Verification of patient location:    Patient is located in the following state in which I hold an active license [place]    Problem List Items Addressed This Visit        Other    Severe episode of recurrent major depressive disorder, without psychotic features ([facility] [address] ) - Primary      Other Visit Diagnoses     Mood disorder ([facility] [address] )        Marijuana use, continuous                 Encounter provider TRE Levin    Provider located at   [address] [address] [phone]    Recent Visits  No visits were found meeting these conditions  Showing recent visits within past 7 days and meeting all other requirements  Today's Visits  Date Type Provider Dept   07/01/22 Telemedicine TRE Levin [facility]   Showing today's visits and meeting all other requirements  Future Appointments  No visits were found meeting these conditions  Showing future appointments within next 150 days and meeting all other requirements         The patient was identified by name and date of birth  Arelis Mao was informed that this is a telemedicine visit and that the visit is being conducted through[employer] Emb[employer] and patient was informed this is a secure, HIPAA-complaint platform  She agrees to proceed     My office door was closed  No one else was in the room  She acknowledged consent and understanding of privacy and security of the video platform  The patient has agreed to participate and understands they can discontinue the visit at any time  Patient is aware this is a billable service       HPI     Current Outpatient Medications   Medication Sig Dispense Refill    Azelastine HCl 137 MCG/SPRAY SOLN USE 1 TO 2 SPRAYS IN EACH NOSTRIL 2 TIMES A DAY FOR 14 DAYS AS DIRECTED      CAMRESE 0 15-0 03 &0 01 MG TABS   0    clotrimazole-betamethasone (LOTRISONE) 1-0 05 % cream       glucose blood (ACCU-CHEK CHELA PLUS) test strip Test blood sugars up to 3 times daily or as needed for fluctuating blood sugars  100 each 2    hydrOXYzine HCL (ATARAX) 25 mg tablet Take 1 tablet (25 mg total) by mouth as needed in the morning and 1 tablet (25 mg total) as needed in the evening for anxiety  60 tablet 0    lamoTRIgine (LaMICtal) 25 mg tablet Take 2 tablets (50 mg total) by mouth in the morning  60 tablet 0    levothyroxine 25 mcg tablet TAKE 1 TABLET DAILY 90 tablet 3    sertraline (ZOLOFT) 100 mg tablet Take 1 5 tablets (150 mg total) by mouth in the morning  45 tablet 0    traZODone (DESYREL) 100 mg tablet Take 1 tablet (100 mg total) by mouth daily at bedtime 30 tablet 1    traZODone (DESYREL) 50 mg tablet Take 150 mg by mouth       No current facility-administered medications for this visit  Review of Systems  Video Exam    There were no vitals filed for this visit  Physical Exam   As a result of this visit, I have referred the patient for further respiratory evaluation  No    I spent 25 minutes directly with the patient during this visit  VIRTUAL VISIT DISCLAIMER    Catrachita Bowens acknowledges that she has consented to an online visit or consultation  She understands that the online visit is based solely on information provided by her, and that, in the absence of a face-to-face physical evaluation by the physician, the diagnosis she receives is both limited and provisional in terms of accuracy and completeness  This is not intended to replace a full medical face-to-face evaluation by the physician  Catrachita Bowens understands and accepts these terms  MEDICATION MANAGEMENT NOTE        [facility]    Name and Date of Birth:  Catrachita Bowens 24 y o  2000 MRN: [id number]    Date of Visit: July 1, 2022    No Known Allergies  SUBJECTIVE:    Prince Bean is seen today for a follow up for Major Depressive Disorder   She reports that she continues to do fairly well since beginning PHP  Pt recently completed PHP at [facility], reports learning helpful coping techniques to use going forward  She was again encouraged to seek drug and alcohol rehab  Has not taken action yet  Reports decreased use of marijuana because "its too expensive"  Shares that she recently moved to new apartment and feels it is a good upgrade from previous living situation  Happy to share she recently received a job offer at a  facility and is looking forward to beginning  Provider has been continuing to encourage psychotherapy and patient finally had a recent intake with [facility] and is scheduled for 1st therapy session shortly  We discussed patient's possible borderline personality disorder versus mood disorder and does appear patient fits criteria for borderline personality disorder more so than bipolar  Borderline symptoms include intense emotional relationships to the point where she feels afraid of abandonment, does not like to be alone, fearful of being judged, and hourly mood swings  Explained how psychotherapy and specifically DBT is first-line treatment for such symptoms and patient reports recently buying a DBT workbook from the store is currently working on it  Encouraged her to work closely with therapist   No medication changes today she denies all side effects  Denies suicidal ideation and homicidal ideation  She denies any side effects from psychiatric medications  PLAN:    Conitnue Lamictal to 25 mg for mood stabilization  Lamotrigine PARQ completed including dizziness, headaches, ataxia, vision problems, somnolence, sleep changes, cognitive difficulties, rash (including Hall-Derek rash), and others, risk of teratogenicity for females     Continue Zoloft to 150 mg daily  Continue Trazodone 150mg daily for insomnia          Aware of 24 hour and weekend coverage for urgent situations accessed by calling [facility] main practice number  Referral for individual psychotherapy    Diagnoses and all orders for this visit:    Severe episode of recurrent major depressive disorder, without psychotic features ([facility] [address] )    [facility]    Marijuana use, continuous        Current Outpatient Medications on File Prior to Visit   Medication Sig Dispense Refill    Azelastine HCl 137 MCG/SPRAY SOLN USE 1 TO 2 SPRAYS IN EACH NOSTRIL 2 TIMES A DAY FOR 14 DAYS AS DIRECTED      CAMRESE 0 15-0 03 &0 01 MG TABS   0    clotrimazole-betamethasone (LOTRISONE) 1-0 05 % cream       glucose blood (ACCU-CHEK CHELA PLUS) test strip Test blood sugars up to 3 times daily or as needed for fluctuating blood sugars  100 each 2    hydrOXYzine HCL (ATARAX) 25 mg tablet Take 1 tablet (25 mg total) by mouth as needed in the morning and 1 tablet (25 mg total) as needed in the evening for anxiety  60 tablet 0    lamoTRIgine (LaMICtal) 25 mg tablet Take 2 tablets (50 mg total) by mouth in the morning  60 tablet 0    levothyroxine 25 mcg tablet TAKE 1 TABLET DAILY 90 tablet 3    sertraline (ZOLOFT) 100 mg tablet Take 1 5 tablets (150 mg total) by mouth in the morning  45 tablet 0    traZODone (DESYREL) 100 mg tablet Take 1 tablet (100 mg total) by mouth daily at bedtime 30 tablet 1    traZODone (DESYREL) 50 mg tablet Take 150 mg by mouth       No current facility-administered medications on file prior to visit  Psychotherapy Provided:     Individual psychotherapy provided: Yes  Counseling was provided during the session today for 16 minutes  Supportive counseling provided  HPI ROS Appetite Changes and Sleep:     She reports adequate number of sleep hours (6-7 hrs hours), adequate appetite, adequate energy level   Denies homicidal ideation, denies suicidal ideation    Review Of Systems:      HPI ROS:               Medication Side Effects:  denies    Depression (10 worst): 5/10    Anxiety (10 worst): 5/10    Safety concerns (SI, HI, etc): Denies si and hi    Sleep: Fair 7 hrs Energy: fair    Appetite: 2-3 meals     Weight Change: denies        Mental Status Evaluation:    Appearance Adequate hygiene and grooming   Behavior cooperative   Mood anxious and depressed  Depression Scale - 5 of 10 (0 = No depression)  Anxiety Scale - 5 of 10 (0 = No anxiety)   Speech Sparse   Affect appropriate   Thought Processes Goal directed and coherent   Thought Content Does not verbalize delusional material   Associations Tightly connected   Perceptual Disturbances Denies hallucinations and does not appear to be responding to internal stimuli   Risk Potential Suicidal/Homicidal Ideation - No evidence of suicidal or homicidal ideation and patient does not verbalize suicidal or homicidal ideation  Risk of Violence - No evidence of risk for violence found on assessment  Risk of Self Mutilation - No evidence of risk for self mutilation found on assessment   Orientation oriented to person, place, time/date and situation   Memory recent and remote memory grossly intact   Consciousness alert and awake   Attention/Concentration attention span and concentration are age appropriate   Insight fair   Judgement fair   Muscle Strength and Gait normal muscle strength and normal muscle tone, normal gait/station and normal balance   Motor Activity no abnormal movements   Language no difficulty naming common objects, no difficulty repeating a phrase, no difficulty writing a sentence   Fund of Knowledge adequate knowledge of current events  adequate fund of knowledge regarding past history  adequate fund of knowledge regarding vocabulary      Past Psychiatric History Update:     Inpatient Psychiatric Admission Since Last Encounter:   no  Changes to Outpatient Psychiatric Treatment Team:    no  Suicide Attempt Or Self Mutilation Since Last Encounter:   no  Incidence of Violent Behavior Since Last Encounter:   no    Traumatic History Update:     New Onset of Abuse Since Last Encounter:   no  Traumatic Events Since Last Encounter:   no    Past Medical History:    Past Medical History:   Diagnosis Date    Anxiety     Depression     Environmental allergies     Last assessed: 1/18/17    GERD (gastroesophageal reflux disease)     Hypoglycemia     Hypothyroidism         Past Surgical History:   Procedure Laterality Date    COLONOSCOPY  2016 & 2018    Fiberoptic    EGD  2016    EGD  06/2016    MYRINGOTOMY      TONSILLECTOMY      TOOTH EXTRACTION       No Known Allergies  Substance Abuse History:    Social History     Substance and Sexual Activity   Alcohol Use Yes    Alcohol/week: 10 0 standard drinks    Types: 10 Glasses of wine per week    Comment: every 2-3 days, more on weekends     Social History     Substance and Sexual Activity   Drug Use Yes    Frequency: 4 0 times per week    Types: Marijuana    Comment: recreational      Social History:    Social History     Socioeconomic History    Marital status: Single     Spouse name: Not on file    Number of children: 0    Years of education: Not on file    Highest education level: Bachelor's degree (e g , BA, AB, BS)   Occupational History    Occupation: UNEMPLOYED   Tobacco Use    Smoking status: Current Some Day Smoker     Types: Cigarettes    Smokeless tobacco: Never Used    Tobacco comment: 1/2 pack per month   Vaping Use    Vaping Use: Every day    Substances: Nicotine   Substance and Sexual Activity    Alcohol use:  Yes     Alcohol/week: 10 0 standard drinks     Types: 10 Glasses of wine per week     Comment: every 2-3 days, more on weekends    Drug use: Yes     Frequency: 4 0 times per week     Types: Marijuana     Comment: recreational     Sexual activity: Not on file   Other Topics Concern    Not on file   Social History Narrative    Not on file     Social Determinants of Health     Financial Resource Strain: Not on file   Food Insecurity: Not on file   Transportation Needs: Not on file   Physical Activity: Not on file   Stress: Not on file   Social Connections: Not on file   Intimate Partner Violence: Not on file   Housing Stability: Not on file     Family Psychiatric History:     Family History   Problem Relation Age of Onset    Anxiety disorder Mother     Diabetes Mother         due to underlying condition with both eyes affected by proliferative retinopathy and traction retinal detatchments involving maculae, without long term use of insulin    Depression Father     Anxiety disorder Father     Alcohol abuse Father     Sleep apnea Father     Suicide Attempts Maternal Grandfather     Suicide Attempts Maternal Grandmother     Crohn's disease Paternal Grandfather      History Review: The following portions of the patient's history were reviewed and updated as appropriate: allergies, current medications, past family history, past medical history, past social history, past surgical history and problem list     OBJECTIVE:     Vital signs in last 24 hours: There were no vitals filed for this visit  Laboratory Results: I have personally reviewed all pertinent laboratory/tests results  Suicide/Homicide Risk Assessment:    Risk of Harm to Self:  Protective Factors: no current suicidal ideation, access to mental health treatment, compliant with medications, compliant with mental health treatment  Based on today's assessment, Marlena presents the following risk of harm to self: minimal    Risk of Harm to Others:  Based on today's assessment, Marlena presents the following risk of harm to others: minimal    The following interventions are recommended: referral for psychotherapy    Medications Risks/Benefits:      Risks, Benefits And Possible Side Effects Of Medications:    Discussed risks and benefits of treatment with patient including risk of suicidality, serotonin syndrome, increased QTc interval and SIADH related to treatment with antidepressants;  Risk of induction of manic symptoms in certain patient populations and risk of rash related to treatment with Lamictal     Controlled Medication Discussion:     Not applicable    Treatment Plan:    Due for update/Updated:   no  Last treatment plan done 6/1/22 by Trenton William  Treatment Plan due on 12/1/22  TRE Mendez 07/01/22    This note was shared with patient

## 2022-07-31 ENCOUNTER — TELEPHONE (OUTPATIENT)
Dept: OTHER | Facility: OTHER | Age: 22
End: 2022-07-31

## 2022-10-11 ENCOUNTER — TELEPHONE (OUTPATIENT)
Dept: PSYCHIATRY | Facility: CLINIC | Age: 22
End: 2022-10-11

## 2022-10-12 ENCOUNTER — TELEMEDICINE (OUTPATIENT)
Dept: PSYCHIATRY | Facility: CLINIC | Age: 22
End: 2022-10-12
Payer: COMMERCIAL

## 2022-10-12 DIAGNOSIS — F32.4 MAJOR DEPRESSIVE DISORDER WITH SINGLE EPISODE, IN PARTIAL REMISSION (HCC): ICD-10-CM

## 2022-10-12 DIAGNOSIS — F41.1 ANXIETY STATE: ICD-10-CM

## 2022-10-12 DIAGNOSIS — F39 MOOD DISORDER (HCC): ICD-10-CM

## 2022-10-12 DIAGNOSIS — F33.2 SEVERE EPISODE OF RECURRENT MAJOR DEPRESSIVE DISORDER, WITHOUT PSYCHOTIC FEATURES (HCC): Primary | ICD-10-CM

## 2022-10-12 PROBLEM — F17.210 CIGARETTE SMOKER: Status: ACTIVE | Noted: 2021-08-07

## 2022-10-12 PROBLEM — Z87.42 HISTORY OF ABNORMAL CERVICAL PAP SMEAR: Status: ACTIVE | Noted: 2020-12-21

## 2022-10-12 PROCEDURE — 99213 OFFICE O/P EST LOW 20 MIN: CPT

## 2022-10-12 RX ORDER — SERTRALINE HYDROCHLORIDE 100 MG/1
150 TABLET, FILM COATED ORAL DAILY
Qty: 135 TABLET | Refills: 1 | Status: SHIPPED | OUTPATIENT
Start: 2022-10-12 | End: 2023-04-10

## 2022-10-12 RX ORDER — PREDNISONE 20 MG/1
TABLET ORAL
COMMUNITY
Start: 2022-08-24

## 2022-10-12 RX ORDER — TRAZODONE HYDROCHLORIDE 100 MG/1
100 TABLET ORAL
Qty: 90 TABLET | Refills: 1 | Status: SHIPPED | OUTPATIENT
Start: 2022-10-12

## 2022-10-12 RX ORDER — LAMOTRIGINE 25 MG/1
25 TABLET ORAL DAILY
Qty: 90 TABLET | Refills: 1 | Status: SHIPPED | OUTPATIENT
Start: 2022-10-12

## 2022-10-12 RX ORDER — HYDROXYZINE 50 MG/1
50 TABLET, FILM COATED ORAL 2 TIMES DAILY PRN
Qty: 90 TABLET | Refills: 1 | Status: SHIPPED | OUTPATIENT
Start: 2022-10-12

## 2022-10-12 NOTE — PSYCH
Virtual Regular Visit    Verification of patient location:    Patient is located in the following state in which I hold an active license [place]    Problem List Items Addressed This Visit        Other    Anxiety state, unspecified    Relevant Medications    lamoTRIgine (LaMICtal) 25 mg tablet    Major depressive disorder with single episode, in partial remission (HCC)    Relevant Medications    hydrOXYzine HCL (ATARAX) 50 mg tablet    traZODone (DESYREL) 100 mg tablet    sertraline (ZOLOFT) 100 mg tablet    Severe episode of recurrent major depressive disorder, without psychotic features (HCC) - Primary    Relevant Medications    hydrOXYzine HCL (ATARAX) 50 mg tablet    traZODone (DESYREL) 100 mg tablet    sertraline (ZOLOFT) 100 mg tablet      Other Visit Diagnoses     Mood disorder (HCC)        Relevant Medications    hydrOXYzine HCL (ATARAX) 50 mg tablet    traZODone (DESYREL) 100 mg tablet    lamoTRIgine (LaMICtal) 25 mg tablet    sertraline (ZOLOFT) 100 mg tablet             Encounter provider TRE Dia    Provider located at    [address] [place] [address]  [address] [facility] [address] [phone]    Recent Visits  No visits were found meeting these conditions  Showing recent visits within past 7 days and meeting all other requirements  Today's Visits  Date Type Provider Dept   10/12/22 Telemedicine TRE Dia  [facility]   Showing today's visits and meeting all other requirements  Future Appointments  No visits were found meeting these conditions  Showing future appointments within next 150 days and meeting all other requirements         The patient was identified by name and date of birth  Aan Miller was informed that this is a telemedicine visit and [facility] and patient was informed this is a secure, HIPAA-complaint platform  She agrees to proceed     My office door was closed  No one else was in the room  She acknowledged consent and understanding of privacy and security of the video platform  The patient has agreed to participate and understands they can discontinue the visit at any time  Patient is aware this is a billable service  HPI     Current Outpatient Medications   Medication Sig Dispense Refill   • hydrOXYzine HCL (ATARAX) 50 mg tablet Take 1 tablet (50 mg total) by mouth 2 (two) times a day as needed for anxiety 90 tablet 1   • lamoTRIgine (LaMICtal) 25 mg tablet Take 1 tablet (25 mg total) by mouth daily 90 tablet 1   • sertraline (ZOLOFT) 100 mg tablet Take 1 5 tablets (150 mg total) by mouth daily 135 tablet 1   • traZODone (DESYREL) 100 mg tablet Take 1 tablet (100 mg total) by mouth daily at bedtime 90 tablet 1   • Azelastine HCl 137 MCG/SPRAY SOLN USE 1 TO 2 SPRAYS IN EACH NOSTRIL 2 TIMES A DAY FOR 14 DAYS AS DIRECTED     • CAMRESE 0 15-0 03 &0 01 MG TABS   0   • clotrimazole-betamethasone (LOTRISONE) 1-0 05 % cream      • glucose blood (ACCU-CHEK CHELA PLUS) test strip Test blood sugars up to 3 times daily or as needed for fluctuating blood sugars  100 each 2   • levothyroxine 25 mcg tablet TAKE 1 TABLET DAILY 90 tablet 3   • predniSONE 20 mg tablet take 2 tablets by mouth once daily for 4 days       No current facility-administered medications for this visit  Review of Systems  Video Exam    There were no vitals filed for this visit  Physical Exam   As a result of this visit, I have referred the patient for further respiratory evaluation  No    I spent 23 minutes directly with the patient during this visit  VIRTUAL VISIT DISCLAIMER    Yeni Mao acknowledges that she has consented to an online visit or consultation   She understands that the online visit is based solely on information provided by her, and that, in the absence of a face-to-face physical evaluation by the physician, the diagnosis she receives is both limited and provisional in terms of accuracy and completeness  This is not intended to replace a full medical face-to-face evaluation by the physician  Alexandro Ramsey understands and accepts these terms  MEDICATION MANAGEMENT NOTE        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  Describes this job is much less stress compared to previous jobs at  she used to work  Expresses how her family wishes she made more money but she is happy where she is at this time  Supportive counseling provided  Also living in a new apartment which has been an upgrade on her previous one  Appears goal oriented and optimistic for the future  Has decreased her alcohol use and currently is only drinking socially about once per week  This is a big decrease from her having a drink or two on a daily basis earlier this year  The decrease in stress appears to be cutting her desire to use alcohol  Continues THC daily, reports helps with anxiety  Provider explained how marijuana use can decrease her energy + motivation and she verbalized understanding  Irritability that was significantly present previously appears to have stabilized and is not affecting her daily life at this point  Continue to encourage decreased marijuana use  No medication changes made today, patient feels stable at this time  Will consider discontinuing Lamictal if tolerable in the near future  Due to her new job, patient is unable to attend weekly psychotherapy and has not been going  Denies SI          She denies any side effects from medications  PLAN:    Conitnue Lamictal to 25 mg for mood stabilization  Lamotrigine PARQ completed including dizziness, headaches, ataxia, vision problems, somnolence, sleep changes, cognitive difficulties, rash (including Hall-Derek rash), and others, risk of teratogenicity for females  Continue Zoloft to 150 mg daily  Continue Trazodone 150mg daily for insomnia  Continue prn atarax 50mg daily for breakthrough anxiety              Aware of 24 hour and weekend coverage for urgent situations accessed by calling [facility] main practice number  Referral for individual psychotherapy    Diagnoses and all orders for this visit:    Severe episode of recurrent major depressive disorder, without psychotic features ([address] )    Major depressive disorder with single episode, in partial remission (HCC)  -     hydrOXYzine HCL (ATARAX) 50 mg tablet; Take 1 tablet (50 mg total) by mouth 2 (two) times a day as needed for anxiety  -     traZODone (DESYREL) 100 mg tablet; Take 1 tablet (100 mg total) by mouth daily at bedtime  -     sertraline (ZOLOFT) 100 mg tablet; Take 1 5 tablets (150 mg total) by mouth daily    Mood disorder (HCC)  -     lamoTRIgine (LaMICtal) 25 mg tablet; Take 1 tablet (25 mg total) by mouth daily    Anxiety state, unspecified  -     lamoTRIgine (LaMICtal) 25 mg tablet; Take 1 tablet (25 mg total) by mouth daily    Other orders  -     predniSONE 20 mg tablet; take 2 tablets by mouth once daily for 4 days        Current Outpatient Medications on File Prior to Visit   Medication Sig Dispense Refill   • Azelastine HCl 137 MCG/SPRAY SOLN USE 1 TO 2 SPRAYS IN EACH NOSTRIL 2 TIMES A DAY FOR 14 DAYS AS DIRECTED     • CAMRESE 0 15-0 03 &0 01 MG TABS   0   • clotrimazole-betamethasone (LOTRISONE) 1-0 05 % cream      • glucose blood (ACCU-CHEK CHELA PLUS) test strip Test blood sugars up to 3 times daily or as needed for fluctuating blood sugars   100 each 2   • levothyroxine 25 mcg tablet TAKE 1 TABLET DAILY 90 tablet 3   • predniSONE 20 mg tablet take 2 tablets by mouth once daily for 4 days     • [DISCONTINUED] hydrOXYzine HCL (ATARAX) 25 mg tablet Take 1 tablet (25 mg total) by mouth 2 (two) times a day as needed for anxiety 90 tablet 0   • [DISCONTINUED] lamoTRIgine (LaMICtal) 25 mg tablet Take 1 tablet (25 mg total) by mouth daily 90 tablet 0   • [DISCONTINUED] sertraline (ZOLOFT) 100 mg tablet Take 1 5 tablets (150 mg total) by mouth daily 135 tablet 0   • [DISCONTINUED] traZODone (DESYREL) 100 mg tablet Take 1 tablet (100 mg total) by mouth daily at bedtime 90 tablet 0     No current facility-administered medications on file prior to visit  Psychotherapy Provided:     Individual psychotherapy provided: Yes  Counseling was provided during the session today for 16 minutes  Supportive counseling provided  HPI ROS Appetite Changes and Sleep:     She reports normal sleep, adequate appetite, low energy   Denies homicidal ideation, denies suicidal ideation    Review Of Systems:      HPI ROS:               Medication Side Effects:  denies    Depression (10 worst): 4/10    Anxiety (10 worst): 4/10    Safety concerns (SI, HI, etc): Denies si and hi    Sleep: 7 hrs    Energy: Fair to low     Appetite: fair    Weight Change: denies        Mental Status Evaluation:    Appearance Adequate hygiene and grooming   Behavior calm and cooperative   Mood anxious and depressed  Depression Scale - 4 of 10 (0 = No depression)  Anxiety Scale - 4 of 10 (0 = No anxiety)   Speech Normal rate and volume   Affect appropriate   Thought Processes Goal directed and coherent   Thought Content Does not verbalize delusional material   Associations Tightly connected   Perceptual Disturbances Denies hallucinations and does not appear to be responding to internal stimuli   Risk Potential Suicidal/Homicidal Ideation - No evidence of suicidal or homicidal ideation and patient does not verbalize suicidal or homicidal ideation  Risk of Violence - No evidence of risk for violence found on assessment  Risk of Self Mutilation - No evidence of risk for self mutilation found on assessment   Orientation oriented to person, place, time/date and situation   Memory recent and remote memory grossly intact   Consciousness alert and awake   Attention/Concentration attention span and concentration are age appropriate   Insight intact   Judgement intact   Muscle Strength and Gait normal muscle strength and normal muscle tone, normal gait/station and normal balance   Motor Activity no abnormal movements   Language no difficulty naming common objects, no difficulty repeating a phrase, no difficulty writing a sentence   Fund of Knowledge adequate knowledge of current events  adequate fund of knowledge regarding past history  adequate fund of knowledge regarding vocabulary      Past Psychiatric History Update:     Inpatient Psychiatric Admission Since Last Encounter:   no  Changes to Outpatient Psychiatric Treatment Team:    no  Suicide Attempt Or Self Mutilation Since Last Encounter:   no  Incidence of Violent Behavior Since Last Encounter:   no    Traumatic History Update:     New Onset of Abuse Since Last Encounter:   no  Traumatic Events Since Last Encounter:   no    Past Medical History:    Past Medical History:   Diagnosis Date   • Anxiety    • Depression    • Environmental allergies     Last assessed: 1/18/17   • GERD (gastroesophageal reflux disease)    • Hypoglycemia    • Hypothyroidism         Past Surgical History:   Procedure Laterality Date   • COLONOSCOPY  2016 & 2018    Fiberoptic   • EGD  2016   • EGD  06/2016   • MYRINGOTOMY     • TONSILLECTOMY     • TOOTH EXTRACTION       No Known Allergies  Substance Abuse History:    Social History     Substance and Sexual Activity   Alcohol Use Yes   • Alcohol/week: 10 0 standard drinks   • Types: 10 Glasses of wine per week    Comment: every 2-3 days, more on weekends     Social History Substance and Sexual Activity   Drug Use Yes   • Frequency: 4 0 times per week   • Types: Marijuana    Comment: recreational      Social History:    Social History     Socioeconomic History   • Marital status: Single     Spouse name: Not on file   • Number of children: 0   • Years of education: Not on file   • Highest education level: Bachelor's degree (e g , BA, AB, BS)   Occupational History   • Occupation: UNEMPLOYED   Tobacco Use   • Smoking status: Current Some Day Smoker     Types: Cigarettes   • Smokeless tobacco: Never Used   • Tobacco comment: 1/2 pack per month   Vaping Use   • Vaping Use: Every day   • Substances: Nicotine   Substance and Sexual Activity   • Alcohol use: Yes     Alcohol/week: 10 0 standard drinks     Types: 10 Glasses of wine per week     Comment: every 2-3 days, more on weekends   • Drug use: Yes     Frequency: 4 0 times per week     Types: Marijuana     Comment: recreational    • Sexual activity: Not on file   Other Topics Concern   • Not on file   Social History Narrative   • Not on file     Social Determinants of Health     Financial Resource Strain: Not on file   Food Insecurity: Not on file   Transportation Needs: Not on file   Physical Activity: Not on file   Stress: Not on file   Social Connections: Not on file   Intimate Partner Violence: Not on file   Housing Stability: Not on file     Family Psychiatric History:     Family History   Problem Relation Age of Onset   • Anxiety disorder Mother    • Diabetes Mother         due to underlying condition with both eyes affected by proliferative retinopathy and traction retinal detatchments involving maculae, without long term use of insulin   • Depression Father    • Anxiety disorder Father    • Alcohol abuse Father    • Sleep apnea Father    • Suicide Attempts Maternal Grandfather    • Suicide Attempts Maternal Grandmother    • Crohn's disease Paternal Grandfather      History Review: The following portions of the patient's history were reviewed and updated as appropriate: allergies, current medications, past family history, past medical history, past social history, past surgical history and problem list     OBJECTIVE:     Vital signs in last 24 hours: There were no vitals filed for this visit  Laboratory Results:   Recent Labs (last 2 months):   No visits with results within 2 Month(s) from this visit  Latest known visit with results is:   Orders Only on 12/24/2020   Component Date Value   • SARS-CoV-2  12/24/2020 Not Detected      I have personally reviewed all pertinent laboratory/tests results  Suicide/Homicide Risk Assessment:    Risk of Harm to Self:  Protective Factors: no current suicidal ideation, access to mental health treatment, compliant with medications, compliant with mental health treatment, connection to community  Based on today's assessment, Marlena presents the following risk of harm to self: minimal    Risk of Harm to Others:  Based on today's assessment, Marlena presents the following risk of harm to others: none    The following interventions are recommended: referral for psychotherapy    Medications Risks/Benefits:      Risks, Benefits And Possible Side Effects Of Medications:    Discussed risks and benefits of treatment with patient including risk of suicidality, serotonin syndrome, increased QTc interval and SIADH related to treatment with antidepressants; Risk of induction of manic symptoms in certain patient populations and risk of rash related to treatment with Lamictal     Controlled Medication Discussion:     Not applicable    Treatment Plan:    Due for update/Updated:   no  Last treatment plan done 6/1/22 by avel mckenzie  Treatment Plan due on 12/1/22  TRE Aparicio 10/12/22    This note was shared with patient

## 2022-11-01 ENCOUNTER — TELEPHONE (OUTPATIENT)
Dept: PSYCHIATRY | Facility: CLINIC | Age: 22
End: 2022-11-01

## 2022-11-01 NOTE — TELEPHONE ENCOUNTER
Returned patients voice message asked her to return call to the [facility] for further assistance

## 2022-12-05 ENCOUNTER — OFFICE VISIT (OUTPATIENT)
Dept: URGENT CARE | Facility: CLINIC | Age: 22
End: 2022-12-05

## 2022-12-05 VITALS
OXYGEN SATURATION: 97 % | BODY MASS INDEX: 37.56 KG/M2 | HEIGHT: 64 IN | HEART RATE: 112 BPM | TEMPERATURE: 101.4 F | RESPIRATION RATE: 16 BRPM | DIASTOLIC BLOOD PRESSURE: 72 MMHG | SYSTOLIC BLOOD PRESSURE: 122 MMHG | WEIGHT: 220 LBS

## 2022-12-05 DIAGNOSIS — R05.1 ACUTE COUGH: Primary | ICD-10-CM

## 2022-12-05 DIAGNOSIS — R68.89 FLU-LIKE SYMPTOMS: ICD-10-CM

## 2022-12-05 RX ORDER — OSELTAMIVIR PHOSPHATE 75 MG/1
75 CAPSULE ORAL 2 TIMES DAILY
Qty: 10 CAPSULE | Refills: 0 | Status: SHIPPED | OUTPATIENT
Start: 2022-12-05 | End: 2022-12-10

## 2022-12-05 RX ORDER — DOXYCYCLINE 100 MG/1
100 TABLET ORAL 2 TIMES DAILY
COMMUNITY
Start: 2022-11-22

## 2022-12-05 NOTE — PROGRESS NOTES
[facility]        NAME: Noel Gallagher is a 20 y o  female  : 2000    MRN: [id number]  DATE: 2022  TIME: 2:57 PM    Assessment and Plan   Acute cough [R05 1]  1  Acute cough  Cov/Flu-Collected at [facility]      2  Flu-like symptoms  oseltamivir (TAMIFLU) 75 mg capsule        Covid/flu collected  Results via [facility]   Start tamiflu   Ok to stop if flu negative  Work note given  Patient Instructions     Follow up with PCP in 3-5 days  Proceed to  ER if symptoms worsen  Chief Complaint     Chief Complaint   Patient presents with   • Cold Like Symptoms     Pt states that starting last night she began to experience chills, productive cough (green and clear), muscle aches, sore throat, and generalized weakness  Has not COVID tested at home  Currently taking doxycycline for chest infection prescribed by [facility] in [place]  History of Present Illness   Noel Gallagher presents to the clinic c/o    Cold Like Symptoms (Pt states that starting last night she began to experience chills, productive cough (green and clear), muscle aches, sore throat, and generalized weakness  Has not COVID tested at home  Currently taking doxycycline for chest infection prescribed by [facility] [place]  )  Has been working at a  since august -   Has been sick almost daily since   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      Review of Systems   Review of Systems   All other systems reviewed and are negative          Current Medications     Long-Term Medications   Medication Sig Dispense Refill   • CAMRESE 0 15-0 03 &0 01 MG TABS   0   • hydrOXYzine HCL (ATARAX) 50 mg tablet Take 1 tablet (50 mg total) by mouth 2 (two) times a day as needed for anxiety 90 tablet 1   • lamoTRIgine (LaMICtal) 25 mg tablet Take 1 tablet (25 mg total) by mouth daily 90 tablet 1   • levothyroxine 25 mcg tablet TAKE 1 TABLET DAILY 90 tablet 3   • sertraline (ZOLOFT) 100 mg tablet Take 1 5 tablets (150 mg total) by mouth daily 135 tablet 1   • traZODone (DESYREL) 100 mg tablet Take 1 tablet (100 mg total) by mouth daily at bedtime 90 tablet 1       Current Allergies     Allergies as of 12/05/2022   • (No Known Allergies)            The following portions of the patient's history were reviewed and updated as appropriate: allergies, current medications, past family history, past medical history, past social history, past surgical history and problem list     Objective   /72 (BP Location: Left arm, Patient Position: Sitting, Cuff Size: Large)   Pulse (!) 112   Temp (!) 101 4 °F (38 6 °C) (Tympanic)   Resp 16   Ht 5' 4" (1 626 m)   Wt 99 8 kg (220 lb)   LMP 11/07/2022   SpO2 97%   BMI 37 76 kg/m²        Physical Exam     Physical Exam  Vitals and nursing note reviewed  Constitutional:       General: She is active  Vital signs are normal       Appearance: Normal appearance  She is well-developed and well-nourished  HENT:      Head: Normocephalic and atraumatic  Right Ear: Hearing, tympanic membrane, ear canal and external ear normal       Left Ear: Hearing, tympanic membrane, ear canal and external ear normal       Nose: Mucosal edema and congestion present  Right Sinus: No maxillary sinus tenderness or frontal sinus tenderness  Left Sinus: No maxillary sinus tenderness or frontal sinus tenderness  Mouth/Throat:      Mouth: Mucous membranes are moist    Eyes:      General: Lids are normal       Extraocular Movements: Extraocular movements intact  Conjunctiva/sclera: Conjunctivae normal       Pupils: Pupils are equal, round, and reactive to light  Cardiovascular:      Rate and Rhythm: Normal rate and regular rhythm  Pulses: Normal pulses  Heart sounds: Normal heart sounds, S1 normal and S2 normal    Pulmonary:      Effort: Pulmonary effort is normal       Breath sounds: Normal breath sounds     Abdominal:      General: Abdomen is flat  Musculoskeletal:      Cervical back: Normal range of motion and neck supple  Skin:     General: Skin is warm and dry  Neurological:      Mental Status: She is alert and oriented to person, place, and time  Psychiatric:         Mood and Affect: Mood and affect normal          Speech: Speech normal          Behavior: Behavior normal  Behavior is cooperative  Thought Content:  Thought content normal          Cognition and Memory: Cognition and memory normal          Judgment: Judgment normal

## 2022-12-05 NOTE — LETTER
[place] [address]  [address] [place] [id number]  Dept: [phone]    December 5, 2022    Patient: Aggie Joseph  YOB: 2000    Aggie Joseph was seen and evaluated at our [facility]  Please note if Covid and Flu tests are negative, they may return to work when fever free for 24 hours without the use of a fever reducing agent  If Covid or Flu test is positive, they may return to work on 12/12/22, as this is 5 days from the onset of symptoms  Upon return, they must then adhere to strict masking for an additional 5 days      Sincerely,    TRE Clifford

## 2022-12-05 NOTE — PATIENT INSTRUCTIONS
Viral Syndrome   AMBULATORY CARE:   Viral syndrome  is a term used for symptoms of an infection caused by a virus  Viruses are spread easily from person to person through the air and on shared items  Signs and symptoms  may start slowly or suddenly and last hours to days  They can be mild to severe and can change over days or hours  You may have any of the following:  Fever and chills    A runny or stuffy nose    Cough, sore throat, or hoarseness    Headache, or pain and pressure around your eyes    Muscle aches and joint pain    Shortness of breath or wheezing    Abdominal pain, cramps, and diarrhea    Nausea, vomiting, or loss of appetite    Call your local emergency number (911 in the [facility]) or have someone else call if:   You have a seizure  You cannot be woken  You have chest pain or trouble breathing  Seek care immediately if:   You have a stiff neck, a bad headache, and sensitivity to light  You feel weak, dizzy, or confused  You stop urinating or urinate a lot less than usual     You cough up blood or thick yellow or green mucus  You have severe abdominal pain or your abdomen is larger than usual     Call your doctor if:   Your symptoms do not get better with treatment or get worse after 10 days  You have a rash or ear pain  You have burning when you urinate  You have questions or concerns about your condition or care  Treatment for viral syndrome  may include medicines to manage your symptoms  Antibiotics are not given for a viral infection  You may  need any of the following:  Acetaminophen  decreases pain and fever  It is available without a doctor's order  Ask how much to take and how often to take it  Follow directions  Read the labels of all other medicines you are using to see if they also contain acetaminophen, or ask your doctor or pharmacist  Acetaminophen can cause liver damage if not taken correctly   Do not use more than 4 grams (4,000 milligrams) total of acetaminophen in one day  NSAIDs , such as ibuprofen, help decrease swelling, pain, and fever  NSAIDs can cause stomach bleeding or kidney problems in certain people  If you take blood thinner medicine, always ask your healthcare provider if NSAIDs are safe for you  Always read the medicine label and follow directions  Cold medicine  helps decrease swelling, control a cough, and relieve chest or nasal congestion  Saline nasal spray  helps decrease nasal congestion  Manage your symptoms:   Drink liquids as directed to prevent dehydration  Ask how much liquid to drink each day and which liquids are best for you  Ask if you should drink an oral rehydration solution (ORS)  An ORS has the right amounts of water, salts, and sugar you need to replace body fluids  This may help prevent dehydration caused by vomiting or diarrhea  Do not drink liquids with caffeine  Liquids with caffeine can make dehydration worse  Get plenty of rest to help your body heal   Take naps throughout the day  Ask your healthcare provider when you can return to work and your normal activities  Use a cool mist humidifier to help you breathe easier  Ask your healthcare provider how to use a cool mist humidifier  Eat honey or use cough drops for a sore throat  Cough drops are available without a doctor's order  Follow directions for taking cough drops  Do not smoke or be close to anyone who is smoking  Nicotine and other chemicals in cigarettes and cigars can cause lung damage  Smoking can also delay healing  Ask your healthcare provider for information if you currently smoke and need help to quit  E-cigarettes or smokeless tobacco still contain nicotine  Talk to your healthcare provider before you use these products  Prevent the spread of germs:       Wash your hands often  Wash your hands several times each day  Wash after you use the bathroom, change a child's diaper, and before you prepare or eat food   Use soap and water every time  Rub your soapy hands together, lacing your fingers  Wash the front and back of your hands, and in between your fingers  Use the fingers of one hand to scrub under the fingernails of the other hand  Wash for at least 20 seconds  Rinse with warm, running water for several seconds  Then dry your hands with a clean towel or paper towel  Use hand  that contains alcohol if soap and water are not available  Do not touch your eyes, nose, or mouth without washing your hands first          Cover a sneeze or cough  Use a tissue that covers your mouth and nose  Throw the tissue away in a trash can right away  Use the bend of your arm if a tissue is not available  Wash your hands well with soap and water or use a hand   Stay away from others while you are sick  Avoid crowds as much as possible  Ask about vaccines you may need  Talk to your healthcare provider about your vaccine history  He or she will tell you which vaccines you need, and when to get them  Get the influenza (flu) vaccine as soon as recommended each year  The flu vaccine is available starting in September or October  Flu viruses change, so it is important to get a flu vaccine every year  Get the pneumonia vaccine if recommended  This vaccine is usually recommended every 5 years  Your provider will tell you when to get this vaccine, if needed  Follow up with your doctor as directed:  Write down your questions so you remember to ask them during your visits  © Copyright [employer] 2022 Information is for End User's use only and may not be sold, redistributed or otherwise used for commercial purposes  All illustrations and images included in CareNotes® are the copyrighted property of [employer]  or Kelle Lopez  The above information is an  only  It is not intended as medical advice for individual conditions or treatments   Talk to your doctor, nurse or pharmacist before following any medical regimen to see if it is safe and effective for you

## 2022-12-06 LAB
FLUAV RNA RESP QL NAA+PROBE: POSITIVE
FLUBV RNA RESP QL NAA+PROBE: NEGATIVE
SARS-COV-2 RNA RESP QL NAA+PROBE: NEGATIVE

## 2022-12-07 ENCOUNTER — TELEMEDICINE (OUTPATIENT)
Dept: PSYCHIATRY | Facility: CLINIC | Age: 22
End: 2022-12-07

## 2022-12-07 DIAGNOSIS — F12.90 MARIJUANA USE, CONTINUOUS: ICD-10-CM

## 2022-12-07 DIAGNOSIS — F32.4 MAJOR DEPRESSIVE DISORDER WITH SINGLE EPISODE, IN PARTIAL REMISSION (HCC): ICD-10-CM

## 2022-12-07 DIAGNOSIS — F39 MOOD DISORDER (HCC): ICD-10-CM

## 2022-12-07 DIAGNOSIS — F33.2 SEVERE EPISODE OF RECURRENT MAJOR DEPRESSIVE DISORDER, WITHOUT PSYCHOTIC FEATURES (HCC): Primary | ICD-10-CM

## 2022-12-07 DIAGNOSIS — F41.1 ANXIETY STATE: ICD-10-CM

## 2022-12-07 RX ORDER — ALBUTEROL SULFATE 90 UG/1
2 AEROSOL, METERED RESPIRATORY (INHALATION) EVERY 4 HOURS PRN
COMMUNITY
Start: 2022-11-07

## 2022-12-07 RX ORDER — TRAZODONE HYDROCHLORIDE 100 MG/1
100 TABLET ORAL
Qty: 90 TABLET | Refills: 1 | Status: SHIPPED | OUTPATIENT
Start: 2022-12-07

## 2022-12-07 RX ORDER — HYDROXYZINE 50 MG/1
50 TABLET, FILM COATED ORAL 2 TIMES DAILY PRN
Qty: 90 TABLET | Refills: 1 | Status: SHIPPED | OUTPATIENT
Start: 2022-12-07

## 2022-12-07 RX ORDER — LAMOTRIGINE 25 MG/1
25 TABLET ORAL DAILY
Qty: 90 TABLET | Refills: 1 | Status: SHIPPED | OUTPATIENT
Start: 2022-12-07

## 2022-12-07 RX ORDER — SERTRALINE HYDROCHLORIDE 100 MG/1
150 TABLET, FILM COATED ORAL DAILY
Qty: 135 TABLET | Refills: 1 | Status: SHIPPED | OUTPATIENT
Start: 2022-12-07 | End: 2023-06-05

## 2022-12-07 NOTE — PSYCH
Regular Visit    Problem List Items Addressed This Visit        Other    Major depressive disorder with single episode, in partial remission ([facility] )    Severe episode of recurrent major depressive disorder, without psychotic features ([facility] ) - Primary   Other Visit Diagnoses     Mood disorder ([facility] )        Marijuana use, continuous                 Encounter provider TRE Mendez    Provider located at    [address] [place] [address]  [address] [facility] [place] [address] [address] [phone]    Recent Visits  No visits were found meeting these conditions  Showing recent visits within past 7 days and meeting all other requirements  Today's Visits  Date Type Provider Dept   12/07/22 Telemedicine TRE Mendez  [facility]   Showing today's visits and meeting all other requirements  Future Appointments  No visits were found meeting these conditions  Showing future appointments within next 150 days and meeting all other requirements       HPI     Current Outpatient Medications   Medication Sig Dispense Refill   • Azelastine HCl 137 MCG/SPRAY SOLN USE 1 TO 2 SPRAYS IN EACH NOSTRIL 2 TIMES A DAY FOR 14 DAYS AS DIRECTED (Patient not taking: Reported on 12/5/2022)     • CAMRESE 0 15-0 03 &0 01 MG TABS   0   • clotrimazole-betamethasone (LOTRISONE) 1-0 05 % cream      • doxycycline (ADOXA) 100 MG tablet Take 100 mg by mouth 2 (two) times a day     • glucose blood (ACCU-CHEK CHELA PLUS) test strip Test blood sugars up to 3 times daily or as needed for fluctuating blood sugars   100 each 2   • hydrOXYzine HCL (ATARAX) 50 mg tablet Take 1 tablet (50 mg total) by mouth 2 (two) times a day as needed for anxiety 90 tablet 1   • lamoTRIgine (LaMICtal) 25 mg tablet Take 1 tablet (25 mg total) by mouth daily 90 tablet 1   • levothyroxine 25 mcg tablet TAKE 1 TABLET DAILY 90 tablet 3   • oseltamivir (TAMIFLU) 75 mg capsule Take 1 capsule (75 mg total) by mouth 2 (two) times a day for 5 days 10 capsule 0   • predniSONE 20 mg tablet take 2 tablets by mouth once daily for 4 days (Patient not taking: Reported on 12/5/2022)     • sertraline (ZOLOFT) 100 mg tablet Take 1 5 tablets (150 mg total) by mouth daily 135 tablet 1   • traZODone (DESYREL) 100 mg tablet Take 1 tablet (100 mg total) by mouth daily at bedtime 90 tablet 1     No current facility-administered medications for this visit  Review of Systems        MEDICATION MANAGEMENT NOTE        Dayron Wallace    Name and Date of Birth:  Jamie Bain 20 y o  2000 MRN: [id number]    Date of Visit: December 7, 2022    No Known Allergies  SUBJECTIVE:    Lucio Course is seen today for a follow up for Major Depressive Disorder, mood disorder and Generalized Anxiety Disorder  She continues to do fairly well since the last visit  Pt feeling physically unwell due to being dx with Influenza-off from work until next week, receiving medical treatment  Continues poor compliance with psych meds-takes 4x per week and forgetting other days  Recently bought a pillbox and is hopeful that will increase compliance  Periods of irritability and dysphoria persist, worsen when forgetting meds  Recent DUI during thanksgiving break, states she has cut down on drinking significantly but went out one night with friends and went through a checkpoint  She is taking it as a learning experience and is grateful she nor anybody else get injured  Given resources to drug and alcohol resources in the past, has not complied  Continues to use marijuana frequently  Encouraged pt to cut down  Continues to work at   States she has some good days and bad days-believes good days will occur more often if she is more compliant with treatment  Sleeps well with trazodone  No med changes  Denies si  She denies any side effects from medications      PLAN:    -Conitnue Lamictal to 25 mg for mood stabilization/Irritability  Lamotrigine PARQ completed including dizziness, headaches, ataxia, vision problems, somnolence, sleep changes, cognitive difficulties, rash (including Hall-Derek rash), and others, risk of teratogenicity for females    -Continue Zoloft to 150 mg daily   PARQ completed including serotonin syndrome, SIADH, worsening depression, suicidality, induction of sabrina, GI upset, headaches, activation, sexual side effects, sedation, potential drug interactions, and others     -Continue Trazodone 150mg daily for insomnia  -Continue prn atarax 50mg daily for breakthrough anxiety           Aware of 24 hour and weekend coverage for urgent situations accessed by calling [facility] main practice number  Referral for individual psychotherapy    Diagnoses and all orders for this visit:    Severe episode of recurrent major depressive disorder, without psychotic features ([facility] )    Major depressive disorder with single episode, in partial remission ([facility] )    Mood disorder ([place] [address] )    Marijuana use, continuous        Current Outpatient Medications on File Prior to Visit   Medication Sig Dispense Refill   • Azelastine HCl 137 MCG/SPRAY SOLN USE 1 TO 2 SPRAYS IN EACH NOSTRIL 2 TIMES A DAY FOR 14 DAYS AS DIRECTED (Patient not taking: Reported on 12/5/2022)     • CAMRESE 0 15-0 03 &0 01 MG TABS   0   • clotrimazole-betamethasone (LOTRISONE) 1-0 05 % cream      • doxycycline (ADOXA) 100 MG tablet Take 100 mg by mouth 2 (two) times a day     • glucose blood (ACCU-CHEK CHELA PLUS) test strip Test blood sugars up to 3 times daily or as needed for fluctuating blood sugars   100 each 2   • hydrOXYzine HCL (ATARAX) 50 mg tablet Take 1 tablet (50 mg total) by mouth 2 (two) times a day as needed for anxiety 90 tablet 1   • lamoTRIgine (LaMICtal) 25 mg tablet Take 1 tablet (25 mg total) by mouth daily 90 tablet 1   • levothyroxine 25 mcg tablet TAKE 1 TABLET DAILY 90 tablet 3   • oseltamivir (TAMIFLU) 75 mg capsule Take 1 capsule (75 mg total) by mouth 2 (two) times a day for 5 days 10 capsule 0   • predniSONE 20 mg tablet take 2 tablets by mouth once daily for 4 days (Patient not taking: Reported on 12/5/2022)     • sertraline (ZOLOFT) 100 mg tablet Take 1 5 tablets (150 mg total) by mouth daily 135 tablet 1   • traZODone (DESYREL) 100 mg tablet Take 1 tablet (100 mg total) by mouth daily at bedtime 90 tablet 1     No current facility-administered medications on file prior to visit  HPI ROS Appetite Changes and Sleep:     She reports difficulty falling asleep, adequate appetite, adequate energy level   Denies homicidal ideation, denies suicidal ideation    Review Of Systems:      HPI ROS:               Medication Side Effects:  denies    Depression (10 worst): 5/10    Anxiety (10 worst): 5/10    Safety concerns (SI, HI, etc): Denies si and hi    Sleep: 5-6 hrs    Energy: Fair to low    Appetite: fair    Weight Change: denies        Mental Status Evaluation:    Appearance Adequate hygiene and grooming   Behavior calm and cooperative   Mood anxious and depressed  Depression Scale - 5 of 10 (0 = No depression)  Anxiety Scale - 5 of 10 (0 = No anxiety)   Speech Normal rate and volume   Affect mood-congruent   Thought Processes Goal directed and coherent   Thought Content Does not verbalize delusional material   Associations Tightly connected   Perceptual Disturbances Denies hallucinations and does not appear to be responding to internal stimuli   Risk Potential Suicidal/Homicidal Ideation - No evidence of suicidal or homicidal ideation and patient does not verbalize suicidal or homicidal ideation  Risk of Violence - No evidence of risk for violence found on assessment  Risk of Self Mutilation - No evidence of risk for self mutilation found on assessment   Orientation oriented to person, place, time/date and situation   Memory recent and remote memory grossly intact   Consciousness alert and awake Attention/Concentration attention span and concentration appear shorter than expected for age   Insight intact   Judgement intact   Muscle Strength and Gait normal muscle strength and normal muscle tone, normal gait/station and normal balance   Motor Activity no abnormal movements   Language no difficulty naming common objects, no difficulty repeating a phrase, no difficulty writing a sentence   Fund of Knowledge adequate knowledge of current events  adequate fund of knowledge regarding past history  adequate fund of knowledge regarding vocabulary      Past Psychiatric History Update:     Inpatient Psychiatric Admission Since Last Encounter:   no  Changes to Outpatient Psychiatric Treatment Team:    no  Suicide Attempt Or Self Mutilation Since Last Encounter:   no  Incidence of Violent Behavior Since Last Encounter:   no    Traumatic History Update:     New Onset of Abuse Since Last Encounter:   no  Traumatic Events Since Last Encounter:   no    Past Medical History:    Past Medical History:   Diagnosis Date   • Anxiety    • Depression    • Environmental allergies     Last assessed: 1/18/17   • GERD (gastroesophageal reflux disease)    • Hypoglycemia    • Hypothyroidism         Past Surgical History:   Procedure Laterality Date   • COLONOSCOPY  2016 & 2018    Fiberoptic   • EGD  2016   • EGD  06/2016   • MYRINGOTOMY     • TONSILLECTOMY     • TOOTH EXTRACTION       No Known Allergies  Substance Abuse History:    Social History     Substance and Sexual Activity   Alcohol Use Yes   • Alcohol/week: 10 0 standard drinks   • Types: 10 Glasses of wine per week    Comment: every 2-3 days, more on weekends     Social History     Substance and Sexual Activity   Drug Use Yes   • Frequency: 4 0 times per week   • Types: Marijuana    Comment: recreational      Social History:    Social History     Socioeconomic History   • Marital status: Single     Spouse name: Not on file   • Number of children: 0   • Years of education: Not on file   • Highest education level: Bachelor's degree (e g , BA, AB, BS)   Occupational History   • Occupation: UNEMPLOYED   Tobacco Use   • Smoking status: Some Days     Types: Cigarettes   • Smokeless tobacco: Never   • Tobacco comments:     1/2 pack per month   Vaping Use   • Vaping Use: Every day   • Substances: Nicotine   Substance and Sexual Activity   • Alcohol use: Yes     Alcohol/week: 10 0 standard drinks     Types: 10 Glasses of wine per week     Comment: every 2-3 days, more on weekends   • Drug use: Yes     Frequency: 4 0 times per week     Types: Marijuana     Comment: recreational    • Sexual activity: Not on file   Other Topics Concern   • Not on file   Social History Narrative   • Not on file     Social Determinants of Health     Financial Resource Strain: Not on file   Food Insecurity: Not on file   Transportation Needs: Not on file   Physical Activity: Not on file   Stress: Not on file   Social Connections: Not on file   Intimate Partner Violence: Not on file   Housing Stability: Not on file     Family Psychiatric History:     Family History   Problem Relation Age of Onset   • Anxiety disorder Mother    • Diabetes Mother         due to underlying condition with both eyes affected by proliferative retinopathy and traction retinal detatchments involving maculae, without long term use of insulin   • Depression Father    • Anxiety disorder Father    • Alcohol abuse Father    • Sleep apnea Father    • Suicide Attempts Maternal Grandfather    • Suicide Attempts Maternal Grandmother    • Crohn's disease Paternal Grandfather      History Review: The following portions of the patient's history were reviewed and updated as appropriate: allergies, current medications, past family history, past medical history, past social history, past surgical history and problem list     OBJECTIVE:     Vital signs in last 24 hours: There were no vitals filed for this visit    Laboratory Results:   Recent Labs (last 2 months):   Office Visit on 12/05/2022   Component Date Value   • SARS-CoV-2 12/05/2022 Negative    • INFLUENZA A PCR 12/05/2022 Positive (A)    • INFLUENZA B PCR 12/05/2022 Negative      I have personally reviewed all pertinent laboratory/tests results  Suicide/Homicide Risk Assessment:    Risk of Harm to Self:  Protective Factors: no current suicidal ideation, access to mental health treatment, having a desire to be alive, having a sense of purpose or meaning in life  Based on today's assessment, Marlena presents the following risk of harm to self: minimal    Risk of Harm to Others:  Based on today's assessment, Marlena presents the following risk of harm to others: minimal    The following interventions are recommended: referral for psychotherapy    Medications Risks/Benefits:      Risks, Benefits And Possible Side Effects Of Medications:    Discussed risks and benefits of treatment with patient including risk of suicidality, serotonin syndrome, increased QTc interval and SIADH related to treatment with antidepressants; Risk of induction of manic symptoms in certain patient populations and risk of rash related to treatment with Lamictal     Controlled Medication Discussion:     Not applicable    Treatment Plan:    Due for update/Updated:   no  Last treatment plan done 6/1/22 by avel mckenzie  Treatment Plan due on 12/1/22      TRE Bradley 12/07/22  Visit Time    Visit Start Time: 8am  Visit Stop Time: 509  Total Visit Duration: 20 minutes

## 2023-03-08 ENCOUNTER — OFFICE VISIT (OUTPATIENT)
Dept: URGENT CARE | Facility: MEDICAL CENTER | Age: 23
End: 2023-03-08

## 2023-03-08 VITALS
RESPIRATION RATE: 18 BRPM | OXYGEN SATURATION: 98 % | DIASTOLIC BLOOD PRESSURE: 94 MMHG | SYSTOLIC BLOOD PRESSURE: 131 MMHG | HEART RATE: 102 BPM | TEMPERATURE: 99.9 F

## 2023-03-08 DIAGNOSIS — J06.9 UPPER RESPIRATORY TRACT INFECTION, UNSPECIFIED TYPE: ICD-10-CM

## 2023-03-08 DIAGNOSIS — H66.001 NON-RECURRENT ACUTE SUPPURATIVE OTITIS MEDIA OF RIGHT EAR WITHOUT SPONTANEOUS RUPTURE OF TYMPANIC MEMBRANE: Primary | ICD-10-CM

## 2023-03-08 RX ORDER — AMOXICILLIN 875 MG/1
875 TABLET, COATED ORAL 2 TIMES DAILY
Qty: 10 TABLET | Refills: 0 | Status: SHIPPED | OUTPATIENT
Start: 2023-03-08 | End: 2023-03-13

## 2023-03-08 RX ORDER — BENZONATATE 200 MG/1
200 CAPSULE ORAL 3 TIMES DAILY PRN
Qty: 20 CAPSULE | Refills: 0 | Status: SHIPPED | OUTPATIENT
Start: 2023-03-08

## 2023-03-08 NOTE — PROGRESS NOTES
[facility]        NAME: Meagan Mcallister is a 20 y o  female  : 2000    MRN: [id number]  DATE: 2023  TIME: 7:09 PM    Assessment and Plan   Non-recurrent acute suppurative otitis media of right ear without spontaneous rupture of tympanic membrane [H66 001]  1  Non-recurrent acute suppurative otitis media of right ear without spontaneous rupture of tympanic membrane  amoxicillin (AMOXIL) 875 mg tablet      2  Upper respiratory tract infection, unspecified type  benzonatate (TESSALON) 200 MG capsule            Patient Instructions   Antibiotics and Tessalon Pearles sent to pharmacy  • For decongestion:  o Claritin-D or Zyrtec-D  o Flonase  • For Cough:  o Salt water gurgle  o Honey  o Throat lozenges  o Tessalon Pearles     Follow up with PCP in 3-5 days  Proceed to  ER if symptoms worsen  Chief Complaint     Chief Complaint   Patient presents with   • Earache     Pt c/o  feeling like fluid in right ear for 3-4 days  Pain started today  Body aches, congestion, dark yellow thick mucous - started over one week ago  History of Present Illness       Patient reports cough, congestion, nasal drainage for about 7 days  States starting 3 days ago she developed pain in right ear that has since evolved to a fluid filled feeling  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  Earache   There is pain in the right ear  This is a new problem  The current episode started in the past 7 days  Progression since onset: Changed from pain to fluid filled feeling  There has been no fever  Associated symptoms include coughing and a sore throat  Pertinent negatives include no abdominal pain, diarrhea, hearing loss or vomiting  She has tried nothing for the symptoms  URI   This is a new problem  The current episode started 1 to 4 weeks ago  The problem has been unchanged  There has been no fever   Associated symptoms include congestion, coughing, ear pain, a sore throat and swollen glands  Pertinent negatives include no abdominal pain, chest pain, diarrhea, sinus pain or vomiting  Review of Systems   Review of Systems   HENT: Positive for congestion, ear pain, postnasal drip and sore throat  Negative for hearing loss, mouth sores, sinus pain and trouble swallowing  Respiratory: Positive for cough  Negative for chest tightness and shortness of breath  Cardiovascular: Negative for chest pain  Gastrointestinal: Negative for abdominal pain, diarrhea and vomiting  Neurological: Negative for dizziness and light-headedness           Current Medications       Current Outpatient Medications:   •  albuterol (PROVENTIL HFA,VENTOLIN HFA) 90 mcg/act inhaler, Inhale 2 puffs every 4 (four) hours as needed, Disp: , Rfl:   •  amoxicillin (AMOXIL) 875 mg tablet, Take 1 tablet (875 mg total) by mouth 2 (two) times a day for 5 days, Disp: 10 tablet, Rfl: 0  •  benzonatate (TESSALON) 200 MG capsule, Take 1 capsule (200 mg total) by mouth 3 (three) times a day as needed for cough, Disp: 20 capsule, Rfl: 0  •  CAMRESE 0 15-0 03 &0 01 MG TABS, , Disp: , Rfl: 0  •  hydrOXYzine HCL (ATARAX) 50 mg tablet, Take 1 tablet (50 mg total) by mouth 2 (two) times a day as needed for anxiety, Disp: 90 tablet, Rfl: 1  •  lamoTRIgine (LaMICtal) 25 mg tablet, Take 1 tablet (25 mg total) by mouth daily, Disp: 90 tablet, Rfl: 1  •  levothyroxine 25 mcg tablet, TAKE 1 TABLET DAILY, Disp: 90 tablet, Rfl: 3  •  sertraline (ZOLOFT) 100 mg tablet, Take 1 5 tablets (150 mg total) by mouth daily, Disp: 135 tablet, Rfl: 1  •  traZODone (DESYREL) 100 mg tablet, Take 1 tablet (100 mg total) by mouth daily at bedtime, Disp: 90 tablet, Rfl: 1  •  Azelastine HCl 137 MCG/SPRAY SOLN, USE 1 TO 2 SPRAYS IN EACH NOSTRIL 2 TIMES A DAY FOR 14 DAYS AS DIRECTED (Patient not taking: Reported on 12/5/2022), Disp: , Rfl:   •  clotrimazole-betamethasone (LOTRISONE) 1-0 05 % cream, , Disp: , Rfl:   •  doxycycline (ADOXA) 100 MG tablet, Take 100 mg by mouth 2 (two) times a day (Patient not taking: Reported on 3/8/2023), Disp: , Rfl:   •  glucose blood (ACCU-CHEK CHELA PLUS) test strip, Test blood sugars up to 3 times daily or as needed for fluctuating blood sugars  (Patient not taking: Reported on 3/8/2023), Disp: 100 each, Rfl: 2  •  predniSONE 20 mg tablet, take 2 tablets by mouth once daily for 4 days (Patient not taking: Reported on 12/5/2022), Disp: , Rfl:     Current Allergies     Allergies as of 03/08/2023   • (No Known Allergies)            The following portions of the patient's history were reviewed and updated as appropriate: allergies, current medications, past family history, past medical history, past social history, past surgical history and problem list      Past Medical History:   Diagnosis Date   • Anxiety    • Depression    • Environmental allergies     Last assessed: 1/18/17   • GERD (gastroesophageal reflux disease)    • Hypoglycemia    • Hypothyroidism        Past Surgical History:   Procedure Laterality Date   • COLONOSCOPY  2016 & 2018    Fiberoptic   • EGD  2016   • EGD  06/2016   • MYRINGOTOMY     • TONSILLECTOMY     • TOOTH EXTRACTION         Family History   Problem Relation Age of Onset   • Anxiety disorder Mother    • Diabetes Mother         due to underlying condition with both eyes affected by proliferative retinopathy and traction retinal detatchments involving maculae, without long term use of insulin   • Depression Father    • Anxiety disorder Father    • Alcohol abuse Father    • Sleep apnea Father    • Suicide Attempts Maternal Grandfather    • Suicide Attempts Maternal Grandmother    • Crohn's disease Paternal Grandfather          Medications have been verified  Objective   /94   Pulse 102   Temp 99 9 °F (37 7 °C) (Tympanic)   Resp 18   SpO2 98%   No LMP recorded  Physical Exam     Physical Exam  Constitutional:       Appearance: Normal appearance     HENT:      Right Ear: External ear normal  Decreased hearing noted  A middle ear effusion is present  Tympanic membrane is bulging  Left Ear: Hearing and external ear normal       Ears:      Comments: Large amounts of cerumen in right ear, visible part of right TM is red and bulging  Nose: Congestion and rhinorrhea present  Mouth/Throat:      Mouth: Mucous membranes are moist    Eyes:      Pupils: Pupils are equal, round, and reactive to light  Cardiovascular:      Rate and Rhythm: Normal rate  Pulses: Normal pulses  Heart sounds: Normal heart sounds  Pulmonary:      Effort: Pulmonary effort is normal       Breath sounds: Wheezing present  Abdominal:      Palpations: Abdomen is soft  Tenderness: There is no abdominal tenderness  Skin:     General: Skin is warm and dry  Neurological:      Mental Status: She is alert

## 2023-03-08 NOTE — PATIENT INSTRUCTIONS
Antibiotics and Tessalon Pearles sent to pharmacy  For decongestion:  Claritin-D or Zyrtec-D  Flonase  For Cough:  Salt water gurgle  Honey  Throat lozenges  Tessalon Pearles   Follow up with PCP in 3-5 days  Proceed to ER if symptoms worsen  Upper Respiratory Infection   WHAT YOU NEED TO KNOW:   An upper respiratory infection is also called a cold  It can affect your nose, throat, ears, and sinuses  Cold symptoms are usually worst for the first 3 to 5 days  Most people get better in 7 to 14 days  You may continue to cough for 2 to 3 weeks  Colds are caused by viruses and do not get better with antibiotics  DISCHARGE INSTRUCTIONS:   Call your local emergency number (911 in the [address] [facility]) if:   You have chest pain or trouble breathing  Return to the emergency department if:   You have a fever over 102ºF (39ºC)  Call your doctor if:   You have a low fever  Your sore throat gets worse or you see white or yellow spots in your throat  Your symptoms get worse after 3 to 5 days or are not better in 14 days  You have a rash anywhere on your skin  You have large, tender lumps in your neck  You have thick, green, or yellow drainage from your nose  You cough up thick yellow, green, or bloody mucus  You have a bad earache  You have questions or concerns about your condition or care  Medicines: You may need any of the following:  Decongestants  help reduce nasal congestion and help you breathe more easily  If you take decongestant pills, they may make you feel restless or cause problems with your sleep  Do not use decongestant sprays for more than a few days  Cough suppressants  help reduce coughing  Ask your healthcare provider which type of cough medicine is best for you  NSAIDs , such as ibuprofen, help decrease swelling, pain, and fever  NSAIDs can cause stomach bleeding or kidney problems in certain people   If you take blood thinner medicine, always ask your healthcare provider if NSAIDs are safe for you  Always read the medicine label and follow directions  Acetaminophen  decreases pain and fever  It is available without a doctor's order  Ask how much to take and how often to take it  Follow directions  Read the labels of all other medicines you are using to see if they also contain acetaminophen, or ask your doctor or pharmacist  Acetaminophen can cause liver damage if not taken correctly  Take your medicine as directed  Contact your healthcare provider if you think your medicine is not helping or if you have side effects  Tell your provider if you are allergic to any medicine  Keep a list of the medicines, vitamins, and herbs you take  Include the amounts, and when and why you take them  Bring the list or the pill bottles to follow-up visits  Carry your medicine list with you in case of an emergency  Self-care:   Rest as much as possible  Slowly start to do more each day  Drink more liquids as directed  Liquids will help thin and loosen mucus so you can cough it up  Liquids will also help prevent dehydration  Liquids that help prevent dehydration include water, fruit juice, and broth  Do not drink liquids that contain caffeine  Caffeine can increase your risk for dehydration  Ask your healthcare provider how much liquid to drink each day  Soothe a sore throat  Gargle with warm salt water  Make salt water by dissolving ¼ teaspoon salt in 1 cup warm water  You may also suck on hard candy or throat lozenges  You may use a sore throat spray  Use a humidifier or vaporizer  Use a cool mist humidifier or a vaporizer to increase air moisture in your home  This may make it easier for you to breathe and help decrease your cough  Use saline nasal drops as directed  These help relieve congestion  Apply petroleum-based jelly around the outside of your nostrils  This can decrease irritation from blowing your nose  Do not smoke    Nicotine and other chemicals in cigarettes and cigars can make your symptoms worse  They can also cause infections such as bronchitis or pneumonia  Ask your healthcare provider for information if you currently smoke and need help to quit  E-cigarettes or smokeless tobacco still contain nicotine  Talk to your healthcare provider before you use these products  Prevent a cold: Wash your hands often  Use soap and water every time you wash your hands  Rub your soapy hands together, lacing your fingers  Use the fingers of one hand to scrub under the nails of the other hand  Wash for at least 20 seconds  Rinse with warm, running water for several seconds  Then dry your hands  Use hand  gel if soap and water are not available  Do not touch your eyes or mouth without washing your hands first          Cover a sneeze or cough  Use a tissue that covers your mouth and nose  Put the used tissue in the trash right away  Use the bend of your arm if a tissue is not available  Wash your hands well with soap and water or use a hand   Do not stand close to anyone who is sneezing or coughing  Try to stay away from others while you are sick  This is especially important during the first 2 to 3 days when the virus is more easily spread  Wait until a fever, cough, or other symptoms are gone before you return to work or other regular activities  Do not share items while you are sick  This includes food, drinks, eating utensils, and dishes  Follow up with your doctor as directed:  Write down your questions so you remember to ask them during your visits  © Copyright [employer] 2022 Information is for End User's use only and may not be sold, redistributed or otherwise used for commercial purposes  The above information is an  only  It is not intended as medical advice for individual conditions or treatments   Talk to your doctor, nurse or pharmacist before following any medical regimen to see if it is safe and effective for you   Ear Infection   WHAT YOU NEED TO KNOW:   An ear infection is also called otitis media  Blocked or swollen eustachian tubes can cause an infection  Eustachian tubes connect the middle ear to the back of the nose and throat  They drain fluid from the middle ear  You may have a buildup of fluid in your ear  Germs build up in the fluid and infection develops  DISCHARGE INSTRUCTIONS:   Return to the emergency department if:   You have clear fluid coming from your ear  You have a stiff neck, headache, and a fever  Call your doctor if:   You see blood or pus draining from your ear  Your ear pain gets worse or does not go away, even after treatment  The outside of your ear is red or swollen  You are vomiting or have diarrhea  You have questions or concerns about your condition or care  Medicines: You may  need any of the following:  Acetaminophen  decreases pain and fever  It is available without a doctor's order  Ask how much to take and how often to take it  Follow directions  Read the labels of all other medicines you are using to see if they also contain acetaminophen, or ask your doctor or pharmacist  Acetaminophen can cause liver damage if not taken correctly  NSAIDs , such as ibuprofen, help decrease swelling, pain, and fever  This medicine is available with or without a doctor's order  NSAIDs can cause stomach bleeding or kidney problems in certain people  If you take blood thinner medicine, always ask your healthcare provider if NSAIDs are safe for you  Always read the medicine label and follow directions  Ear drops  may contain medicine to decrease pain and inflammation  Antibiotics  help treat a bacterial infection  Take your medicine as directed  Contact your healthcare provider if you think your medicine is not helping or if you have side effects  Tell your provider if you are allergic to any medicine  Keep a list of the medicines, vitamins, and herbs you take  Include the amounts, and when and why you take them  Bring the list or the pill bottles to follow-up visits  Carry your medicine list with you in case of an emergency  Self-care:   Apply heat  on your ear for 15 to 20 minutes, 3 to 4 times a day or as directed  You can apply heat with an electric heating pad, hot water bottle, or warm compress  Always put a cloth between your skin and the heat pack to prevent burns  Heat helps decrease pain  Apply ice  on your ear for 15 to 20 minutes, 3 to 4 times a day for 2 days or as directed  Use an ice pack, or put crushed ice in a plastic bag  Cover it with a towel before you apply it to your ear  Ice decreases swelling and pain  Prevent an ear infection:   Wash your hands often  to help prevent the spread of germs  Ask everyone in your house to wash their hands with soap and water  Ask them to wash after they use the bathroom or change a diaper  Remind them to wash before they prepare or eat food  Stay away from people who are ill  Some germs spread easily and quickly through contact  Follow up with your doctor as directed:  Write down your questions so you remember to ask them during your visits  © Copyright [facility] 2022 Information is for End User's use only and may not be sold, redistributed or otherwise used for commercial purposes  The above information is an  only  It is not intended as medical advice for individual conditions or treatments  Talk to your doctor, nurse or pharmacist before following any medical regimen to see if it is safe and effective for you

## 2023-03-08 NOTE — LETTER
March 8, 2023     Patient: Cathi Bain   YOB: 2000   Date of Visit: 3/8/2023       To Whom it May Concern:    Cathi Bain was seen in my clinic on 3/8/2023  She may return to work on 3/10/2023  If you have any questions or concerns, please don't hesitate to call           Sincerely,          TRE Diana        CC: No Recipients

## 2023-04-18 NOTE — TELEPHONE ENCOUNTER
"2 months if symptoms are not consistent with having COVID  If these were new symptoms for her, then we would consider testing

## 2023-05-03 ENCOUNTER — APPOINTMENT (OUTPATIENT)
Age: 23
End: 2023-05-03

## 2023-05-03 ENCOUNTER — OFFICE VISIT (OUTPATIENT)
Age: 23
End: 2023-05-03

## 2023-05-03 VITALS
HEIGHT: 63 IN | TEMPERATURE: 97.8 F | DIASTOLIC BLOOD PRESSURE: 88 MMHG | WEIGHT: 230.82 LBS | SYSTOLIC BLOOD PRESSURE: 128 MMHG | RESPIRATION RATE: 18 BRPM | HEART RATE: 119 BPM | BODY MASS INDEX: 40.9 KG/M2 | OXYGEN SATURATION: 95 %

## 2023-05-03 DIAGNOSIS — J06.9 VIRAL URI WITH COUGH: Primary | ICD-10-CM

## 2023-05-03 DIAGNOSIS — J30.2 SEASONAL ALLERGIES: ICD-10-CM

## 2023-05-03 DIAGNOSIS — R06.02 SOB (SHORTNESS OF BREATH): ICD-10-CM

## 2023-05-03 LAB
SARS-COV-2 AG UPPER RESP QL IA: NEGATIVE
VALID CONTROL: NORMAL

## 2023-05-03 RX ORDER — ALBUTEROL SULFATE 90 UG/1
2 AEROSOL, METERED RESPIRATORY (INHALATION) EVERY 6 HOURS PRN
Qty: 8.5 G | Refills: 0 | Status: SHIPPED | OUTPATIENT
Start: 2023-05-03

## 2023-05-03 RX ORDER — PREDNISONE 10 MG/1
TABLET ORAL
Qty: 24 TABLET | Refills: 0 | Status: SHIPPED | OUTPATIENT
Start: 2023-05-03

## 2023-05-03 NOTE — PATIENT INSTRUCTIONS
You have been diagnosed with a Viral Upper Respiratory infection and your symptoms should resolve over the next 7 to 10 days with the treatments recommended today  If they do not, it is possible that you have developed a bacterial infection and you should return  If you were to take an antibiotic while you are still in the viral stage, you will not get better any faster, but could kill off the good germs in your body as well as make the germs in you resistant to the antibiotic  Take an expectorant - guaifenesin should be the only ingredient - during the day, and the cough suppressant (ex  Robitussin DM or Tessalon) if needed at night only  Take Zinc 50 mg every 12 hours for the next week (the dose is important so do not just take a multivitamin with zinc or an over-the-counter cold med with zinc such as Airborne or Zicam, as that will not give you the sufficient dose)  You should also take Quercetin 500 mg twice daily with it (again dose is important)  You should also take Vitamin D3 5000 i u s per day for the next 1 week, and Vitamin-C 1 g twice daily (and again dosages are important, do not think you are getting enough vitamin C just by drinking extra orange juice)  You may use Flonase as discussed  You may also take a decongestant like Sudafed, unless you have hypertension or cardiac disease  If you are diabetic you should adhere strictly to your diabetic diet and monitor blood sugar closely while on prednisone and you should discontinue the prednisone if blood sugar becomes significantly elevated  Avoid nonsteroidal anti-inflammatories like Advil or Aleve while on prednisone  Upper Respiratory Infection   AMBULATORY CARE:   An upper respiratory infection  is also called a cold  Your nose, throat, ears, and sinuses may be affected  You are more likely to get a cold in the winter  Your risk of getting a cold may be increased if you smoke cigarettes or have allergies, such as hay fever    What causes a cold?  A cold is caused by a virus  Many viruses can cause a cold, and each is contagious  This means the virus can be easily spread to another person when the sick person coughs or sneezes  The virus can also be spread if you touch an object the virus is on and then touch your eyes, mouth, or nose  Cold symptoms  are usually worst for the first 3 to 5 days  You may have any of the following:  Runny or stuffy nose    Sneezing and coughing    Sore throat or hoarseness    Red, watery, and sore eyes    Fatigue (you feel more tired than usual)    Chills and fever    Headache, body aches, or sore muscles    Call your local emergency number (911 in the [address] [facility]) if:   You have chest pain or trouble breathing  Seek care immediately if:   You have a fever over 102ºF (39ºC)  Call your doctor if:   You have a low fever  Your sore throat gets worse or you see white or yellow spots in your throat  Your symptoms get worse after 3 to 5 days or are not better in 14 days  You have a rash anywhere on your skin  You have large, tender lumps in your neck  You have thick, green, or yellow drainage from your nose  You cough up thick yellow, green, or bloody mucus  You have a bad earache  You have questions or concerns about your condition or care  Treatment:  Colds are caused by viruses and do not get better with antibiotics  Most people get better in 7 to 14 days  You may continue to cough for 2 to 3 weeks  The following may help decrease your symptoms:  Decongestants  help reduce nasal congestion and help you breathe more easily  If you take decongestant pills, they may make you feel restless or not able to sleep  Do not use decongestant sprays for more than a few days  Cough suppressants  help reduce coughing  Ask your healthcare provider which type of cough medicine is best for you  NSAIDs , such as ibuprofen, help decrease swelling, pain, and fever   NSAIDs can cause stomach bleeding or kidney problems in certain people  If you take blood thinner medicine, always ask your healthcare provider if NSAIDs are safe for you  Always read the medicine label and follow directions  Acetaminophen  decreases pain and fever  It is available without a doctor's order  Ask how much to take and how often to take it  Follow directions  Read the labels of all other medicines you are using to see if they also contain acetaminophen, or ask your doctor or pharmacist  Acetaminophen can cause liver damage if not taken correctly  Do not use more than 4 grams (4,000 milligrams) total of acetaminophen in one day  Manage a cold:   Rest as much as possible  Slowly start to do more each day  Drink more liquids as directed  Liquids will help thin and loosen mucus so you can cough it up  Liquids will also help prevent dehydration  Liquids that help prevent dehydration include water, fruit juice, and broth  Do not drink liquids that contain caffeine  Caffeine can increase your risk for dehydration  Ask your healthcare provider how much liquid to drink each day  Soothe a sore throat  Gargle with warm salt water  Make salt water by dissolving ¼ teaspoon salt in 1 cup warm water  You may also suck on hard candy or throat lozenges  You may use a sore throat spray  Use a humidifier or vaporizer  Use a cool mist humidifier or a vaporizer to increase air moisture in your home  This may make it easier for you to breathe and help decrease your cough  Use saline nasal drops as directed  These help relieve congestion  Apply petroleum-based jelly around the outside of your nostrils  This can decrease irritation from blowing your nose  Do not smoke  Nicotine and other chemicals in cigarettes and cigars can make your symptoms worse  They can also cause infections such as bronchitis or pneumonia  Ask your healthcare provider for information if you currently smoke and need help to quit   E-cigarettes or smokeless tobacco still contain nicotine  Talk to your healthcare provider before you use these products  Prevent a cold: Wash your hands often  Use soap and water every time you wash your hands  Rub your soapy hands together, lacing your fingers  Use the fingers of one hand to scrub under the nails of the other hand  Wash for at least 20 seconds  Rinse with warm, running water for several seconds  Then dry your hands  Use germ-killing gel if soap and water are not available  Do not touch your eyes or mouth without washing your hands first      Cover a sneeze or cough  Use a tissue that covers your mouth and nose  Put the used tissue in the trash right away  Use the bend of your arm if a tissue is not available  Wash your hands well with soap and water or use a hand   Do not stand close to anyone who is sneezing or coughing  Try to stay away from others while you are sick  This is especially important during the first 2 to 3 days when the virus is more easily spread  Wait until a fever, cough, or other symptoms are gone before you return to work or other regular activities  Do not share items while you are sick  This includes food, drinks, eating utensils, and dishes  Follow up with your doctor as directed:  Write down your questions so you remember to ask them during your visits  © [employer] 2022 Information is for End User's use only and may not be sold, redistributed or otherwise used for commercial purposes  All illustrations and images included in CareNotes® are the copyrighted property of [employer]  or Kelle Mitchell   The above information is an  only  It is not intended as medical advice for individual conditions or treatments  Talk to your doctor, nurse or pharmacist before following any medical regimen to see if it is safe and effective for you  Allergies   WHAT YOU NEED TO KNOW:   Allergies are an immune system reaction to a substance called an allergen  Your immune system sees the allergen as harmful and attacks it  An allergic reaction can be mild or life-threatening  A life-threatening reaction is called anaphylaxis  Anaphylaxis is a sudden, life-threatening reaction that needs immediate treatment  DISCHARGE INSTRUCTIONS:   Call 911 for signs or symptoms of anaphylaxis,  such as trouble breathing, swelling in your mouth or throat, or wheezing  You may also have itching, a rash, hives, or feel like you are going to faint  Return to the emergency department if:   You have tingling in your hands or feet  Your skin is red or flushed  Contact your healthcare provider if:   You have questions or concerns about your condition or care  Medicines:   Antihistamines  help decrease itching, sneezing, and swelling  You may take them as a pill or use drops in your nose or eyes  Decongestants  help your nose feel less stuffy  Topical treatments  help decrease itching or swelling  You also may be given nasal sprays or eyedrops  Epinephrine  is used to treat a severe allergic reaction such as anaphylaxis  Take your medicine as directed  Contact your healthcare provider if you think your medicine is not helping or if you have side effects  Tell your provider if you are allergic to any medicine  Keep a list of the medicines, vitamins, and herbs you take  Include the amounts, and when and why you take them  Bring the list or the pill bottles to follow-up visits  Carry your medicine list with you in case of an emergency  Steps to take for signs or symptoms of anaphylaxis:   Immediately  give 1 shot of epinephrine only into the outer thigh muscle  Leave the shot in place  as directed  Your healthcare provider may recommend you leave it in place for up to 10 seconds before you remove it  This helps make sure all of the epinephrine is delivered  Call 911 and go to the emergency department,  even if the shot improved symptoms   Do not drive yourself  Bring the used epinephrine shot with you  Safety precautions to take if you are at risk for anaphylaxis:   Keep 2 shots of epinephrine with you at all times  You may need a second shot, because epinephrine only works for about 20 minutes and symptoms may return  Your healthcare provider can show you and family members how to give the shot  Check the expiration date every month and replace it before it expires  Create an action plan  Your healthcare provider can help you create a written plan that explains the allergy and an emergency plan to treat a reaction  The plan explains when to give a second epinephrine shot if symptoms return or do not improve after the first  Give copies of the action plan and emergency instructions to family members and work staff  Show them how to give a shot of epinephrine  Be careful when you exercise  If you have had exercise-induced anaphylaxis, do not exercise right after you eat  Stop exercising right away if you start to develop any signs or symptoms of anaphylaxis  You may first feel tired, warm, or have itchy skin  Hives, swelling, and severe breathing problems may develop if you continue to exercise  Carry medical alert identification  Wear medical alert jewelry or carry a card that explains the allergy  Ask your healthcare provider where to get these items  Inform all healthcare providers of the allergy  This includes dentists, nurses, doctors, and surgeons  Manage allergies:   Use nasal rinses as directed  Rinse with a saline solution daily  This will help clear allergens out of your nose  Use distilled water if possible  You can also boil tap water and let it cool before you use it  Do not use tap water that has not been boiled  Do not smoke  Allergy symptoms may decrease if you are not around smoke  Nicotine and other chemicals in cigarettes and cigars can cause lung damage   Ask your healthcare provider for information if you currently smoke and need help to quit  E-cigarettes or smokeless tobacco still contain nicotine  Talk to your healthcare provider before you use these products  Prevent allergic reactions:   Do not go outside when pollen counts are high if you have seasonal allergies  Your symptoms may be better if you go outside only in the morning or evening  Use your air conditioner, and change air filters often  Avoid dust, fur, and mold  Dust and vacuum your home often  You may want to wear a mask when you vacuum  Keep pets in certain rooms, and bathe them often  Use a dehumidifier (machine that decreases moisture) to help prevent mold  Do not use products that contain latex if you have a latex allergy  Use nonlatex gloves if you work in healthcare or in food preparation  Always tell healthcare providers about a latex allergy  Avoid areas that attract insects if you have an insect bite or sting allergy  Areas include trash cans, gardens, and picnics  Do not wear bright clothing or strong scents when you will be outside  Prevent an allergic reaction caused by food  You may have a reaction if your food is not prepared safely  For example, you could be served food that touched your trigger food during preparation  This is called cross-contamination  Kitchen tools can also cause cross-contamination  You may also eat baked foods that contain a trigger food you do not know about  Ask if the food contains your trigger food before you handle or eat it  Follow up with your healthcare provider as directed:  Write down your questions so you remember to ask them during your visits  When you have an allergic reaction, write down everything you were exposed to in the 2 hours before the reaction  Take that information to your next visit  © Copyright [employer] 2022 Information is for End User's use only and may not be sold, redistributed or otherwise used for commercial purposes    The above information is an  only  It is not intended as medical advice for individual conditions or treatments  Talk to your doctor, nurse or pharmacist before following any medical regimen to see if it is safe and effective for you

## 2023-05-03 NOTE — PROGRESS NOTES
[facility]        NAME: Quang Medrano is a 20 y o  female  : 2000    MRN: [id number]  DATE: May 3, 2023  TIME: 10:01 AM    Assessment and Plan   Viral URI with cough [J06 9]  1  Viral URI with cough  XR chest pa & lateral    Poct Covid 19 Rapid Antigen Test    predniSONE 10 mg tablet    albuterol (ProAir HFA) 90 mcg/act inhaler      2  Seasonal allergies  predniSONE 10 mg tablet    albuterol (ProAir HFA) 90 mcg/act inhaler            Patient Instructions     Patient Instructions     You have been diagnosed with a Viral Upper Respiratory infection and your symptoms should resolve over the next 7 to 10 days with the treatments recommended today  If they do not, it is possible that you have developed a bacterial infection and you should return  If you were to take an antibiotic while you are still in the viral stage, you will not get better any faster, but could kill off the good germs in your body as well as make the germs in you resistant to the antibiotic  Take an expectorant - guaifenesin should be the only ingredient - during the day, and the cough suppressant (ex  Robitussin DM or Tessalon) if needed at night only  Take Zinc 50 mg every 12 hours for the next week (the dose is important so do not just take a multivitamin with zinc or an over-the-counter cold med with zinc such as Airborne or Zicam, as that will not give you the sufficient dose)  You should also take Quercetin 500 mg twice daily with it (again dose is important)  You should also take Vitamin D3 5000 i u s per day for the next 1 week, and Vitamin-C 1 g twice daily (and again dosages are important, do not think you are getting enough vitamin C just by drinking extra orange juice)  You may use Flonase as discussed  You may also take a decongestant like Sudafed, unless you have hypertension or cardiac disease    If you are diabetic you should adhere strictly to your diabetic diet and monitor blood sugar closely while on prednisone and you should discontinue the prednisone if blood sugar becomes significantly elevated  Avoid nonsteroidal anti-inflammatories like Advil or Aleve while on prednisone  Upper Respiratory Infection   AMBULATORY CARE:   An upper respiratory infection  is also called a cold  Your nose, throat, ears, and sinuses may be affected  You are more likely to get a cold in the winter  Your risk of getting a cold may be increased if you smoke cigarettes or have allergies, such as hay fever  What causes a cold? A cold is caused by a virus  Many viruses can cause a cold, and each is contagious  This means the virus can be easily spread to another person when the sick person coughs or sneezes  The virus can also be spread if you touch an object the virus is on and then touch your eyes, mouth, or nose  Cold symptoms  are usually worst for the first 3 to 5 days  You may have any of the following:  · Runny or stuffy nose    · Sneezing and coughing    · Sore throat or hoarseness    · Red, watery, and sore eyes    · Fatigue (you feel more tired than usual)    · Chills and fever    · Headache, body aches, or sore muscles    Call your local emergency number (911 in the [address] [facility]) if:   · You have chest pain or trouble breathing  Seek care immediately if:   · You have a fever over 102ºF (39ºC)  Call your doctor if:   · You have a low fever  · Your sore throat gets worse or you see white or yellow spots in your throat  · Your symptoms get worse after 3 to 5 days or are not better in 14 days  · You have a rash anywhere on your skin  · You have large, tender lumps in your neck  · You have thick, green, or yellow drainage from your nose  · You cough up thick yellow, green, or bloody mucus  · You have a bad earache  · You have questions or concerns about your condition or care  Treatment:  Colds are caused by viruses and do not get better with antibiotics  Most people get better in 7 to 14 days  You may continue to cough for 2 to 3 weeks  The following may help decrease your symptoms:  · Decongestants  help reduce nasal congestion and help you breathe more easily  If you take decongestant pills, they may make you feel restless or not able to sleep  Do not use decongestant sprays for more than a few days  · Cough suppressants  help reduce coughing  Ask your healthcare provider which type of cough medicine is best for you  · NSAIDs , such as ibuprofen, help decrease swelling, pain, and fever  NSAIDs can cause stomach bleeding or kidney problems in certain people  If you take blood thinner medicine, always ask your healthcare provider if NSAIDs are safe for you  Always read the medicine label and follow directions  · Acetaminophen  decreases pain and fever  It is available without a doctor's order  Ask how much to take and how often to take it  Follow directions  Read the labels of all other medicines you are using to see if they also contain acetaminophen, or ask your doctor or pharmacist  Acetaminophen can cause liver damage if not taken correctly  Do not use more than 4 grams (4,000 milligrams) total of acetaminophen in one day  Manage a cold:   · Rest as much as possible  Slowly start to do more each day  · Drink more liquids as directed  Liquids will help thin and loosen mucus so you can cough it up  Liquids will also help prevent dehydration  Liquids that help prevent dehydration include water, fruit juice, and broth  Do not drink liquids that contain caffeine  Caffeine can increase your risk for dehydration  Ask your healthcare provider how much liquid to drink each day  · Soothe a sore throat  Gargle with warm salt water  Make salt water by dissolving ¼ teaspoon salt in 1 cup warm water  You may also suck on hard candy or throat lozenges  You may use a sore throat spray  · Use a humidifier or vaporizer    Use a cool mist humidifier or a vaporizer to increase air moisture in your home  This may make it easier for you to breathe and help decrease your cough  · Use saline nasal drops as directed  These help relieve congestion  · Apply petroleum-based jelly around the outside of your nostrils  This can decrease irritation from blowing your nose  · Do not smoke  Nicotine and other chemicals in cigarettes and cigars can make your symptoms worse  They can also cause infections such as bronchitis or pneumonia  Ask your healthcare provider for information if you currently smoke and need help to quit  E-cigarettes or smokeless tobacco still contain nicotine  Talk to your healthcare provider before you use these products  Prevent a cold:   · Wash your hands often  Use soap and water every time you wash your hands  Rub your soapy hands together, lacing your fingers  Use the fingers of one hand to scrub under the nails of the other hand  Wash for at least 20 seconds  Rinse with warm, running water for several seconds  Then dry your hands  Use germ-killing gel if soap and water are not available  Do not touch your eyes or mouth without washing your hands first      · Cover a sneeze or cough  Use a tissue that covers your mouth and nose  Put the used tissue in the trash right away  Use the bend of your arm if a tissue is not available  Wash your hands well with soap and water or use a hand   Do not stand close to anyone who is sneezing or coughing  · Try to stay away from others while you are sick  This is especially important during the first 2 to 3 days when the virus is more easily spread  Wait until a fever, cough, or other symptoms are gone before you return to work or other regular activities  · Do not share items while you are sick  This includes food, drinks, eating utensils, and dishes  Follow up with your doctor as directed:  Write down your questions so you remember to ask them during your visits    © Copyright [employer] 2022 Information is for End User's use only and may not be sold, redistributed or otherwise used for commercial purposes  All illustrations and images included in CareNotes® are the copyrighted property of [employer]  or Kelle Lopez  The above information is an  only  It is not intended as medical advice for individual conditions or treatments  Talk to your doctor, nurse or pharmacist before following any medical regimen to see if it is safe and effective for you  Allergies   WHAT YOU NEED TO KNOW:   Allergies are an immune system reaction to a substance called an allergen  Your immune system sees the allergen as harmful and attacks it  An allergic reaction can be mild or life-threatening  A life-threatening reaction is called anaphylaxis  Anaphylaxis is a sudden, life-threatening reaction that needs immediate treatment  DISCHARGE INSTRUCTIONS:   Call 911 for signs or symptoms of anaphylaxis,  such as trouble breathing, swelling in your mouth or throat, or wheezing  You may also have itching, a rash, hives, or feel like you are going to faint  Return to the emergency department if:    You have tingling in your hands or feet   Your skin is red or flushed  Contact your healthcare provider if:   Buzzy Marker You have questions or concerns about your condition or care  Medicines:    Antihistamines  help decrease itching, sneezing, and swelling  You may take them as a pill or use drops in your nose or eyes   Decongestants  help your nose feel less stuffy   Topical treatments  help decrease itching or swelling  You also may be given nasal sprays or eyedrops   Epinephrine  is used to treat a severe allergic reaction such as anaphylaxis   Take your medicine as directed  Contact your healthcare provider if you think your medicine is not helping or if you have side effects  Tell your provider if you are allergic to any medicine  Keep a list of the medicines, vitamins, and herbs you take   Include the amounts, and when and why you take them  Bring the list or the pill bottles to follow-up visits  Carry your medicine list with you in case of an emergency  Steps to take for signs or symptoms of anaphylaxis:    Immediately  give 1 shot of epinephrine only into the outer thigh muscle   Leave the shot in place  as directed  Your healthcare provider may recommend you leave it in place for up to 10 seconds before you remove it  This helps make sure all of the epinephrine is delivered   Call 911 and go to the emergency department,  even if the shot improved symptoms  Do not drive yourself  Bring the used epinephrine shot with you  Safety precautions to take if you are at risk for anaphylaxis:    Keep 2 shots of epinephrine with you at all times  You may need a second shot, because epinephrine only works for about 20 minutes and symptoms may return  Your healthcare provider can show you and family members how to give the shot  Check the expiration date every month and replace it before it expires   Create an action plan  Your healthcare provider can help you create a written plan that explains the allergy and an emergency plan to treat a reaction  The plan explains when to give a second epinephrine shot if symptoms return or do not improve after the first  Give copies of the action plan and emergency instructions to family members and work staff  Show them how to give a shot of epinephrine   Be careful when you exercise  If you have had exercise-induced anaphylaxis, do not exercise right after you eat  Stop exercising right away if you start to develop any signs or symptoms of anaphylaxis  You may first feel tired, warm, or have itchy skin  Hives, swelling, and severe breathing problems may develop if you continue to exercise   Carry medical alert identification  Wear medical alert jewelry or carry a card that explains the allergy  Ask your healthcare provider where to get these items   Inform all healthcare providers of the allergy  This includes dentists, nurses, doctors, and surgeons  Manage allergies:    Use nasal rinses as directed  Rinse with a saline solution daily  This will help clear allergens out of your nose  Use distilled water if possible  You can also boil tap water and let it cool before you use it  Do not use tap water that has not been boiled   Do not smoke  Allergy symptoms may decrease if you are not around smoke  Nicotine and other chemicals in cigarettes and cigars can cause lung damage  Ask your healthcare provider for information if you currently smoke and need help to quit  E-cigarettes or smokeless tobacco still contain nicotine  Talk to your healthcare provider before you use these products  Prevent allergic reactions:    Do not go outside when pollen counts are high if you have seasonal allergies  Your symptoms may be better if you go outside only in the morning or evening  Use your air conditioner, and change air filters often   Avoid dust, fur, and mold  Dust and vacuum your home often  You may want to wear a mask when you vacuum  Keep pets in certain rooms, and bathe them often  Use a dehumidifier (machine that decreases moisture) to help prevent mold   Do not use products that contain latex if you have a latex allergy  Use nonlatex gloves if you work in healthcare or in food preparation  Always tell healthcare providers about a latex allergy   Avoid areas that attract insects if you have an insect bite or sting allergy  Areas include trash cans, gardens, and picnics  Do not wear bright clothing or strong scents when you will be outside   Prevent an allergic reaction caused by food  You may have a reaction if your food is not prepared safely  For example, you could be served food that touched your trigger food during preparation  This is called cross-contamination  Kitchen tools can also cause cross-contamination   You may also eat baked foods that contain a trigger food you do not know about  Ask if the food contains your trigger food before you handle or eat it  Follow up with your healthcare provider as directed:  Write down your questions so you remember to ask them during your visits  When you have an allergic reaction, write down everything you were exposed to in the 2 hours before the reaction  Take that information to your next visit  © Copyright [employer] 2022 Information is for End User's use only and may not be sold, redistributed or otherwise used for commercial purposes  The above information is an  only  It is not intended as medical advice for individual conditions or treatments  Talk to your doctor, nurse or pharmacist before following any medical regimen to see if it is safe and effective for you  Follow up with PCP in 3-5 days  Proceed to  ER if symptoms worsen  Chief Complaint     Chief Complaint   Patient presents with    Shortness of Breath     SOB, headaches, and nights sweats x 2 days  Recently ended doxy for a resp infection  Has only been getting worse          History of Present Illness       Patient complains of cough, congestion, shortness of breath, headaches, night sweats for the past 2 days  Patient recently completed course of doxycycline for upper respiratory infection  She has an albuterol inhaler which she has been using according to directions but is almost empty  He has history of seasonal allergies which are active at this time of year  She is a smoker  Review of Systems   Review of Systems   Constitutional: Negative for appetite change, chills, fatigue and fever  HENT: Positive for congestion, rhinorrhea, sinus pressure and sore throat  Negative for trouble swallowing and voice change  Respiratory: Positive for cough and shortness of breath  Negative for chest tightness and wheezing  Cardiovascular: Negative for chest pain     Gastrointestinal: Negative for nausea  Musculoskeletal: Negative for myalgias  Neurological: Positive for headaches  Current Medications       Current Outpatient Medications:     albuterol (ProAir HFA) 90 mcg/act inhaler, Inhale 2 puffs every 6 (six) hours as needed for shortness of breath May dispense 1 spacer, Disp: 8 5 g, Rfl: 0    albuterol (PROVENTIL HFA,VENTOLIN HFA) 90 mcg/act inhaler, Inhale 2 puffs every 4 (four) hours as needed, Disp: , Rfl:     CAMRESE 0 15-0 03 &0 01 MG TABS, , Disp: , Rfl: 0    hydrOXYzine HCL (ATARAX) 50 mg tablet, Take 1 tablet (50 mg total) by mouth 2 (two) times a day as needed for anxiety, Disp: 90 tablet, Rfl: 1    lamoTRIgine (LaMICtal) 25 mg tablet, Take 1 tablet (25 mg total) by mouth daily, Disp: 90 tablet, Rfl: 1    predniSONE 10 mg tablet, Take once daily all days pills on this schedule 5- 5- 4- 4- 3- 2- 1, Disp: 24 tablet, Rfl: 0    sertraline (ZOLOFT) 100 mg tablet, Take 1 5 tablets (150 mg total) by mouth daily, Disp: 135 tablet, Rfl: 1    traZODone (DESYREL) 100 mg tablet, Take 1 tablet (100 mg total) by mouth daily at bedtime, Disp: 90 tablet, Rfl: 1    Azelastine HCl 137 MCG/SPRAY SOLN, USE 1 TO 2 SPRAYS IN EACH NOSTRIL 2 TIMES A DAY FOR 14 DAYS AS DIRECTED (Patient not taking: Reported on 12/5/2022), Disp: , Rfl:     benzonatate (TESSALON) 200 MG capsule, Take 1 capsule (200 mg total) by mouth 3 (three) times a day as needed for cough (Patient not taking: Reported on 5/3/2023), Disp: 20 capsule, Rfl: 0    clotrimazole-betamethasone (LOTRISONE) 1-0 05 % cream, , Disp: , Rfl:     doxycycline (ADOXA) 100 MG tablet, Take 100 mg by mouth 2 (two) times a day (Patient not taking: Reported on 3/8/2023), Disp: , Rfl:     glucose blood (ACCU-CHEK CHELA PLUS) test strip, Test blood sugars up to 3 times daily or as needed for fluctuating blood sugars   (Patient not taking: Reported on 3/8/2023), Disp: 100 each, Rfl: 2    levothyroxine 25 mcg tablet, TAKE 1 TABLET DAILY "(Patient not taking: Reported on 5/3/2023), Disp: 90 tablet, Rfl: 3    predniSONE 20 mg tablet, take 2 tablets by mouth once daily for 4 days (Patient not taking: Reported on 12/5/2022), Disp: , Rfl:     Current Allergies     Allergies as of 05/03/2023    (No Known Allergies)            The following portions of the patient's history were reviewed and updated as appropriate: allergies, current medications, past family history, past medical history, past social history, past surgical history and problem list      Past Medical History:   Diagnosis Date    Anxiety     Depression     Environmental allergies     Last assessed: 1/18/17    GERD (gastroesophageal reflux disease)     Hypoglycemia     Hypothyroidism        Past Surgical History:   Procedure Laterality Date    COLONOSCOPY  2016 & 2018    Fiberoptic    EGD  2016    EGD  06/2016    MYRINGOTOMY      TONSILLECTOMY      TOOTH EXTRACTION         Family History   Problem Relation Age of Onset    Anxiety disorder Mother     Diabetes Mother         due to underlying condition with both eyes affected by proliferative retinopathy and traction retinal detatchments involving maculae, without long term use of insulin    Depression Father     Anxiety disorder Father     Alcohol abuse Father     Sleep apnea Father     Suicide Attempts Maternal Grandfather     Suicide Attempts Maternal Grandmother     Crohn's disease Paternal Grandfather          Medications have been verified  Objective   /88   Pulse (!) 119   Temp 97 8 °F (36 6 °C) (Tympanic)   Resp 18   Ht 5' 3\" (1 6 m)   Wt 105 kg (230 lb 13 2 oz)   LMP 04/26/2023   SpO2 95%   BMI 40 89 kg/m²        Physical Exam     Physical Exam  Vitals and nursing note reviewed  Constitutional:       General: She is not in acute distress  Appearance: She is well-developed  HENT:      Head: Normocephalic and atraumatic  Nose: Mucosal edema present        Mouth/Throat:      Pharynx: " Posterior oropharyngeal erythema present  No oropharyngeal exudate  Tonsils: No tonsillar abscesses  Cardiovascular:      Rate and Rhythm: Normal rate and regular rhythm  Pulmonary:      Effort: Pulmonary effort is normal  No respiratory distress  Breath sounds: No wheezing or rales  Musculoskeletal:      Cervical back: Neck supple  Skin:     General: Skin is warm and dry  Neurological:      Mental Status: She is alert and oriented to person, place, and time  Psychiatric:         Mood and Affect: Mood normal          Behavior: Behavior normal          Thought Content:  Thought content normal          Judgment: Judgment normal

## 2023-05-18 ENCOUNTER — OFFICE VISIT (OUTPATIENT)
Age: 23
End: 2023-05-18

## 2023-05-18 VITALS
BODY MASS INDEX: 40.08 KG/M2 | DIASTOLIC BLOOD PRESSURE: 82 MMHG | HEART RATE: 114 BPM | OXYGEN SATURATION: 96 % | WEIGHT: 234.79 LBS | SYSTOLIC BLOOD PRESSURE: 128 MMHG | HEIGHT: 64 IN | RESPIRATION RATE: 20 BRPM | TEMPERATURE: 98.3 F

## 2023-05-18 DIAGNOSIS — E03.9 HYPOTHYROIDISM, UNSPECIFIED TYPE: ICD-10-CM

## 2023-05-18 DIAGNOSIS — Z11.59 NEED FOR HEPATITIS C SCREENING TEST: ICD-10-CM

## 2023-05-18 DIAGNOSIS — Z76.89 ENCOUNTER TO ESTABLISH CARE: Primary | ICD-10-CM

## 2023-05-18 DIAGNOSIS — Z87.09 HISTORY OF ACUTE BRONCHITIS: ICD-10-CM

## 2023-05-18 DIAGNOSIS — R07.89 CHEST TIGHTNESS: ICD-10-CM

## 2023-05-18 DIAGNOSIS — Z13.0 SCREENING FOR DEFICIENCY ANEMIA: ICD-10-CM

## 2023-05-18 DIAGNOSIS — F17.210 CIGARETTE SMOKER: ICD-10-CM

## 2023-05-18 DIAGNOSIS — Z13.1 ENCOUNTER FOR SCREENING FOR DIABETES MELLITUS: ICD-10-CM

## 2023-05-18 DIAGNOSIS — J30.9 ALLERGIC RHINITIS, UNSPECIFIED SEASONALITY, UNSPECIFIED TRIGGER: ICD-10-CM

## 2023-05-18 DIAGNOSIS — Z13.31 DEPRESSION SCREENING: ICD-10-CM

## 2023-05-18 DIAGNOSIS — Z12.4 SCREENING FOR CERVICAL CANCER: ICD-10-CM

## 2023-05-18 DIAGNOSIS — Z13.220 LIPID SCREENING: ICD-10-CM

## 2023-05-18 DIAGNOSIS — R06.2 WHEEZING: ICD-10-CM

## 2023-05-18 DIAGNOSIS — Z11.4 SCREENING FOR HIV (HUMAN IMMUNODEFICIENCY VIRUS): ICD-10-CM

## 2023-05-18 PROBLEM — R10.30 LOWER ABDOMINAL PAIN: Status: RESOLVED | Noted: 2018-05-24 | Resolved: 2023-05-18

## 2023-05-18 PROBLEM — F33.2 SEVERE EPISODE OF RECURRENT MAJOR DEPRESSIVE DISORDER, WITHOUT PSYCHOTIC FEATURES (HCC): Status: RESOLVED | Noted: 2022-06-07 | Resolved: 2023-05-18

## 2023-05-18 PROBLEM — E03.8 OTHER SPECIFIED HYPOTHYROIDISM: Status: RESOLVED | Noted: 2017-12-18 | Resolved: 2023-05-18

## 2023-05-18 RX ORDER — FLUTICASONE PROPIONATE 110 UG/1
2 AEROSOL, METERED RESPIRATORY (INHALATION) 2 TIMES DAILY
Qty: 12 G | Refills: 1 | Status: SHIPPED | OUTPATIENT
Start: 2023-05-18

## 2023-05-18 NOTE — PROGRESS NOTES
Name: Francesco Avitia      : 2000      MRN: [id number]  Encounter Provider: TRE Beauchamp  Encounter Date: 2023   Encounter department: [facility]     Patient presents today to establish care  Baseline lab work ordered and referral to GYN placed  Patient's primary concern today: chest tightness, wheezing, and shortness of breath with frequent urgent care visits and oral steroid regimens over the past 9 months  Patient is concerned that she has asthma and reports that her symptoms are progressively worsening  Wheezing present in all lung fields upon inspiration and expiration  Patient's overall presentation is consistent with moderate persistent asthma, PFT ordered for official diagnosis  Patient is a social smoker and also has a history of allergies, both may be contributing to her overall condition  Will start patient on a low-dose ICS today to use with prn LITZY  Discussed medication usage technique and side effects  Will follow-up with the patient in 1 month to reassess and to create a formal asthma action plan if needed  Discussed what signs and symptoms warrant emergent medical care  Patient verbalized understanding  1  Encounter to establish care  -     CBC and differential; Future  -     Comprehensive metabolic panel; Future  -     Lipid panel; Future  -     TSH, 3rd generation with Free T4 reflex; Future  -     Hemoglobin A1C; Future    2  Chest tightness  -     fluticasone (FLOVENT HFA) 110 MCG/ACT inhaler; Inhale 2 puffs 2 (two) times a day Rinse mouth after use  -     Complete PFT with post bronchodilator; Future; Expected date: 2023    3  Wheezing  -     fluticasone (FLOVENT HFA) 110 MCG/ACT inhaler; Inhale 2 puffs 2 (two) times a day Rinse mouth after use  -     Complete PFT with post bronchodilator; Future; Expected date: 2023    4   History of acute bronchitis  -     fluticasone (FLOVENT HFA) 110 MCG/ACT "inhaler; Inhale 2 puffs 2 (two) times a day Rinse mouth after use  -     Complete PFT with post bronchodilator; Future; Expected date: 06/01/2023    5  Allergic rhinitis, unspecified seasonality, unspecified trigger  Assessment & Plan:  Patient has a history of seasonal allergies which may be contributing to her breathing difficulties  Currently taking Benadryl as needed at night  Encouraged patient to take OTC Zyrtec, Claritin, or Allegra rather than Benadryl  6  Cigarette smoker    7  Hypothyroidism, unspecified type  Assessment & Plan:  Patient used to follow with endocrinology and was on levothyroxine 25 mcg in the past, patient is no longer taking  Will add a baseline TSH to her other lab orders  Orders:  -     TSH, 3rd generation with Free T4 reflex; Future    8  Screening for cervical cancer  -     Ambulatory referral to Obstetrics / Gynecology; Future    9  Need for hepatitis C screening test  -     Hepatitis C antibody; Future    10  Screening for HIV (human immunodeficiency virus)  -     HIV 1/2 AG/AB w Reflex [facility] for 2 yr old and above; Future    11  Screening for deficiency anemia  -     CBC and differential; Future    12  Encounter for screening for diabetes mellitus  -     Comprehensive metabolic panel; Future  -     Hemoglobin A1C; Future    13  Lipid screening  -     Lipid panel; Future    14  Depression screening    15  BMI 40 0-44 9, adult (HCC)  -     Comprehensive metabolic panel; Future  -     Lipid panel; Future  -     Hemoglobin A1C; Future      I have spent a total time of 55 minutes on 05/18/23 in caring for this patient including Instructions for management, Patient and family education, Importance of tx compliance, Impressions, Documenting in the medical record, Reviewing / ordering tests, medicine, procedures   and Obtaining or reviewing history    Subjective      Reporting \"chest tightness for months\"   Saw urgent care several times over the past few months, was " "prescribed steroids and abxs \"several times\"  Also reports SOB, cough, wheezing  Symptoms worse with exertion  Worsening over the past 2-3 weeks  Last urgent care visit was 5/3, condition improved while on PO steroids but now \"is back at baseline\"  Asthma  She presents for initial evaluation of a history of wheezing and chest tightness  The patient has not been previously diagnosed with asthma  The patient is currently having symptoms  Current symptoms include chest tightness, dyspnea, productive cough and wheezing  Symptoms have been present since several months ago and have been gradually worsening  She denies chest pain  Denies chills and fevers  This episode appears to have been triggered by exercise and pollens  Treatments tried for the current exacerbation include short-acting inhaled beta-adrenergic agonists and systemic corticosteroids, which have provided some relief of symptoms  The patient has been having similar episodes for approximately 9 months  Current Disease Severity  The patient is having daytime symptoms throughout the day  The patient is having daytime symptoms daily  The patient is using short-acting beta agonists for symptom control several times per day  She has exacerbations requiring oral systemic corticosteroids 3 times per year  1 nocturnal awakening per month  Current limitations in activity from asthma: impacting activity such as stairs and prolonged talking  Number of days of school or work missed in the last month: 1  Number of urgent/emergent visit in last year: 11  The patient is not using a spacer with MDIs  Review of Systems   Constitutional: Negative  Negative for fever  HENT: Negative  Eyes: Negative  Respiratory: Positive for cough, chest tightness, shortness of breath and wheezing  Cardiovascular: Negative  Gastrointestinal: Negative  Endocrine: Negative  Genitourinary: Negative  Musculoskeletal: Negative  Skin: Negative    " Allergic/Immunologic: Negative  Neurological: Negative  Hematological: Negative  Psychiatric/Behavioral: Negative  Current Outpatient Medications on File Prior to Visit   Medication Sig   • albuterol (ProAir HFA) 90 mcg/act inhaler Inhale 2 puffs every 6 (six) hours as needed for shortness of breath May dispense 1 spacer   • albuterol (PROVENTIL HFA,VENTOLIN HFA) 90 mcg/act inhaler Inhale 2 puffs every 4 (four) hours as needed   • CAMRESE 0 15-0 03 &0 01 MG TABS    • hydrOXYzine HCL (ATARAX) 50 mg tablet Take 1 tablet (50 mg total) by mouth 2 (two) times a day as needed for anxiety   • lamoTRIgine (LaMICtal) 25 mg tablet Take 1 tablet (25 mg total) by mouth daily   • sertraline (ZOLOFT) 100 mg tablet Take 1 5 tablets (150 mg total) by mouth daily   • traZODone (DESYREL) 100 mg tablet Take 1 tablet (100 mg total) by mouth daily at bedtime   • [DISCONTINUED] Azelastine HCl 137 MCG/SPRAY SOLN USE 1 TO 2 SPRAYS IN EACH NOSTRIL 2 TIMES A DAY FOR 14 DAYS AS DIRECTED (Patient not taking: Reported on 12/5/2022)   • [DISCONTINUED] benzonatate (TESSALON) 200 MG capsule Take 1 capsule (200 mg total) by mouth 3 (three) times a day as needed for cough (Patient not taking: Reported on 5/3/2023)   • [DISCONTINUED] clotrimazole-betamethasone (LOTRISONE) 1-0 05 % cream  (Patient not taking: Reported on 3/8/2023)   • [DISCONTINUED] doxycycline (ADOXA) 100 MG tablet Take 100 mg by mouth 2 (two) times a day (Patient not taking: Reported on 3/8/2023)   • [DISCONTINUED] glucose blood (ACCU-CHEK CHELA PLUS) test strip Test blood sugars up to 3 times daily or as needed for fluctuating blood sugars   (Patient not taking: Reported on 3/8/2023)   • [DISCONTINUED] levothyroxine 25 mcg tablet TAKE 1 TABLET DAILY (Patient not taking: Reported on 5/3/2023)   • [DISCONTINUED] predniSONE 10 mg tablet Take once daily all days pills on this schedule 5- 5- 4- 4- 3- 2- 1 (Patient not taking: Reported on 5/18/2023)   • [DISCONTINUED] "predniSONE 20 mg tablet take 2 tablets by mouth once daily for 4 days (Patient not taking: Reported on 12/5/2022)       Objective     /82 (BP Location: Left arm, Patient Position: Sitting, Cuff Size: Large)   Pulse (!) 114   Temp 98 3 °F (36 8 °C) (Tympanic)   Resp 20   Ht 5' 4\" (1 626 m)   Wt 107 kg (234 lb 12 6 oz)   LMP 04/26/2023   SpO2 96%   BMI 40 30 kg/m²     Physical Exam  Constitutional:       General: She is not in acute distress  Appearance: Normal appearance  She is obese  HENT:      Right Ear: Tympanic membrane normal       Left Ear: Tympanic membrane normal       Nose: Nose normal  No congestion  Mouth/Throat:      Mouth: Mucous membranes are moist       Pharynx: Oropharynx is clear  No oropharyngeal exudate or posterior oropharyngeal erythema  Eyes:      Extraocular Movements: Extraocular movements intact  Conjunctiva/sclera: Conjunctivae normal    Cardiovascular:      Rate and Rhythm: Regular rhythm  Tachycardia present  Heart sounds: Normal heart sounds  Pulmonary:      Effort: Pulmonary effort is normal  No respiratory distress  Breath sounds: Examination of the right-upper field reveals wheezing  Examination of the left-upper field reveals wheezing  Examination of the right-middle field reveals wheezing  Examination of the left-middle field reveals wheezing  Examination of the right-lower field reveals wheezing  Examination of the left-lower field reveals wheezing  Wheezing present  Abdominal:      General: Abdomen is flat  Musculoskeletal:         General: No swelling or deformity  Normal range of motion  Cervical back: Normal range of motion  Lymphadenopathy:      Cervical: No cervical adenopathy  Skin:     General: Skin is warm  Capillary Refill: Capillary refill takes less than 2 seconds  Coloration: Skin is not cyanotic  Findings: No erythema or rash     Neurological:      Mental Status: She is alert and oriented to " person, place, and time     Psychiatric:         Mood and Affect: Mood normal          Behavior: Behavior normal        TRE Painting

## 2023-05-18 NOTE — ASSESSMENT & PLAN NOTE
Patient has a history of seasonal allergies which may be contributing to her breathing difficulties  Currently taking Benadryl as needed at night  Encouraged patient to take OTC Zyrtec, Claritin, or Allegra rather than Benadryl

## 2023-06-09 ENCOUNTER — RA CDI HCC (OUTPATIENT)
Dept: OTHER | Facility: HOSPITAL | Age: 23
End: 2023-06-09

## 2023-06-09 NOTE — PROGRESS NOTES
[facility]  coding opportunities          Chart Reviewed number of suggestions sent to Provider: 1  [id number]     Patients Insurance        Commercial Insurance: [employer]

## 2023-06-12 PROBLEM — E66.01 MORBID (SEVERE) OBESITY DUE TO EXCESS CALORIES (HCC): Status: ACTIVE | Noted: 2023-06-12

## 2023-06-20 ENCOUNTER — TELEMEDICINE (OUTPATIENT)
Dept: PSYCHIATRY | Facility: CLINIC | Age: 23
End: 2023-06-20
Payer: COMMERCIAL

## 2023-06-20 DIAGNOSIS — F41.9 ANXIETY: ICD-10-CM

## 2023-06-20 DIAGNOSIS — F12.90 MARIJUANA USE, CONTINUOUS: ICD-10-CM

## 2023-06-20 DIAGNOSIS — F33.1 MODERATE EPISODE OF RECURRENT MAJOR DEPRESSIVE DISORDER (HCC): Primary | ICD-10-CM

## 2023-06-20 PROCEDURE — 99213 OFFICE O/P EST LOW 20 MIN: CPT

## 2023-06-20 RX ORDER — TRAZODONE HYDROCHLORIDE 100 MG/1
100 TABLET ORAL
Qty: 90 TABLET | Refills: 0
Start: 2023-06-20

## 2023-06-20 RX ORDER — SERTRALINE HYDROCHLORIDE 100 MG/1
100 TABLET, FILM COATED ORAL DAILY
Qty: 90 TABLET | Refills: 0 | Status: SHIPPED | OUTPATIENT
Start: 2023-06-20 | End: 2023-09-18

## 2023-06-20 RX ORDER — HYDROXYZINE 50 MG/1
50 TABLET, FILM COATED ORAL 2 TIMES DAILY PRN
Qty: 90 TABLET | Refills: 0 | Status: SHIPPED | OUTPATIENT
Start: 2023-06-20

## 2023-06-20 NOTE — BH TREATMENT PLAN
TREATMENT PLAN (Medication Management Only)        Fileboard    Name and Date of Birth:  Homar Wren 20 y o  2000  Date of Treatment Plan: June 20, 2023  Diagnosis/Diagnoses:    1  Moderate episode of recurrent major depressive disorder ([address] )    2  Anxiety    3  Marijuana use, continuous      Strengths/Personal Resources for Self-Care: supportive family, supportive friends, ability to communicate needs, ability to communicate well, independence  Area/Areas of need (in own words): anxiety symptoms, depressive symptoms, mood instability  1  Long Term Goal: maintain control of acceptable anxiety level  Target Date:6 months - 12/20/2023  Person/Persons responsible for completion of goal: Marlena  2  Short Term Objective (s) - How will we reach this goal?:   A  Provider new recommended medication/dosage changes and/or continue medication(s): continue current medications as prescribed (Vraylar 1 5mg)  B  Take psychiatric medications responsibly  C  Attend medication management appointments regularly  Target Date:6 months - 12/20/2023  Person/Persons Responsible for Completion of Goal: Marlena  Progress Towards Goals: continuing treatment  Treatment Modality: medication management every 3 months  Review due 180 days from date of this plan: 6 months - 12/20/2023  Expected length of service: ongoing treatment  My Physician/PA/NP and I have developed this plan together and I agree to work on the goals and objectives  I understand the treatment goals that were developed for my treatment

## 2023-06-20 NOTE — PSYCH
Virtual Regular Visit    Verification of patient location:    Patient is located in the following state in which I hold an active license [place]    Problem List Items Addressed This Visit        Other    Major depressive disorder with single episode, in partial remission ([facility] [address] ) - Primary    Relevant Medications    sertraline (ZOLOFT) 100 mg tablet    hydrOXYzine HCL (ATARAX) 50 mg tablet    traZODone (DESYREL) 100 mg tablet    cariprazine (Vraylar) 1 5 MG capsule   Other Visit Diagnoses     Marijuana use, continuous                 Encounter provider TRE Rodríguez    Provider located at    [address] [place] [address]  [address] [facility] [address] [phone]    Recent Visits  No visits were found meeting these conditions  Showing recent visits within past 7 days and meeting all other requirements  Today's Visits  Date Type Provider Dept   06/20/23 Telemedicine TRE Rodríguez  [facility]   Showing today's visits and meeting all other requirements  Future Appointments  No visits were found meeting these conditions  Showing future appointments within next 150 days and meeting all other requirements         The patient was identified by name and date of birth  Destiny Hudson was informed that this is a telemedicine visit and [facility]  She agrees to proceed     My office door was closed  No one else was in the room  She acknowledged consent and understanding of privacy and security of the video platform  The patient has agreed to participate and understands they can discontinue the visit at any time  Patient is aware this is a billable service       HPI     Current Outpatient Medications   Medication Sig Dispense Refill   • cariprazine (Vraylar) 1 5 MG capsule Take 1 capsule (1 5 mg total) by mouth daily 30 capsule 1   • hydrOXYzine HCL (ATARAX) 50 mg tablet Take 1 tablet (50 mg total) by mouth 2 (two) times a day as needed for anxiety 90 tablet 0   • sertraline (ZOLOFT) 100 mg tablet Take 1 tablet (100 mg total) by mouth daily 90 tablet 0   • traZODone (DESYREL) 100 mg tablet Take 1 tablet (100 mg total) by mouth daily at bedtime as needed for sleep 90 tablet 0   • albuterol (ProAir HFA) 90 mcg/act inhaler Inhale 2 puffs every 6 (six) hours as needed for shortness of breath May dispense 1 spacer 8 5 g 0   • albuterol (PROVENTIL HFA,VENTOLIN HFA) 90 mcg/act inhaler Inhale 2 puffs every 4 (four) hours as needed     • CAMRESE 0 15-0 03 &0 01 MG TABS   0   • fluticasone (FLOVENT HFA) 110 MCG/ACT inhaler Inhale 2 puffs 2 (two) times a day Rinse mouth after use  12 g 1     No current facility-administered medications for this visit  Review of Systems  Video Exam    There were no vitals filed for this visit  Physical Exam   As a result of this visit, I have referred the patient for further respiratory evaluation  No      VIRTUAL VISIT DISCLAIMER    Esthela Curling acknowledges that she has consented to an online visit or consultation  She understands that the online visit is based solely on information provided by her, and that, in the absence of a face-to-face physical evaluation by the physician, the diagnosis she receives is both limited and provisional in terms of accuracy and completeness  This is not intended to replace a full medical face-to-face evaluation by the physician  Era Curling understands and accepts these terms  MEDICATION MANAGEMENT NOTE        [facility]    Name and Date of Birth:  Esthela Boovelasquez 20 y o  2000 MRN: [id number]    Date of Visit: June 20, 2023    No Known Allergies  SUBJECTIVE:    Priscilla Hernandez is seen today for a follow up for Major Depressive Disorder and anxiety  She reports that she continues to improve slowly since the last visit   Nicola Gregory was happy to share she got her 's license back from a prior DUI last year and attended addiction counseling tx  Reports a significant decrease in alcohol consumption, has only drank socially twice in the past month  Supportive counseling provided, encouraged patient to refrain from alcohol usage  Continues to use marijuana daily  Struggled with depressive thoughts for the past 4 months as she lost her independence to drive and was often isolated at home in her apartment  Since regaining license, reports improvement in mood, more time with friends     Current 5/10 depression with periods of dysphoria, sadness, lack of energy and motivation, interrupted sleep  Anxiety comes and goes depending on external stressors  We discussed optimizing her medication regimen, will discontinue Lamictal 25 mg as it was unlikely to be providing any benefit  Will trial Vraylar 1 5 mg daily for augmentation of SSRI and help with her chronic irritability symptoms-side effect explained and patient verbalized understanding  She appears hopeful and optimistic regarding medications and recent lifestyle changes  Mood is euthymic and appropriate  Shares that 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, 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 and likes this compared to working with others in the past   Continue abstinence from drugs and alcohol  Consider psychotherapy, denies SI  She denies any side effects from medications  PLAN:    -Initiate Vraylar 1 5mg for augmentation of SSRI, mood swings, irritability  D/c Lamictal-no improvement   Failed Abilify and seroquel in the past-ineffectiveness    -Continue Zoloft to 100 mg daily   PARQ completed including serotonin syndrome, SIADH, worsening depression, suicidality, induction of sabrina, GI upset, headaches, activation, sexual side effects, sedation, potential drug interactions, and others      -Continue prn Trazodone 100mg daily for insomnia  -Continue prn atarax 50mg daily for breakthrough anxiety           Aware of 24 hour and weekend coverage for urgent situations accessed by calling [facility] main practice number  Referral for individual psychotherapy    Diagnoses and all orders for this visit:    Moderate episode of recurrent major depressive disorder (HCC)  -     sertraline (ZOLOFT) 100 mg tablet; Take 1 tablet (100 mg total) by mouth daily  -     hydrOXYzine HCL (ATARAX) 50 mg tablet; Take 1 tablet (50 mg total) by mouth 2 (two) times a day as needed for anxiety  -     traZODone (DESYREL) 100 mg tablet; Take 1 tablet (100 mg total) by mouth daily at bedtime as needed for sleep  -     cariprazine (Vraylar) 1 5 MG capsule; Take 1 capsule (1 5 mg total) by mouth daily    Marijuana use, continuous        Current Outpatient Medications on File Prior to Visit   Medication Sig Dispense Refill   • albuterol (ProAir HFA) 90 mcg/act inhaler Inhale 2 puffs every 6 (six) hours as needed for shortness of breath May dispense 1 spacer 8 5 g 0   • albuterol (PROVENTIL HFA,VENTOLIN HFA) 90 mcg/act inhaler Inhale 2 puffs every 4 (four) hours as needed     • CAMRESE 0 15-0 03 &0 01 MG TABS   0   • fluticasone (FLOVENT HFA) 110 MCG/ACT inhaler Inhale 2 puffs 2 (two) times a day Rinse mouth after use  12 g 1   • [DISCONTINUED] hydrOXYzine HCL (ATARAX) 50 mg tablet Take 1 tablet (50 mg total) by mouth 2 (two) times a day as needed for anxiety 90 tablet 1   • [DISCONTINUED] lamoTRIgine (LaMICtal) 25 mg tablet Take 1 tablet (25 mg total) by mouth daily 90 tablet 1   • [DISCONTINUED] sertraline (ZOLOFT) 100 mg tablet Take 1 5 tablets (150 mg total) by mouth daily 135 tablet 1   • [DISCONTINUED] traZODone (DESYREL) 100 mg tablet Take 1 tablet (100 mg total) by mouth daily at bedtime 90 tablet 1     No current facility-administered medications on file prior to visit  Psychotherapy Provided:     Individual psychotherapy provided: Yes  Counseling was provided during the session today for 16 minutes    Supportive counseling provided  Medication changes discussed with Marlena  Medication education provided to [facility]  HPI ROS Appetite Changes and Sleep:     She reports difficulty falling asleep, adequate appetite, fluctuating energy levels   Denies homicidal ideation, denies suicidal ideation    Review Of Systems:      HPI ROS:               Medication Side Effects:  denies    Depression (10 worst): 5/10    Anxiety (10 worst): 5/10    Safety concerns (SI, HI, etc): Denies si and hi    Sleep: 6-9 hrs    Energy: Fair to low    Appetite: 3 meals    Weight Change: denies        Mental Status Evaluation:    Appearance Adequate hygiene and grooming   Behavior calm and cooperative   Mood anxious and depressed  Depression Scale - 5 of 10 (0 = No depression)  Anxiety Scale - 5 of 10 (0 = No anxiety)   Speech Normal rate and volume   Affect appropriate   Thought Processes Goal directed and coherent   Thought Content Does not verbalize delusional material   Associations Tightly connected   Perceptual Disturbances Denies hallucinations and does not appear to be responding to internal stimuli   Risk Potential Suicidal/Homicidal Ideation - No evidence of suicidal or homicidal ideation and patient does not verbalize suicidal or homicidal ideation  Risk of Violence - No evidence of risk for violence found on assessment  Risk of Self Mutilation - No evidence of risk for self mutilation found on assessment   Orientation oriented to person, place, time/date and situation   Memory recent and remote memory grossly intact   Consciousness alert and awake   Attention/Concentration attention span and concentration are age appropriate   Insight intact   Judgement intact   Muscle Strength and Gait normal muscle strength and normal muscle tone, normal gait/station and normal balance   Motor Activity no abnormal movements   Language no difficulty naming common objects, no difficulty repeating a phrase, no difficulty writing a sentence   Fund of Knowledge adequate knowledge of current events  adequate fund of knowledge regarding past history  adequate fund of knowledge regarding vocabulary          Past Medical History:    Past Medical History:   Diagnosis Date   • Anxiety    • Depression    • Environmental allergies     Last assessed: 1/18/17   • GERD (gastroesophageal reflux disease)    • Hypoglycemia    • Hypothyroidism         Past Surgical History:   Procedure Laterality Date   • COLONOSCOPY  2016 & 2018    Fiberoptic   • EGD  2016   • EGD  06/2016   • MYRINGOTOMY     • TONSILLECTOMY     • TOOTH EXTRACTION       No Known Allergies  Substance Abuse History:    Social History     Substance and Sexual Activity   Alcohol Use Yes   • Alcohol/week: 10 0 standard drinks of alcohol   • Types: 10 Glasses of wine per week    Comment: every 2-3 days, more on weekends     Social History     Substance and Sexual Activity   Drug Use Yes   • Frequency: 4 0 times per week   • Types: Marijuana    Comment: recreational      Social History:    Social History     Socioeconomic History   • Marital status: Single     Spouse name: Not on file   • Number of children: 0   • Years of education: Not on file   • Highest education level: Bachelor's degree (e g , BA, AB, BS)   Occupational History   • Occupation: UNEMPLOYED   Tobacco Use   • Smoking status: Every Day     Types: Cigarettes   • Smokeless tobacco: Never   • Tobacco comments:     1/2 pack per month   Vaping Use   • Vaping Use: Former   • Substances: Nicotine   Substance and Sexual Activity   • Alcohol use:  Yes     Alcohol/week: 10 0 standard drinks of alcohol     Types: 10 Glasses of wine per week     Comment: every 2-3 days, more on weekends   • Drug use: Yes     Frequency: 4 0 times per week     Types: Marijuana     Comment: recreational    • Sexual activity: Not on file   Other Topics Concern   • Not on file   Social History Narrative   • Not on file     Social Determinants of Health     Financial Resource Strain: Not on file   Food Insecurity: Not on file   Transportation Needs: Not on file   Physical Activity: Not on file   Stress: Not on file   Social Connections: Not on file   Intimate Partner Violence: Not on file   Housing Stability: Not on file     Family Psychiatric History:     Family History   Problem Relation Age of Onset   • Anxiety disorder Mother    • Diabetes Mother         due to underlying condition with both eyes affected by proliferative retinopathy and traction retinal detatchments involving maculae, without long term use of insulin   • Depression Father    • Anxiety disorder Father    • Alcohol abuse Father    • Sleep apnea Father    • Suicide Attempts Maternal Grandfather    • Suicide Attempts Maternal Grandmother    • Crohn's disease Paternal Grandfather      History Review: The following portions of the patient's history were reviewed and updated as appropriate: allergies, current medications, past family history, past medical history, past social history, past surgical history and problem list     OBJECTIVE:     Vital signs in last 24 hours: There were no vitals filed for this visit  Laboratory Results:   Recent Labs (last 2 months):   Office Visit on 05/03/2023   Component Date Value   • POCT SARS-CoV-2 Ag 05/03/2023 Negative    • VALID CONTROL 05/03/2023 Valid      I have personally reviewed all pertinent laboratory/tests results      Suicide/Homicide Risk Assessment:    Risk of Harm to Self:  Protective Factors: no current suicidal ideation, access to mental health treatment, compliant with medications, compliant with mental health treatment, having a desire to be alive, having a sense of purpose or meaning in life  Based on today's assessment, Marlena presents the following risk of harm to self: minimal    Risk of Harm to Others:  Based on today's assessment, Marlena presents the following risk of harm to others: none    The following interventions are recommended: referral for psychotherapy    Medications Risks/Benefits: Risks, Benefits And Possible Side Effects Of Medications:    Discussed risks and benefits of treatment with patient including risk of suicidality, serotonin syndrome, increased QTc interval and SIADH related to treatment with antidepressants; Risk of induction of manic symptoms in certain patient populations and risk of parkinsonian symptoms, metabolic syndrome, tardive dyskinesia and neuroleptic malignant syndrome related to treatment with antipsychotic medications     Controlled Medication Discussion:     Not applicable    Treatment Plan:    Due for update/Updated:   yes  Last treatment plan done 6/20/23   Treatment Plan due on 12/20/23  TRE Khan 06/20/23    This note was shared with patient        Visit Time    Visit Start Time: 130  Visit Stop Time: 150  Total Visit Duration: 20 minutes

## 2023-06-29 ENCOUNTER — TELEPHONE (OUTPATIENT)
Dept: PSYCHIATRY | Facility: CLINIC | Age: 23
End: 2023-06-29

## 2023-07-20 ENCOUNTER — APPOINTMENT (OUTPATIENT)
Age: 23
End: 2023-07-20
Payer: COMMERCIAL

## 2023-07-20 DIAGNOSIS — E03.9 HYPOTHYROIDISM, UNSPECIFIED TYPE: ICD-10-CM

## 2023-07-20 DIAGNOSIS — Z11.59 NEED FOR HEPATITIS C SCREENING TEST: ICD-10-CM

## 2023-07-20 DIAGNOSIS — Z13.0 SCREENING FOR DEFICIENCY ANEMIA: ICD-10-CM

## 2023-07-20 DIAGNOSIS — Z13.220 LIPID SCREENING: ICD-10-CM

## 2023-07-20 DIAGNOSIS — Z13.1 ENCOUNTER FOR SCREENING FOR DIABETES MELLITUS: ICD-10-CM

## 2023-07-20 DIAGNOSIS — Z11.4 SCREENING FOR HIV (HUMAN IMMUNODEFICIENCY VIRUS): ICD-10-CM

## 2023-07-20 DIAGNOSIS — Z76.89 ENCOUNTER TO ESTABLISH CARE: ICD-10-CM

## 2023-07-20 LAB
ALBUMIN SERPL BCP-MCNC: 3.4 G/DL (ref 3.5–5)
ALP SERPL-CCNC: 75 U/L (ref 46–116)
ALT SERPL W P-5'-P-CCNC: 21 U/L (ref 12–78)
ANION GAP SERPL CALCULATED.3IONS-SCNC: 3 MMOL/L
AST SERPL W P-5'-P-CCNC: 17 U/L (ref 5–45)
BASOPHILS # BLD AUTO: 0.05 THOUSANDS/ÂΜL (ref 0–0.1)
BASOPHILS NFR BLD AUTO: 1 % (ref 0–1)
BILIRUB SERPL-MCNC: 0.25 MG/DL (ref 0.2–1)
BUN SERPL-MCNC: 11 MG/DL (ref 5–25)
CALCIUM ALBUM COR SERPL-MCNC: 9.6 MG/DL (ref 8.3–10.1)
CALCIUM SERPL-MCNC: 9.1 MG/DL (ref 8.3–10.1)
CHLORIDE SERPL-SCNC: 113 MMOL/L (ref 96–108)
CHOLEST SERPL-MCNC: 194 MG/DL
CO2 SERPL-SCNC: 22 MMOL/L (ref 21–32)
CREAT SERPL-MCNC: 0.7 MG/DL (ref 0.6–1.3)
EOSINOPHIL # BLD AUTO: 0.58 THOUSAND/ÂΜL (ref 0–0.61)
EOSINOPHIL NFR BLD AUTO: 7 % (ref 0–6)
ERYTHROCYTE [DISTWIDTH] IN BLOOD BY AUTOMATED COUNT: 12.5 % (ref 11.6–15.1)
EST. AVERAGE GLUCOSE BLD GHB EST-MCNC: 114 MG/DL
GFR SERPL CREATININE-BSD FRML MDRD: 122 ML/MIN/1.73SQ M
GLUCOSE P FAST SERPL-MCNC: 132 MG/DL (ref 65–99)
HBA1C MFR BLD: 5.6 %
HCT VFR BLD AUTO: 39.6 % (ref 34.8–46.1)
HDLC SERPL-MCNC: 29 MG/DL
HGB BLD-MCNC: 13.6 G/DL (ref 11.5–15.4)
IMM GRANULOCYTES # BLD AUTO: 0.02 THOUSAND/UL (ref 0–0.2)
IMM GRANULOCYTES NFR BLD AUTO: 0 % (ref 0–2)
LDLC SERPL CALC-MCNC: 118 MG/DL (ref 0–100)
LYMPHOCYTES # BLD AUTO: 1.73 THOUSANDS/ÂΜL (ref 0.6–4.47)
LYMPHOCYTES NFR BLD AUTO: 21 % (ref 14–44)
MCH RBC QN AUTO: 31.1 PG (ref 26.8–34.3)
MCHC RBC AUTO-ENTMCNC: 34.3 G/DL (ref 31.4–37.4)
MCV RBC AUTO: 91 FL (ref 82–98)
MONOCYTES # BLD AUTO: 0.47 THOUSAND/ÂΜL (ref 0.17–1.22)
MONOCYTES NFR BLD AUTO: 6 % (ref 4–12)
NEUTROPHILS # BLD AUTO: 5.58 THOUSANDS/ÂΜL (ref 1.85–7.62)
NEUTS SEG NFR BLD AUTO: 65 % (ref 43–75)
NONHDLC SERPL-MCNC: 165 MG/DL
NRBC BLD AUTO-RTO: 0 /100 WBCS
PLATELET # BLD AUTO: 248 THOUSANDS/UL (ref 149–390)
PMV BLD AUTO: 11.3 FL (ref 8.9–12.7)
POTASSIUM SERPL-SCNC: 4 MMOL/L (ref 3.5–5.3)
PROT SERPL-MCNC: 7.3 G/DL (ref 6.4–8.4)
RBC # BLD AUTO: 4.37 MILLION/UL (ref 3.81–5.12)
SODIUM SERPL-SCNC: 138 MMOL/L (ref 135–147)
TRIGL SERPL-MCNC: 233 MG/DL
TSH SERPL DL<=0.05 MIU/L-ACNC: 1.97 UIU/ML (ref 0.45–4.5)
WBC # BLD AUTO: 8.43 THOUSAND/UL (ref 4.31–10.16)

## 2023-07-20 PROCEDURE — 80053 COMPREHEN METABOLIC PANEL: CPT

## 2023-07-20 PROCEDURE — 80061 LIPID PANEL: CPT

## 2023-07-20 PROCEDURE — 87389 HIV-1 AG W/HIV-1&-2 AB AG IA: CPT

## 2023-07-20 PROCEDURE — 86803 HEPATITIS C AB TEST: CPT

## 2023-07-20 PROCEDURE — 36415 COLL VENOUS BLD VENIPUNCTURE: CPT

## 2023-07-20 PROCEDURE — 85025 COMPLETE CBC W/AUTO DIFF WBC: CPT

## 2023-07-20 PROCEDURE — 84443 ASSAY THYROID STIM HORMONE: CPT

## 2023-07-20 PROCEDURE — 83036 HEMOGLOBIN GLYCOSYLATED A1C: CPT

## 2023-07-21 LAB
HCV AB SER QL: NORMAL
HIV 1+2 AB+HIV1 P24 AG SERPL QL IA: NORMAL
HIV 2 AB SERPL QL IA: NORMAL
HIV1 AB SERPL QL IA: NORMAL
HIV1 P24 AG SERPL QL IA: NORMAL

## 2023-07-25 ENCOUNTER — OFFICE VISIT (OUTPATIENT)
Age: 23
End: 2023-07-25
Payer: COMMERCIAL

## 2023-07-25 ENCOUNTER — TELEPHONE (OUTPATIENT)
Dept: PSYCHIATRY | Facility: CLINIC | Age: 23
End: 2023-07-25

## 2023-07-25 VITALS
TEMPERATURE: 99.1 F | DIASTOLIC BLOOD PRESSURE: 68 MMHG | WEIGHT: 220 LBS | HEIGHT: 64 IN | OXYGEN SATURATION: 95 % | RESPIRATION RATE: 18 BRPM | HEART RATE: 110 BPM | SYSTOLIC BLOOD PRESSURE: 100 MMHG | BODY MASS INDEX: 37.56 KG/M2

## 2023-07-25 DIAGNOSIS — B97.89 ACUTE VIRAL SINUSITIS: ICD-10-CM

## 2023-07-25 DIAGNOSIS — J01.90 ACUTE VIRAL SINUSITIS: ICD-10-CM

## 2023-07-25 DIAGNOSIS — J20.8 ACUTE VIRAL BRONCHITIS: Primary | ICD-10-CM

## 2023-07-25 PROCEDURE — 99213 OFFICE O/P EST LOW 20 MIN: CPT

## 2023-07-25 RX ORDER — PREDNISONE 20 MG/1
20 TABLET ORAL 2 TIMES DAILY
Qty: 10 TABLET | Refills: 0 | Status: SHIPPED | OUTPATIENT
Start: 2023-07-25 | End: 2023-07-30

## 2023-07-25 NOTE — LETTER
July 25, 2023     Patient: Brayan Adams   YOB: 2000   Date of Visit: 7/25/2023       To Whom it May Concern:    Brayan Adams was seen in my clinic on 7/25/2023. She may return to work on 7/26/2023 . If you have any questions or concerns, please don't hesitate to call.          Sincerely,          DARIN BECK        CC: No Recipients

## 2023-07-25 NOTE — LETTER
July 25, 2023     Patient: Evgeny Hutton   YOB: 2000   Date of Visit: 7/25/2023       To Whom it May Concern:    Evgeny Hutton was seen in my clinic on 7/25/2023. She may return to work on 7/27/2023 . If you have any questions or concerns, please don't hesitate to call.          Sincerely,          DARIN BECK        CC: No Recipients

## 2023-07-25 NOTE — PROGRESS NOTES
[facility]        NAME: Azucena Louis is a 20 y.o. female  : 2000    MRN: [id number]  DATE: 2023  TIME: 9:46 AM    Assessment and Plan   Acute viral bronchitis [J20.8]  1. Acute viral bronchitis  predniSONE 20 mg tablet      2. Acute viral sinusitis  predniSONE 20 mg tablet            Patient Instructions   • Marks Morning could be causing your increased sweatiness. • The rash could also be related but unable to determine at this time. At this time the rash is localized to lower extremity and abdomen - does not appear to be blistering or painful. Contact the prescribing provider for further direction. • At this time you have been diagnosed with a viral sinus infection and viral bronchitis. • Antibiotics are not indicated for viral infections. • Sometimes an upper respiratory infection may lead to secondary bacterial infection, in which case antibiotics would be indicated at that time. That is not currently the case. • For viral illnesses, the recommendation is for supportive care which would consists of the following:  o Steroids sent to pharmacy  o Claritin or Zyrtec after steroids are completed  o Nasal corticosteroid: examples are Flonase or Nasacort.  o Use your Albuterol inhaler as prescribed as you are wheezing in your lungs. • If at any point, symptoms are worse or you feel better then worse again, return for re-evaluation. • Follow up with PCP in 3-5 days. • Proceed to ER if symptoms worsen. Chief Complaint     Chief Complaint   Patient presents with   • Cold Like Symptoms     Pt states she believes she has a sinus infection for a couple of days. Runny nose, ears full, headache, weakness, sweating a lot, and green and brown mucous in the back of the throat. Pt states it has been going on for 3-4 days. Pt states she started Marks Morning yesterday and has noticed some red patches on her skin.          History of Present Illness       Nasal congestion, runny nose, headache, increased sweats, green and brown mucus starting 4 days ago. States she also noticed rash on her legs and lower abdomen starting 2 days ago which was when she started a new medication ([employer]ana [employer]). Has been taking Tylenol and Benadryl. 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 Review of Systems   Review of Systems   Constitutional: Positive for fatigue. HENT: Positive for congestion, ear pain, postnasal drip, rhinorrhea and sore throat. Respiratory: Positive for cough. Negative for shortness of breath. Genitourinary: Negative for pelvic pain and urgency. Musculoskeletal: Positive for myalgias. Skin: Positive for rash. Negative for wound. Neurological: Negative for dizziness, weakness, light-headedness and numbness. Current Medications       Current Outpatient Medications:   •  albuterol (ProAir HFA) 90 mcg/act inhaler, Inhale 2 puffs every 6 (six) hours as needed for shortness of breath May dispense 1 spacer, Disp: 8.5 g, Rfl: 0  •  albuterol (PROVENTIL HFA,VENTOLIN HFA) 90 mcg/act inhaler, Inhale 2 puffs every 4 (four) hours as needed, Disp: , Rfl:   •  CAMRESE 0.15-0.03 &0.01 MG TABS, , Disp: , Rfl: 0  •  cariprazine (Vraylar) 1.5 MG capsule, Take 1 capsule (1.5 mg total) by mouth daily, Disp: 30 capsule, Rfl: 1  •  hydrOXYzine HCL (ATARAX) 50 mg tablet, Take 1 tablet (50 mg total) by mouth 2 (two) times a day as needed for anxiety, Disp: 90 tablet, Rfl: 0  •  predniSONE 20 mg tablet, Take 1 tablet (20 mg total) by mouth 2 (two) times a day for 5 days, Disp: 10 tablet, Rfl: 0  •  sertraline (ZOLOFT) 100 mg tablet, Take 1 tablet (100 mg total) by mouth daily, Disp: 90 tablet, Rfl: 0  •  traZODone (DESYREL) 100 mg tablet, Take 1 tablet (100 mg total) by mouth daily at bedtime as needed for sleep, Disp: 90 tablet, Rfl: 0  •  fluticasone (FLOVENT HFA) 110 MCG/ACT inhaler, Inhale 2 puffs 2 (two) times a day Rinse mouth after use.  (Patient not taking: Reported on 7/25/2023), Disp: 12 g, Rfl: 1    Current Allergies     Allergies as of 07/25/2023   • (No Known Allergies)            The following portions of the patient's history were reviewed and updated as appropriate: allergies, current medications, past family history, past medical history, past social history, past surgical history and problem list.     Past Medical History:   Diagnosis Date   • Anxiety    • Depression    • Environmental allergies     Last assessed: 1/18/17   • GERD (gastroesophageal reflux disease)    • Hypoglycemia    • Hypothyroidism        Past Surgical History:   Procedure Laterality Date   • COLONOSCOPY  2016 & 2018    Fiberoptic   • EGD  2016   • EGD  06/2016   • MYRINGOTOMY     • TONSILLECTOMY     • TOOTH EXTRACTION         Family History   Problem Relation Age of Onset   • Anxiety disorder Mother    • Diabetes Mother         due to underlying condition with both eyes affected by proliferative retinopathy and traction retinal detatchments involving maculae, without long term use of insulin   • Depression Father    • Anxiety disorder Father    • Alcohol abuse Father    • Sleep apnea Father    • Suicide Attempts Maternal Grandfather    • Suicide Attempts Maternal Grandmother    • Crohn's disease Paternal Grandfather          Medications have been verified. Objective   /68   Pulse (!) 110   Temp 99.1 °F (37.3 °C)   Resp 18   Ht 5' 4" (1.626 m)   Wt 99.8 kg (220 lb)   LMP 05/29/2023 (Approximate)   SpO2 95%   BMI 37.76 kg/m²   Patient's last menstrual period was 05/29/2023 (approximate). Physical Exam     Physical Exam  Vitals and nursing note reviewed. Constitutional:       General: She is not in acute distress. Appearance: Normal appearance. She is obese. She is not ill-appearing or toxic-appearing. HENT:      Head: Normocephalic and atraumatic. Right Ear: Tympanic membrane has decreased mobility. Left Ear: Tympanic membrane has decreased mobility. Nose: Congestion and rhinorrhea present. Right Sinus: Maxillary sinus tenderness present. Left Sinus: Maxillary sinus tenderness present. Mouth/Throat:      Mouth: Mucous membranes are moist.      Pharynx: Oropharynx is clear. Eyes:      Extraocular Movements: Extraocular movements intact. Conjunctiva/sclera: Conjunctivae normal.      Pupils: Pupils are equal, round, and reactive to light. Cardiovascular:      Rate and Rhythm: Normal rate. Pulses: Normal pulses. Heart sounds: Normal heart sounds. Pulmonary:      Effort: Pulmonary effort is normal. No respiratory distress. Breath sounds: No stridor. Wheezing and rales present. No rhonchi. Chest:      Chest wall: No tenderness. Abdominal:      General: Bowel sounds are normal.      Palpations: Abdomen is soft. Tenderness: There is no abdominal tenderness. There is no right CVA tenderness or left CVA tenderness. Musculoskeletal:         General: Normal range of motion. Cervical back: Normal range of motion and neck supple. No tenderness. Skin:     General: Skin is warm and dry. Capillary Refill: Capillary refill takes less than 2 seconds. Neurological:      General: No focal deficit present. Mental Status: She is alert and oriented to person, place, and time. Mental status is at baseline. Sensory: No sensory deficit. Motor: No weakness. Psychiatric:         Mood and Affect: Mood normal.         Behavior: Behavior normal.         Thought Content:  Thought content normal.

## 2023-07-25 NOTE — PATIENT INSTRUCTIONS
José Luis Londonccasin could be causing your increased sweatiness. The rash could also be related but unable to determine at this time. At this time the rash is localized to lower extremity and abdomen - does not appear to be blistering or painful. Contact the prescribing provider for further direction. At this time you have been diagnosed with a viral sinus infection and viral bronchitis. Antibiotics are not indicated for viral infections. Sometimes an upper respiratory infection may lead to secondary bacterial infection, in which case antibiotics would be indicated at that time. That is not currently the case. For viral illnesses, the recommendation is for supportive care which would consists of the following:  Steroids sent to pharmacy  Claritin or Zyrtec after steroids are completed  Nasal corticosteroid: examples are Flonase or Nasacort. Use your Albuterol inhaler as prescribed as you are wheezing in your lungs. If at any point, symptoms are worse or you feel better then worse again, return for re-evaluation. Follow up with PCP in 3-5 days. Proceed to ER if symptoms worsen.

## 2023-07-26 NOTE — PROGRESS NOTES
ADULT ANNUAL [facility] PRIMARY CARE    NAME: Derek Officer  AGE: 20 y.o. SEX: female  : 2000     DATE: 2023     Assessment and Plan:     Patient reports an improvement in her respiratory symptoms since starting fluticasone inhaler, using 1 puff 2 times daily. However, patient continues to get frequent acute bouts of bronchitis. Patient was recently seen at our urgent care on  and was prescribed prednisone for suspected bronchitis. Suspected asthma at this time. Offered the following options to the patient today: PFT vs referral to pulmonology. Patient would like to proceed with a pulmonary function test, order was re-entered into the system and the patient was provided the number for central scheduling. Follow-up visit scheduled for early , sooner if needed pending test results    Problem List Items Addressed This Visit        Other    Major depressive disorder with single episode, in partial remission [facility])     Patient followed by psych, recently started Vraylar 1.5mg QD. Dyslipidemia     Mild elevation in triglycerides and LDL cholesterol, LDL slightly improved since last checked in 2018. A1c slightly up since last checked, now at 5.6%. Stressed the importance of exercise, weight loss, and low fat/sugar diet. History of acute bronchitis    Relevant Orders    Complete PFT with post bronchodilator    Wheezing    Relevant Orders    Complete PFT with post bronchodilator    Chest tightness    Relevant Orders    Complete PFT with post bronchodilator    Candidiasis of breast     Patient reports increased sweating since starting Vraylar, now with fungal rash under bilateral breasts.   Will treat with clotrimazole 1% cream         Relevant Medications    clotrimazole (LOTRIMIN) 1 % cream   Other Visit Diagnoses     Annual physical exam    -  Primary          Immunizations and preventive care screenings were discussed with patient today. Appropriate education was printed on patient's after visit summary. Counseling:  Exercise: the importance of regular exercise/physical activity was discussed. Recommend exercise 3-5 times per week for at least 30 minutes. BMI Counseling: There is no height or weight on file to calculate BMI. The BMI is above normal. Nutrition recommendations include encouraging healthy choices of fruits and vegetables and consuming healthier snacks. Exercise recommendations include exercising 3-5 times per week. Rationale for BMI follow-up plan is due to patient being overweight or obese. Return in about 7 months (around 2/27/2024) for Recheck. Chief Complaint:     Chief Complaint   Patient presents with   • Physical Exam   • Review Lab Results      History of Present Illness:     Adult Annual Physical   Patient here for a comprehensive physical exam. The patient reports problems - rash. Diet and Physical Activity  Diet/Nutrition: well balanced diet. Exercise: no formal exercise. Depression Screening  PHQ-2/9 Depression Screening         General Health  Sleep: sleeps well. Hearing: normal - bilateral.  Vision: wears glasses. Dental: regular dental visits. Review of Systems:     Review of Systems   Constitutional: Negative. Negative for fatigue and fever. HENT: Negative. Eyes: Negative. Respiratory: Positive for wheezing. Cardiovascular: Negative. Gastrointestinal: Negative. Endocrine: Negative. Genitourinary: Negative. Musculoskeletal: Negative. Skin: Positive for rash. Allergic/Immunologic: Negative. Neurological: Negative. Hematological: Negative. Psychiatric/Behavioral: Negative.        Past Medical History:     Past Medical History:   Diagnosis Date   • Anxiety    • Depression    • Environmental allergies     Last assessed: 1/18/17   • GERD (gastroesophageal reflux disease)    • Hypoglycemia    • Hypothyroidism       Past Surgical History:     Past Surgical History:   Procedure Laterality Date   • COLONOSCOPY  2016 & 2018    Fiberoptic   • EGD  2016   • EGD  06/2016   • MYRINGOTOMY     • TONSILLECTOMY     • TOOTH EXTRACTION        Social History:     Social History     Socioeconomic History   • Marital status: Single     Spouse name: None   • Number of children: 0   • Years of education: None   • Highest education level: Bachelor's degree (e.g., BA, AB, BS)   Occupational History   • Occupation: UNEMPLOYED   Tobacco Use   • Smoking status: Every Day     Types: Cigarettes   • Smokeless tobacco: Never   • Tobacco comments:     1/2 pack per month   Vaping Use   • Vaping Use: Former   • Substances: Nicotine   Substance and Sexual Activity   • Alcohol use:  Yes     Alcohol/week: 10.0 standard drinks of alcohol     Types: 10 Glasses of wine per week     Comment: every 2-3 days, more on weekends   • Drug use: Yes     Frequency: 4.0 times per week     Types: Marijuana     Comment: recreational    • Sexual activity: None   Other Topics Concern   • None   Social History Narrative   • None     Social Determinants of Health     Financial Resource Strain: Not on file   Food Insecurity: Not on file   Transportation Needs: Not on file   Physical Activity: Not on file   Stress: Not on file   Social Connections: Not on file   Intimate Partner Violence: Not on file   Housing Stability: Not on file      Family History:     Family History   Problem Relation Age of Onset   • Anxiety disorder Mother    • Diabetes Mother         due to underlying condition with both eyes affected by proliferative retinopathy and traction retinal detatchments involving maculae, without long term use of insulin   • Depression Father    • Anxiety disorder Father    • Alcohol abuse Father    • Sleep apnea Father    • Suicide Attempts Maternal Grandfather    • Suicide Attempts Maternal Grandmother    • Crohn's disease Paternal Grandfather       Current Medications:     Current Outpatient Medications   Medication Sig Dispense Refill   • albuterol (ProAir HFA) 90 mcg/act inhaler Inhale 2 puffs every 6 (six) hours as needed for shortness of breath May dispense 1 spacer 8.5 g 0   • CAMRESE 0.15-0.03 &0.01 MG TABS   0   • cariprazine (Vraylar) 1.5 MG capsule Take 1 capsule (1.5 mg total) by mouth daily 30 capsule 1   • clotrimazole (LOTRIMIN) 1 % cream Apply topically 2 (two) times a day 30 g 3   • fluticasone (FLOVENT HFA) 110 MCG/ACT inhaler Inhale 2 puffs 2 (two) times a day Rinse mouth after use. 12 g 1   • hydrOXYzine HCL (ATARAX) 50 mg tablet Take 1 tablet (50 mg total) by mouth 2 (two) times a day as needed for anxiety 90 tablet 0   • predniSONE 20 mg tablet Take 1 tablet (20 mg total) by mouth 2 (two) times a day for 5 days 10 tablet 0   • sertraline (ZOLOFT) 100 mg tablet Take 1 tablet (100 mg total) by mouth daily 90 tablet 0   • traZODone (DESYREL) 100 mg tablet Take 1 tablet (100 mg total) by mouth daily at bedtime as needed for sleep 90 tablet 0   • albuterol (PROVENTIL HFA,VENTOLIN HFA) 90 mcg/act inhaler Inhale 2 puffs every 4 (four) hours as needed       No current facility-administered medications for this visit. Allergies:     No Known Allergies   Physical Exam:     /90 (BP Location: Left arm, Patient Position: Sitting, Cuff Size: Large)   Pulse 98   Temp 98.5 °F (36.9 °C) (Tympanic)   Resp 16   Ht 5' 4.5" (1.638 m)   Wt 108 kg (237 lb 10.5 oz)   LMP 05/29/2023 (Approximate)   SpO2 96%   BMI 40.16 kg/m²     Physical Exam  Vitals and nursing note reviewed. Constitutional:       General: She is not in acute distress. Appearance: Normal appearance. She is not ill-appearing, toxic-appearing or diaphoretic. HENT:      Head: Normocephalic. Right Ear: Tympanic membrane normal.      Left Ear: Tympanic membrane normal.      Nose: Nose normal. No congestion. Mouth/Throat:      Mouth: Mucous membranes are moist.      Pharynx: Oropharynx is clear.  No oropharyngeal exudate. Eyes:      Extraocular Movements: Extraocular movements intact. Conjunctiva/sclera: Conjunctivae normal.      Pupils: Pupils are equal, round, and reactive to light. Cardiovascular:      Rate and Rhythm: Normal rate and regular rhythm. Pulses: Normal pulses. Heart sounds: Normal heart sounds. No murmur heard. Pulmonary:      Effort: Pulmonary effort is normal. No respiratory distress. Breath sounds: Examination of the right-upper field reveals wheezing. Examination of the right-middle field reveals wheezing. Examination of the right-lower field reveals wheezing. Wheezing present. Abdominal:      General: Bowel sounds are normal.      Palpations: Abdomen is soft. Tenderness: There is no abdominal tenderness. Musculoskeletal:         General: No swelling, tenderness, deformity or signs of injury. Normal range of motion. Cervical back: Normal range of motion and neck supple. No tenderness. Right lower leg: No edema. Left lower leg: No edema. Lymphadenopathy:      Cervical: No cervical adenopathy. Skin:     General: Skin is warm and dry. Capillary Refill: Capillary refill takes less than 2 seconds. Findings: Rash present. Comments: Fungal rash under breasts   Neurological:      General: No focal deficit present. Mental Status: She is alert and oriented to person, place, and time. Cranial Nerves: No cranial nerve deficit. Sensory: No sensory deficit. Motor: No weakness.       Coordination: Coordination normal.      Gait: Gait normal.      Deep Tendon Reflexes: Reflexes normal.   Psychiatric:         Mood and Affect: Mood normal.         Behavior: Behavior normal.          [address]

## 2023-07-26 NOTE — TELEPHONE ENCOUNTER
Placed call, voicemail left    Spoke to pt, reports small red circular rash on bilateral legs. Denies pain, itchiness. Denies any rash on her upper extremity. Denies any shortness of breath, difficulty breathing, chest pain, fever. Patient instructed to monitor symptoms closely and call provider back if symptoms worsen. Immediately stop Vraylar if symptoms worsen and go to emergency room. Patient verbalized understanding and states she likes the way the [facility] [employer] is controlling her mood and would like to stay on it. Monitor closely.

## 2023-07-27 ENCOUNTER — OFFICE VISIT (OUTPATIENT)
Age: 23
End: 2023-07-27
Payer: COMMERCIAL

## 2023-07-27 VITALS
TEMPERATURE: 98.5 F | HEART RATE: 98 BPM | WEIGHT: 237.66 LBS | SYSTOLIC BLOOD PRESSURE: 134 MMHG | OXYGEN SATURATION: 96 % | RESPIRATION RATE: 16 BRPM | DIASTOLIC BLOOD PRESSURE: 90 MMHG | HEIGHT: 65 IN | BODY MASS INDEX: 39.6 KG/M2

## 2023-07-27 DIAGNOSIS — E78.5 DYSLIPIDEMIA: ICD-10-CM

## 2023-07-27 DIAGNOSIS — R07.89 CHEST TIGHTNESS: ICD-10-CM

## 2023-07-27 DIAGNOSIS — Z00.00 ANNUAL PHYSICAL EXAM: Primary | ICD-10-CM

## 2023-07-27 DIAGNOSIS — B37.89 CANDIDIASIS OF BREAST: ICD-10-CM

## 2023-07-27 DIAGNOSIS — Z87.09 HISTORY OF ACUTE BRONCHITIS: ICD-10-CM

## 2023-07-27 DIAGNOSIS — F32.4 MAJOR DEPRESSIVE DISORDER WITH SINGLE EPISODE, IN PARTIAL REMISSION (HCC): ICD-10-CM

## 2023-07-27 DIAGNOSIS — R06.2 WHEEZING: ICD-10-CM

## 2023-07-27 PROCEDURE — 99395 PREV VISIT EST AGE 18-39: CPT | Performed by: NURSE PRACTITIONER

## 2023-07-27 RX ORDER — CLOTRIMAZOLE 1 %
CREAM (GRAM) TOPICAL 2 TIMES DAILY
Qty: 30 G | Refills: 3 | Status: SHIPPED | OUTPATIENT
Start: 2023-07-27

## 2023-07-27 NOTE — ASSESSMENT & PLAN NOTE
Patient reports increased sweating since starting Vraylar, now with fungal rash under bilateral breasts.   Will treat with clotrimazole 1% cream

## 2023-07-27 NOTE — ASSESSMENT & PLAN NOTE
Mild elevation in triglycerides and LDL cholesterol, LDL slightly improved since last checked in 2018. A1c slightly up since last checked, now at 5.6%. Stressed the importance of exercise, weight loss, and low fat/sugar diet.

## 2023-08-02 ENCOUNTER — TELEMEDICINE (OUTPATIENT)
Dept: PSYCHIATRY | Facility: CLINIC | Age: 23
End: 2023-08-02
Payer: COMMERCIAL

## 2023-08-02 DIAGNOSIS — F33.1 MODERATE EPISODE OF RECURRENT MAJOR DEPRESSIVE DISORDER (HCC): ICD-10-CM

## 2023-08-02 PROCEDURE — 99213 OFFICE O/P EST LOW 20 MIN: CPT

## 2023-08-02 RX ORDER — SERTRALINE HYDROCHLORIDE 100 MG/1
100 TABLET, FILM COATED ORAL DAILY
Qty: 30 TABLET | Refills: 2 | Status: SHIPPED | OUTPATIENT
Start: 2023-08-02 | End: 2023-10-31

## 2023-08-02 RX ORDER — TRAZODONE HYDROCHLORIDE 100 MG/1
100 TABLET ORAL
Qty: 30 TABLET | Refills: 2 | Status: SHIPPED | OUTPATIENT
Start: 2023-08-02

## 2023-08-02 RX ORDER — HYDROXYZINE 50 MG/1
50 TABLET, FILM COATED ORAL 2 TIMES DAILY PRN
Qty: 30 TABLET | Refills: 2 | Status: SHIPPED | OUTPATIENT
Start: 2023-08-02

## 2023-08-02 NOTE — PSYCH
Virtual Regular Visit    Verification of patient location:    Patient is located in the following state in which I hold an active license [place]    Problem List Items Addressed This Visit    None  Visit Diagnoses     Moderate episode of recurrent major depressive disorder (HCC)        Relevant Medications    traZODone (DESYREL) 100 mg tablet    sertraline (ZOLOFT) 100 mg tablet    hydrOXYzine HCL (ATARAX) 50 mg tablet    cariprazine (Vraylar) 1.5 MG capsule             Encounter provider TRE Adams    Provider located at    [address] [address] [address] [phone]    Recent Visits  Date Type Provider Dept   07/27/23 Office Visit TRE [facility]   Showing recent visits within past 7 days and meeting all other requirements  Today's Visits  Date Type Provider Dept   08/02/[facility]   Showing today's visits and meeting all other requirements  Future Appointments  No visits were found meeting these conditions. Showing future appointments within next 150 days and meeting all other requirements         The patient was identified by name and date of birth. Marj Suggs was informed that this is a telemedicine visit and [facility]. She agrees to proceed. .  My office door was closed. No one else was in the room. She acknowledged consent and understanding of privacy and security of the video platform. The patient has agreed to participate and understands they can discontinue the visit at any time. Patient is aware this is a billable service.      HPI     Current Outpatient Medications   Medication Sig Dispense Refill   • cariprazine (Vraylar) 1.5 MG capsule Take 1 capsule (1.5 mg total) by mouth daily 30 capsule 2   • hydrOXYzine HCL (ATARAX) 50 mg tablet Take 1 tablet (50 mg total) by mouth 2 (two) times a day as needed for anxiety 30 tablet 2   • sertraline (ZOLOFT) 100 mg tablet Take 1 tablet (100 mg total) by mouth daily 30 tablet 2   • traZODone (DESYREL) 100 mg tablet Take 1 tablet (100 mg total) by mouth daily at bedtime as needed for sleep 30 tablet 2   • albuterol (ProAir HFA) 90 mcg/act inhaler Inhale 2 puffs every 6 (six) hours as needed for shortness of breath May dispense 1 spacer 8.5 g 0   • albuterol (PROVENTIL HFA,VENTOLIN HFA) 90 mcg/act inhaler Inhale 2 puffs every 4 (four) hours as needed     • CAMRESE 0.15-0.03 &0.01 MG TABS   0   • clotrimazole (LOTRIMIN) 1 % cream Apply topically 2 (two) times a day 30 g 3   • fluticasone (FLOVENT HFA) 110 MCG/ACT inhaler Inhale 2 puffs 2 (two) times a day Rinse mouth after use. 12 g 1     No current facility-administered medications for this visit. Review of Systems  Video Exam    There were no vitals filed for this visit. Physical Exam   As a result of this visit, I have referred the patient for further respiratory evaluation. No      VIRTUAL VISIT DISCLAIMER    Vishnu Kerr acknowledges that she has consented to an online visit or consultation. She understands that the online visit is based solely on information provided by her, and that, in the absence of a face-to-face physical evaluation by the physician, the diagnosis she receives is both limited and provisional in terms of accuracy and completeness. This is not intended to replace a full medical face-to-face evaluation by the physician. Vishnu Kerr understands and accepts these terms. MEDICATION MANAGEMENT NOTE         [facility]    Name and Date of Birth:  Vishnu Kerr 20 y.o. 2000 MRN: [id number]    Date of Visit: August 2, 2023    No Known Allergies  SUBJECTIVE:    Jana Wiggins is seen today for a follow up for Major Depressive Disorder and anxiety. She reports that she continues to improve slowly since the last visit.  Az Hudson is finding significant improvement with the recent initiation of Vraylar 1.5 mg daily to augment SSRI to treat her daily irritability. Describes how her mood is more even, able to think more clear, more positive for the future, slight increase in energy + motivation levels. Irritability and anger has significantly lessened and she is hopeful symptoms will stay this way. Having no adverse effects and will continue current dosage at this time. Her mood seems more upbeat compared to previous visits. Shares that she is looking for a new job because she would like to improve her income. Depression is decreased to 3/10 with minimal symptoms at this time. Continues to remain free from alcohol usage. Does admit to occasional marijuana use and encouraged her to decrease. Sleep and appetite are adequate. Continue current medication regimen. Consider psychotherapy, denies SI. She denies any side effects from medications. PLAN:    -Continue  Vraylar 1.5mg for augmentation of SSRI, mood swings, irritability. Failed Abilify and seroquel in the past-ineffectiveness.   -Continue Zoloft to 100 mg daily   PARQ completed including serotonin syndrome, SIADH, worsening depression, suicidality, induction of sabrina, GI upset, headaches, activation, sexual side effects, sedation, potential drug interactions, and others.     -Continue prn Trazodone 100mg daily for insomnia  -Continue prn atarax 50mg daily for breakthrough anxiety           Aware of 24 hour and weekend coverage for urgent situations accessed by calling [facility] main practice number  Referral for individual psychotherapy    Diagnoses and all orders for this visit:    Moderate episode of recurrent major depressive disorder ([address])  -     traZODone (DESYREL) 100 mg tablet; Take 1 tablet (100 mg total) by mouth daily at bedtime as needed for sleep  -     sertraline (ZOLOFT) 100 mg tablet;  Take 1 tablet (100 mg total) by mouth daily  - hydrOXYzine HCL (ATARAX) 50 mg tablet; Take 1 tablet (50 mg total) by mouth 2 (two) times a day as needed for anxiety  -     cariprazine (Vraylar) 1.5 MG capsule; Take 1 capsule (1.5 mg total) by mouth daily        Current Outpatient Medications on File Prior to Visit   Medication Sig Dispense Refill   • albuterol (ProAir HFA) 90 mcg/act inhaler Inhale 2 puffs every 6 (six) hours as needed for shortness of breath May dispense 1 spacer 8.5 g 0   • albuterol (PROVENTIL HFA,VENTOLIN HFA) 90 mcg/act inhaler Inhale 2 puffs every 4 (four) hours as needed     • CAMRESE 0.15-0.03 &0.01 MG TABS   0   • clotrimazole (LOTRIMIN) 1 % cream Apply topically 2 (two) times a day 30 g 3   • fluticasone (FLOVENT HFA) 110 MCG/ACT inhaler Inhale 2 puffs 2 (two) times a day Rinse mouth after use. 12 g 1   • [DISCONTINUED] cariprazine (Vraylar) 1.5 MG capsule Take 1 capsule (1.5 mg total) by mouth daily 30 capsule 1   • [DISCONTINUED] hydrOXYzine HCL (ATARAX) 50 mg tablet Take 1 tablet (50 mg total) by mouth 2 (two) times a day as needed for anxiety 90 tablet 0   • [DISCONTINUED] sertraline (ZOLOFT) 100 mg tablet Take 1 tablet (100 mg total) by mouth daily 90 tablet 0   • [DISCONTINUED] traZODone (DESYREL) 100 mg tablet Take 1 tablet (100 mg total) by mouth daily at bedtime as needed for sleep 90 tablet 0     No current facility-administered medications on file prior to visit. Psychotherapy Provided:     Supportive counseling provided. Medication changes discussed with Marlena. Medication education provided to [facility]. HPI ROS Appetite Changes and Sleep:     She reports difficulty falling asleep, adequate appetite, fluctuating energy levels.  Denies homicidal ideation, denies suicidal ideation    Review Of Systems:      HPI ROS:               Medication Side Effects:  denies    Depression (10 worst): 3/10    Anxiety (10 worst): 4/10    Safety concerns (SI, HI, etc): Denies si and hi    Sleep: 6-9 hrs    Energy: Fair to low    Appetite: 3 meals    Weight Change: denies        Mental Status Evaluation:    Appearance Adequate hygiene and grooming   Behavior calm and cooperative   Mood anxious and depressed  Depression Scale - 3 of 10 (0 = No depression)  Anxiety Scale - 5 of 10 (0 = No anxiety)   Speech Normal rate and volume   Affect appropriate   Thought Processes Goal directed and coherent   Thought Content Does not verbalize delusional material   Associations Tightly connected   Perceptual Disturbances Denies hallucinations and does not appear to be responding to internal stimuli   Risk Potential Suicidal/Homicidal Ideation - No evidence of suicidal or homicidal ideation and patient does not verbalize suicidal or homicidal ideation  Risk of Violence - No evidence of risk for violence found on assessment  Risk of Self Mutilation - No evidence of risk for self mutilation found on assessment   Orientation oriented to person, place, time/date and situation   Memory recent and remote memory grossly intact   Consciousness alert and awake   Attention/Concentration attention span and concentration are age appropriate   Insight intact   Judgement intact   Muscle Strength and Gait normal muscle strength and normal muscle tone, normal gait/station and normal balance   Motor Activity no abnormal movements   Language no difficulty naming common objects, no difficulty repeating a phrase, no difficulty writing a sentence   Fund of Knowledge adequate knowledge of current events  adequate fund of knowledge regarding past history  adequate fund of knowledge regarding vocabulary          Past Medical History:    Past Medical History:   Diagnosis Date   • Anxiety    • Depression    • Environmental allergies     Last assessed: 1/18/17   • GERD (gastroesophageal reflux disease)    • Hypoglycemia    • Hypothyroidism         Past Surgical History:   Procedure Laterality Date   • COLONOSCOPY  2016 & 2018    Fiberoptic   • EGD  2016   • EGD  06/2016   • MYRINGOTOMY     • TONSILLECTOMY     • TOOTH EXTRACTION       No Known Allergies  Substance Abuse History:    Social History     Substance and Sexual Activity   Alcohol Use Yes   • Alcohol/week: 10.0 standard drinks of alcohol   • Types: 10 Glasses of wine per week    Comment: every 2-3 days, more on weekends     Social History     Substance and Sexual Activity   Drug Use Yes   • Frequency: 4.0 times per week   • Types: Marijuana    Comment: recreational      Social History:    Social History     Socioeconomic History   • Marital status: Single     Spouse name: Not on file   • Number of children: 0   • Years of education: Not on file   • Highest education level: Bachelor's degree (e.g., BA, AB, BS)   Occupational History   • Occupation: UNEMPLOYED   Tobacco Use   • Smoking status: Every Day     Types: Cigarettes   • Smokeless tobacco: Never   • Tobacco comments:     1/2 pack per month   Vaping Use   • Vaping Use: Former   • Substances: Nicotine   Substance and Sexual Activity   • Alcohol use:  Yes     Alcohol/week: 10.0 standard drinks of alcohol     Types: 10 Glasses of wine per week     Comment: every 2-3 days, more on weekends   • Drug use: Yes     Frequency: 4.0 times per week     Types: Marijuana     Comment: recreational    • Sexual activity: Not on file   Other Topics Concern   • Not on file   Social History Narrative   • Not on file     Social Determinants of Health     Financial Resource Strain: Not on file   Food Insecurity: Not on file   Transportation Needs: Not on file   Physical Activity: Not on file   Stress: Not on file   Social Connections: Not on file   Intimate Partner Violence: Not on file   Housing Stability: Not on file     Family Psychiatric History:     Family History   Problem Relation Age of Onset   • Anxiety disorder Mother    • Diabetes Mother         due to underlying condition with both eyes affected by proliferative retinopathy and traction retinal detatchments involving maculae, without long term use of insulin   • Depression Father    • Anxiety disorder Father    • Alcohol abuse Father    • Sleep apnea Father    • Suicide Attempts Maternal Grandfather    • Suicide Attempts Maternal Grandmother    • Crohn's disease Paternal Grandfather      History Review: The following portions of the patient's history were reviewed and updated as appropriate: allergies, current medications, past family history, past medical history, past social history, past surgical history and problem list     OBJECTIVE:     Vital signs in last 24 hours: There were no vitals filed for this visit.   Laboratory Results:   Recent Labs (last 2 months):   Appointment on 07/20/2023   Component Date Value   • WBC 07/20/2023 8.43    • RBC 07/20/2023 4.37    • Hemoglobin 07/20/2023 13.6    • Hematocrit 07/20/2023 39.6    • MCV 07/20/2023 91    • MCH 07/20/2023 31.1    • MCHC 07/20/2023 34.3    • RDW 07/20/2023 12.5    • MPV 07/20/2023 11.3    • Platelets 36/30/6150 248    • nRBC 07/20/2023 0    • Neutrophils Relative 07/20/2023 65    • Immat GRANS % 07/20/2023 0    • Lymphocytes Relative 07/20/2023 21    • Monocytes Relative 07/20/2023 6    • Eosinophils Relative 07/20/2023 7 (H)    • Basophils Relative 07/20/2023 1    • Neutrophils Absolute 07/20/2023 5.58    • Immature Grans Absolute 07/20/2023 0.02    • Lymphocytes Absolute 07/20/2023 1.73    • Monocytes Absolute 07/20/2023 0.47    • Eosinophils Absolute 07/20/2023 0.58    • Basophils Absolute 07/20/2023 0.05    • Sodium 07/20/2023 138    • Potassium 07/20/2023 4.0    • Chloride 07/20/2023 113 (H)    • CO2 07/20/2023 22    • ANION GAP 07/20/2023 3    • BUN 07/20/2023 11    • Creatinine 07/20/2023 0.70    • Glucose, Fasting 07/20/2023 132 (H)    • Calcium 07/20/2023 9.1    • Corrected Calcium 07/20/2023 9.6    • AST 07/20/2023 17    • ALT 07/20/2023 21    • Alkaline Phosphatase 07/20/2023 75    • Total Protein 07/20/2023 7.3    • Albumin 07/20/2023 3.4 (L)    • Total Bilirubin 07/20/2023 0.25    • eGFR 07/20/2023 122    • Cholesterol 07/20/2023 194    • Triglycerides 07/20/2023 233 (H)    • HDL, Direct 07/20/2023 29 (L)    • LDL Calculated 07/20/2023 118 (H)    • Non-HDL-Chol (CHOL-HDL) 07/20/2023 165    • Hepatitis C Ab 07/20/2023 Non-reactive    • HIV-1 p24 Antigen 07/20/2023 Non-Reactive    • HIV-1 Antibody 07/20/2023 Non-Reactive    • HIV-2 Antibody 07/20/2023 Non-Reactive    • HIV Ag-Ab 5th Gen 07/20/2023 Non-Reactive    • TSH 3RD GENERATON 07/20/2023 1.965    • Hemoglobin A1C 07/20/2023 5.6    • EAG 07/20/2023 114      I have personally reviewed all pertinent laboratory/tests results. Suicide/Homicide Risk Assessment:    Risk of Harm to Self:  Protective Factors: no current suicidal ideation, access to mental health treatment, compliant with medications, compliant with mental health treatment, having a desire to be alive, having a sense of purpose or meaning in life  Based on today's assessment, Marlena presents the following risk of harm to self: minimal    Risk of Harm to Others:  Based on today's assessment, Marlena presents the following risk of harm to others: none    The following interventions are recommended: referral for psychotherapy    Medications Risks/Benefits:      Risks, Benefits And Possible Side Effects Of Medications:    Discussed risks and benefits of treatment with patient including risk of suicidality, serotonin syndrome, increased QTc interval and SIADH related to treatment with antidepressants; Risk of induction of manic symptoms in certain patient populations and risk of parkinsonian symptoms, metabolic syndrome, tardive dyskinesia and neuroleptic malignant syndrome related to treatment with antipsychotic medications     Controlled Medication Discussion:     Not applicable    Treatment Plan:    Due for update/Updated:   yes  Last treatment plan done 6/20/23 . Treatment Plan due on 12/20/23. TRE hRodes 08/02/23    This note was shared with patient.       Visit Time    Visit Start Time: 130  Visit Stop Time: 146  Total Visit Duration: 16 minutes

## 2023-08-24 ENCOUNTER — OFFICE VISIT (OUTPATIENT)
Age: 23
End: 2023-08-24
Payer: COMMERCIAL

## 2023-08-24 VITALS
WEIGHT: 230 LBS | BODY MASS INDEX: 39.27 KG/M2 | HEIGHT: 64 IN | DIASTOLIC BLOOD PRESSURE: 70 MMHG | HEART RATE: 115 BPM | OXYGEN SATURATION: 96 % | TEMPERATURE: 98 F | RESPIRATION RATE: 18 BRPM | SYSTOLIC BLOOD PRESSURE: 118 MMHG

## 2023-08-24 DIAGNOSIS — R09.81 HEAD CONGESTION: ICD-10-CM

## 2023-08-24 DIAGNOSIS — J20.9 ACUTE BRONCHITIS, UNSPECIFIED ORGANISM: Primary | ICD-10-CM

## 2023-08-24 LAB
SARS-COV-2 AG UPPER RESP QL IA: NEGATIVE
VALID CONTROL: NORMAL

## 2023-08-24 PROCEDURE — 99213 OFFICE O/P EST LOW 20 MIN: CPT | Performed by: PHYSICIAN ASSISTANT

## 2023-08-24 PROCEDURE — 87811 SARS-COV-2 COVID19 W/OPTIC: CPT | Performed by: PHYSICIAN ASSISTANT

## 2023-08-24 RX ORDER — BENZONATATE 100 MG/1
100 CAPSULE ORAL 3 TIMES DAILY PRN
Qty: 20 CAPSULE | Refills: 0 | Status: SHIPPED | OUTPATIENT
Start: 2023-08-24

## 2023-08-24 RX ORDER — ALBUTEROL SULFATE 90 UG/1
2 AEROSOL, METERED RESPIRATORY (INHALATION) EVERY 6 HOURS PRN
Qty: 17 G | Refills: 0 | Status: SHIPPED | OUTPATIENT
Start: 2023-08-24

## 2023-08-24 RX ORDER — PREDNISONE 20 MG/1
40 TABLET ORAL DAILY
Qty: 10 TABLET | Refills: 0 | Status: SHIPPED | OUTPATIENT
Start: 2023-08-24 | End: 2023-08-29

## 2023-08-24 NOTE — PROGRESS NOTES
[facility]        NAME: Shanell Velasquez is a 24 y.o. female  : 2000    MRN: [id number]  DATE: 2023  TIME: 9:13 AM    Assessment and Plan   Acute bronchitis, unspecified organism [J20.9]  1. Acute bronchitis, unspecified organism        2. Head congestion  Poct Covid 19 Rapid Antigen Test            Patient Instructions       Follow up with PCP in 3-5 days. Proceed to  ER if symptoms worsen. Chief Complaint     Chief Complaint   Patient presents with   • Cold Like Symptoms     Pt states she has been wheezing for 3-4 days now. Head congestion, drowsiness, and runny nose, not sure if she has a fever. Pt states she is more so worried about the wheezing.          History of Present Illness       HPI    Review of Systems   Review of Systems      Current Medications       Current Outpatient Medications:   •  albuterol (PROVENTIL HFA,VENTOLIN HFA) 90 mcg/act inhaler, Inhale 2 puffs every 4 (four) hours as needed, Disp: , Rfl:   •  CAMRESE 0.15-0.03 &0.01 MG TABS, , Disp: , Rfl: 0  •  cariprazine (Vraylar) 1.5 MG capsule, Take 1 capsule (1.5 mg total) by mouth daily, Disp: 30 capsule, Rfl: 2  •  fluticasone (FLOVENT HFA) 110 MCG/ACT inhaler, Inhale 2 puffs 2 (two) times a day Rinse mouth after use., Disp: 12 g, Rfl: 1  •  hydrOXYzine HCL (ATARAX) 50 mg tablet, Take 1 tablet (50 mg total) by mouth 2 (two) times a day as needed for anxiety, Disp: 30 tablet, Rfl: 2  •  sertraline (ZOLOFT) 100 mg tablet, Take 1 tablet (100 mg total) by mouth daily, Disp: 30 tablet, Rfl: 2  •  traZODone (DESYREL) 100 mg tablet, Take 1 tablet (100 mg total) by mouth daily at bedtime as needed for sleep, Disp: 30 tablet, Rfl: 2  •  albuterol (ProAir HFA) 90 mcg/act inhaler, Inhale 2 puffs every 6 (six) hours as needed for shortness of breath May dispense 1 spacer (Patient not taking: Reported on 2023), Disp: 8.5 g, Rfl: 0  •  clotrimazole (LOTRIMIN) 1 % cream, Apply topically 2 (two) times a day (Patient not taking: Reported on 8/24/2023), Disp: 30 g, Rfl: 3    Current Allergies     Allergies as of 08/24/2023   • (No Known Allergies)            The following portions of the patient's history were reviewed and updated as appropriate: allergies, current medications, past family history, past medical history, past social history, past surgical history and problem list.     Past Medical History:   Diagnosis Date   • Anxiety    • Depression    • Environmental allergies     Last assessed: 1/18/17   • GERD (gastroesophageal reflux disease)    • Hypoglycemia    • Hypothyroidism        Past Surgical History:   Procedure Laterality Date   • COLONOSCOPY  2016 & 2018    Fiberoptic   • EGD  2016   • EGD  06/2016   • MYRINGOTOMY     • TONSILLECTOMY     • TOOTH EXTRACTION         Family History   Problem Relation Age of Onset   • Anxiety disorder Mother    • Diabetes Mother         due to underlying condition with both eyes affected by proliferative retinopathy and traction retinal detatchments involving maculae, without long term use of insulin   • Depression Father    • Anxiety disorder Father    • Alcohol abuse Father    • Sleep apnea Father    • Suicide Attempts Maternal Grandfather    • Suicide Attempts Maternal Grandmother    • Crohn's disease Paternal Grandfather          Medications have been verified. Objective   /70   Pulse (!) 115   Temp 98 °F (36.7 °C)   Resp 18   Ht 5' 4" (1.626 m)   Wt 104 kg (230 lb)   LMP  (LMP Unknown)   SpO2 96%   BMI 39.48 kg/m²   No LMP recorded (lmp unknown).        Physical Exam     Physical Exam Family History   Problem Relation Age of Onset   • Anxiety disorder Mother    • Diabetes Mother         due to underlying condition with both eyes affected by proliferative retinopathy and traction retinal detatchments involving maculae, without long term use of insulin   • Depression Father    • Anxiety disorder Father    • Alcohol abuse Father    • Sleep apnea Father    • Suicide Attempts Maternal Grandfather    • Suicide Attempts Maternal Grandmother    • Crohn's disease Paternal Grandfather          Medications have been verified. Objective   /70   Pulse (!) 115   Temp 98 °F (36.7 °C)   Resp 18   Ht 5' 4" (1.626 m)   Wt 104 kg (230 lb)   LMP  (LMP Unknown)   SpO2 96%   BMI 39.48 kg/m²   No LMP recorded (lmp unknown). Physical Exam     Physical Exam  Vitals reviewed. Constitutional:       General: She is not in acute distress. Appearance: She is well-developed. HENT:      Right Ear: Hearing, tympanic membrane, ear canal and external ear normal.      Left Ear: Hearing, tympanic membrane, ear canal and external ear normal.      Nose: Mucosal edema (B/L boggy turbinates) and congestion present. Mouth/Throat:      Mouth: Mucous membranes are moist.      Pharynx: Posterior oropharyngeal erythema (PND) present. No oropharyngeal exudate. Tonsils: No tonsillar exudate. Cardiovascular:      Rate and Rhythm: Normal rate and regular rhythm. Pulses: Normal pulses. Heart sounds: Normal heart sounds. No murmur heard. Pulmonary:      Effort: Pulmonary effort is normal. No respiratory distress. Breath sounds: Wheezing and rhonchi (Bilateral diffuse coarse breath sounds and scattered trace wheezes heard throughout) present. Musculoskeletal:      Cervical back: Neck supple. Lymphadenopathy:      Cervical: No cervical adenopathy. Neurological:      Mental Status: She is alert and oriented to person, place, and time.

## 2023-08-24 NOTE — LETTER
August 24, 2023     Patient: Hipolito Walker   YOB: 2000   Date of Visit: 8/24/2023       To Whom it May Concern:    Hipolito Walker was seen in my clinic on 8/24/2023. She may return to work on 8/25/2023 . If you have any questions or concerns, please don't hesitate to call.          Sincerely,          Ni Leary PA-C        CC: No Recipients

## 2023-08-24 NOTE — PATIENT INSTRUCTIONS
Acute Bronchitis   WHAT YOU NEED TO KNOW:   Acute bronchitis is swelling and irritation in your lungs. It is usually caused by a virus and most often happens in the winter. Bronchitis may also be caused by bacteria or by a chemical irritant, such as smoke. DISCHARGE INSTRUCTIONS:   Return to the emergency department if:   You cough up blood. Your lips or fingernails turn blue. You feel like you are not getting enough air when you breathe. Call your doctor if:   Your symptoms do not go away or get worse, even after treatment. Your cough does not get better within 4 weeks. You have questions or concerns about your condition or care. Medicines: You may  need any of the following:  Cough suppressants  decrease your urge to cough. Decongestants  help loosen mucus in your lungs and make it easier to cough up. This can help you breathe easier. Inhalers  may be given. Your healthcare provider may give you one or more inhalers to help you breathe easier and cough less. An inhaler gives your medicine to open your airways. Ask your healthcare provider to show you how to use your inhaler correctly. Antibiotics  may be given for up to 5 days if your bronchitis is caused by bacteria. Acetaminophen  decreases pain and fever. It is available without a doctor's order. Ask how much to take and how often to take it. Follow directions. Read the labels of all other medicines you are using to see if they also contain acetaminophen, or ask your doctor or pharmacist. Acetaminophen can cause liver damage if not taken correctly. NSAIDs  help decrease swelling and pain or fever. This medicine is available with or without a doctor's order. NSAIDs can cause stomach bleeding or kidney problems in certain people. If you take blood thinner medicine, always ask your healthcare provider if NSAIDs are safe for you. Always read the medicine label and follow directions. Take your medicine as directed. Contact your healthcare provider if you think your medicine is not helping or if you have side effects. Tell your provider if you are allergic to any medicine. Keep a list of the medicines, vitamins, and herbs you take. Include the amounts, and when and why you take them. Bring the list or the pill bottles to follow-up visits. Carry your medicine list with you in case of an emergency. Self-care:   Drink liquids as directed. You may need to drink more liquids than usual to stay hydrated. Ask how much liquid to drink each day and which liquids are best for you. Use a cool mist humidifier  to increase air moisture in your home. This may make it easier for you to breathe and help decrease your cough. Get more rest.  Rest helps your body to heal. Slowly start to do more each day. Rest when you feel it is needed. Avoid irritants in the air. Avoid chemicals, fumes, and dust. Wear a face mask if you must work around dust or fumes. Stay inside on days when air pollution levels are high. If you have allergies, stay inside when pollen counts are high. Do not use aerosol products, such as spray-on deodorant, bug spray, and hair spray. Do not smoke or be around others who are smoking. Nicotine and other chemicals in cigarettes and cigars can cause lung damage. Ask your healthcare provider for information if you currently smoke and need help to quit. E-cigarettes or smokeless tobacco still contain nicotine. Talk to your healthcare provider before you use these products. Prevent acute bronchitis:       Ask about vaccines you may need. Get a flu vaccine each year as soon as recommended, usually in September or October. Ask your healthcare provider if you should also get a pneumonia or COVID-19 vaccine. Your healthcare provider can tell you if you should also get other vaccines, and when to get them. Prevent the spread of germs.   You can decrease your risk for acute bronchitis and other illnesses by doing the following:     Wash your hands often with soap and water. Carry germ-killing hand lotion or gel with you. You can use the lotion or gel to clean your hands when soap and water are not available. Do not touch your eyes, nose, or mouth unless you have washed your hands first.    Always cover your mouth when you cough to prevent the spread of germs. It is best to cough into a tissue or your shirt sleeve instead of into your hand. Ask those around you to cover their mouths when they cough. Try to avoid people who have a cold or the flu. If you are sick, stay away from others as much as possible. Follow up with your doctor as directed:  Write down questions you have so you will remember to ask them during your follow-up visits. © Copyright [employer] 2022 Information is for End User's use only and may not be sold, redistributed or otherwise used for commercial purposes. The above information is an  only. It is not intended as medical advice for individual conditions or treatments. Talk to your doctor, nurse or pharmacist before following any medical regimen to see if it is safe and effective for you.

## 2023-09-01 ENCOUNTER — HOSPITAL ENCOUNTER (OUTPATIENT)
Dept: PULMONOLOGY | Facility: HOSPITAL | Age: 23
End: 2023-09-01
Payer: COMMERCIAL

## 2023-09-01 DIAGNOSIS — Z87.09 HISTORY OF ACUTE BRONCHITIS: ICD-10-CM

## 2023-09-01 DIAGNOSIS — R06.2 WHEEZING: ICD-10-CM

## 2023-09-01 DIAGNOSIS — R07.89 CHEST TIGHTNESS: ICD-10-CM

## 2023-09-01 PROCEDURE — 94726 PLETHYSMOGRAPHY LUNG VOLUMES: CPT

## 2023-09-01 PROCEDURE — 94726 PLETHYSMOGRAPHY LUNG VOLUMES: CPT | Performed by: INTERNAL MEDICINE

## 2023-09-01 PROCEDURE — 94760 N-INVAS EAR/PLS OXIMETRY 1: CPT

## 2023-09-01 PROCEDURE — 94060 EVALUATION OF WHEEZING: CPT

## 2023-09-01 PROCEDURE — 94060 EVALUATION OF WHEEZING: CPT | Performed by: INTERNAL MEDICINE

## 2023-09-01 PROCEDURE — 94729 DIFFUSING CAPACITY: CPT

## 2023-09-01 PROCEDURE — 94729 DIFFUSING CAPACITY: CPT | Performed by: INTERNAL MEDICINE

## 2023-09-01 RX ORDER — ALBUTEROL SULFATE 2.5 MG/3ML
2.5 SOLUTION RESPIRATORY (INHALATION) ONCE AS NEEDED
Status: COMPLETED | OUTPATIENT
Start: 2023-09-01 | End: 2023-09-01

## 2023-09-01 RX ADMIN — ALBUTEROL SULFATE 2.5 MG: 2.5 SOLUTION RESPIRATORY (INHALATION) at 15:37

## 2023-09-05 ENCOUNTER — TELEPHONE (OUTPATIENT)
Dept: PSYCHIATRY | Facility: CLINIC | Age: 23
End: 2023-09-05

## 2023-09-05 NOTE — TELEPHONE ENCOUNTER
Patient canceled 9/1/2023 appointment with Katelin) via [address] [place] [address]. Left message for patient to call the office back to reschedule .

## 2023-09-12 PROBLEM — R06.2 WHEEZING: Status: RESOLVED | Noted: 2023-05-18 | Resolved: 2023-09-12

## 2023-09-12 PROBLEM — J45.40 MODERATE PERSISTENT ASTHMA WITHOUT COMPLICATION: Status: ACTIVE | Noted: 2023-09-12

## 2023-09-12 PROBLEM — R07.89 CHEST TIGHTNESS: Status: RESOLVED | Noted: 2023-05-18 | Resolved: 2023-09-12

## 2023-09-13 ENCOUNTER — OFFICE VISIT (OUTPATIENT)
Age: 23
End: 2023-09-13
Payer: COMMERCIAL

## 2023-09-13 VITALS
RESPIRATION RATE: 16 BRPM | TEMPERATURE: 97.8 F | WEIGHT: 240.3 LBS | HEIGHT: 64 IN | HEART RATE: 100 BPM | OXYGEN SATURATION: 100 % | BODY MASS INDEX: 41.03 KG/M2 | SYSTOLIC BLOOD PRESSURE: 132 MMHG | DIASTOLIC BLOOD PRESSURE: 86 MMHG

## 2023-09-13 DIAGNOSIS — F32.4 MAJOR DEPRESSIVE DISORDER WITH SINGLE EPISODE, IN PARTIAL REMISSION (HCC): ICD-10-CM

## 2023-09-13 DIAGNOSIS — J45.40 MODERATE PERSISTENT ASTHMA WITHOUT COMPLICATION: Primary | ICD-10-CM

## 2023-09-13 DIAGNOSIS — F41.1 ANXIETY STATE: ICD-10-CM

## 2023-09-13 DIAGNOSIS — E55.9 VITAMIN D INSUFFICIENCY: ICD-10-CM

## 2023-09-13 PROCEDURE — 99215 OFFICE O/P EST HI 40 MIN: CPT | Performed by: NURSE PRACTITIONER

## 2023-09-13 RX ORDER — ALBUTEROL SULFATE 90 UG/1
2 AEROSOL, METERED RESPIRATORY (INHALATION) EVERY 6 HOURS PRN
Qty: 6.7 G | Refills: 5 | Status: SHIPPED | OUTPATIENT
Start: 2023-09-13

## 2023-09-13 RX ORDER — BUDESONIDE AND FORMOTEROL FUMARATE DIHYDRATE 80; 4.5 UG/1; UG/1
2 AEROSOL RESPIRATORY (INHALATION) 2 TIMES DAILY
Qty: 10.2 G | Refills: 5 | Status: CANCELLED | OUTPATIENT
Start: 2023-09-13

## 2023-09-13 RX ORDER — FLUTICASONE PROPIONATE 110 UG/1
2 AEROSOL, METERED RESPIRATORY (INHALATION) 2 TIMES DAILY
Qty: 12 G | Refills: 3 | Status: SHIPPED | OUTPATIENT
Start: 2023-09-13

## 2023-09-13 NOTE — PROGRESS NOTES
Name: Te Baltazar      : 2000      MRN: [id number]  Encounter Provider: TRE Ortiz  Encounter Date: 2023   Encounter department: [address] [place] [address] PRIMARY CARE    Assessment & Plan     F/U in 2024 or sooner prn    I have spent a total time of 40 minutes on 23 in caring for this patient including Diagnostic results, Prognosis, Risks and benefits of tx options, Instructions for management, Patient and family education, Importance of tx compliance, Documenting in the medical record, Reviewing / ordering tests, medicine, procedures   and Obtaining or reviewing history  . 1. Moderate persistent asthma without complication  Assessment & Plan:  Patient's PFT results from July are consistent with a diagnosis of moderate persistent asthma. Patient reports "my lungs feel pretty good today". Wheezing noted anteriorly on the left side upon physical exam.  Patient reports "I was using my albuterol inhaler a lot last week, I think I had a cold". Patient is currently out of her albuterol and Flovent inhalers, refilled per her request.  Patient did have another bout of acute bronchitis on  that required oral steroids. In regards to treatment, the following options were offered to the patient:   1) Continue with Flovent but increase to 2 puffs 2 times daily   2) switch patient from Flovent to an ICS/LABA inhaler  3) refer to pulmonology or order an E consult    Patient would like to continue with her Flovent and increase the dosing to 2 puffs 2 times daily. I strongly advised the patient to contact our office if her respiratory status fails to improve or worsens with these measures. Discussed what signs and symptoms warrant emergent medical care. Patient verbalized understanding. Orders:  -     albuterol (Proventil HFA) 90 mcg/act inhaler; Inhale 2 puffs every 6 (six) hours as needed for wheezing  -     fluticasone (FLOVENT HFA) 110 MCG/ACT inhaler;  Inhale 2 puffs 2 (two) times a day Rinse mouth after use. 2. Anxiety state, unspecified  Assessment & Plan:  Patient quit job this morning, job was causing significant stress. "Having mixed motions today. .. nothing lined up yet"  Started [employer] Pac a few weeks ago, "not sure if its helping"  Reporting issues "getting out of bed. .. falling back into depression"  Has not been taking trazodone, "making me too sleepy"    I advised the patient to discuss her anxiety and depression symptoms with her psychiatry team.  Luckily, the patient has a virtual follow-up with her psychiatric nurse practitioner tomorrow. I strongly advised the patient to keep this visit. Patient would also like to have her vitamin D level checked as she thinks this may be contributing to her symptoms, lab order was placed. Patient does have a history of vitamin D insufficiency based on blood work from 2021. Orders:  -     Vitamin D 25 hydroxy; Future    3. Major depressive disorder with single episode, in partial remission (HCC)  -     Vitamin D 25 hydroxy; Future    4. Vitamin D insufficiency  -     Vitamin D 25 hydroxy; Future           Subjective      Subjective  Isha Benjamin is an 24 y.o. female who presents for follow up of asthma. The patient is not currently have symptoms / an exacerbation. Current Disease Severity  Marlena has no daytime symptoms today, was using albuterol "almost daily last week". She has no nighttime asthma symptoms. The patient is using short-acting beta agonists for symptom control more than 2 days per week but not more than once a day. She has exacerbations requiring oral systemic corticosteroids 5 times per year. Current limitations in activity from asthma: none. Number of days of school or work missed in the last month: 2. Number of urgent/emergent visits in last year: 11. Currently "out of both inhalers"     Review of Systems   Constitutional: Negative. HENT: Negative. Eyes: Negative.     Respiratory: Positive for wheezing. Negative for chest tightness and shortness of breath. Cardiovascular: Negative. Gastrointestinal: Negative. Endocrine: Negative. Genitourinary: Negative. Musculoskeletal: Negative. Skin: Negative. Allergic/Immunologic: Negative. Neurological: Negative. Hematological: Negative. Psychiatric/Behavioral: Positive for dysphoric mood. Negative for self-injury, sleep disturbance and suicidal ideas. The patient is nervous/anxious. Current Outpatient Medications on File Prior to Visit   Medication Sig   • cariprazine (Vraylar) 1.5 MG capsule Take 1 capsule (1.5 mg total) by mouth daily   • clotrimazole (LOTRIMIN) 1 % cream Apply topically 2 (two) times a day   • hydrOXYzine HCL (ATARAX) 50 mg tablet Take 1 tablet (50 mg total) by mouth 2 (two) times a day as needed for anxiety   • sertraline (ZOLOFT) 100 mg tablet Take 1 tablet (100 mg total) by mouth daily   • traZODone (DESYREL) 100 mg tablet Take 1 tablet (100 mg total) by mouth daily at bedtime as needed for sleep   • [DISCONTINUED] albuterol (ProAir HFA) 90 mcg/act inhaler Inhale 2 puffs every 6 (six) hours as needed for shortness of breath May dispense 1 spacer   • [DISCONTINUED] fluticasone (FLOVENT HFA) 110 MCG/ACT inhaler Inhale 2 puffs 2 (two) times a day Rinse mouth after use.    • benzonatate (TESSALON PERLES) 100 mg capsule Take 1 capsule (100 mg total) by mouth 3 (three) times a day as needed for cough (Patient not taking: Reported on 9/13/2023)   • CAMRESE 0.15-0.03 &0.01 MG TABS  (Patient not taking: Reported on 9/13/2023)   • [DISCONTINUED] albuterol (PROVENTIL HFA,VENTOLIN HFA) 90 mcg/act inhaler Inhale 2 puffs every 6 (six) hours as needed for wheezing or shortness of breath (Patient not taking: Reported on 9/13/2023)       Objective     /86 (BP Location: Left arm, Patient Position: Sitting, Cuff Size: Large)   Pulse 100   Temp 97.8 °F (36.6 °C) (Tympanic)   Resp 16   Ht 5' 4" (1.626 m)   Wt 109 kg (240 lb 4.8 oz)   LMP  (LMP Unknown)   SpO2 100%   BMI 41.25 kg/m²     Physical Exam  Constitutional:       General: She is not in acute distress. Appearance: Normal appearance. HENT:      Nose: Nose normal.   Eyes:      Extraocular Movements: Extraocular movements intact. Conjunctiva/sclera: Conjunctivae normal.   Cardiovascular:      Rate and Rhythm: Normal rate and regular rhythm. Heart sounds: Normal heart sounds. Pulmonary:      Effort: Pulmonary effort is normal. No respiratory distress. Breath sounds: Examination of the left-upper field reveals wheezing. Wheezing present. Abdominal:      General: Abdomen is flat. Musculoskeletal:         General: No swelling or deformity. Normal range of motion. Cervical back: Normal range of motion. Skin:     Coloration: Skin is pale. Findings: No erythema or rash. Neurological:      Mental Status: She is alert and oriented to person, place, and time. Psychiatric:         Mood and Affect: Affect is tearful.          Behavior: Behavior normal.       TRE Mason

## 2023-09-13 NOTE — ASSESSMENT & PLAN NOTE
Patient's PFT results from July are consistent with a diagnosis of moderate persistent asthma. Patient reports "my lungs feel pretty good today". Wheezing noted anteriorly on the left side upon physical exam.  Patient reports "I was using my albuterol inhaler a lot last week, I think I had a cold". Patient is currently out of her albuterol and Flovent inhalers, refilled per her request.  Patient did have another bout of acute bronchitis on 8/24 that required oral steroids. In regards to treatment, the following options were offered to the patient:   1) Continue with Flovent but increase to 2 puffs 2 times daily   2) switch patient from Flovent to an ICS/LABA inhaler  3) refer to pulmonology or order an E consult    Patient would like to continue with her Flovent and increase the dosing to 2 puffs 2 times daily. I strongly advised the patient to contact our office if her respiratory status fails to improve or worsens with these measures. Discussed what signs and symptoms warrant emergent medical care. Patient verbalized understanding.

## 2023-09-13 NOTE — ASSESSMENT & PLAN NOTE
Patient quit job this morning, job was causing significant stress. "Having mixed motions today. .. nothing lined up yet"  Started Shreya Flatter a few weeks ago, "not sure if its helping"  Reporting issues "getting out of bed. .. falling back into depression"  Has not been taking trazodone, "making me too sleepy"    I advised the patient to discuss her anxiety and depression symptoms with her psychiatry team.  Luckily, the patient has a virtual follow-up with her psychiatric nurse practitioner tomorrow. I strongly advised the patient to keep this visit. Patient would also like to have her vitamin D level checked as she thinks this may be contributing to her symptoms, lab order was placed. Patient does have a history of vitamin D insufficiency based on blood work from 2021.

## 2023-09-14 ENCOUNTER — TELEMEDICINE (OUTPATIENT)
Dept: PSYCHIATRY | Facility: CLINIC | Age: 23
End: 2023-09-14
Payer: COMMERCIAL

## 2023-09-14 DIAGNOSIS — F33.1 MODERATE EPISODE OF RECURRENT MAJOR DEPRESSIVE DISORDER (HCC): Primary | ICD-10-CM

## 2023-09-14 DIAGNOSIS — F41.9 ANXIETY: ICD-10-CM

## 2023-09-14 PROCEDURE — 99213 OFFICE O/P EST LOW 20 MIN: CPT

## 2023-09-14 RX ORDER — SERTRALINE HYDROCHLORIDE 100 MG/1
150 TABLET, FILM COATED ORAL DAILY
Qty: 90 TABLET | Refills: 0 | Status: SHIPPED | OUTPATIENT
Start: 2023-09-14 | End: 2023-11-13

## 2023-09-14 NOTE — PSYCH
Virtual Regular Visit    Verification of patient location:    Patient is located in the following state in which I hold an active license [place]    Problem List Items Addressed This Visit    None  Visit Diagnoses     Moderate episode of recurrent major depressive disorder ([address])    -  Primary    Anxiety                 Encounter provider TRE Haney    Provider located at    [id number] [id number] [facility] [id number] [phone]    Recent Visits  Date Type Provider Dept   09/13/23 Office Visit TRE [facility]   Showing recent visits within past 7 days and meeting all other requirements  Future Appointments  No visits were found meeting these conditions. Showing future appointments within next 150 days and meeting all other requirements         The patient was identified by name and date of birth. Adry Mirza was informed that this is a telemedicine visit and that [facility]. She agrees to proceed. .  My office door was closed. No one else was in the room. She acknowledged consent and understanding of privacy and security of the video platform. The patient has agreed to participate and understands they can discontinue the visit at any time. Patient is aware this is a billable service.      HPI     Current Outpatient Medications   Medication Sig Dispense Refill   • albuterol (Proventil HFA) 90 mcg/act inhaler Inhale 2 puffs every 6 (six) hours as needed for wheezing 6.7 g 5   • benzonatate (TESSALON PERLES) 100 mg capsule Take 1 capsule (100 mg total) by mouth 3 (three) times a day as needed for cough (Patient not taking: Reported on 9/13/2023) 20 capsule 0   • CAMRESE 0.15-0.03 &0.01 MG TABS  (Patient not taking: Reported on 9/13/2023)  0   • cariprazine (Vraylar) 1.5 MG capsule Take 1 capsule (1.5 mg total) by mouth daily 30 capsule 2   • clotrimazole (LOTRIMIN) 1 % cream Apply topically 2 (two) times a day 30 g 3   • fluticasone (FLOVENT HFA) 110 MCG/ACT inhaler Inhale 2 puffs 2 (two) times a day Rinse mouth after use. 12 g 3   • hydrOXYzine HCL (ATARAX) 50 mg tablet Take 1 tablet (50 mg total) by mouth 2 (two) times a day as needed for anxiety 30 tablet 2   • sertraline (ZOLOFT) 100 mg tablet Take 1 tablet (100 mg total) by mouth daily 30 tablet 2   • traZODone (DESYREL) 100 mg tablet Take 1 tablet (100 mg total) by mouth daily at bedtime as needed for sleep 30 tablet 2     No current facility-administered medications for this visit. Review of Systems  Video Exam    There were no vitals filed for this visit. Physical Exam   As a result of this visit, I have referred the patient for further respiratory evaluation. No      VIRTUAL VISIT DISCLAIMER    Jamin Griffin acknowledges that she has consented to an online visit or consultation. She understands that the online visit is based solely on information provided by her, and that, in the absence of a face-to-face physical evaluation by the physician, the diagnosis she receives is both limited and provisional in terms of accuracy and completeness. This is not intended to replace a full medical face-to-face evaluation by the physician. Jamin Griffin understands and accepts these terms. MEDICATION MANAGEMENT NOTE        [facility]    Name and Date of Birth:  Jamin Griffin 24 y.o. 2000 MRN: [id number]    Date of Visit: September 14, 2023    No Known Allergies  SUBJECTIVE:    Zaid Tala is seen today for a follow up for Major Depressive Disorder and anxiety. She reports that she continues to improve slowly since the last visit. Jose Daniel Carrillo reports leaving her recent job at [employer], this was planned for a few weeks,  states it was too stressful and not the best fit, already had a few interviews and is hopefully to find more suitable job.  Appears optimistic, prior job increasing depression with lack of energy and motivation to attend, dysphoria. Will titrate Zoloft to 150mg daily to aid with symptoms-continue Vraylar 1.5mg for augmentation which she continues to find helpful but not as helpful as 1 month ago-likely due to issues at work. Sleeping well, rarely needs prn trazodone. 4x weekly marijuana use, 2x weekly wine with 2-3 glasses on these days. Continue to recommend decreasing usage, pt verbalized understanding. Mood was constricted yet hopeful. Denies SI. Pending vit D level, consider supplement if low. She denies any side effects from medications. PLAN:    -Continue  Vraylar 1.5mg for augmentation of SSRI, mood swings, irritability.  Failed Abilify and seroquel in the past-ineffectiveness.     -Titrate Zoloft to 150 mg daily for poor energy and motivation s/s  PARQ completed including serotonin syndrome, SIADH, worsening depression, suicidality, induction of sabrina, GI upset, headaches, activation, sexual side effects, sedation, potential drug interactions, and others.     -Continue prn Trazodone 100mg daily for insomnia, uses very rarely   -Continue prn atarax 50mg daily for breakthrough anxiety           Aware of 24 hour and weekend coverage for urgent situations accessed by calling [facility] main practice number  Referral for individual psychotherapy    Diagnoses and all orders for this visit:    Moderate episode of recurrent major depressive disorder ([address] [facility])    Anxiety        Current Outpatient Medications on File Prior to Visit   Medication Sig Dispense Refill   • albuterol (Proventil HFA) 90 mcg/act inhaler Inhale 2 puffs every 6 (six) hours as needed for wheezing 6.7 g 5   • benzonatate (TESSALON PERLES) 100 mg capsule Take 1 capsule (100 mg total) by mouth 3 (three) times a day as needed for cough (Patient not taking: Reported on 9/13/2023) 20 capsule 0   • CAMRESE 0.15-0.03 &0.01 MG TABS  (Patient not taking: Reported on 9/13/2023) 0   • cariprazine (Vraylar) 1.5 MG capsule Take 1 capsule (1.5 mg total) by mouth daily 30 capsule 2   • clotrimazole (LOTRIMIN) 1 % cream Apply topically 2 (two) times a day 30 g 3   • fluticasone (FLOVENT HFA) 110 MCG/ACT inhaler Inhale 2 puffs 2 (two) times a day Rinse mouth after use. 12 g 3   • hydrOXYzine HCL (ATARAX) 50 mg tablet Take 1 tablet (50 mg total) by mouth 2 (two) times a day as needed for anxiety 30 tablet 2   • sertraline (ZOLOFT) 100 mg tablet Take 1 tablet (100 mg total) by mouth daily 30 tablet 2   • traZODone (DESYREL) 100 mg tablet Take 1 tablet (100 mg total) by mouth daily at bedtime as needed for sleep 30 tablet 2     No current facility-administered medications on file prior to visit. Psychotherapy Provided:     Supportive counseling provided. Medication changes discussed with Marlena. Medication education provided to [facility]. HPI ROS Appetite Changes and Sleep:     She reports difficulty falling asleep, adequate appetite, fluctuating energy levels.  Denies homicidal ideation, denies suicidal ideation    Review Of Systems:      HPI ROS:               Medication Side Effects:  denies    Depression (10 worst): 3-5/10    Anxiety (10 worst): 3/10    Safety concerns (SI, HI, etc): Denies si and hi    Sleep: 6-7 hrs    Energy: Fair to low    Appetite: 3 meals    Weight Change: denies        Mental Status Evaluation:    Appearance Adequate hygiene and grooming   Behavior calm and cooperative   Mood anxious and depressed  Depression Scale - 5 of 10 (0 = No depression)  Anxiety Scale - 3 of 10 (0 = No anxiety)   Speech Normal rate and volume   Affect appropriate   Thought Processes Goal directed and coherent   Thought Content Does not verbalize delusional material   Associations Tightly connected   Perceptual Disturbances Denies hallucinations and does not appear to be responding to internal stimuli   Risk Potential Suicidal/Homicidal Ideation - No evidence of suicidal or homicidal ideation and patient does not verbalize suicidal or homicidal ideation  Risk of Violence - No evidence of risk for violence found on assessment  Risk of Self Mutilation - No evidence of risk for self mutilation found on assessment   Orientation oriented to person, place, time/date and situation   Memory recent and remote memory grossly intact   Consciousness alert and awake   Attention/Concentration attention span and concentration are age appropriate   Insight intact   Judgement intact   Muscle Strength and Gait normal muscle strength and normal muscle tone, normal gait/station and normal balance   Motor Activity no abnormal movements   Language no difficulty naming common objects, no difficulty repeating a phrase, no difficulty writing a sentence   Fund of Knowledge adequate knowledge of current events  adequate fund of knowledge regarding past history  adequate fund of knowledge regarding vocabulary          Past Medical History:    Past Medical History:   Diagnosis Date   • Anxiety    • Asthma    • Depression    • Environmental allergies     Last assessed: 1/18/17   • GERD (gastroesophageal reflux disease)    • Hypoglycemia    • Hypothyroidism         Past Surgical History:   Procedure Laterality Date   • COLONOSCOPY  2016 & 2018    Fiberoptic   • EGD  2016   • EGD  06/2016   • MYRINGOTOMY     • TONSILLECTOMY     • TOOTH EXTRACTION       No Known Allergies  Substance Abuse History:    Social History     Substance and Sexual Activity   Alcohol Use Yes   • Alcohol/week: 10.0 standard drinks of alcohol   • Types: 10 Glasses of wine per week    Comment: every 2-3 days, more on weekends     Social History     Substance and Sexual Activity   Drug Use Yes   • Frequency: 4.0 times per week   • Types: Marijuana    Comment: recreational      Social History:    Social History     Socioeconomic History   • Marital status: Single     Spouse name: Not on file   • Number of children: 0   • Years of education: Not on file   • Highest education level: Bachelor's degree (e.g., BA, AB, BS)   Occupational History   • Occupation: UNEMPLOYED   Tobacco Use   • Smoking status: Every Day     Types: Cigarettes   • Smokeless tobacco: Never   • Tobacco comments:     1/2 pack per month   Vaping Use   • Vaping Use: Former   • Substances: Nicotine   Substance and Sexual Activity   • Alcohol use: Yes     Alcohol/week: 10.0 standard drinks of alcohol     Types: 10 Glasses of wine per week     Comment: every 2-3 days, more on weekends   • Drug use: Yes     Frequency: 4.0 times per week     Types: Marijuana     Comment: recreational    • Sexual activity: Not on file   Other Topics Concern   • Not on file   Social History Narrative   • Not on file     Social Determinants of Health     Financial Resource Strain: Not on file   Food Insecurity: Not on file   Transportation Needs: Not on file   Physical Activity: Not on file   Stress: Not on file   Social Connections: Not on file   Intimate Partner Violence: Not on file   Housing Stability: Not on file     Family Psychiatric History:     Family History   Problem Relation Age of Onset   • Anxiety disorder Mother    • Diabetes Mother         due to underlying condition with both eyes affected by proliferative retinopathy and traction retinal detatchments involving maculae, without long term use of insulin   • Depression Father    • Anxiety disorder Father    • Alcohol abuse Father    • Sleep apnea Father    • Suicide Attempts Maternal Grandfather    • Suicide Attempts Maternal Grandmother    • Crohn's disease Paternal Grandfather      History Review: The following portions of the patient's history were reviewed and updated as appropriate: allergies, current medications, past family history, past medical history, past social history, past surgical history and problem list     OBJECTIVE:     Vital signs in last 24 hours: There were no vitals filed for this visit.   Laboratory Results:   Recent Labs (last 2 months): Office Visit on 08/24/2023   Component Date Value   • POCT SARS-CoV-2 Ag 08/24/2023 Negative    • VALID CONTROL 08/24/2023 Valid    Appointment on 07/20/2023   Component Date Value   • WBC 07/20/2023 8.43    • RBC 07/20/2023 4.37    • Hemoglobin 07/20/2023 13.6    • Hematocrit 07/20/2023 39.6    • MCV 07/20/2023 91    • MCH 07/20/2023 31.1    • MCHC 07/20/2023 34.3    • RDW 07/20/2023 12.5    • MPV 07/20/2023 11.3    • Platelets 57/26/2985 248    • nRBC 07/20/2023 0    • Neutrophils Relative 07/20/2023 65    • Immat GRANS % 07/20/2023 0    • Lymphocytes Relative 07/20/2023 21    • Monocytes Relative 07/20/2023 6    • Eosinophils Relative 07/20/2023 7 (H)    • Basophils Relative 07/20/2023 1    • Neutrophils Absolute 07/20/2023 5.58    • Immature Grans Absolute 07/20/2023 0.02    • Lymphocytes Absolute 07/20/2023 1.73    • Monocytes Absolute 07/20/2023 0.47    • Eosinophils Absolute 07/20/2023 0.58    • Basophils Absolute 07/20/2023 0.05    • Sodium 07/20/2023 138    • Potassium 07/20/2023 4.0    • Chloride 07/20/2023 113 (H)    • CO2 07/20/2023 22    • ANION GAP 07/20/2023 3    • BUN 07/20/2023 11    • Creatinine 07/20/2023 0.70    • Glucose, Fasting 07/20/2023 132 (H)    • Calcium 07/20/2023 9.1    • Corrected Calcium 07/20/2023 9.6    • AST 07/20/2023 17    • ALT 07/20/2023 21    • Alkaline Phosphatase 07/20/2023 75    • Total Protein 07/20/2023 7.3    • Albumin 07/20/2023 3.4 (L)    • Total Bilirubin 07/20/2023 0.25    • eGFR 07/20/2023 122    • Cholesterol 07/20/2023 194    • Triglycerides 07/20/2023 233 (H)    • HDL, Direct 07/20/2023 29 (L)    • LDL Calculated 07/20/2023 118 (H)    • Non-HDL-Chol (CHOL-HDL) 07/20/2023 165    • Hepatitis C Ab 07/20/2023 Non-reactive    • HIV-1 p24 Antigen 07/20/2023 Non-Reactive    • HIV-1 Antibody 07/20/2023 Non-Reactive    • HIV-2 Antibody 07/20/2023 Non-Reactive    • HIV Ag-Ab 5th Gen 07/20/2023 Non-Reactive    • TSH 3RD GENERATON 07/20/2023 1.965    • Hemoglobin A1C 07/20/2023 5.6    • EAG 07/20/2023 114      I have personally reviewed all pertinent laboratory/tests results. Suicide/Homicide Risk Assessment:    Risk of Harm to Self:  Protective Factors: no current suicidal ideation, access to mental health treatment, compliant with medications, compliant with mental health treatment, having a desire to be alive, having a sense of purpose or meaning in life  Based on today's assessment, Marlena presents the following risk of harm to self: minimal    Risk of Harm to Others:  Based on today's assessment, Marlena presents the following risk of harm to others: none    The following interventions are recommended: referral for psychotherapy    Medications Risks/Benefits:      Risks, Benefits And Possible Side Effects Of Medications:    Discussed risks and benefits of treatment with patient including risk of suicidality, serotonin syndrome, increased QTc interval and SIADH related to treatment with antidepressants; Risk of induction of manic symptoms in certain patient populations and risk of parkinsonian symptoms, metabolic syndrome, tardive dyskinesia and neuroleptic malignant syndrome related to treatment with antipsychotic medications     Controlled Medication Discussion:     Not applicable    Treatment Plan:    Due for update/Updated:   yes  Last treatment plan done 6/20/23 . Treatment Plan due on 12/20/23. TRE Fournier 09/14/23    This note was shared with patient.       Visit Time    Visit Start Time: 230  Visit Stop Time: 246  Total Visit Duration: 16 minutes

## 2023-09-19 ENCOUNTER — TELEPHONE (OUTPATIENT)
Age: 23
End: 2023-09-19

## 2023-09-19 NOTE — TELEPHONE ENCOUNTER
Called patient and left message for patient to drop off physical form for provider to review. She may not need an appointment for the form to be completed.

## 2023-09-22 ENCOUNTER — CLINICAL SUPPORT (OUTPATIENT)
Age: 23
End: 2023-09-22
Payer: COMMERCIAL

## 2023-09-22 DIAGNOSIS — Z23 NEED FOR VIRAL IMMUNIZATION: Primary | ICD-10-CM

## 2023-09-22 PROCEDURE — 86580 TB INTRADERMAL TEST: CPT

## 2023-09-22 NOTE — PROGRESS NOTES
Patient in office for PPD Placement. Placed in L arm. Patient instructed to return in 48-72 hours. PPD Placement note  Scarlett Henderson, 24 y.o. female is here today for placement of PPD test  Reason for PPD test: Employment  Pt taken PPD test before: yes  Verified in allergy area and with patient that they are not allergic to the products PPD is made of (Phenol or Tween). Yes  Is patient taking any oral or IV steroid medication now or have they taken it in the last month? no  Has the patient ever received the BCG vaccine?: no  Has the patient been in recent contact with anyone known or suspected of having active TB disease?: no       Date of exposure (if applicable):       Name of person they were exposed to (if applicable):  Patient's Country of origin?:  O: Alert and oriented in NAD. P:  PPD placed on 9/22/2023. Patient advised to return for reading within 48-72 hours.

## 2023-09-25 ENCOUNTER — CLINICAL SUPPORT (OUTPATIENT)
Age: 23
End: 2023-09-25

## 2023-09-25 DIAGNOSIS — Z11.1 ENCOUNTER FOR READING OF TUBERCULIN SKIN TEST: Primary | ICD-10-CM

## 2023-09-25 LAB
INDURATION: 0 MM
TB SKIN TEST: NEGATIVE

## 2023-10-04 ENCOUNTER — HOSPITAL ENCOUNTER (EMERGENCY)
Facility: HOSPITAL | Age: 23
End: 2023-10-04
Attending: EMERGENCY MEDICINE
Payer: COMMERCIAL

## 2023-10-04 ENCOUNTER — OFFICE VISIT (OUTPATIENT)
Dept: PSYCHIATRY | Facility: CLINIC | Age: 23
End: 2023-10-04
Payer: COMMERCIAL

## 2023-10-04 ENCOUNTER — HOSPITAL ENCOUNTER (INPATIENT)
Facility: HOSPITAL | Age: 23
LOS: 6 days | Discharge: HOME | DRG: 885 | End: 2023-10-10
Attending: HOSPITALIST | Admitting: FAMILY MEDICINE
Payer: COMMERCIAL

## 2023-10-04 VITALS
DIASTOLIC BLOOD PRESSURE: 90 MMHG | OXYGEN SATURATION: 95 % | BODY MASS INDEX: 40.97 KG/M2 | RESPIRATION RATE: 18 BRPM | SYSTOLIC BLOOD PRESSURE: 140 MMHG | HEIGHT: 64 IN | WEIGHT: 240 LBS | TEMPERATURE: 99 F | HEART RATE: 94 BPM

## 2023-10-04 DIAGNOSIS — F39 MOOD DISORDER (HCC): Primary | ICD-10-CM

## 2023-10-04 DIAGNOSIS — J45.40 MODERATE PERSISTENT ASTHMA WITHOUT COMPLICATION: Primary | ICD-10-CM

## 2023-10-04 DIAGNOSIS — F41.9 ANXIETY: ICD-10-CM

## 2023-10-04 DIAGNOSIS — F32.A DEPRESSION: Primary | ICD-10-CM

## 2023-10-04 DIAGNOSIS — B37.89 CANDIDIASIS OF BREAST: ICD-10-CM

## 2023-10-04 DIAGNOSIS — F32.A DEPRESSION: ICD-10-CM

## 2023-10-04 DIAGNOSIS — F33.2 SEVERE EPISODE OF RECURRENT MAJOR DEPRESSIVE DISORDER, WITHOUT PSYCHOTIC FEATURES (HCC): ICD-10-CM

## 2023-10-04 LAB
AMPHETAMINES SERPL QL SCN: NEGATIVE
BACTERIA UR QL AUTO: NORMAL /HPF
BARBITURATES UR QL: NEGATIVE
BENZODIAZ UR QL: NEGATIVE
BILIRUB UR QL STRIP: NEGATIVE
CLARITY UR: CLEAR
COCAINE UR QL: NEGATIVE
COLOR UR: YELLOW
ETHANOL EXG-MCNC: 0 MG/DL
ETHANOL EXG-MCNC: 0 MG/DL
EXT PREGNANCY TEST URINE: NEGATIVE
EXT. CONTROL: NORMAL
GLUCOSE UR STRIP-MCNC: NEGATIVE MG/DL
HGB UR QL STRIP.AUTO: NEGATIVE
KETONES UR STRIP-MCNC: NEGATIVE MG/DL
LEUKOCYTE ESTERASE UR QL STRIP: ABNORMAL
NITRITE UR QL STRIP: NEGATIVE
NON-SQ EPI CELLS URNS QL MICRO: NORMAL /HPF
OPIATES UR QL SCN: NEGATIVE
OXYCODONE+OXYMORPHONE UR QL SCN: NEGATIVE
PCP UR QL: NEGATIVE
PH UR STRIP.AUTO: 6 [PH]
PROT UR STRIP-MCNC: NEGATIVE MG/DL
RBC #/AREA URNS AUTO: NORMAL /HPF
SP GR UR STRIP.AUTO: 1.02
THC UR QL: POSITIVE
UROBILINOGEN UR QL STRIP.AUTO: 0.2 E.U./DL
WBC #/AREA URNS AUTO: NORMAL /HPF

## 2023-10-04 PROCEDURE — 80307 DRUG TEST PRSMV CHEM ANLYZR: CPT

## 2023-10-04 PROCEDURE — 81025 URINE PREGNANCY TEST: CPT

## 2023-10-04 PROCEDURE — 99285 EMERGENCY DEPT VISIT HI MDM: CPT | Performed by: EMERGENCY MEDICINE

## 2023-10-04 PROCEDURE — 82075 ASSAY OF BREATH ETHANOL: CPT

## 2023-10-04 PROCEDURE — 82075 ASSAY OF BREATH ETHANOL: CPT | Performed by: EMERGENCY MEDICINE

## 2023-10-04 PROCEDURE — 99214 OFFICE O/P EST MOD 30 MIN: CPT

## 2023-10-04 PROCEDURE — 93005 ELECTROCARDIOGRAM TRACING: CPT

## 2023-10-04 PROCEDURE — 81001 URINALYSIS AUTO W/SCOPE: CPT | Performed by: EMERGENCY MEDICINE

## 2023-10-04 PROCEDURE — GZHZZZZ GROUP PSYCHOTHERAPY: ICD-10-PCS | Performed by: HOSPITALIST

## 2023-10-04 PROCEDURE — 99284 EMERGENCY DEPT VISIT MOD MDM: CPT

## 2023-10-04 PROCEDURE — 81003 URINALYSIS AUTO W/O SCOPE: CPT | Performed by: EMERGENCY MEDICINE

## 2023-10-04 PROCEDURE — GZ59ZZZ INDIVIDUAL PSYCHOTHERAPY, PSYCHOPHYSIOLOGICAL: ICD-10-PCS | Performed by: HOSPITALIST

## 2023-10-04 RX ORDER — BENZTROPINE MESYLATE 1 MG/ML
1 INJECTION INTRAMUSCULAR; INTRAVENOUS
Status: CANCELLED | OUTPATIENT
Start: 2023-10-04

## 2023-10-04 RX ORDER — HYDROXYZINE 50 MG/1
50 TABLET, FILM COATED ORAL
Status: DISCONTINUED | OUTPATIENT
Start: 2023-10-04 | End: 2023-10-10 | Stop reason: HOSPADM

## 2023-10-04 RX ORDER — OLANZAPINE 10 MG/1
5 INJECTION, POWDER, LYOPHILIZED, FOR SOLUTION INTRAMUSCULAR
Status: CANCELLED | OUTPATIENT
Start: 2023-10-04

## 2023-10-04 RX ORDER — OLANZAPINE 10 MG/2ML
5 INJECTION, POWDER, FOR SOLUTION INTRAMUSCULAR
Status: DISCONTINUED | OUTPATIENT
Start: 2023-10-04 | End: 2023-10-10 | Stop reason: HOSPADM

## 2023-10-04 RX ORDER — ACETAMINOPHEN 325 MG/1
650 TABLET ORAL EVERY 4 HOURS PRN
Status: CANCELLED | OUTPATIENT
Start: 2023-10-04

## 2023-10-04 RX ORDER — POLYETHYLENE GLYCOL 3350 17 G/17G
17 POWDER, FOR SOLUTION ORAL DAILY PRN
Status: DISCONTINUED | OUTPATIENT
Start: 2023-10-04 | End: 2023-10-05

## 2023-10-04 RX ORDER — TRAZODONE HYDROCHLORIDE 50 MG/1
100 TABLET ORAL
Status: DISCONTINUED | OUTPATIENT
Start: 2023-10-04 | End: 2023-10-04 | Stop reason: HOSPADM

## 2023-10-04 RX ORDER — HYDROXYZINE 50 MG/1
100 TABLET, FILM COATED ORAL
Status: DISCONTINUED | OUTPATIENT
Start: 2023-10-04 | End: 2023-10-10 | Stop reason: HOSPADM

## 2023-10-04 RX ORDER — TRAZODONE HYDROCHLORIDE 50 MG/1
50 TABLET ORAL
Status: DISCONTINUED | OUTPATIENT
Start: 2023-10-04 | End: 2023-10-07

## 2023-10-04 RX ORDER — ACETAMINOPHEN 325 MG/1
975 TABLET ORAL EVERY 6 HOURS PRN
Status: CANCELLED | OUTPATIENT
Start: 2023-10-04

## 2023-10-04 RX ORDER — DIPHENHYDRAMINE HYDROCHLORIDE 50 MG/ML
50 INJECTION INTRAMUSCULAR; INTRAVENOUS EVERY 6 HOURS PRN
Status: DISCONTINUED | OUTPATIENT
Start: 2023-10-04 | End: 2023-10-10 | Stop reason: HOSPADM

## 2023-10-04 RX ORDER — MAGNESIUM HYDROXIDE/ALUMINUM HYDROXICE/SIMETHICONE 120; 1200; 1200 MG/30ML; MG/30ML; MG/30ML
30 SUSPENSION ORAL EVERY 4 HOURS PRN
Status: DISCONTINUED | OUTPATIENT
Start: 2023-10-04 | End: 2023-10-10 | Stop reason: HOSPADM

## 2023-10-04 RX ORDER — PROPRANOLOL HYDROCHLORIDE 10 MG/1
10 TABLET ORAL EVERY 8 HOURS PRN
Status: DISCONTINUED | OUTPATIENT
Start: 2023-10-04 | End: 2023-10-10 | Stop reason: HOSPADM

## 2023-10-04 RX ORDER — TRAZODONE HYDROCHLORIDE 50 MG/1
50 TABLET ORAL
Status: CANCELLED | OUTPATIENT
Start: 2023-10-04

## 2023-10-04 RX ORDER — DIPHENHYDRAMINE HYDROCHLORIDE 50 MG/ML
50 INJECTION INTRAMUSCULAR; INTRAVENOUS EVERY 6 HOURS PRN
Status: CANCELLED | OUTPATIENT
Start: 2023-10-04

## 2023-10-04 RX ORDER — HYDROXYZINE 50 MG/1
50 TABLET, FILM COATED ORAL
Status: CANCELLED | OUTPATIENT
Start: 2023-10-04

## 2023-10-04 RX ORDER — ACETAMINOPHEN 325 MG/1
650 TABLET ORAL EVERY 4 HOURS PRN
Status: DISCONTINUED | OUTPATIENT
Start: 2023-10-04 | End: 2023-10-10 | Stop reason: HOSPADM

## 2023-10-04 RX ORDER — OLANZAPINE 10 MG/2ML
10 INJECTION, POWDER, FOR SOLUTION INTRAMUSCULAR
Status: DISCONTINUED | OUTPATIENT
Start: 2023-10-04 | End: 2023-10-10 | Stop reason: HOSPADM

## 2023-10-04 RX ORDER — BISACODYL 10 MG
10 SUPPOSITORY, RECTAL RECTAL DAILY PRN
Status: CANCELLED | OUTPATIENT
Start: 2023-10-04

## 2023-10-04 RX ORDER — HYDROXYZINE 50 MG/1
25 TABLET, FILM COATED ORAL
Status: CANCELLED | OUTPATIENT
Start: 2023-10-04

## 2023-10-04 RX ORDER — HYDROXYZINE 50 MG/1
100 TABLET, FILM COATED ORAL
Status: CANCELLED | OUTPATIENT
Start: 2023-10-04

## 2023-10-04 RX ORDER — MAGNESIUM HYDROXIDE/ALUMINUM HYDROXICE/SIMETHICONE 120; 1200; 1200 MG/30ML; MG/30ML; MG/30ML
30 SUSPENSION ORAL EVERY 4 HOURS PRN
Status: CANCELLED | OUTPATIENT
Start: 2023-10-04

## 2023-10-04 RX ORDER — AMOXICILLIN 250 MG
1 CAPSULE ORAL DAILY PRN
Status: CANCELLED | OUTPATIENT
Start: 2023-10-04

## 2023-10-04 RX ORDER — OLANZAPINE 2.5 MG/1
5 TABLET ORAL
Status: CANCELLED | OUTPATIENT
Start: 2023-10-04

## 2023-10-04 RX ORDER — POLYETHYLENE GLYCOL 3350 17 G/17G
17 POWDER, FOR SOLUTION ORAL DAILY PRN
Status: CANCELLED | OUTPATIENT
Start: 2023-10-04

## 2023-10-04 RX ORDER — BISACODYL 10 MG
10 SUPPOSITORY, RECTAL RECTAL DAILY PRN
Status: DISCONTINUED | OUTPATIENT
Start: 2023-10-04 | End: 2023-10-05

## 2023-10-04 RX ORDER — ACETAMINOPHEN 325 MG/1
650 TABLET ORAL EVERY 6 HOURS PRN
Status: CANCELLED | OUTPATIENT
Start: 2023-10-04

## 2023-10-04 RX ORDER — OLANZAPINE 2.5 MG/1
2.5 TABLET ORAL
Status: CANCELLED | OUTPATIENT
Start: 2023-10-04

## 2023-10-04 RX ORDER — OLANZAPINE 5 MG/1
5 TABLET ORAL
Status: DISCONTINUED | OUTPATIENT
Start: 2023-10-04 | End: 2023-10-10 | Stop reason: HOSPADM

## 2023-10-04 RX ORDER — OLANZAPINE 10 MG/1
10 TABLET ORAL
Status: DISCONTINUED | OUTPATIENT
Start: 2023-10-04 | End: 2023-10-10 | Stop reason: HOSPADM

## 2023-10-04 RX ORDER — HYDROXYZINE 50 MG/1
50 TABLET, FILM COATED ORAL 2 TIMES DAILY PRN
Status: DISCONTINUED | OUTPATIENT
Start: 2023-10-04 | End: 2023-10-04 | Stop reason: HOSPADM

## 2023-10-04 RX ORDER — ACETAMINOPHEN 325 MG/1
650 TABLET ORAL EVERY 6 HOURS PRN
Status: DISCONTINUED | OUTPATIENT
Start: 2023-10-04 | End: 2023-10-10 | Stop reason: HOSPADM

## 2023-10-04 RX ORDER — BENZTROPINE MESYLATE 1 MG/ML
1 INJECTION INTRAMUSCULAR; INTRAVENOUS
Status: DISCONTINUED | OUTPATIENT
Start: 2023-10-04 | End: 2023-10-10 | Stop reason: HOSPADM

## 2023-10-04 RX ORDER — HYDROXYZINE HYDROCHLORIDE 25 MG/1
25 TABLET, FILM COATED ORAL
Status: DISCONTINUED | OUTPATIENT
Start: 2023-10-04 | End: 2023-10-10 | Stop reason: HOSPADM

## 2023-10-04 RX ORDER — LORAZEPAM 2 MG/ML
2 INJECTION INTRAMUSCULAR EVERY 6 HOURS PRN
Status: DISCONTINUED | OUTPATIENT
Start: 2023-10-04 | End: 2023-10-10 | Stop reason: HOSPADM

## 2023-10-04 RX ORDER — OLANZAPINE 2.5 MG/1
2.5 TABLET ORAL
Status: DISCONTINUED | OUTPATIENT
Start: 2023-10-04 | End: 2023-10-10 | Stop reason: HOSPADM

## 2023-10-04 RX ORDER — AMOXICILLIN 250 MG
1 CAPSULE ORAL DAILY PRN
Status: DISCONTINUED | OUTPATIENT
Start: 2023-10-04 | End: 2023-10-10 | Stop reason: HOSPADM

## 2023-10-04 RX ORDER — OLANZAPINE 2.5 MG/1
10 TABLET ORAL
Status: CANCELLED | OUTPATIENT
Start: 2023-10-04

## 2023-10-04 RX ORDER — LORAZEPAM 2 MG/ML
2 INJECTION INTRAMUSCULAR EVERY 6 HOURS PRN
Status: CANCELLED | OUTPATIENT
Start: 2023-10-04

## 2023-10-04 RX ORDER — PROPRANOLOL HYDROCHLORIDE 20 MG/1
10 TABLET ORAL EVERY 8 HOURS PRN
Status: CANCELLED | OUTPATIENT
Start: 2023-10-04

## 2023-10-04 RX ORDER — BENZTROPINE MESYLATE 1 MG/1
1 TABLET ORAL
Status: DISCONTINUED | OUTPATIENT
Start: 2023-10-04 | End: 2023-10-10 | Stop reason: HOSPADM

## 2023-10-04 RX ORDER — OLANZAPINE 10 MG/1
10 INJECTION, POWDER, LYOPHILIZED, FOR SOLUTION INTRAMUSCULAR
Status: CANCELLED | OUTPATIENT
Start: 2023-10-04

## 2023-10-04 RX ORDER — ACETAMINOPHEN 325 MG/1
975 TABLET ORAL EVERY 6 HOURS PRN
Status: DISCONTINUED | OUTPATIENT
Start: 2023-10-04 | End: 2023-10-10 | Stop reason: HOSPADM

## 2023-10-04 RX ORDER — BENZTROPINE MESYLATE 0.5 MG/1
1 TABLET ORAL
Status: CANCELLED | OUTPATIENT
Start: 2023-10-04

## 2023-10-04 RX ADMIN — TRAZODONE HYDROCHLORIDE 50 MG: 50 TABLET ORAL at 21:01

## 2023-10-04 RX ADMIN — HYDROXYZINE HYDROCHLORIDE 50 MG: 50 TABLET, FILM COATED ORAL at 16:13

## 2023-10-04 NOTE — ED NOTES
Pt anxious about going to the unit. Pt ws offered prn atarax which she took. Pt denies current si/hi at this time. Pt is less anxious after conversing with this nurse. Pt currently resting bed.       Ladan Mathias RN  10/04/23 1444

## 2023-10-04 NOTE — ED PROVIDER NOTES
History  Chief Complaint   Patient presents with   • Depression     Increased depression and crying. No plans to harm self. States symptoms worsened since starting a new job. 23 yo female presenting to the ed for worsening anxiety and depression states that she was was admitted to [facility] clinic when she was a sophomore in college but that it was not a great place however that seeing the psychiatrist there and getting med changes did seem to help her when she got out. Denies any suicidal or homicidal ideation. States that over the last 1-1/2 to 2 months she started Katie Copier and her Zoloft medication was increased from 100 to 150 mg. Does not state that this has been helping in any way. Denies any hallucinations, alcohol or drug use beyond recreational marijuana and social alcohol usage. Denies any physical pain at this time. Patient's mother at bedside very supportive. Patient states that it seems that her symptoms have worsenend since starting a new job. Prior to Admission Medications   Prescriptions Last Dose Informant Patient Reported? Taking?    CAMRESE 0.15-0.03 &0.01 MG TABS   Yes No   Patient not taking: Reported on 9/13/2023   albuterol (Proventil HFA) 90 mcg/act inhaler   No No   Sig: Inhale 2 puffs every 6 (six) hours as needed for wheezing   benzonatate (TESSALON PERLES) 100 mg capsule   No No   Sig: Take 1 capsule (100 mg total) by mouth 3 (three) times a day as needed for cough   Patient not taking: Reported on 9/14/2023   cariprazine (Vraylar) 1.5 MG capsule   No No   Sig: Take 1 capsule (1.5 mg total) by mouth daily   clotrimazole (LOTRIMIN) 1 % cream   No No   Sig: Apply topically 2 (two) times a day   fluticasone (FLOVENT HFA) 110 MCG/ACT inhaler   No No   Sig: Inhale 2 puffs 2 (two) times a day Rinse mouth after use.   hydrOXYzine HCL (ATARAX) 50 mg tablet   No No   Sig: Take 1 tablet (50 mg total) by mouth 2 (two) times a day as needed for anxiety   sertraline (ZOLOFT) 100 mg tablet   No No   Sig: Take 1.5 tablets (150 mg total) by mouth daily   traZODone (DESYREL) 100 mg tablet   No No   Sig: Take 1 tablet (100 mg total) by mouth daily at bedtime as needed for sleep      Facility-Administered Medications: None       Past Medical History:   Diagnosis Date   • Anxiety    • Asthma    • Depression    • Environmental allergies     Last assessed: 1/18/17   • GERD (gastroesophageal reflux disease)    • Hypoglycemia    • Hypothyroidism        Past Surgical History:   Procedure Laterality Date   • COLONOSCOPY  2016 & 2018    Fiberoptic   • EGD  2016   • EGD  06/2016   • MYRINGOTOMY     • TONSILLECTOMY     • TOOTH EXTRACTION         Family History   Problem Relation Age of Onset   • Anxiety disorder Mother    • Diabetes Mother         due to underlying condition with both eyes affected by proliferative retinopathy and traction retinal detatchments involving maculae, without long term use of insulin   • Depression Father    • Anxiety disorder Father    • Alcohol abuse Father    • Sleep apnea Father    • Suicide Attempts Maternal Grandfather    • Suicide Attempts Maternal Grandmother    • Crohn's disease Paternal Grandfather      I have reviewed and agree with the history as documented. E-Cigarette/Vaping   • E-Cigarette Use Former User      E-Cigarette/Vaping Substances   • Nicotine Yes    • THC No    • CBD No    • Flavoring No    • Other No    • Unknown No      Social History     Tobacco Use   • Smoking status: Every Day     Types: Cigarettes   • Smokeless tobacco: Never   • Tobacco comments:     1/2 pack per month   Vaping Use   • Vaping Use: Former   • Substances: Nicotine   Substance Use Topics   • Alcohol use:  Yes     Alcohol/week: 10.0 standard drinks of alcohol     Types: 10 Glasses of wine per week     Comment: every 2-3 days, more on weekends   • Drug use: Yes     Frequency: 4.0 times per week     Types: Marijuana     Comment: recreational        Review of Systems Psychiatric/Behavioral: Positive for dysphoric mood. The patient is nervous/anxious. All other systems reviewed and are negative. Physical Exam  Physical Exam  General: VS reviewed  Appears in NAD  awake, alert. Well-nourished, well-developed. Appears stated age. Speaking normally in full sentences. Head: Normocephalic, atraumatic  Eyes: EOM-I. No diplopia. No hyphema. No subconjunctival hemorrhages. Symmetrical lids. ENT: Atraumatic external nose and ears. MMM  No malocclusion. No stridor. Normal phonation. No drooling. Normal swallowing. Neck: No JVD. CV: No pallor noted  Lungs:   No tachypnea  No respiratory distress  MSK:   FROM spontaneously  Skin: Dry, intact   Neuro: Awake, alert, GCS15, CN II-XII grossly intact. Motor grossly intact.   Psychiatric/Behavioral: depressed and tearful on exam, not clearly hallucinating, denying SI or HI   Exam: deferred    Vital Signs  ED Triage Vitals   Temperature Pulse Respirations Blood Pressure SpO2   10/04/23 1230 10/04/23 1227 10/04/23 1227 10/04/23 1227 10/04/23 1227   99 °F (37.2 °C) 94 18 140/90 95 %      Temp Source Heart Rate Source Patient Position - Orthostatic VS BP Location FiO2 (%)   10/04/23 1230 10/04/23 1227 10/04/23 1227 10/04/23 1227 --   Tympanic Monitor Sitting Right arm       Pain Score       10/04/23 1227       No Pain           Vitals:    10/04/23 1227   BP: 140/90   Pulse: 94   Patient Position - Orthostatic VS: Sitting         Visual Acuity      ED Medications  Medications - No data to display    Diagnostic Studies  Results Reviewed     Procedure Component Value Units Date/Time    Rapid drug screen, urine [[id number]]  (Abnormal) Collected: 10/04/23 1306    Lab Status: Final result Specimen: Urine, Clean Catch Updated: 10/04/23 1332     Amph/Meth UR Negative     Barbiturate Ur Negative     Benzodiazepine Urine Negative     Cocaine Urine Negative     Methadone Urine --     Opiate Urine Negative     PCP Ur Negative THC Urine Positive     Oxycodone Urine Negative    Narrative:      Presumptive report. If requested, specimen will be sent to reference lab for confirmation. FOR MEDICAL PURPOSES ONLY. IF CONFIRMATION NEEDED PLEASE CONTACT THE LAB WITHIN 5 DAYS.     Drug Screen Cutoff Levels:  AMPHETAMINE/METHAMPHETAMINES  1000 ng/mL  BARBITURATES     200 ng/mL  BENZODIAZEPINES     200 ng/mL  COCAINE      300 ng/mL  METHADONE      300 ng/mL  OPIATES      300 ng/mL  PHENCYCLIDINE     25 ng/mL  THC       50 ng/mL  OXYCODONE      100 ng/mL    Urine Microscopic [[id number]]  (Normal) Collected: 10/04/23 1314    Lab Status: Final result Specimen: Urine, Clean Catch Updated: 10/04/23 1331     RBC, UA 1-2 /hpf      WBC, UA None Seen /hpf      Epithelial Cells Occasional /hpf      Bacteria, UA None Seen /hpf     UA w Reflex to Microscopic w Reflex to Culture [[id number]]  (Abnormal) Collected: 10/04/23 1314    Lab Status: Final result Specimen: Urine, Clean Catch Updated: 10/04/23 1321     Color, UA Yellow     Clarity, UA Clear     Specific Gravity, UA 1.020     pH, UA 6.0     Leukocytes, UA 1+     Nitrite, UA Negative     Protein, UA Negative mg/dl      Glucose, UA Negative mg/dl      Ketones, UA Negative mg/dl      Urobilinogen, UA 0.2 E.U./dl      Bilirubin, UA Negative     Occult Blood, UA Negative    POCT alcohol breath test [[id number]]  (Normal) Resulted: 10/04/23 1307    Lab Status: Final result Updated: 10/04/23 1307     EXTBreath Alcohol 0.000    POCT pregnancy, urine [[id number]]  (Normal) Resulted: 10/04/23 1247    Lab Status: Final result Specimen: Urine Updated: 10/04/23 1248     EXT Preg Test, Ur Negative     Control Valid    POCT alcohol breath test [[id number]]  (Normal) Resulted: 10/04/23 1241    Lab Status: Final result Updated: 10/04/23 1242     EXTBreath Alcohol 0.000                 No orders to display              Procedures  Procedures         ED Course                               SBIRT 22yo+    Flowsheet Row Most Recent Value   Initial Alcohol Screen: US AUDIT-C     1. How often do you have a drink containing alcohol? 0 Filed at: 10/04/2023 1230   2. How many drinks containing alcohol do you have on a typical day you are drinking? 0 Filed at: 10/04/2023 1230   3b. FEMALE Any Age, or MALE 65+: How often do you have 4 or more drinks on one occassion? 0 Filed at: 10/04/2023 1230   Audit-C Score 0 Filed at: 10/04/2023 1230   ARTURO: How many times in the past year have you. .. Used an illegal drug or used a prescription medication for non-medical reasons? Never Filed at: 10/04/2023 1230                    Medical Decision Making  Crisis and behavioral health evaluation. 201 signed. Amount and/or Complexity of Data Reviewed  Labs: ordered. Risk  Decision regarding hospitalization. Disposition  Final diagnoses:   Depression   Anxiety     Time reflects when diagnosis was documented in both MDM as applicable and the Disposition within this note     Time User Action Codes Description Comment    10/4/2023  1:16 PM Agata Philiper Add [F32. A] Depression     10/4/2023  1:16 PM Agata Elliott Add [F41.9] Anxiety       ED Disposition     ED Disposition   Transfer to [address] [place] [address]   --    Date/Time   Wed Oct 4, 2023  1:16 PM    Comment   Cristina Ying should be transferred out to [facility] and has been medically cleared. MD Documentation    Monica Tompkins Most Recent Value   Sending MD Agata Elliott DO      Follow-up Information    None         Patient's Medications   Discharge Prescriptions    No medications on file       No discharge procedures on file.     PDMP Review       Value Time User    PDMP Reviewed  Yes 6/7/2022 11:26 AM Son Raya MD          ED Provider  Electronically Signed by           Thu Mo DO  10/04/23 3173

## 2023-10-04 NOTE — ED NOTES
This writer prepared EMTALA and Medical Necessity, all appropriate signatures obtained. Copies were made for the record. Hospital packet is prepared for patient p/u @ 2363.

## 2023-10-04 NOTE — ED NOTES
Phone call placed to [facility] at [phone] regarding pre-cert authorization for inpatient. The office is closed at this time. Hours are Monday-Friday from 8am-5pm. A voicemail was left requesting a return call to the crisis office at [facility].

## 2023-10-04 NOTE — ED NOTES
Patient is accepted at [facility]. Patient is accepted by Dr. Stephanie Zavala per Holly/intake. Transportation is arranged with [employer]. Transportation is scheduled for 1915. Patient may go to the floor at [facility] or after. Nurse report is to be called to [phone] prior to patient transfer.

## 2023-10-04 NOTE — ED NOTES
Met with patient (mother at bedside) and completed the crisis intake assessment as well as the safety risk assessment. Patient arrived to the ER via private vehicle from outpatient providers office. Patient presents as depressed with flat affect. Patient eye contact was fair maintained throughout assessment and speech was within normal limits. Patient reports passive SI denies current plan. Patient states started a new job last week and since has been more depressed and anxious. Patient states increase in sleep " its all I want to do" A decrease in appetite (no weight loss noted) as well as lack of concentration and motivation. Patient reports compliance with medications and aftercare. Patient in agreement to sign a voluntary admission. Voluntary rights and 72 hour notice explained to patient. Patient verbalized and understanding. Original placed on patients chart. Bed search and insurance in progress.

## 2023-10-04 NOTE — PSYCH
Regular Visit    Problem List Items Addressed This Visit    None  Visit Diagnoses     Mood disorder [facility])    -  Primary    Anxiety                 Encounter provider TRE Guerrero    Provider located at    [id number] [facility] [id number] [phone]    Recent Visits  No visits were found meeting these conditions. Showing recent visits within past 7 days and meeting all other requirements  Today's Visits  Date Type Provider Dept   10/04/23 Office Visit TRE Guerrero  [facility]   Showing today's visits and meeting all other requirements  Future Appointments  No visits were found meeting these conditions. Showing future appointments within next 150 days and meeting all other requirements       HPI     Current Outpatient Medications   Medication Sig Dispense Refill   • albuterol (Proventil HFA) 90 mcg/act inhaler Inhale 2 puffs every 6 (six) hours as needed for wheezing 6.7 g 5   • cariprazine (Vraylar) 1.5 MG capsule Take 1 capsule (1.5 mg total) by mouth daily 30 capsule 2   • clotrimazole (LOTRIMIN) 1 % cream Apply topically 2 (two) times a day 30 g 3   • fluticasone (FLOVENT HFA) 110 MCG/ACT inhaler Inhale 2 puffs 2 (two) times a day Rinse mouth after use.  12 g 3   • hydrOXYzine HCL (ATARAX) 50 mg tablet Take 1 tablet (50 mg total) by mouth 2 (two) times a day as needed for anxiety 30 tablet 2   • sertraline (ZOLOFT) 100 mg tablet Take 1.5 tablets (150 mg total) by mouth daily 90 tablet 0   • traZODone (DESYREL) 100 mg tablet Take 1 tablet (100 mg total) by mouth daily at bedtime as needed for sleep 30 tablet 2   • benzonatate (TESSALON PERLES) 100 mg capsule Take 1 capsule (100 mg total) by mouth 3 (three) times a day as needed for cough (Patient not taking: Reported on 9/14/2023) 20 capsule 0   • CAMRESE 0.15-0.03 &0.01 MG TABS  (Patient not taking: Reported on 9/13/2023)  0     No current facility-administered medications for this visit. Review of Systems        MEDICATION MANAGEMENT NOTE        [facility]    Name and Date of Birth:  Scarlett Henderson 24 y.o. 2000 MRN: [id number]    Date of Visit: October 4, 2023    No Known Allergies  SUBJECTIVE:    García Acuna is seen today for a follow up for mood disorder and anxiety. She continues to experience significant symptoms since the last visit. Presented to walk-in clinic today for severe symptoms of depression, anxiety. Started a new job 1 week ago, difficult coworker, difficult work environment, patient tried to bring up her mental struggle at work and employer provided no support. New job precipitated severe stress and anxiety, identifies her romantic partner disconnecting from her, her job belittling her and making her feel inadequate. Presents as tearful, dysphoric. Describes 9/10 depression and anxiety with symptoms of hopelessness, helplessness, crying spells, feeling inadequate, poor self-care, eating 1-2 meals daily, hypersomnia about 12 hours daily, no desire or motivation to get out of bed, recent increase in alcohol use where she drank 5 glasses of wine on Saturday night, thoughts of attempting suicide. Patient is willing and motivated to seek treatment, agreeable for immediate ER evaluation and inpatient psychiatric hospitalization as recommended by this provider. Patient's mother present, agreeable with plan, and will drive patient to the local ER immediately for admission. She does report compliance with current psychiatric medications and denies all side effects. She will follow up with this provider after inpatient hospitalization. This office has contacted charge nursejamari regarding patient's anticipated ER visit. According to chart-pt has arrived at [facility] at 1219. She denies any side effects from medications.     PLAN:    -Referred for immediate inpatient hospitalization      -Continue  Vraylar 1.5mg for augmentation of SSRI, mood swings, irritability. Failed Abilify and seroquel in the past-ineffectiveness.   -Titrate Zoloft to 150 mg daily for poor energy and motivation s/s  PARQ completed including serotonin syndrome, SIADH, worsening depression, suicidality, induction of sabrina, GI upset, headaches, activation, sexual side effects, sedation, potential drug interactions, and others.  -Continue prn Trazodone 100mg daily for insomnia, uses very rarely   -Continue prn atarax 50mg daily for breakthrough anxiety        Aware of 24 hour and weekend coverage for urgent situations accessed by calling [facility] main practice number  Referral for individual psychotherapy    Diagnoses and all orders for this visit:    Mood disorder ([facility])    Anxiety        Current Outpatient Medications on File Prior to Visit   Medication Sig Dispense Refill   • albuterol (Proventil HFA) 90 mcg/act inhaler Inhale 2 puffs every 6 (six) hours as needed for wheezing 6.7 g 5   • cariprazine (Vraylar) 1.5 MG capsule Take 1 capsule (1.5 mg total) by mouth daily 30 capsule 2   • clotrimazole (LOTRIMIN) 1 % cream Apply topically 2 (two) times a day 30 g 3   • fluticasone (FLOVENT HFA) 110 MCG/ACT inhaler Inhale 2 puffs 2 (two) times a day Rinse mouth after use.  12 g 3   • hydrOXYzine HCL (ATARAX) 50 mg tablet Take 1 tablet (50 mg total) by mouth 2 (two) times a day as needed for anxiety 30 tablet 2   • sertraline (ZOLOFT) 100 mg tablet Take 1.5 tablets (150 mg total) by mouth daily 90 tablet 0   • traZODone (DESYREL) 100 mg tablet Take 1 tablet (100 mg total) by mouth daily at bedtime as needed for sleep 30 tablet 2   • benzonatate (TESSALON PERLES) 100 mg capsule Take 1 capsule (100 mg total) by mouth 3 (three) times a day as needed for cough (Patient not taking: Reported on 9/14/2023) 20 capsule 0   • CAMRESE 0.15-0.03 &0.01 MG TABS  (Patient not taking: Reported on 9/13/2023)  0     No current facility-administered medications on file prior to visit. HPI ROS Appetite Changes and Sleep:     She reports hypersomnia, decreased appetite, low energy.  Denies homicidal ideation, denies suicidal ideation    Review Of Systems:      HPI ROS:               Medication Side Effects:  denies    Depression (10 worst): 9/10    Anxiety (10 worst): 9/10    Safety concerns (SI, HI, etc): Denies si and hi    Sleep: 10-12 hrs    Energy: low    Appetite: Decreased 1-2 meals    Weight Change: denies        Mental Status Evaluation:    Appearance disheveled   Behavior restless and fidgety   Mood depressed  Depression Scale - 9 of 10 (0 = No depression)  Anxiety Scale - 9 of 10 (0 = No anxiety)   Speech Normal rate and volume   Affect constricted   Thought Processes Goal directed and coherent   Thought Content Does not verbalize delusional material   Associations Tightly connected   Perceptual Disturbances Denies hallucinations and does not appear to be responding to internal stimuli   Risk Potential Suicidal/Homicidal Ideation - Passive suicidal ideation without intent or plan  Risk of Violence - No evidence of risk for violence found on assessment  Risk of Self Mutilation - No evidence of risk for self mutilation found on assessment   Orientation oriented to person, place, time/date and situation   Memory recent and remote memory grossly intact   Consciousness alert and awake   Attention/Concentration attention span and concentration are age appropriate   Insight intact   Judgement intact   Muscle Strength and Gait normal muscle strength and normal muscle tone, normal gait/station and normal balance   Motor Activity no abnormal movements   Language no difficulty naming common objects, no difficulty repeating a phrase, no difficulty writing a sentence   Fund of Knowledge adequate knowledge of current events  adequate fund of knowledge regarding past history  adequate fund of knowledge regarding vocabulary      Traumatic History Update:     New Onset of Abuse Since Last Encounter:   no  Traumatic Events Since Last Encounter:   no    Past Medical History:    Past Medical History:   Diagnosis Date   • Anxiety    • Asthma    • Depression    • Environmental allergies     Last assessed: 1/18/17   • GERD (gastroesophageal reflux disease)    • Hypoglycemia    • Hypothyroidism         Past Surgical History:   Procedure Laterality Date   • COLONOSCOPY  2016 & 2018    Fiberoptic   • EGD  2016   • EGD  06/2016   • MYRINGOTOMY     • TONSILLECTOMY     • TOOTH EXTRACTION       No Known Allergies  Substance Abuse History:    Social History     Substance and Sexual Activity   Alcohol Use Yes   • Alcohol/week: 10.0 standard drinks of alcohol   • Types: 10 Glasses of wine per week    Comment: every 2-3 days, more on weekends     Social History     Substance and Sexual Activity   Drug Use Yes   • Frequency: 4.0 times per week   • Types: Marijuana    Comment: recreational      Social History:    Social History     Socioeconomic History   • Marital status: Single     Spouse name: Not on file   • Number of children: 0   • Years of education: Not on file   • Highest education level: Bachelor's degree (e.g., BA, AB, BS)   Occupational History   • Occupation: UNEMPLOYED   Tobacco Use   • Smoking status: Every Day     Types: Cigarettes   • Smokeless tobacco: Never   • Tobacco comments:     1/2 pack per month   Vaping Use   • Vaping Use: Former   • Substances: Nicotine   Substance and Sexual Activity   • Alcohol use:  Yes     Alcohol/week: 10.0 standard drinks of alcohol     Types: 10 Glasses of wine per week     Comment: every 2-3 days, more on weekends   • Drug use: Yes     Frequency: 4.0 times per week     Types: Marijuana     Comment: recreational    • Sexual activity: Not on file   Other Topics Concern   • Not on file   Social History Narrative   • Not on file     Social Determinants of Health     Financial Resource Strain: Not on file   Food Insecurity: Not on file   Transportation Needs: Not on file   Physical Activity: Not on file   Stress: Not on file   Social Connections: Not on file   Intimate Partner Violence: Not on file   Housing Stability: Not on file     Family Psychiatric History:     Family History   Problem Relation Age of Onset   • Anxiety disorder Mother    • Diabetes Mother         due to underlying condition with both eyes affected by proliferative retinopathy and traction retinal detatchments involving maculae, without long term use of insulin   • Depression Father    • Anxiety disorder Father    • Alcohol abuse Father    • Sleep apnea Father    • Suicide Attempts Maternal Grandfather    • Suicide Attempts Maternal Grandmother    • Crohn's disease Paternal Grandfather      History Review: The following portions of the patient's history were reviewed and updated as appropriate: allergies, current medications, past family history, past medical history, past social history, past surgical history and problem list     OBJECTIVE:     Vital signs in last 24 hours: There were no vitals filed for this visit. Laboratory Results:   Recent Labs (last 2 months):   Clinical Support on 09/22/2023   Component Date Value   • TB Skin Test 09/25/2023 Negative    • Induration 09/25/2023 0    Office Visit on 08/24/2023   Component Date Value   • POCT SARS-CoV-2 Ag 08/24/2023 Negative    • VALID CONTROL 08/24/2023 Valid      I have personally reviewed all pertinent laboratory/tests results. Suicide/Homicide Risk Assessment:    Risk of Harm to Self:  Protective Factors: no current suicidal ideation, access to mental health treatment, compliant with medications, compliant with mental health treatment, medical compliance  Based on today's assessment, Marlena presents the following risk of harm to self: moderate Referred for immediate inpatient stay, mother driving pt to ER now.     Risk of Harm to Others:  Based on today's assessment, Marlena presents the following risk of harm to others: minimal    The following interventions are recommended: referral for psychotherapy, referral for inpatient admission    Medications Risks/Benefits:      Risks, Benefits And Possible Side Effects Of Medications:    Discussed risks and benefits of treatment with patient including risk of suicidality, serotonin syndrome, increased QTc interval and SIADH related to treatment with antidepressants; Risk of induction of manic symptoms in certain patient populations and risk of parkinsonian symptoms, metabolic syndrome, tardive dyskinesia and neuroleptic malignant syndrome related to treatment with antipsychotic medications     Controlled Medication Discussion:     Not applicable    Treatment Plan:    Due for update/Updated:   no  Last treatment plan done 6/20 . Treatment Plan due on 12/20/24.     TRE Colon 10/04/23    Visit Time    Visit Start Time: 5945  Visit Stop Time: 12  Total Visit Duration: 25 minutes

## 2023-10-05 ENCOUNTER — TELEPHONE (OUTPATIENT)
Dept: PSYCHIATRY | Facility: CLINIC | Age: 23
End: 2023-10-05

## 2023-10-05 LAB
25(OH)D3 SERPL-MCNC: 16.5 NG/ML (ref 30–100)
ALBUMIN SERPL BCP-MCNC: 4.1 G/DL (ref 3.5–5)
ALP SERPL-CCNC: 70 U/L (ref 34–104)
ALT SERPL W P-5'-P-CCNC: 32 U/L (ref 7–52)
ANION GAP SERPL CALCULATED.3IONS-SCNC: 8 MMOL/L
AST SERPL W P-5'-P-CCNC: 24 U/L (ref 13–39)
ATRIAL RATE: 76 BPM
ATRIAL RATE: 84 BPM
BASOPHILS # BLD AUTO: 0.06 THOUSANDS/ÂΜL (ref 0–0.1)
BASOPHILS NFR BLD AUTO: 1 % (ref 0–1)
BILIRUB SERPL-MCNC: 0.48 MG/DL (ref 0.2–1)
BUN SERPL-MCNC: 9 MG/DL (ref 5–25)
CALCIUM SERPL-MCNC: 8.9 MG/DL (ref 8.4–10.2)
CHLORIDE SERPL-SCNC: 106 MMOL/L (ref 96–108)
CO2 SERPL-SCNC: 24 MMOL/L (ref 21–32)
CREAT SERPL-MCNC: 0.76 MG/DL (ref 0.6–1.3)
EOSINOPHIL # BLD AUTO: 0.42 THOUSAND/ÂΜL (ref 0–0.61)
EOSINOPHIL NFR BLD AUTO: 5 % (ref 0–6)
ERYTHROCYTE [DISTWIDTH] IN BLOOD BY AUTOMATED COUNT: 12.9 % (ref 11.6–15.1)
FOLATE SERPL-MCNC: 18.7 NG/ML
GFR SERPL CREATININE-BSD FRML MDRD: 110 ML/MIN/1.73SQ M
GLUCOSE P FAST SERPL-MCNC: 103 MG/DL (ref 65–99)
GLUCOSE SERPL-MCNC: 103 MG/DL (ref 65–140)
HCT VFR BLD AUTO: 41.4 % (ref 34.8–46.1)
HGB BLD-MCNC: 13.8 G/DL (ref 11.5–15.4)
IMM GRANULOCYTES # BLD AUTO: 0.01 THOUSAND/UL (ref 0–0.2)
IMM GRANULOCYTES NFR BLD AUTO: 0 % (ref 0–2)
LYMPHOCYTES # BLD AUTO: 2.8 THOUSANDS/ÂΜL (ref 0.6–4.47)
LYMPHOCYTES NFR BLD AUTO: 34 % (ref 14–44)
MCH RBC QN AUTO: 31.4 PG (ref 26.8–34.3)
MCHC RBC AUTO-ENTMCNC: 33.3 G/DL (ref 31.4–37.4)
MCV RBC AUTO: 94 FL (ref 82–98)
MONOCYTES # BLD AUTO: 0.49 THOUSAND/ÂΜL (ref 0.17–1.22)
MONOCYTES NFR BLD AUTO: 6 % (ref 4–12)
NEUTROPHILS # BLD AUTO: 4.54 THOUSANDS/ÂΜL (ref 1.85–7.62)
NEUTS SEG NFR BLD AUTO: 54 % (ref 43–75)
NRBC BLD AUTO-RTO: 0 /100 WBCS
P AXIS: 61 DEGREES
P AXIS: 64 DEGREES
PLATELET # BLD AUTO: 237 THOUSANDS/UL (ref 149–390)
PMV BLD AUTO: 10.2 FL (ref 8.9–12.7)
POTASSIUM SERPL-SCNC: 3.7 MMOL/L (ref 3.5–5.3)
PR INTERVAL: 130 MS
PR INTERVAL: 134 MS
PROT SERPL-MCNC: 6.9 G/DL (ref 6.4–8.4)
QRS AXIS: 82 DEGREES
QRS AXIS: 85 DEGREES
QRSD INTERVAL: 104 MS
QRSD INTERVAL: 106 MS
QT INTERVAL: 410 MS
QT INTERVAL: 410 MS
QTC INTERVAL: 461 MS
QTC INTERVAL: 479 MS
RBC # BLD AUTO: 4.4 MILLION/UL (ref 3.81–5.12)
SODIUM SERPL-SCNC: 138 MMOL/L (ref 135–147)
T WAVE AXIS: 40 DEGREES
T WAVE AXIS: 43 DEGREES
TSH SERPL DL<=0.05 MIU/L-ACNC: 1.61 UIU/ML (ref 0.45–4.5)
VENTRICULAR RATE: 76 BPM
VENTRICULAR RATE: 82 BPM
VIT B12 SERPL-MCNC: 301 PG/ML (ref 180–914)
WBC # BLD AUTO: 8.32 THOUSAND/UL (ref 4.31–10.16)

## 2023-10-05 PROCEDURE — 84443 ASSAY THYROID STIM HORMONE: CPT

## 2023-10-05 PROCEDURE — 85025 COMPLETE CBC W/AUTO DIFF WBC: CPT

## 2023-10-05 PROCEDURE — 99221 1ST HOSP IP/OBS SF/LOW 40: CPT | Performed by: PSYCHIATRY & NEUROLOGY

## 2023-10-05 PROCEDURE — 93010 ELECTROCARDIOGRAM REPORT: CPT | Performed by: INTERNAL MEDICINE

## 2023-10-05 PROCEDURE — 82652 VIT D 1 25-DIHYDROXY: CPT

## 2023-10-05 PROCEDURE — 80053 COMPREHEN METABOLIC PANEL: CPT

## 2023-10-05 PROCEDURE — 82306 VITAMIN D 25 HYDROXY: CPT

## 2023-10-05 PROCEDURE — 82607 VITAMIN B-12: CPT

## 2023-10-05 PROCEDURE — 82746 ASSAY OF FOLIC ACID SERUM: CPT

## 2023-10-05 RX ORDER — NYSTATIN 100000 [USP'U]/G
POWDER TOPICAL 2 TIMES DAILY
Status: DISCONTINUED | OUTPATIENT
Start: 2023-10-05 | End: 2023-10-06

## 2023-10-05 RX ORDER — ALBUTEROL SULFATE 90 UG/1
2 AEROSOL, METERED RESPIRATORY (INHALATION) EVERY 6 HOURS PRN
Status: DISCONTINUED | OUTPATIENT
Start: 2023-10-05 | End: 2023-10-10 | Stop reason: HOSPADM

## 2023-10-05 RX ORDER — POLYETHYLENE GLYCOL 3350 17 G/17G
17 POWDER, FOR SOLUTION ORAL DAILY PRN
Status: DISCONTINUED | OUTPATIENT
Start: 2023-10-05 | End: 2023-10-10 | Stop reason: HOSPADM

## 2023-10-05 RX ORDER — LURASIDONE HYDROCHLORIDE 20 MG/1
10 TABLET, FILM COATED ORAL
Status: COMPLETED | OUTPATIENT
Start: 2023-10-05 | End: 2023-10-05

## 2023-10-05 RX ORDER — FLUTICASONE PROPIONATE 110 UG/1
2 AEROSOL, METERED RESPIRATORY (INHALATION)
Status: DISCONTINUED | OUTPATIENT
Start: 2023-10-05 | End: 2023-10-10 | Stop reason: HOSPADM

## 2023-10-05 RX ORDER — BISACODYL 10 MG
10 SUPPOSITORY, RECTAL RECTAL DAILY PRN
Status: DISCONTINUED | OUTPATIENT
Start: 2023-10-05 | End: 2023-10-10 | Stop reason: HOSPADM

## 2023-10-05 RX ORDER — LURASIDONE HYDROCHLORIDE 20 MG/1
20 TABLET, FILM COATED ORAL
Status: DISCONTINUED | OUTPATIENT
Start: 2023-10-06 | End: 2023-10-06

## 2023-10-05 RX ADMIN — FLUTICASONE PROPIONATE 2 PUFF: 110 AEROSOL, METERED RESPIRATORY (INHALATION) at 15:33

## 2023-10-05 RX ADMIN — LURASIDONE HYDROCHLORIDE 10 MG: 20 TABLET, COATED ORAL at 17:07

## 2023-10-05 RX ADMIN — CARIPRAZINE 1.5 MG: 1.5 CAPSULE, GELATIN COATED ORAL at 08:49

## 2023-10-05 RX ADMIN — SERTRALINE HYDROCHLORIDE 150 MG: 50 TABLET ORAL at 08:49

## 2023-10-05 RX ADMIN — HYDROXYZINE HYDROCHLORIDE 50 MG: 50 TABLET, FILM COATED ORAL at 17:07

## 2023-10-05 RX ADMIN — FLUTICASONE PROPIONATE 2 PUFF: 110 AEROSOL, METERED RESPIRATORY (INHALATION) at 20:46

## 2023-10-05 RX ADMIN — NYSTATIN: 100000 POWDER TOPICAL at 20:48

## 2023-10-05 NOTE — TELEPHONE ENCOUNTER
Patient due for [facility] has been scheduled for med mgmt f/u with existing psychiatrist and virtual therapy     Ulices Jorgensen 10/17 @ 9a (switched from 30min to 1hour follow up)  [facility] 10/11 @ 10a

## 2023-10-05 NOTE — PLAN OF CARE
Patient states she will have CBC drawn tomorrow - order entered.    She will be due for medication this weekend, refill sent.   Problem: Ineffective Coping  Goal: Cooperates with admission process  Description: Interventions:   - Complete admission process  Outcome: Progressing

## 2023-10-05 NOTE — NURSING NOTE
Pt. Medication, placed in bag #[id number] & put into omnicell    Bag includes:  1 albuterol inhaler  1 flovent inhaler  1 ayr saline nasal gel  1 redness relief eye drops

## 2023-10-05 NOTE — PROGRESS NOTES
10/05/23 1055   Team Meeting   Meeting Type Tx Team Meeting   Team Members Present   Team Members Present Physician;Nurse;   Physician Team Member Dr. Keren Dawkins MD   Nursing Team Member Ayala montana[address]   Social Work Team Member Marck Barrios, [place]   Patient/Family Present   Patient Present Yes   Patient's Family Present No     Patient and treatment team met and reviewed pt strengths, limitations, coping skills, treatment plan and goals; pt agreeable and signed; copy on chart Wounds

## 2023-10-05 NOTE — H&P
Mounika Manning  :2000 F  PDS:[id number]    ZN  Adm Date: 10/4/2023 2014  8:14 PM   ATT PHY: Abner Lorenzo, [facility]         Chief Complaint: depression      History of Presenting Illness: Mounika Manning is a(n) 24y.o. year old female who is admitted to [address] [place] [address] on voluntary 201 committment basis. Patient originally presented to [address] [place] [facility] on 10/4/2023 for worsening depression. Patient examined at bedside. Patient complains of a rash under bilateral breasts but worse under left. She has not tried anything for it. Patient otherwise denies any physical symptoms. Denies chest pain, shortness of breath, nausea, vomiting, abdominal pain, headache, dysuria, urinary frequency.     No Known Allergies    Current Facility-Administered Medications on File Prior to Encounter   Medication Dose Route Frequency Provider Last Rate Last Admin   • [DISCONTINUED] cariprazine (VRAYLAR) capsule 1.5 mg  1.5 mg Oral Daily Nirav Kenney DO       • [DISCONTINUED] hydrOXYzine HCL (ATARAX) tablet 50 mg  50 mg Oral BID PRN Ashley Palauan, DO   50 mg at 10/04/23 1613   • [DISCONTINUED] sertraline (ZOLOFT) tablet 150 mg  150 mg Oral Daily Nirav Kenney DO       • [DISCONTINUED] traZODone (DESYREL) tablet 100 mg  100 mg Oral HS PRN Ashley Palauan, DO         Current Outpatient Medications on File Prior to Encounter   Medication Sig Dispense Refill   • albuterol (Proventil HFA) 90 mcg/act inhaler Inhale 2 puffs every 6 (six) hours as needed for wheezing 6.7 g 5   • benzonatate (TESSALON PERLES) 100 mg capsule Take 1 capsule (100 mg total) by mouth 3 (three) times a day as needed for cough (Patient not taking: Reported on 2023) 20 capsule 0   • CAMRESE 0.15-0.03 &0.01 MG TABS  (Patient not taking: Reported on 2023)  0   • cariprazine (Vraylar) 1.5 MG capsule Take 1 capsule (1.5 mg total) by mouth daily 30 capsule 2   • clotrimazole (LOTRIMIN) 1 % cream Apply topically 2 (two) times a day 30 g 3   • fluticasone (FLOVENT HFA) 110 MCG/ACT inhaler Inhale 2 puffs 2 (two) times a day Rinse mouth after use.  12 g 3   • hydrOXYzine HCL (ATARAX) 50 mg tablet Take 1 tablet (50 mg total) by mouth 2 (two) times a day as needed for anxiety 30 tablet 2   • sertraline (ZOLOFT) 100 mg tablet Take 1.5 tablets (150 mg total) by mouth daily 90 tablet 0   • traZODone (DESYREL) 100 mg tablet Take 1 tablet (100 mg total) by mouth daily at bedtime as needed for sleep 30 tablet 2     Active Ambulatory Problems     Diagnosis Date Noted   • Allergic rhinitis 10/12/2010   • Iron deficiency anemia 04/22/2015   • Psoriasis 12/15/2017   • Anxiety state, unspecified 11/19/2012   • Major depressive disorder with single episode, in partial remission ([address]) 12/18/2018   • Dyslipidemia 12/19/2018   • Hypothyroidism 07/18/2018   • Functional diarrhea 03/11/2020   • Calculus of gallbladder without cholecystitis without obstruction 03/11/2020   • GERD (gastroesophageal reflux disease) 04/07/2020   • Dyspepsia 05/14/2020   • Cigarette smoker 08/07/2021   • History of abnormal cervical Pap smear 12/21/2020   • History of acute bronchitis 05/18/2023   • Morbid (severe) obesity due to excess calories ([address]) 06/12/2023   • Candidiasis of breast 07/27/2023   • Moderate persistent asthma without complication 14/87/4101     Resolved Ambulatory Problems     Diagnosis Date Noted   • Other specified hypothyroidism 12/18/2017   • Lower abdominal pain 05/24/2018   • Severe episode of recurrent major depressive disorder, without psychotic features ([address]) 06/07/2022   • Wheezing 05/18/2023   • Chest tightness 05/18/2023     Past Medical History:   Diagnosis Date   • Anxiety    • Asthma    • Depression    • Environmental allergies    • Hypoglycemia      Past Surgical History:   Procedure Laterality Date   • COLONOSCOPY  2016 & 2018    Fiberoptic   • EGD  2016   • EGD  06/2016   • MYRINGOTOMY     • TONSILLECTOMY     • TOOTH EXTRACTION       Social History:   Social History     Socioeconomic History   • Marital status: Single     Spouse name: None   • Number of children: 0   • Years of education: None   • Highest education level: Bachelor's degree (e.g., BA, AB, BS)   Occupational History   • Occupation: UNEMPLOYED   Tobacco Use   • Smoking status: Every Day     Types: Cigarettes   • Smokeless tobacco: Never   • Tobacco comments:     1/2 pack per month   Vaping Use   • Vaping Use: Former   • Substances: Nicotine   Substance and Sexual Activity   • Alcohol use: Yes     Alcohol/week: 10.0 standard drinks of alcohol     Types: 10 Glasses of wine per week     Comment: every 2-3 days, more on weekends   • Drug use: Yes     Frequency: 4.0 times per week     Types: Marijuana     Comment: recreational    • Sexual activity: Not Currently   Other Topics Concern   • None   Social History Narrative   • None     Social Determinants of Health     Financial Resource Strain: Not on file   Food Insecurity: Not on file   Transportation Needs: Not on file   Physical Activity: Not on file   Stress: Not on file   Social Connections: Not on file   Intimate Partner Violence: Not on file   Housing Stability: Not on file     Family History:   Family History   Problem Relation Age of Onset   • Anxiety disorder Mother    • Diabetes Mother         due to underlying condition with both eyes affected by proliferative retinopathy and traction retinal detatchments involving maculae, without long term use of insulin   • Depression Father    • Anxiety disorder Father    • Alcohol abuse Father    • Sleep apnea Father    • Suicide Attempts Maternal Grandfather    • Suicide Attempts Maternal Grandmother    • Crohn's disease Paternal Grandfather      Review of Systems   Constitutional: Negative for chills and fever. HENT: Negative for ear pain and sore throat. Eyes: Negative for pain and visual disturbance. Respiratory: Negative for cough and shortness of breath. Cardiovascular: Negative for chest pain and palpitations. Gastrointestinal: Negative for abdominal pain and vomiting. Genitourinary: Negative for dysuria and hematuria. Musculoskeletal: Negative for arthralgias and back pain. Skin: Positive for rash. Negative for color change. Allergic/Immunologic: Positive for environmental allergies. Neurological: Negative for seizures and syncope. Psychiatric/Behavioral: Positive for dysphoric mood. All other systems reviewed and are negative. Physical Exam   Vitals: Blood pressure 111/67, pulse 92, temperature 98.6 °F (37 °C), temperature source Temporal, resp. rate 17, height 5' 4" (1.626 m), weight 108 kg (237 lb 3.2 oz), last menstrual period 09/20/2023, SpO2 95 %. ,Body mass index is 40.72 kg/m². Constitutional: Awake, alert, in no acute distress. Head: Normocephalic and atraumatic. Mouth/Throat: Oropharynx is clear and moist.    Eyes: Conjunctivae and EOM are normal.   Neck: Neck supple. No thyromegaly present. Cardiovascular: Normal rate, regular rhythm and normal heart sounds. Pulmonary/Chest: Effort normal and breath sounds normal.   Abdominal: Soft. Bowel sounds are normal. There is no tenderness. Neurological: No focal deficits. Musculoskeletal:  Nontender spine. Skin: Skin is warm and dry. Erythematous rash under breasts. Iam Beavers is a(n) 24 y.o. female with depression. 1.  Asthma. Continue home Flovent HFA, albuterol inhaler as needed. 2.  Tobacco use. Declines nicotine patch. Nicotine gum as needed. 3.  GERD. Stable. Maalox as needed. 4.  Seasonal allergies. Stable at this time. 5.  Psych with depression. This is being managed by the psych team.     Prognosis: Fair. Discharge Plan: In progress. Advanced Directives: I have discussed in detail with the patient the advanced directives.  The patient does not have an appointed POA and does not have a living will. Patient's emergency contact is her mother, Evelyn Hawley, whose number is [phone]. When discussing cardiac and pulmonary resuscitation efforts with the patient, the patient wishes to be full code. I have spent more than 50 minutes gathering data, doing physical examination, and discussing the advanced directives, which was witnessed by caring staff. The patient was discussed with Dr. Peng Ellis and he is in agreement with the above note.

## 2023-10-05 NOTE — NURSING NOTE
Patient admitted from [place]. Patient is a 201. UDS positive for THC. 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 Patient  ashamed of her self for needing help again. Emotional support given. Patient pleasant and cooperative. Denies any SI,HI,AVH, anxiety. Does endorse depression. Skin checked done. Patient feels safe on the unit and at her home. Rash under left breast noted. Patient showered and oriented to the unit.

## 2023-10-05 NOTE — PLAN OF CARE
Problem: Ineffective Coping  Goal: Participates in unit activities  Description: Interventions:  - Provide therapeutic environment   - Provide required programming   - Redirect inappropriate behaviors   Outcome: Progressing   Patient presents as sad/depressed, though cooperative and motivated for recovery. Participates in scheduled activities; responsive with Assessment requirements.
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
## 2023-10-05 NOTE — TREATMENT PLAN
TREATMENT PLAN REVIEW - [place] [address]89 y.o. 2000 female MRN: [id number]    [address] [place] [address] [address] Room / Bed: [address] [place] [address]/[facility] [id number] Encounter: [id number]          Admit Date/Time:  10/4/2023  8:14 PM    Treatment Team: Attending Provider: Kei Barba MD; Consulting Physician: Yifan Samuels MD; : Jonathan Dorantes; Registered Nurse: Inna Bernard RN; Patient Care Assistant: Srikanth Lambert; Patient Care Assistant: Laurent Tanner; Dietitian: Harvey Leavitt RD; Patient Care Assistant: Tash Tamez; Patient Care Assistant: Armani Avila    Diagnosis: Active Problems: There are no active Hospital Problems.       Patient Strengths/Assets: cooperative, family ties, negotiates basic needs, patient is on a voluntary commitment    Patient Barriers/Limitations: difficulty adapting, low self esteem    Short Term Goals: decrease in depressive symptoms, decrease in anxiety symptoms, ability to stay safe on the unit    Long Term Goals: improvement in depression, improvement in anxiety, stabilization of mood, free of suicidal thoughts    Progress Towards Goals: starting psychiatric medications as prescribed    Recommended Treatment: medication management, patient medication education, group therapy, milieu therapy, continued Behavioral Health psychiatric evaluation/assessment process    Treatment Frequency: daily medication monitoring, group and milieu therapy daily, monitoring through interdisciplinary rounds, monitoring through weekly patient care conferences    Expected Discharge Date:  10/16/2023    Discharge Plan: discharge to home, referral for outpatient medication management with a psychiatrist, referral for outpatient psychotherapy    Treatment Plan Created/Updated By: Car Hebert MD

## 2023-10-05 NOTE — PROGRESS NOTES
Contraband:    [address] w. Chap stick pen.      Behind Nurses Station:   [facility]    Medications Given to the Nurses  2 Inalers  Nasal Gel   Eye Drops    Clothing Bedside:  Sweatshirt  PJ's tops & bottom 2  [employer] 2   Underwear    Sports Pants Black  Long Sleeve Tops 3   Sweatpants  2 stretch Pantaloons    Given to Security:  [address] (5)  [employer] Debit Card  [place] License  [facility] [place] [place]
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
## 2023-10-05 NOTE — H&P
PSYCHIATRIC EVALUATION -- [address] [place] [address] 24 y.o. female MRN: [id number]   UNIT/BED#: -01 ENCOUNTER: [id number]  DATE: 10/05/23      CHIEF COMPLAINT: "I am very depressed"    HISTORY OF PRESENT ILLNESS    Chastity Gipson is a 24 y.o. female with a past psychiatric history of Major Depressive Disorder, who was admitted to the inpatient adult psychiatric unit on a voluntary 201 basis due to worsening anxiety and depression. Per ED provider notes: " Patient presents with  · Depression  Increased depression and crying. No plans to harm self. States symptoms worsened since starting a new job. 25 yo female presenting to the ed for worsening anxiety and depression states that she was was admitted to [facility] clinic when she was a sophomore in college but that it was not a great place however that seeing the psychiatrist there and getting med changes did seem to help her when she got out. Denies any suicidal or homicidal ideation. States that over the last 1-1/2 to 2 months she started Stu Sanjuana and her Zoloft medication was increased from 100 to 150 mg. Does not state that this has been helping in any way. Denies any hallucinations, alcohol or drug use beyond recreational marijuana and social alcohol usage. Denies any physical pain at this time. Patient's mother at bedside very supportive. Patient states that it seems that her symptoms have worsenend since starting a new job."      Per crisis worker notes: "Patient arrived to the ER via private vehicle from outpatient providers office. Patient presents as depressed with flat affect. Patient eye contact was fair maintained throughout assessment and speech was within normal limits. Patient reports passive SI denies current plan. Patient states started a new job last week and since has been more depressed and anxious.  Patient states increase in sleep " its all I want to do" A decrease in appetite (no weight loss noted) as well as lack of concentration and motivation. Patient reports compliance with medications and aftercare.      Patient in agreement to sign a voluntary admission. Voluntary rights and 72 hour notice explained to patient. Patient verbalized and understanding. Original placed on patients chart.     Bed search and insurance in progress. "      Upon evaluation in the unit, May Moses is a 24 y. o. well built, overweight/obese,  female, pleasant, cooperative, appears anxious and depressed. Patient reports she has struggled with depression and anxiety since her teenage years. 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 Because of that patient started feeling exhausted, incompetent, guilty, tearful, her sleep increased, she is taking more naps during the day, sleeps longer at night, but she does not feel refreshed in the morning after overnight sleep. cries a lot in the morning, and had suicidal ideation by driving her car into the opposing traffic, but denies attempting to do that. Currently Marlena reports she is also having symptoms of mood instability with irritability, and ruminating thoughts. According to the patient she had experienced anxiety, depression, suicidal ideations in the past during her sophomore years at school, and was seeing a psychiatrist at that time for that. She had previous psychiatric inpatient admission at [facility] in October 2018. After discharge from [facility] clinic she was seeing a psychiatrist at [facility], and a therapist for about 1 year.  Among the medications which patient was taking since that time, she remembers Zoloft, which she is still currently taking in the dose of 150 mg, Seroquel, Trazodone, Celexa, Wellbutrin, Abilify, Vraylar. Shraddha Garcia denies any symptoms of overt remy. Among risky behaviors endorses having DUI on her record. PSYCHIATRIC REVIEW OF SYMPTOMS    SLEEP: increased  APPETITE: yes, alternating increase and decrease  WEIGHT: no change  ENERGY: decreased  INTEREST/PLEASURE: decreased  ANHEDONIA: no  ANXIETY: yes, worrying daily  REMY: history of periods of irritable mood, history of mood swings, but no clear history of full hypomanic, manic or mixed episodes  GUILT:  yes  HOPELESSNESS:  no  SELF-MUTILATION/RISKY BEHAVIOR: no  SUICIDAL IDEATION: had prior to admission, but denies at present  HOMICIDAL IDEATION: no  AUDITORY HALLUCINATIONS: no  VISUAL HALLUCINATIONS: no  DELUSIONAL THINKING: no  EATING DISORDER HISTORY: denies currently, recent bingeing episodes  OBSESSIVE-COMPULSIVE SYMPTOMS: no    PSYCHIATRIC HISTORY    PAST INPATIENT PSYCHIATRIC CARE  • One past inpatient psychiatric admission at [address]  • Was in outpatient psychiatric treatment in the past with a psychiatrist at [address]  • Was in outpatient psychiatric treatment in the past with a therapist  • Currently in outpatient psychiatric treatment with a Nurse Practitioner TRE Arreguin at [address]  • no    PAST VIOLENT BEHAVIORS  • none    CURRENT (ACTIVE) PSYCHOTROPIC MEDICATIONS  • Zoloft 150 mg    PAST (INACTIVE) PSYCHOTROPIC MEDICATIONS  · Seroquel,   · Trazodone,   · Celexa,   · Wellbutrin,   · Abilify,   · Vraylar     SUBSTANCE ABUSE HISTORY    NICOTINE  • 2-3 cigarettes per day    ALCOHOL USE  • Drink socially. Sometimes has hangovers if drinks "too much".  Maximum drinks she had: 10.    RECREATIONAL DRUG USE  • Marijuana, once daily    ACUTE WITHDRAWAL HISTORY  • Denies    SUBSTANCE ABUSE REHAB HISTORY  • Denies    FAMILY PSYCHIATRIC HISTORY    • Maternal grandparents history of SA: Grandfather by firearms, grandmother by OD. Both alive currently. • Father: OCD  • Paternal grandfather: Anxiety disorder, panic disorder. SOCIAL HISTORY    EDUCATION: Bachelor degree  MARITAL HISTORY: Single  CHILDREN: No  LIVING ARRANGEMENT: Rents a room. 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 FUNCTIONING RELATIONSHIPS: Friends  LEGAL HISTORY: Currently on DUI [place] probation, no other legal issues   HISTORY: No    TRAUMA HISTORY    • No    PAST MEDICAL HISTORY    Past Medical History:   Diagnosis Date   • Anxiety    • Asthma    • Depression    • Environmental allergies     Last assessed: 1/18/17   • GERD (gastroesophageal reflux disease)    • Hypoglycemia    • Hypothyroidism      Past Surgical History:   Procedure Laterality Date   • COLONOSCOPY  2016 & 2018    Fiberoptic   • EGD  2016   • EGD  06/2016   • MYRINGOTOMY     • TONSILLECTOMY     • TOOTH EXTRACTION         MEDICAL REVIEW OF SYSTEMS  Pertinent items are noted in HPI. ALLERGIES  No Known Allergies    MEDICATIONS  All current active medications have been reviewed. OBJECTIVE DATA    MENTAL STATUS EXAM  APPEARANCE age appropriate, casually dressed, dressed appropriately, adequate grooming, looks stated age, overweight   BEHAVIOR pleasant, cooperative, calm   SPEECH normal rate and volume   MOOD depressed, anxious   AFFECT normal range and intensity, appropriate, mood-congruent   THOUGHT PROCESS organized, logical, linear   ASSOCIATIONS intact associations   THOUGHT CONTENT no overt delusions   PERCEPTUAL DISTURBANCES no visual hallucinations, denies auditory hallucinations when asked, does not appear responding to internal stimuli   RISK POTENTIAL Suicidal ideation - None, but had suicidal ideation with plan to drive car into traffic prior to admission.   Homicidal ideation - None   SENSORIUM oriented to person, place and time/date   MEMORY recent and remote memory grossly intact   CONSCIOUSNESS alert and awake   ATTENTION decreased concentration and decreased attention span   INSIGHT limited   JUDGEMENT limited   GAIT/STATION normal gait/station   MOTOR ACTIVITY no abnormal movements     VITAL SIGNS    Temp:  [97.7 °F (36.5 °C)-98.7 °F (37.1 °C)] 98.7 °F (37.1 °C)  HR:  [92-98] 98  Resp:  [16-18] 16  BP: (111-139)/(44-90) 127/44    VITAL SIGNS REVIEWED: Yes    LABORATORY RESULTS    BMP  Lab Results   Component Value Date    SODIUM 138 10/05/2023    K 3.7 10/05/2023     10/05/2023    CO2 24 10/05/2023    BUN 9 10/05/2023    CREATININE 0.76 10/05/2023    GLUC 103 10/05/2023    CALCIUM 8.9 10/05/2023        CBC  Lab Results   Component Value Date    WBC 8.32 10/05/2023    HGB 13.8 10/05/2023     10/05/2023        THYROID HORMONE   Lab Results   Component Value Date    RQB2NIVRRPPM 1.609 10/05/2023        LIPID + DM   Lab Results   Component Value Date    CHOLESTEROL 194 07/20/2023    HDL 29 (L) 07/20/2023    TRIG 233 (H) 07/20/2023    [address] 07/20/2023       Lab Results   Component Value Date    GLUC 103 10/05/2023    GLUC 55 08/08/2019     Lab Results   Component Value Date    HGBA1C 5.6 07/20/2023     No results found for: "Carol Petar", "[id number]"     LIVER FUNCTION   Lab Results   Component Value Date    ALT 32 10/05/2023    AST 24 10/05/2023    ALKPHOS 70 10/05/2023      Lab Results   Component Value Date    HEPCAB Non-reactive 07/20/2023        SERUM DRUG LEVELS  No results found for: "LITHIUM"  No results found for: "VALPROATE"   No results found for: "CLOZAPINE"   No results found for: "LAMOTRIGINE"    URINE DRUG SCREEN  Lab Results   Component Value Date    BDZUR Negative 10/04/2023    COCAINEUR Negative 10/04/2023    METHADONEUR  10/04/2023      Comment:      Test not performed; call laboratory if required.       OPIATEUR Negative 10/04/2023    THCUR Positive (A) 10/04/2023    PCPUR Negative 10/04/2023    OXYCODONEUR Negative 10/04/2023         IMAGING RESULTS    No results found. ASSESSMENT    Active Problems: There are no active Hospital Problems. I had the pleasure of evaluating and managing the psychiatric care of Lalit Sandy today, Thursday October 5, 2023. a 24 y.o. female with a past psychiatric history of Major Depressive Disorder, presenting with anxiety and depression. At this time we will continue Zoloft 150 mg, which patient was already taking prior to admission, and also will initiate Latuda 20 mg, PO, daily with breakfast, for Bipolar Depression. •     PATIENT STRENGTHS: cooperative, family ties, negotiates basic needs, patient is on a voluntary commitment   PATIENT BARRIERS: difficulty adapting, low self esteem, financially strained because pays $200/month for DUI    TREATMENT    PSYCHOTROPIC MEDICATION  • Zoloft 150 mg, PO, daily  • Latuda 20 mg, PO, daily with breakfast  • Risks, benefits, and possible side effects of medications explained to patient including risk of parkinsonian symptoms, Tardive Dyskinesia and metabolic syndrome related to treatment with antipsychotic medications, risk of cardiovascular events in elderly related to treatment with antipsychotic medications and risk of suicidality and serotonin syndrome related to treatment with antidepressants. The patient verbalizes understanding and agreement for treatment. MEDICATION FOLLOW UP  • Yes    COORDINATION OF CARE  • Total floor / unit time spent today 60 minutes. Greater than 50% of total time was spent with the patient and / or family counseling and / or coordination of care. A description of the counseling / coordination of care:   • Patient's presentation on admission and proposed treatment plan discussed with treatment team.  • Diagnosis, medication changes and treatment plan reviewed with patient. • Stressors preceding admission discussed with patient including stress at work and difficulty with maintaining healthy weight.   • Recent stressors discussed with patient including stress at work. • Stressors leading to admission reviewed with patient including stress at work. • Importance of medication and treatment compliance reviewed with patient.   • All current active medications have been reviewed  • Encourage group therapy, milieu therapy and occupational therapy  • Behavioral Health checks every 7 minutes    ESTIMATED LENGTH OF STAY  • 10 midnights    CODE STATUS: No Order  ADVANCED DIRECTIVE AND LIVING WILL: <no information>    ALL ORDERED MEDICATIONS  Current Facility-Administered Medications   Medication Dose Route Frequency Provider Last Rate   • acetaminophen  650 mg Oral Q6H PRN Bijan Colin MD     • acetaminophen  650 mg Oral Q4H PRN Bijan Colin MD     • acetaminophen  975 mg Oral Q6H PRN Bijan Colin MD     • albuterol  2 puff Inhalation Q6H PRN Shraddha Day PA-C     • aluminum-magnesium hydroxide-simethicone  30 mL Oral Q4H PRN Bijan Colin MD     • benztropine  1 mg Intramuscular Q4H PRN Max 6/day Bijan Colin MD     • benztropine  1 mg Oral Q4H PRN Max 6/day Bijan Colin MD     • bisacodyl  10 mg Rectal Daily PRN Evens Ramos MD     • hydrOXYzine HCL  50 mg Oral Q6H PRN Max 4/day Bijan Colin MD      Or   • diphenhydrAMINE  50 mg Intramuscular Q6H PRN Bijan Colin MD     • fluticasone  2 puff Inhalation BID Shraddha Day PA-C     • glycerin-hypromellose-  1 drop Both Eyes Q3H PRN Bijan Colin MD     • hydrOXYzine HCL  100 mg Oral Q6H PRN Max 4/day Bijan Colin MD      Or   • LORazepam  2 mg Intramuscular Q6H PRN Bijan Colin MD     • hydrOXYzine HCL  25 mg Oral Q6H PRN Max 4/day Bijan Colin MD     • [START ON 10/6/2023] lurasidone  20 mg Oral Daily With Breakfast Bijan Colin MD     • nicotine polacrilex  2 mg Oral Q2H PRN Shraddha Day PA-C     • nystatin   Topical BID Shraddha Day PA-C     • OLANZapine  10 mg Oral Q3H PRN Max 3/day Bijan Colin MD      Or   • OLANZapine  10 mg Intramuscular Q3H PRN Max 3/day Bijan Colin MD     • OLANZapine  5 mg Oral Q3H PRN Max 6/day Bijan Colin MD      Or   • OLANZapine  5 mg Intramuscular Q3H PRN Max 6/day Bijan Colin MD     • OLANZapine  2.5 mg Oral Q3H PRN Max 8/day Bijan Colin MD     • polyethylene glycol  17 g Oral Daily PRN Evens Ramos MD     • propranolol  10 mg Oral Q8H PRN Bijan Colin MD     • senna-docusate sodium  1 tablet Oral Daily PRN Bijan Colin MD     • sertraline  150 mg Oral Daily Bijan Colin MD     • traZODone  50 mg Oral HS PRN Bijan Colin MD         ORDERS REVIEWED: Yes     I have spent 60 minutes evaluating and managing the psychiatric care of Rhys León today, Thursday October 5, 2023.     Bijan Colin M.D.  PGY-1, [facility]  [address] WITH [address]  [phone]

## 2023-10-05 NOTE — SOCIAL WORK
Per [employer]: Jen Carbaajl on 10/17 for a virtual appointment at 9:00am. As for therapy, appointment for virtual therapy with Tricia Aase on 10/11 at 10:00a. All links on [employer] [address] [employer].

## 2023-10-05 NOTE — TREATMENT TEAM
10/05/23 1709   Mcclure Anxiety Scale   Anxious Mood 3   Tension 2   Fears 1   Insomnia 0   Intellectual 2   Depressed Mood 3   Somatic Complaints: Muscular 1   Somatic Complaints: Sensory 0   Cardiovascular Symptoms 0   Respiratory Symptoms 0   Gastrointestinal Symptoms 0   Genitourinary Symptoms 0   Autonomic Symptoms 3   Behavior at Interview 3   Mcclure Anxiety Score 18    patient stated feeling anxious when asked what was wrong patient stated she did not know but was just feeling anxious.  Gave patient 50mg atarax will monitor for effectiveness

## 2023-10-05 NOTE — ED NOTES
Insurance Authorization for admission:   Phone call placed to [employer]. Phone number: [phone]. Spoke to Marlon/Marta.     (pending) days approved. Level of care: 201 IP Psych. Review on (pending). Authorization # (pending). Jannie Navarrete reports patient needs individual member ID number, mother's card and number is listed in her chart only. A call was placed to patient's mother who reports that the insurance has changed recently as her father retired and [employer] is now being used. Patient ID number will be the same as mother, only difference last number is 1. PPO managed by Meño Florez [phone]. A phone call was placed. [employer] was on hold for about 40 minutes than was directed to a voicemail. A voicemail was left and awaiting call back.

## 2023-10-05 NOTE — NURSING NOTE
Patient was pleasant and cooperative. Patient social with staff and peers. Staff support provided. Q 7 minute safety checks maintained. Patient denied SI/HI. Patient compliant with medications and groups. Patient reported mild anxiety and depression. Staff will continue to monitor and support.

## 2023-10-06 LAB — 1,25(OH)2D3 SERPL-MCNC: 61.1 PG/ML (ref 24.8–81.5)

## 2023-10-06 PROCEDURE — 99231 SBSQ HOSP IP/OBS SF/LOW 25: CPT | Performed by: PSYCHIATRY & NEUROLOGY

## 2023-10-06 RX ORDER — NYSTATIN 100000 [USP'U]/G
POWDER TOPICAL 2 TIMES DAILY
Status: DISCONTINUED | OUTPATIENT
Start: 2023-10-06 | End: 2023-10-09

## 2023-10-06 RX ORDER — ERGOCALCIFEROL 1.25 MG/1
50000 CAPSULE ORAL WEEKLY
Status: DISCONTINUED | OUTPATIENT
Start: 2023-10-07 | End: 2023-10-07

## 2023-10-06 RX ORDER — MELATONIN
1000 DAILY
Status: DISCONTINUED | OUTPATIENT
Start: 2023-11-26 | End: 2023-10-10 | Stop reason: HOSPADM

## 2023-10-06 RX ORDER — LURASIDONE HYDROCHLORIDE 40 MG/1
40 TABLET, FILM COATED ORAL
Status: DISCONTINUED | OUTPATIENT
Start: 2023-10-07 | End: 2023-10-10 | Stop reason: HOSPADM

## 2023-10-06 RX ADMIN — FLUTICASONE PROPIONATE 2 PUFF: 110 AEROSOL, METERED RESPIRATORY (INHALATION) at 08:33

## 2023-10-06 RX ADMIN — LURASIDONE HYDROCHLORIDE 20 MG: 20 TABLET, COATED ORAL at 08:33

## 2023-10-06 RX ADMIN — TRAZODONE HYDROCHLORIDE 50 MG: 50 TABLET ORAL at 20:57

## 2023-10-06 RX ADMIN — NYSTATIN: 100000 POWDER TOPICAL at 20:57

## 2023-10-06 RX ADMIN — FLUTICASONE PROPIONATE 2 PUFF: 110 AEROSOL, METERED RESPIRATORY (INHALATION) at 20:57

## 2023-10-06 RX ADMIN — NYSTATIN: 100000 POWDER TOPICAL at 08:33

## 2023-10-06 RX ADMIN — SERTRALINE HYDROCHLORIDE 150 MG: 50 TABLET ORAL at 08:33

## 2023-10-06 RX ADMIN — HYDROXYZINE HYDROCHLORIDE 50 MG: 50 TABLET, FILM COATED ORAL at 12:33

## 2023-10-06 NOTE — NURSING NOTE
Patient visible on unit. Social with staff and peers. Pleasant and cooperative. Denies SI,HI,AVH, anxiety and depression. Safety checks continue Q 7 minutes.

## 2023-10-06 NOTE — NURSING NOTE
Prn atarax 50 mg effective as patient is napping comfortably at this time. Will continue to monitor.

## 2023-10-06 NOTE — MALNUTRITION/BMI
This medical record reflects one or more clinical indicators suggestive of malnutrition and/or morbid obesity. BMI Findings:  Adult BMI Classifications: Morbid Obesity 40-44.9     Energy intake > energy out put over time. Body mass index is 40.72 kg/m². See Nutrition note dated 10/6/2023  for additional details. Completed nutrition assessment is viewable in the nutrition documentation.

## 2023-10-06 NOTE — NURSING NOTE
Patient observed sleeping during the night. Non labored breathing noted. No distress or behaviors. Safety checks continue.

## 2023-10-06 NOTE — NURSING NOTE
Patient is pleasant and cooperative upon interaction. Patient is visible and social. Denies SI/HI. Mood is depressed. Support provided. Patient is compliant with medications. No acute behaviors noted. Q 7 min safety checks maintained. Monitoring continues.

## 2023-10-06 NOTE — ED NOTES
Insurance Authorization for admission:   Phone call placed to [employer]. Phone number: [phone]. [facility] days approved. Level of care: 201. Review on 10/7/2023. Authorization # [id number].

## 2023-10-06 NOTE — NUTRITION
10/06/23 1045   Biochemical Data,Medical Tests, and Procedures   Biochemical Data/Medical Tests/Procedures Lab values reviewed; Meds reviewed   Labs (Comment) 10/5 B, CBC & TSH WNL   Meds (Comment) cogentin, atarax, ativan, latuda, zyprexa, inderal, zoloft, senna, desyrel   Nutrition-Focused Physical Exam   Nutrition-Focused Physical Exam Findings RN skin assessment reviewed; No skin issues documented   Nutrition-Focused Physical Exam Findings Smoker, alcohol use. Medical-Related Concerns anxiety, asthma, depression, GERD, hypothyroidism   Adequacy of Intake   Nutrition Modality PO   Feeding Route   PO Independent   Current PO Intake   Current Diet Order Regular diet thin lqiuids   Current Meal Intake %   Estimated calorie intake compared to estimated need Nutrient needs met. PES Statement   Problem Clinical   Weight (3) Overweight/obesity NC-3.3   Overweight/ obesity specific to Obese, Class III (5)   Related to Energy intake>energy output over time   As evidenced by: BMI   Recommendations/Interventions   Malnutrition/BMI Present Yes  (energy intake > energy output over time)   Adult BMI Classifications Morbid Obesity 40-44.9   Summary High BMI. Regular diet thin liquids. Meal completions 100%. Reports no diet plan. States her appetite has improved and was previously decreased for ~2 weeks. Reports consuming 2 meals per day. States she eats most of her meals at home. 10/4/#; #; 4/15/#; 10/18/#. No significant weight changes. Smoker, alcohol use. Skin intact. Interventions/Recommendations Continue current diet order   Education Assessment   Education Education not indicated at this time;Patient declined nutrition education   Education Notes Aislinn discussed foods/fluids to avoid to prevent weight gain during admission.    Patient Nutrition Goals   Goal Avoid weight gain   Goal Status Initiated   Timeframe to complete goal by next f/u   Nutrition Complexity Risk Nutrition complexity level Low risk   Follow up date 10/20/23

## 2023-10-06 NOTE — PROGRESS NOTES
10/06/23   Team Meeting   Meeting Type Daily Rounds   Team Members Present   Team Members Present Physician;Nurse;   Physician Team Member Dr Angel Cardona MD; Nery Vu PA-C; Dr Mile Peraza MD   Nursing Team Member AIMEE Nolan   Social Work Team Member Rosa [place]   Patient/Family Present   Patient Present No   Patient's Family Present No     Increasing depression, poor sleep, tearful

## 2023-10-06 NOTE — NURSING NOTE
Patient complains of increased anxiety, requesting PRN Atarax 50 mg, unable to redirect verbally, with exercise, diversion activity, food/fluids. PRN Atarax given as ordered. Remains on 7" checks for safety and behaviors.

## 2023-10-06 NOTE — PROGRESS NOTES
Progress Note - Mela Holder 24 y.o. female MRN: [id number]    Unit/Bed#: [facility] 247-01 Encounter: [id number]        Subjective:   Patient seen and examined at bedside after reviewing the chart and discussing the case with the caring staff. Patient examined at bedside. Patient has no acute concerns. Physical Exam   Vitals: Blood pressure 129/70, pulse 90, temperature 99.1 °F (37.3 °C), temperature source Temporal, resp. rate 18, height 5' 4" (1.626 m), weight 108 kg (237 lb 3.2 oz), last menstrual period 09/20/2023, SpO2 98 %. ,Body mass index is 40.72 kg/m². Constitutional: Patient in no acute distress. HEENT: PERR, EOMI, MMM. Cardiovascular: Normal rate and regular rhythm. Pulmonary/Chest: Effort normal and breath sounds normal.   Abdomen: Soft, + BS, NT. Assessment/Plan:  Mela Holder is a(n) 24 y.o. female with depression.     1. Asthma. Continue home Flovent HFA, albuterol inhaler as needed. 2.  Tobacco use. Declines nicotine patch. Nicotine gum as needed. 3.  GERD. Stable. Maalox as needed. 4.  Seasonal allergies. Stable at this time. 5.  Vitamin B12 deficiency. Patient started on vitamin B12 supplement. 6.  Vitamin D deficiency. Patient started on vitamin D2 50,000 units weekly for 8 weeks followed by vitamin D3 1000 units daily. The patient was discussed with Dr. Nelida Avalos and he is in agreement with the above note.

## 2023-10-07 PROCEDURE — 99232 SBSQ HOSP IP/OBS MODERATE 35: CPT

## 2023-10-07 RX ORDER — ERGOCALCIFEROL 1.25 MG/1
50000 CAPSULE ORAL WEEKLY
Status: DISCONTINUED | OUTPATIENT
Start: 2023-10-14 | End: 2023-10-10 | Stop reason: HOSPADM

## 2023-10-07 RX ORDER — TRAZODONE HYDROCHLORIDE 100 MG/1
100 TABLET ORAL
Status: DISCONTINUED | OUTPATIENT
Start: 2023-10-07 | End: 2023-10-10 | Stop reason: HOSPADM

## 2023-10-07 RX ADMIN — ACETAMINOPHEN 650 MG: 325 TABLET ORAL at 09:51

## 2023-10-07 RX ADMIN — FLUTICASONE PROPIONATE 2 PUFF: 110 AEROSOL, METERED RESPIRATORY (INHALATION) at 21:39

## 2023-10-07 RX ADMIN — TRAZODONE HYDROCHLORIDE 100 MG: 100 TABLET ORAL at 21:08

## 2023-10-07 RX ADMIN — FLUTICASONE PROPIONATE 2 PUFF: 110 AEROSOL, METERED RESPIRATORY (INHALATION) at 08:51

## 2023-10-07 RX ADMIN — ERGOCALCIFEROL 50000 UNITS: 1.25 CAPSULE, LIQUID FILLED ORAL at 08:49

## 2023-10-07 RX ADMIN — NICOTINE POLACRILEX 2 MG: 2 GUM, CHEWING BUCCAL at 19:41

## 2023-10-07 RX ADMIN — LURASIDONE HYDROCHLORIDE 40 MG: 40 TABLET, COATED ORAL at 08:49

## 2023-10-07 RX ADMIN — HYDROXYZINE HYDROCHLORIDE 50 MG: 50 TABLET, FILM COATED ORAL at 17:50

## 2023-10-07 RX ADMIN — NYSTATIN 1 APPLICATION: 100000 POWDER TOPICAL at 08:50

## 2023-10-07 RX ADMIN — CYANOCOBALAMIN TAB 500 MCG 500 MCG: 500 TAB at 08:49

## 2023-10-07 RX ADMIN — SERTRALINE HYDROCHLORIDE 150 MG: 50 TABLET ORAL at 08:49

## 2023-10-07 RX ADMIN — NYSTATIN 1 APPLICATION: 100000 POWDER TOPICAL at 21:10

## 2023-10-07 NOTE — NURSING NOTE
Patient utilized prn Trazodone @ hs w/ positive effect as pt slept throughout the night without interruption, non labored breathing noted while asleep. Q 7 min safety checks maintained.

## 2023-10-07 NOTE — PROGRESS NOTES
Progress Note - Chan Escamilla 24 y.o. female MRN: [id number]    Unit/Bed#: [facility] 247-01 Encounter: [id number]        Subjective:   Patient seen and examined at bedside after reviewing the chart and discussing the case with the caring staff. Patient examined at bedside. Patient has no acute concerns. Physical Exam   Vitals: Blood pressure 110/69, pulse 93, temperature (!) 97.4 °F (36.3 °C), temperature source Temporal, resp. rate 17, height 5' 4" (1.626 m), weight 108 kg (237 lb 3.2 oz), last menstrual period 09/20/2023, SpO2 97 %. ,Body mass index is 40.72 kg/m². Constitutional: Patient in no acute distress. HEENT: PERR, EOMI, MMM. Cardiovascular: Normal rate and regular rhythm. Pulmonary/Chest: Effort normal and breath sounds normal.   Abdomen: Soft, + BS, NT. Assessment/Plan:  Chan Escamilla is a(n) 24 y.o. female with depression.     1. Asthma. Continue home Flovent HFA, albuterol inhaler as needed. 2.  Tobacco use. Declines nicotine patch. Nicotine gum as needed. 3.  GERD. Stable. Maalox as needed. 4.  Seasonal allergies. Stable at this time. 5.  Vitamin B12 deficiency. Patient started on vitamin B12 supplement. 6.  Vitamin D deficiency. Patient started on vitamin D2 50,000 units weekly for 12 weeks followed by vitamin D3 1000 units daily.

## 2023-10-07 NOTE — NURSING NOTE
Patient visible in the unit. Calm and cooperative. Endorsed anxiety about thinking a lot of things. Endorsed moderate depression. Patient compliant with medications. No disturbed behavior noted. Q 7 minutes safety checks maintained.

## 2023-10-07 NOTE — NURSING NOTE
Patient noted to be visible and social on the milieu. Pleasant and cooperative during interaction. Endorsed severe anxiety and depression r/t to being inpatient. Denied SI/HI/AVH. Utilized prn Trazodone 50 mg @ 2057 for c/o trouble w/ sleep. Compliant w/ med regimen. Q 7 min safety checks continuous and mainatained.

## 2023-10-07 NOTE — PLAN OF CARE
Problem: Ineffective Coping  Goal: Cooperates with admission process  Description: Interventions:   - Complete admission process  Outcome: Progressing  Goal: Participates in unit activities  Description: Interventions:  - Provide therapeutic environment   - Provide required programming   - Redirect inappropriate behaviors   Outcome: Progressing     Problem: Nutrition/Hydration-ADULT  Goal: Nutrient/Hydration intake appropriate for improving, restoring or maintaining nutritional needs  Description: Monitor and assess patient's nutrition/hydration status for malnutrition. Collaborate with interdisciplinary team and initiate plan and interventions as ordered. Monitor patient's weight and dietary intake as ordered or per policy. Utilize nutrition screening tool and intervene as necessary. Determine patient's food preferences and provide high-protein, high-caloric foods as appropriate.      INTERVENTIONS:  - Monitor oral intake, urinary output, labs, and treatment plans  - Assess nutrition and hydration status and recommend course of action  - Evaluate amount of meals eaten  - Assist patient with eating if necessary   - Allow adequate time for meals  - Recommend/ encourage appropriate diets, oral nutritional supplements, and vitamin/mineral supplements  - Order, calculate, and assess calorie counts as needed  - Recommend, monitor, and adjust tube feedings and TPN/PPN based on assessed needs  - Assess need for intravenous fluids  - Provide specific nutrition/hydration education as appropriate  - Include patient/family/caregiver in decisions related to nutrition  Outcome: Progressing

## 2023-10-07 NOTE — NURSING NOTE
Patient reported an improvement in level of depression/ anxiety, rating both at as mild to moderate as compared with severe at the time of admission. Denied A/V hallucinations or lethality. Feels that medications, having time to relax/journal/express thoughts/feelings and being away from her job have made a difference. "the job has been my biggest stressor." Did report a headache at level 6 mid morning. Tylenol 650 mg given po at 0951. Minimal effect in an hourly review. Patient has been social, easily engaged, active in groiup participation. Will continue to reinforce positive gains.

## 2023-10-07 NOTE — TREATMENT TEAM
10/07/23 1750   Mcclure Anxiety Scale   Anxious Mood 4   Tension 4   Fears 2   Insomnia 0   Intellectual 4   Depressed Mood 4   Somatic Complaints: Muscular 0   Somatic Complaints: Sensory 0   Cardiovascular Symptoms 0   Respiratory Symptoms 0   Gastrointestinal Symptoms 0   Genitourinary Symptoms 0   Autonomic Symptoms 0   Behavior at Interview 4   Mcclure Anxiety Score 22     Patient endorsed anxiety. Patient stated that she is having racing thoughts and that made her anxious. Atarax 50 mg given for moderate anxiety. Will monitor for effectiveness.

## 2023-10-07 NOTE — PROGRESS NOTES
Progress Note - [address] [place] [address] 24 y.o. female MRN: [id number]  Unit/Bed#: -01 Encounter: [id number]    Assessment/Plan   Principal Problem:    Severe episode of recurrent major depressive disorder, without psychotic features ([address])    Patient was seen resting in bed, noting that she was feeling improved. She notes that her sleep was improving though there was still some trouble. She denies any side effects to her medications and is pleasant and cooperative in discussion. Given some difficulty with sleeping and prior home dose of higher dose of trazodone, we will increase to 100 mg. No other changes at this time. Recommended Treatment:   Increase trazodone 100 mg at bedtime for sleep   Contin other ue current medications    Continue with group therapy, milieu therapy and occupational therapy. Continue frequent safety checks and vitals per unit protocol. Case discussed with treatment team.  Continue with SLIM medical management as indicated  Continue coordinating with case management regarding disposition  Risks, benefits and possible side effects of Medications: Risks, benefits, and possible side effects of medications have been explained to the patient, who verbalizes understanding    Legal Status: 201  ------------------------------------------------------------    Subjective: Per nursing report, Gilbert Sutton has been cooperative on the unit and compliant with medications. Today, Gilbert Sutton is consenting for safety on the unit. She reports feeling " good." Marlena notes having pretty good sleep, though feels there could be some slight improvement. She notes she was taking a higher dose of trazodone at home, was agreeable to having her is increased in the hospital. Gilbert Sutton states having a good appetite. Gilbert Sutton has been taking the medications as prescribed and reporting no side effects. Gilbert Sutton denies suicidal ideations. Gilbert Sutton denies homicidal ideations.  Regarding hallucinations, Marlena denies any auditory or visual hallucinations    PRNs overnight: Atarax 50 mg, trazodone 50 mg  VS: Reviewed, within normal limits    Progress Toward Goals: slow improvement    Psychiatric Review of Systems:  Behavior over the last 24 hours: improved  Sleep: improving  Appetite: adequate, normal compared to baseline  Medication side effects: none verbalized  ROS: Complete review of systems is negative except as noted above.     Vital signs in last 24 hours:  Temp:  [97.4 °F (36.3 °C)-97.5 °F (36.4 °C)] 97.4 °F (36.3 °C)  HR:  [] 93  Resp:  [17-18] 17  BP: (110-130)/(69-70) 110/69    Mental Status Exam:  Appearance:  age appropriate, casually dressed, hair tied up   Behavior:  pleasant, cooperative, calm   Speech:  normal rate, normal volume, normal pitch   Mood:  "Good"   Affect:  constricted, brighter   Thought Process:  logical, goal directed, linear   Associations: intact associations   Thought Content:  no overt delusions   Perceptual Disturbances: Denies auditory or visual hallucinations   Risk Potential: Suicidal ideation - None at present  Homicidal ideation - None at present  Potential for aggression - Not at present   Sensorium:  oriented to person, place and time/date   Memory:  recent and remote memory grossly intact   Consciousness:  alert and awake   Attention/Concentration: attention span and concentration are age appropriate   Insight:  limited and improving   Judgment: limited and improving   Gait/Station: in bed   Motor Activity: no abnormal movements     Current Medications:  Current Facility-Administered Medications   Medication Dose Route Frequency Provider Last Rate   • acetaminophen  650 mg Oral Q6H PRN Wilman Bravo MD     • acetaminophen  650 mg Oral Q4H PRN Wilman Bravo MD     • acetaminophen  975 mg Oral Q6H PRN Wilman Bravo MD     • albuterol  2 puff Inhalation Q6H PRN Shraddha Day PA-C     • aluminum-magnesium hydroxide-simethicone  30 mL Oral Q4H PRN Wilman Bravo MD     • benztropine  1 mg Intramuscular Q4H PRN Max 6/day Jeni Ponce MD     • benztropine  1 mg Oral Q4H PRN Max 6/day Jeni Ponce MD     • bisacodyl  10 mg Rectal Daily PRN Koffi Ybarra MD     • [START ON 11/26/2023] cholecalciferol  1,000 Units Oral Daily Shraddha Day PA-C     • cyanocobalamin  500 mcg Oral Daily Shraddha Day PA-C     • hydrOXYzine HCL  50 mg Oral Q6H PRN Max 4/day Jeni Ponce MD      Or   • diphenhydrAMINE  50 mg Intramuscular Q6H PRN Jeni Ponce MD     • [START ON 10/14/2023] ergocalciferol  50,000 Units Oral Weekly Wilma Montenegro MD     • fluticasone  2 puff Inhalation BID Shraddha Day PA-C     • glycerin-hypromellose-  1 drop Both Eyes Q3H PRN Jeni Ponce MD     • hydrOXYzine HCL  100 mg Oral Q6H PRN Max 4/day Jeni Ponce MD      Or   • LORazepam  2 mg Intramuscular Q6H PRN Jeni Ponce MD     • hydrOXYzine HCL  25 mg Oral Q6H PRN Max 4/day Jeni Ponce MD     • lurasidone  40 mg Oral Daily With Breakfast Warden Walter MD     • nicotine polacrilex  2 mg Oral Q2H PRN Shraddha Day PA-C     • nystatin   Topical BID Shraddha Day PA-C     • OLANZapine  10 mg Oral Q3H PRN Max 3/day Jeni Ponce MD      Or   • OLANZapine  10 mg Intramuscular Q3H PRN Max 3/day Jeni Ponce MD     • OLANZapine  5 mg Oral Q3H PRN Max 6/day Jeni Ponce MD      Or   • OLANZapine  5 mg Intramuscular Q3H PRN Max 6/day Jeni Ponce MD     • OLANZapine  2.5 mg Oral Q3H PRN Max 8/day Jeni Ponce MD     • polyethylene glycol  17 g Oral Daily PRN Koffi Ybarra MD     • propranolol  10 mg Oral Q8H PRN Jeni Ponce MD     • senna-docusate sodium  1 tablet Oral Daily PRN Jeni Ponce MD     • sertraline  150 mg Oral Daily Jeni Ponce MD     • traZODone  50 mg Oral HS PRN Jeni Ponec MD         Behavioral Health Medications: all current active meds have been reviewed. Changes as in plan section above.     Laboratory results:  I have personally reviewed all pertinent laboratory/tests results. No results found for this or any previous visit (from the past 48 hour(s)). This note has been constructed using a voice recognition system. There may be translation, syntax, or grammatical errors. If you have any questions, please contact the dictating author.     Daniel Has, DO

## 2023-10-08 PROCEDURE — 99232 SBSQ HOSP IP/OBS MODERATE 35: CPT

## 2023-10-08 RX ADMIN — HYDROXYZINE HYDROCHLORIDE 50 MG: 50 TABLET, FILM COATED ORAL at 13:03

## 2023-10-08 RX ADMIN — NYSTATIN: 100000 POWDER TOPICAL at 08:51

## 2023-10-08 RX ADMIN — NICOTINE POLACRILEX 2 MG: 2 GUM, CHEWING BUCCAL at 17:15

## 2023-10-08 RX ADMIN — FLUTICASONE PROPIONATE 2 PUFF: 110 AEROSOL, METERED RESPIRATORY (INHALATION) at 08:49

## 2023-10-08 RX ADMIN — SERTRALINE HYDROCHLORIDE 150 MG: 50 TABLET ORAL at 08:49

## 2023-10-08 RX ADMIN — TRAZODONE HYDROCHLORIDE 100 MG: 100 TABLET ORAL at 21:05

## 2023-10-08 RX ADMIN — FLUTICASONE PROPIONATE 2 PUFF: 110 AEROSOL, METERED RESPIRATORY (INHALATION) at 21:05

## 2023-10-08 RX ADMIN — NYSTATIN 1 APPLICATION: 100000 POWDER TOPICAL at 21:05

## 2023-10-08 RX ADMIN — CYANOCOBALAMIN TAB 500 MCG 500 MCG: 500 TAB at 08:49

## 2023-10-08 RX ADMIN — LURASIDONE HYDROCHLORIDE 40 MG: 40 TABLET, COATED ORAL at 08:49

## 2023-10-08 NOTE — PLAN OF CARE
Problem: Risk for Self Injury/Neglect  Goal: Verbalize thoughts and feelings  Description: Interventions:  - Assess and re-assess patient's lethality and potential for self-injury  - Engage patient in 1:1 interactions, daily, for a minimum of 15 minutes  - Encourage patient to express feelings, fears, frustrations, hopes  - Establish rapport/trust with patient   Outcome: Progressing  Goal: Refrain from harming self  Description: Interventions:  - Monitor patient closely, per order  - Develop a trusting relationship  - Supervise medication ingestion, monitor effects and side effects   Outcome: Progressing    See notes

## 2023-10-08 NOTE — NURSING NOTE
Patient observed asleep or resting throughout majority of the night during continual monitoring without signs or symptoms of distress. Non-labored breathing noted. No behaviors. Will remain on safety precautions, q7 minute checks continue.

## 2023-10-08 NOTE — NURSING NOTE
Patient is quiet, calm, and cooperative. Endorsed mild depression and anxiety. Good insight about her illness. Compliant with medications. Denies SI/HI/AVH. No disturbed behavior noted. Q 7 minutes safety checks  maintained.

## 2023-10-08 NOTE — NURSING NOTE
Assumed care of this patient at [phone] from previous nurse. Patient observed resting comfortably in their bed without signs/symptoms of distress. Will maintain on safety precautions and continual monitoring.

## 2023-10-08 NOTE — PROGRESS NOTES
Progress Note - [address] [place] [address] 24 y.o. female MRN: [id number]  Unit/Bed#: -01 Encounter: [id number]    Assessment/Plan   Principal Problem:    Severe episode of recurrent major depressive disorder, without psychotic features ([address])    Patient was seen outside of room in the hallway from patient's preference. Patient notes that she was feeling tired and a little anxious because of worries about her discharge planning. She did have some difficulty sleeping but noted poor sleep hygiene as she was watching television and not preparing for bedtime, but once asleep slept well. She reports overall improvements, and we will not make any changes at this time. Over the weekend her trazodone was increased to her prior home dose of 100 mg    Recommended Treatment:   Continue current medication regimen    Continue with group therapy, milieu therapy and occupational therapy. Continue frequent safety checks and vitals per unit protocol. Case discussed with treatment team.  Continue with SLIM medical management as indicated  Continue coordinating with case management regarding disposition  Risks, benefits and possible side effects of Medications: Risks, benefits, and possible side effects of medications have previously been explained. No new medications at this time. Legal Status: 201  ------------------------------------------------------------    Subjective: Per nursing report, Reese Rojas has been cooperative on the unit and compliant with medications and was noted to be thinking a lot about her anxiety. Today, Reese Rojas is consenting for safety on the unit. She reports feeling " tired."  She notes also feeling a little anxious reporting that she was ruminating on her discharge plans. Reese Rojas notes having some difficulties with sleep, but admits to not preparing for bed and watching television later. She notes that when she fell asleep that she slept well throughout the night which is rare for her.  Reese Rojas states having a good appetite. Fam Bentley has been taking the medications as prescribed and reporting no noticeable side effects. Fam Bentley denies any suicidal ideations. Fam Bentley denies any homicidal ideations. Regarding hallucinations, Marlena denies any auditory or visual hallucinations. PRNs overnight: Tylenol 650, Atarax 50, nicotine gum  VS: Reviewed, within normal limits    Progress Toward Goals: slow improvement    Psychiatric Review of Systems:  Behavior over the last 24 hours: improved  Sleep: Minor difficulties but admits to poor sleep hygiene  Appetite: adequate, normal compared to baseline  Medication side effects: none verbalized  ROS: Complete review of systems is negative except as noted above.     Vital signs in last 24 hours:  Temp:  [97.6 °F (36.4 °C)-98.7 °F (37.1 °C)] 98.7 °F (37.1 °C)  HR:  [90-96] 90  Resp:  [18-20] 20  BP: (114-137)/(60-86) 137/60    Mental Status Exam:  Appearance:  age appropriate, casually dressed, glasses   Behavior:  pleasant, cooperative, calm   Speech:  normal rate, normal pitch, softer volume   Mood:  "Tired, little anxious"   Affect:  constricted   Thought Process:  logical, goal directed, linear   Associations: intact associations   Thought Content:  no overt delusions though admits to ruminating thoughts at night   Perceptual Disturbances: Denies auditory or visual hallucinations   Risk Potential: Suicidal ideation - None at present  Homicidal ideation - None at present  Potential for aggression - Not at present   Sensorium:  oriented to person, place and time/date   Memory:  recent and remote memory grossly intact   Consciousness:  alert and awake   Attention/Concentration: attention span and concentration are age appropriate   Insight:  limited and improving   Judgment: limited and improving   Gait/Station: normal gait/station, normal balance   Motor Activity: no abnormal movements     Current Medications:  Current Facility-Administered Medications   Medication Dose Route Frequency Provider Last Rate   • acetaminophen  650 mg Oral Q6H PRN Benny Alford MD     • acetaminophen  650 mg Oral Q4H PRN Benny Alford MD     • acetaminophen  975 mg Oral Q6H PRN Benny Alford MD     • albuterol  2 puff Inhalation Q6H PRN Shraddha Day PA-C     • aluminum-magnesium hydroxide-simethicone  30 mL Oral Q4H PRN Benny Alford MD     • benztropine  1 mg Intramuscular Q4H PRN Max 6/day Benny Alford MD     • benztropine  1 mg Oral Q4H PRN Max 6/day Benny Alford MD     • bisacodyl  10 mg Rectal Daily PRN Bautista Dash MD     • [START ON 11/26/2023] cholecalciferol  1,000 Units Oral Daily Shraddha Day PA-C     • cyanocobalamin  500 mcg Oral Daily Shraddha Day PA-C     • hydrOXYzine HCL  50 mg Oral Q6H PRN Max 4/day Benny Alford MD      Or   • diphenhydrAMINE  50 mg Intramuscular Q6H PRN Benny Alford MD     • [START ON 10/14/2023] ergocalciferol  50,000 Units Oral Weekly Erum Morton MD     • fluticasone  2 puff Inhalation BID Shraddha Day PA-C     • glycerin-hypromellose-  1 drop Both Eyes Q3H PRN Benny Alford MD     • hydrOXYzine HCL  100 mg Oral Q6H PRN Max 4/day Benny Alford MD      Or   • LORazepam  2 mg Intramuscular Q6H PRN Benny Alford MD     • hydrOXYzine HCL  25 mg Oral Q6H PRN Max 4/day Benny Alford MD     • lurasidone  40 mg Oral Daily With Breakfast Meagan Garcia MD     • nicotine polacrilex  2 mg Oral Q2H PRN Shraddha Day PA-C     • nystatin   Topical BID Shraddha Day PA-C     • OLANZapine  10 mg Oral Q3H PRN Max 3/day Benny Alford MD      Or   • OLANZapine  10 mg Intramuscular Q3H PRN Max 3/day Benny Alford MD     • OLANZapine  5 mg Oral Q3H PRN Max 6/day Benny Alford MD      Or   • OLANZapine  5 mg Intramuscular Q3H PRN Max 6/day Benny Alford MD     • OLANZapine  2.5 mg Oral Q3H PRN Max 8/day Benny Alford MD     • polyethylene glycol  17 g Oral Daily PRN Bautista Dash MD     • propranolol  10 mg Oral Q8H PRN Alejandra Jacobo MD     • senna-docusate sodium  1 tablet Oral Daily PRN Alejandra Jacobo MD     • sertraline  150 mg Oral Daily Alejandra Jacobo MD     • traZODone  100 mg Oral HS Nela Mancini DO         Behavioral Health Medications: all current active meds have been reviewed. Changes as in plan section above. Laboratory results:  I have personally reviewed all pertinent laboratory/tests results. No results found for this or any previous visit (from the past 48 hour(s)). This note has been constructed using a voice recognition system. There may be translation, syntax, or grammatical errors. If you have any questions, please contact the dictating author.     Nela Mancini DO

## 2023-10-08 NOTE — PROGRESS NOTES
Progress Note - America Clark 24 y.o. female MRN: [id number]    Unit/Bed#: [facility] 247-01 Encounter: [id number]        Subjective:   Patient seen and examined at bedside after reviewing the chart and discussing the case with the caring staff. Patient examined at bedside. Patient has no acute concerns. Physical Exam   Vitals: Blood pressure 137/60, pulse 90, temperature 98.7 °F (37.1 °C), temperature source Temporal, resp. rate 20, height 5' 4" (1.626 m), weight 109 kg (240 lb 6.4 oz), last menstrual period 09/20/2023, SpO2 96 %. ,Body mass index is 41.26 kg/m². Constitutional: Patient in no acute distress. HEENT: PERR, EOMI, MMM. Cardiovascular: Normal rate and regular rhythm. Pulmonary/Chest: Effort normal and breath sounds normal.   Abdomen: Soft, + BS, NT. Assessment/Plan:  America Clark is a(n) 24 y.o. female with depression.     1. Asthma. Continue home Flovent HFA, albuterol inhaler as needed. 2.  Tobacco use. Declines nicotine patch. Nicotine gum as needed. 3.  GERD. Stable. Maalox as needed. 4.  Seasonal allergies. Stable at this time. 5.  Vitamin B12 deficiency. Patient started on vitamin B12 supplement. 6.  Vitamin D deficiency. Patient started on vitamin D2 50,000 units weekly for 12 weeks followed by vitamin D3 1000 units daily.

## 2023-10-08 NOTE — TREATMENT TEAM
10/08/23 1303   Mcclure Anxiety Scale   Anxious Mood 4   Tension 4   Fears 0   Insomnia 0   Intellectual 4   Depressed Mood 4   Somatic Complaints: Muscular 0   Somatic Complaints: Sensory 0   Cardiovascular Symptoms 0   Respiratory Symptoms 0   Gastrointestinal Symptoms 0   Genitourinary Symptoms 0   Autonomic Symptoms 0   Behavior at Interview 4   Mcclure Anxiety Score 20     Patient endorsed feeling anxious. She reported of having racing thoughts, ruminating about the past and worries about the future. Thoughts makes her anxious and rated anxiety of 6/10. Atarax 50 mg prn given for moderate anxiety. Will monitor for effectiveness.

## 2023-10-09 PROCEDURE — 99232 SBSQ HOSP IP/OBS MODERATE 35: CPT | Performed by: HOSPITALIST

## 2023-10-09 RX ORDER — TRAZODONE HYDROCHLORIDE 100 MG/1
100 TABLET ORAL
Qty: 30 TABLET | Refills: 0 | Status: SHIPPED | OUTPATIENT
Start: 2023-10-09 | End: 2023-11-08

## 2023-10-09 RX ORDER — TRAZODONE HYDROCHLORIDE 100 MG/1
100 TABLET ORAL
Qty: 30 TABLET | Refills: 0 | Status: CANCELLED | OUTPATIENT
Start: 2023-10-09 | End: 2023-11-08

## 2023-10-09 RX ORDER — ERGOCALCIFEROL 1.25 MG/1
50000 CAPSULE ORAL WEEKLY
Qty: 4 CAPSULE | Refills: 2 | Status: SHIPPED | OUTPATIENT
Start: 2023-10-14 | End: 2023-12-31

## 2023-10-09 RX ORDER — MELATONIN
1000 DAILY
Qty: 30 TABLET | Refills: 0 | Status: CANCELLED | OUTPATIENT
Start: 2023-11-26 | End: 2023-12-26

## 2023-10-09 RX ORDER — ERGOCALCIFEROL 1.25 MG/1
50000 CAPSULE ORAL WEEKLY
Qty: 4 CAPSULE | Refills: 2 | Status: CANCELLED | OUTPATIENT
Start: 2023-10-14 | End: 2023-12-31

## 2023-10-09 RX ORDER — LURASIDONE HYDROCHLORIDE 40 MG/1
40 TABLET, FILM COATED ORAL
Qty: 30 TABLET | Refills: 0 | Status: CANCELLED | OUTPATIENT
Start: 2023-10-09 | End: 2023-11-08

## 2023-10-09 RX ORDER — NYSTATIN 100000 [USP'U]/G
POWDER TOPICAL 3 TIMES DAILY
Status: DISCONTINUED | OUTPATIENT
Start: 2023-10-09 | End: 2023-10-10 | Stop reason: HOSPADM

## 2023-10-09 RX ORDER — MELATONIN
1000 DAILY
Qty: 30 TABLET | Refills: 0 | Status: SHIPPED | OUTPATIENT
Start: 2023-11-26 | End: 2023-12-26

## 2023-10-09 RX ORDER — FLUTICASONE PROPIONATE 110 UG/1
2 AEROSOL, METERED RESPIRATORY (INHALATION)
Qty: 12 G | Refills: 1 | Status: SHIPPED | OUTPATIENT
Start: 2023-10-10

## 2023-10-09 RX ORDER — NYSTATIN 100000 [USP'U]/G
POWDER TOPICAL 3 TIMES DAILY
Qty: 15 G | Refills: 1 | Status: SHIPPED | OUTPATIENT
Start: 2023-10-10

## 2023-10-09 RX ORDER — NYSTATIN 100000 [USP'U]/G
POWDER TOPICAL 2 TIMES DAILY
Qty: 30 G | Refills: 1 | Status: CANCELLED | OUTPATIENT
Start: 2023-10-09 | End: 2023-11-08

## 2023-10-09 RX ORDER — FLUTICASONE PROPIONATE 110 UG/1
2 AEROSOL, METERED RESPIRATORY (INHALATION)
Qty: 12 G | Refills: 1 | Status: CANCELLED | OUTPATIENT
Start: 2023-10-09 | End: 2023-11-08

## 2023-10-09 RX ORDER — LURASIDONE HYDROCHLORIDE 40 MG/1
40 TABLET, FILM COATED ORAL
Qty: 30 TABLET | Refills: 0 | Status: SHIPPED | OUTPATIENT
Start: 2023-10-10 | End: 2023-11-09

## 2023-10-09 RX ADMIN — NYSTATIN: 100000 POWDER TOPICAL at 17:49

## 2023-10-09 RX ADMIN — LURASIDONE HYDROCHLORIDE 40 MG: 40 TABLET, COATED ORAL at 08:56

## 2023-10-09 RX ADMIN — FLUTICASONE PROPIONATE 2 PUFF: 110 AEROSOL, METERED RESPIRATORY (INHALATION) at 09:44

## 2023-10-09 RX ADMIN — NICOTINE POLACRILEX 2 MG: 2 GUM, CHEWING BUCCAL at 18:39

## 2023-10-09 RX ADMIN — HYDROXYZINE HYDROCHLORIDE 50 MG: 50 TABLET, FILM COATED ORAL at 12:42

## 2023-10-09 RX ADMIN — NYSTATIN: 100000 POWDER TOPICAL at 20:51

## 2023-10-09 RX ADMIN — FLUTICASONE PROPIONATE 2 PUFF: 110 AEROSOL, METERED RESPIRATORY (INHALATION) at 20:51

## 2023-10-09 RX ADMIN — NYSTATIN: 100000 POWDER TOPICAL at 09:44

## 2023-10-09 RX ADMIN — CYANOCOBALAMIN TAB 500 MCG 500 MCG: 500 TAB at 08:55

## 2023-10-09 RX ADMIN — SERTRALINE HYDROCHLORIDE 150 MG: 50 TABLET ORAL at 08:55

## 2023-10-09 RX ADMIN — TRAZODONE HYDROCHLORIDE 100 MG: 100 TABLET ORAL at 20:49

## 2023-10-09 NOTE — PROGRESS NOTES
10/09/23   Team Meeting   Meeting Type Daily Rounds   Team Members Present   Team Members Present Physician;;Nurse   Physician Team Member Dr. Vince Erickson MD; Ana Loja PA-C; Dr Cortney Feldman MD   Nursing Team Member Tracy Mesa[address] Work Team Member [place]   Patient/Family Present   Patient Present No   Patient's Family Present No     Pleasant, cooperative, med comp, visible, depressed, improving.

## 2023-10-09 NOTE — PROGRESS NOTES
Progress Note - Rhys León 24 y.o. female MRN: [id number]    Unit/Bed#: [facility] 247-01 Encounter: [id number]        Subjective:   Patient seen and examined at bedside after reviewing the chart and discussing the case with the caring staff. Patient examined at bedside. Patient is reporting rash under her left breast.  Patient is requesting something for it. Physical Exam   Vitals: Blood pressure 111/73, pulse 91, temperature 98.1 °F (36.7 °C), temperature source Temporal, resp. rate 17, height 5' 4" (1.626 m), weight 109 kg (240 lb 6.4 oz), last menstrual period 09/20/2023, SpO2 98 %. ,Body mass index is 41.26 kg/m². Constitutional: Patient in no acute distress. HEENT: PERR, EOMI, MMM. Cardiovascular: Normal rate and regular rhythm. Pulmonary/Chest: Effort normal and breath sounds normal.   Abdomen: Soft, + BS, NT. Assessment/Plan:  Rhys León is a(n) 24 y.o. female with depression.     1. Asthma. Continue home Flovent HFA, albuterol inhaler as needed. 2.  Tobacco use. Declines nicotine patch. Nicotine gum as needed. 3.  GERD. Stable. Maalox as needed. 4.  Seasonal allergies. Stable at this time. 5.  Vitamin B12 deficiency. Patient started on vitamin B12 supplement. 6.  Vitamin D deficiency. Patient started on vitamin D2 50,000 units weekly for 12 weeks followed by vitamin D3 1000 units daily. 7. Intertrigo involving left breast undersurface. I will put the patient on nystatin powder 3 times daily along with dry gauze to keep the area dry.

## 2023-10-09 NOTE — TREATMENT TEAM
10/09/23 1244   Mcclure Anxiety Scale   Anxious Mood 3   Tension 3   Fears 3   Insomnia 0   Intellectual 2   Depressed Mood 3   Somatic Complaints: Muscular 0   Somatic Complaints: Sensory 0   Cardiovascular Symptoms 0   Respiratory Symptoms 0   Gastrointestinal Symptoms 0   Genitourinary Symptoms 0   Autonomic Symptoms 1   Behavior at Interview 3   Mcclure Anxiety Score 18      Patient given 50 mg Atarax for moderate anxiety related to " racing thoughts"

## 2023-10-09 NOTE — PROGRESS NOTES
Progress Note - [address] 24 y.o. female MRN: [id number]   Unit/Bed#: -01 Encounter: [id number]    Behavior over the last 24 hours: improving. Subjective:    Marlena seen today, per staff report has been anxious over weekend, appears depressed. Wanted to sign 72-hours notice, but then reconsidered and did not. According to patient, she is doing well and feels much better, but has some anxiety and mild depression which is caused by the fact that she is staying here on an inpatient treatment. Otherwise no other complaints. Gala Casas asked if she can go home today, and became tearful when was told that we need to make sure she achieves her treatment goals before being discharged. Redirected patient. Encouragement given, and was effective. Patient states that she will not be going back to to the job she had before this admission and will try to find a new job with the conditions are better. Overall though it has positive outlook on her future. Encouraged patient to work on her coping skills to address stressors that she may encounter in the future. Patient agreeable to do that. Patient denies SI, HI, AVH.     Sleep: improved, slept better  Appetite: normal  Medication side effects: denies      Mental Status Evaluation:    Appearance:  age appropriate, casually dressed, adequate grooming   Behavior:  pleasant, cooperative, calm   Speech:  normal rate and volume   Mood:  improved, not as labile   Affect:  less labile   Thought Process:  organized, logical, goal directed, normal rate of thoughts   Associations: intact associations   Thought Content:  normal, no overt delusions   Perceptual Disturbances: no visual hallucinations, denies auditory hallucinations when asked, does not appear responding to internal stimuli   Risk Potential: Suicidal ideation - None at present  Homicidal ideation - None   Sensorium:  oriented to person, place and time/date   Memory:  recent and remote memory grossly intact   Consciousness:  alert and awake   Attention: attention span and concentration are age appropriate   Insight:  improving   Judgment: improving   Gait/Station: normal gait/station   Motor Activity: no abnormal movements     Vital signs in last 24 hours:    Temp:  [98.1 °F (36.7 °C)-98.2 °F (36.8 °C)] 98.1 °F (36.7 °C)  HR:  [] 91  Resp:  [17] 17  BP: (106-111)/(57-73) 111/73    Laboratory results:   I have personally reviewed all pertinent laboratory/tests results. Most Recent Labs:   Lab Results   Component Value Date    WBC 8.32 10/05/2023    RBC 4.40 10/05/2023    HGB 13.8 10/05/2023    HCT 41.4 10/05/2023     10/05/2023    RDW 12.9 10/05/2023    NEUTROABS 4.54 10/05/2023    SODIUM 138 10/05/2023    K 3.7 10/05/2023     10/05/2023    CO2 24 10/05/2023    BUN 9 10/05/2023    CREATININE 0.76 10/05/2023    GLUC 103 10/05/2023    GLUF 103 (H) 10/05/2023    CALCIUM 8.9 10/05/2023    AST 24 10/05/2023    ALT 32 10/05/2023    ALKPHOS 70 10/05/2023    TP 6.9 10/05/2023    ALB 4.1 10/05/2023    TBILI 0.48 10/05/2023    CHOLESTEROL 194 07/20/2023    HDL 29 (L) 07/20/2023    TRIG 233 (H) 07/20/2023    LDLCALC 118 (H) 07/20/2023    NONHDLC 165 07/20/2023    DHL3JZASAXZX 1.609 10/05/2023    PREGUR negative 05/21/2018    HGBA1C 5.6 07/20/2023     07/20/2023           Assessment/Plan   Principal Problem:    Severe episode of recurrent major depressive disorder, without psychotic features ([address])    Recommended Treatment:     Planned medication and treatment changes:  No medication changes today.     All current active medications have been reviewed  Encourage group therapy, milieu therapy and occupational therapy  Behavioral Health checks every 7 minutes  Possible discharge tomorrow if continues to improve  Current Facility-Administered Medications   Medication Dose Route Frequency Provider Last Rate   • acetaminophen  650 mg Oral Q6H PRN Syd Yo MD     • acetaminophen  650 mg Oral Q4H PRN Alejandra Jacobo MD     • acetaminophen  975 mg Oral Q6H PRN Alejandra Jacobo MD     • albuterol  2 puff Inhalation Q6H PRN Shraddha Day PA-C     • aluminum-magnesium hydroxide-simethicone  30 mL Oral Q4H PRN Alejandra Jacobo MD     • benztropine  1 mg Intramuscular Q4H PRN Max 6/day Alejandra Jacobo MD     • benztropine  1 mg Oral Q4H PRN Max 6/day Alejandra Jacobo MD     • bisacodyl  10 mg Rectal Daily PRN Bipin Mathias MD     • [START ON 11/26/2023] cholecalciferol  1,000 Units Oral Daily Shraddha Day PA-C     • cyanocobalamin  500 mcg Oral Daily Shraddha Day PA-C     • hydrOXYzine HCL  50 mg Oral Q6H PRN Max 4/day Alejandra Jacobo MD      Or   • diphenhydrAMINE  50 mg Intramuscular Q6H PRN Alejandra Jacobo MD     • [START ON 10/14/2023] ergocalciferol  50,000 Units Oral Weekly Maximino Olszewski, MD     • fluticasone  2 puff Inhalation BID Shraddha Day PA-C     • glycerin-hypromellose-  1 drop Both Eyes Q3H PRN Alejandra Jacobo MD     • hydrOXYzine HCL  100 mg Oral Q6H PRN Max 4/day Alejandra Jacobo MD      Or   • LORazepam  2 mg Intramuscular Q6H PRN Alejandra Jacobo MD     • hydrOXYzine HCL  25 mg Oral Q6H PRN Max 4/day Alejandra Jacobo MD     • lurasidone  40 mg Oral Daily With Breakfast Sen Aguilar MD     • nicotine polacrilex  2 mg Oral Q2H PRN Shraddha Day PA-C     • nystatin   Topical TID Maximino Olszewski, MD     • OLANZapine  10 mg Oral Q3H PRN Max 3/day Alejandra Jacobo MD      Or   • OLANZapine  10 mg Intramuscular Q3H PRN Max 3/day Alejandra Jacobo MD     • OLANZapine  5 mg Oral Q3H PRN Max 6/day Alejandra Jacobo MD      Or   • OLANZapine  5 mg Intramuscular Q3H PRN Max 6/day Alejandra Jacobo MD     • OLANZapine  2.5 mg Oral Q3H PRN Max 8/day Alejandra Jacobo MD     • polyethylene glycol  17 g Oral Daily PRN Bipin Mathias MD     • propranolol  10 mg Oral Q8H PRN Alejandra Jacobo MD     • senna-docusate sodium  1 tablet Oral Daily PRN Alejandra Jacobo MD     • sertraline  150 mg Oral Daily Ai Fletcher MD     • traZODone  100 mg Oral HS Dylan Santos DO         Risks / Benefits of Treatment:    Risks, benefits, and possible side effects of medications explained to patient and patient verbalizes understanding and agreement for treatment. Counseling / Coordination of Care: Total floor / unit time spent today 35 minutes. Greater than 50% of total time was spent with the patient and / or family counseling and / or coordination of care. A description of counseling / coordination of care:  Patient's progress discussed with staff in treatment team meeting. Medications, treatment progress and treatment plan reviewed with patient.     Ai Fletcher MD 10/09/23

## 2023-10-09 NOTE — SOCIAL WORK
Sw met with pt for dc planning, pt agreeable to [facility] med mgmt appt 10/17, therapy Wed 10/11. Denies current SI/HI/AVH/dep/anx. Pharm confirmed:[facility]. confirmed car is in hospital parking lot. Contact returned to Larry Tanner (Mother) [phone], confirmed dc Tuesday 1030am to car in parking lot, reviewed dc plan.

## 2023-10-09 NOTE — NURSING NOTE
Patient is calm, cooperative, and no disturbed behavior noted. Patient visible in the milieu, Socializes appropriately with peers. Denies SI/HI/AVH. Endorsed mild anxiety and depression. Stated that she ruminates about the bad thing she had done on the past and worried about the future. Compliant with medications. Looking forward of getting discharge. Q 7 minutes safety checks maintained.

## 2023-10-09 NOTE — NURSING NOTE
Patient med and meal compliant. Visible on unit social with peers  Endorses mild anxiety and depression r/t to still being here, wants to go home so she can work on finding a new job. Denies SI HI AVH Safety checks maintained.

## 2023-10-10 VITALS
RESPIRATION RATE: 18 BRPM | WEIGHT: 240.4 LBS | HEIGHT: 64 IN | BODY MASS INDEX: 41.04 KG/M2 | OXYGEN SATURATION: 98 % | SYSTOLIC BLOOD PRESSURE: 106 MMHG | DIASTOLIC BLOOD PRESSURE: 53 MMHG | TEMPERATURE: 99 F | HEART RATE: 105 BPM

## 2023-10-10 PROCEDURE — 99238 HOSP IP/OBS DSCHRG MGMT 30/<: CPT | Performed by: HOSPITALIST

## 2023-10-10 RX ADMIN — CYANOCOBALAMIN TAB 500 MCG 500 MCG: 500 TAB at 08:45

## 2023-10-10 RX ADMIN — FLUTICASONE PROPIONATE 2 PUFF: 110 AEROSOL, METERED RESPIRATORY (INHALATION) at 08:56

## 2023-10-10 RX ADMIN — LURASIDONE HYDROCHLORIDE 40 MG: 40 TABLET, COATED ORAL at 08:45

## 2023-10-10 RX ADMIN — SERTRALINE HYDROCHLORIDE 150 MG: 50 TABLET ORAL at 08:44

## 2023-10-10 NOTE — NURSING NOTE
Patient visible on the unit, calm , pleasant, social with staff and peers. Denies all psychiatric symptoms at this time. States she is excited for discharge. Discharge instructions reviewed with the patient.  Patient verbalizes understanding, asks appropriate quesrions

## 2023-10-10 NOTE — NURSING NOTE
SECURITY BAG #[id number]    GONE OVER WITH PT SECURITY AND BHT    1 WALLET 1 DEBIT CARD 1 DRIVERS LICENSE  2 INSURANCE CARDS $5.OO

## 2023-10-10 NOTE — PLAN OF CARE
Problem: Ineffective Coping  Goal: Cooperates with admission process  Description: Interventions:   - Complete admission process  Outcome: Adequate for Discharge  Goal: Identifies ineffective coping skills  Outcome: Adequate for Discharge  Goal: Identifies healthy coping skills  Outcome: Adequate for Discharge  Goal: Demonstrates healthy coping skills  Outcome: Adequate for Discharge  Goal: Participates in unit activities  Description: Interventions:  - Provide therapeutic environment   - Provide required programming   - Redirect inappropriate behaviors   Outcome: Adequate for Discharge  Goal: Patient/Family participate in treatment and DC plans  Description: Interventions:  - Provide therapeutic environment  Outcome: Adequate for Discharge  Goal: Patient/Family verbalizes awareness of resources  Outcome: Adequate for Discharge     Problem: Risk for Self Injury/Neglect  Goal: Treatment Goal: Remain safe during length of stay, learn and adopt new coping skills, and be free of self-injurious ideation, impulses and acts at the time of discharge  Outcome: Adequate for Discharge  Goal: Verbalize thoughts and feelings  Description: Interventions:  - Assess and re-assess patient's lethality and potential for self-injury  - Engage patient in 1:1 interactions, daily, for a minimum of 15 minutes  - Encourage patient to express feelings, fears, frustrations, hopes  - Establish rapport/trust with patient   Outcome: Adequate for Discharge  Goal: Refrain from harming self  Description: Interventions:  - Monitor patient closely, per order  - Develop a trusting relationship  - Supervise medication ingestion, monitor effects and side effects   Outcome: Adequate for Discharge  Goal: Recognize maladaptive responses and adopt new coping mechanisms  Outcome: Adequate for Discharge     Problem: Depression  Goal: Treatment Goal: Demonstrate behavioral control of depressive symptoms, verbalize feelings of improved mood/affect, and adopt new coping skills prior to discharge  Outcome: Adequate for Discharge  Goal: Verbalize thoughts and feelings  Description: Interventions:  - Assess and re-assess patient's level of risk   - Engage patient in 1:1 interactions, daily, for a minimum of 15 minutes   - Encourage patient to express feelings, fears, frustrations, hopes   Outcome: Adequate for Discharge  Goal: Refrain from isolation  Description: Interventions:  - Develop a trusting relationship   - Encourage socialization   Outcome: Adequate for Discharge  Goal: Refrain from self-neglect  Outcome: Adequate for Discharge  Goal: Complete daily ADLs, including personal hygiene independently, as able  Description: Interventions:  - Observe, teach, and assist patient with ADLS  -  Monitor and promote a balance of rest/activity, with adequate nutrition and elimination   Outcome: Adequate for Discharge     Problem: DISCHARGE PLANNING - CARE MANAGEMENT  Goal: Discharge to post-acute care or home with appropriate resources  Description: INTERVENTIONS:  - Conduct assessment to determine patient/family and health care team treatment goals, and need for post-acute services based on payer coverage, community resources, and patient preferences, and barriers to discharge  - Address psychosocial, clinical, and financial barriers to discharge as identified in assessment in conjunction with the patient/family and health care team  - Arrange appropriate level of post-acute services according to patient’s   needs and preference and payer coverage in collaboration with the physician and health care team  - Communicate with and update the patient/family, physician, and health care team regarding progress on the discharge plan  - Arrange appropriate transportation to post-acute venues  Outcome: Adequate for Discharge     Problem: Ineffective Coping  Goal: Participates in unit activities  Description: Interventions:  - Provide therapeutic environment   - Provide required programming   - Redirect inappropriate behaviors   Outcome: Adequate for Discharge     Problem: Nutrition/Hydration-ADULT  Goal: Nutrient/Hydration intake appropriate for improving, restoring or maintaining nutritional needs  Description: Monitor and assess patient's nutrition/hydration status for malnutrition. Collaborate with interdisciplinary team and initiate plan and interventions as ordered. Monitor patient's weight and dietary intake as ordered or per policy. Utilize nutrition screening tool and intervene as necessary. Determine patient's food preferences and provide high-protein, high-caloric foods as appropriate.      INTERVENTIONS:  - Monitor oral intake, urinary output, labs, and treatment plans  - Assess nutrition and hydration status and recommend course of action  - Evaluate amount of meals eaten  - Assist patient with eating if necessary   - Allow adequate time for meals  - Recommend/ encourage appropriate diets, oral nutritional supplements, and vitamin/mineral supplements  - Order, calculate, and assess calorie counts as needed  - Recommend, monitor, and adjust tube feedings and TPN/PPN based on assessed needs  - Assess need for intravenous fluids  - Provide specific nutrition/hydration education as appropriate  - Include patient/family/caregiver in decisions related to nutrition  Outcome: Adequate for Discharge

## 2023-10-10 NOTE — PROGRESS NOTES
10/10/23   Team Meeting   Meeting Type Daily Rounds   Team Members Present   Team Members Present Physician;Nurse;   Physician Team Member Dr. David Mota MD; Rizwana Basilio PA-C; Dr Sara Hoover MD   Nursing Team Member Ayala montana RN; Leda Molina RN   Social Work Team Member Krystian Pyle [place]   Patient/Family Present   Patient Present No   Patient's Family Present No     DC today with med mgmt [facility], therapy to start tomorrow with [facility]; declined D&A, pcp.

## 2023-10-10 NOTE — NURSING NOTE
Patient visible on unit socializing with peers. Pleasant and cooperative. Denies all psych symptoms.  Safety checks continue Q 7 minutes,

## 2023-10-10 NOTE — DISCHARGE SUMMARY
Discharge Summary - [address] [place] [address] 24 y.o. female MRN: [id number]  Unit/Bed#: Cecille Snyder 247-01 Encounter: [id number]     Admission Date: 10/4/2023         Discharge Date: 10/10/2023    Attending Psychiatrist: Sadi Brewster MD    Reason for Admission/HPI: Depression [F32. A]    Patient is a 24 y.o. female presented with a past psychiatric history of Major Depressive Disorder, who was admitted to the inpatient adult psychiatric unit on a voluntary 201 basis due to worsening anxiety and depression. Hospital Course: The patient was admitted to the inpatient psychiatric unit and started on every 7 minutes precautions. During the hospitalization the patient was attending individual therapy, group therapy, milieu therapy and occupational therapy. Psychiatric medications were titrated over the hospital stay. To address depression the patient was started on antidepressant Zoloft, antipsychotic medication Latuda and hypnotic medication Trazodone. Medication doses were titrated during the hospital course. Prior to beginning of treatment medications risks and benefits and possible side effects including risk of parkinsonian symptoms, Tardive Dyskinesia and metabolic syndrome related to treatment with antipsychotic medications, risk of suicidality and serotonin syndrome related to treatment with antidepressants, risk of impaired next-day mental alertness, complex sleep-related behavior and dependence related to treatment with hypnotic medications and risks and benefits of treatment with medications in pregnancy were reviewed with the patient. The patient verbalized understanding and agreement for treatment. Patient's symptoms improved gradually over the hospital course. At the end of treatment the patient was doing well. Mood was stable at the time of discharge. The patient denied suicidal ideation, intent or plan at the time of discharge and denied homicidal ideation, intent or plan at the time of discharge. There was no overt psychosis at the time of discharge. Sleep and appetite were improved. The patient was tolerating medications and was not reporting any significant side effects at the time of discharge. Since the patient was doing well at the end of the hospitalization, treatment team felt that the patient could be safely discharged to outpatient care. Since Nasra Alvarado was doing well at the end of the hospitalization, treatment team felt that she could be safely discharged to outpatient care. The outpatient follow up with a psychiatrist at [address] [place] [address] was arranged by the unit  upon discharge. Mental Status at time of Discharge:     Appearance:  age appropriate, casually dressed, dressed appropriately, adequate grooming   Behavior:  pleasant, cooperative, calm   Speech:  normal rate and volume   Mood:  improved, euthymic   Affect:  normal range and intensity, appropriate   Language: naming objects and repeating phrases   Thought Process:  organized, goal directed, linear   Associations: intact associations   Thought Content:  no overt delusions   Perceptual Disturbances: no auditory hallucinations, no visual hallucinations   Risk Potential: Suicidal ideation - None  Homicidal ideation - None  Potential for aggression - No   Sensorium:  oriented to person, place and time/date   Memory:  recent and remote memory grossly intact   Consciousness:  alert and awake   Attention: attention span and concentration are improved   Intellect: within normal limits   Fund of Knowledge: awareness of current events: normal   Insight:  improved   Judgment: improved   Muscle Strength Muscle Tone: normal  normal   Gait/Station: normal gait/station   Motor Activity: no abnormal movements       Admission Diagnosis:Depression [F32. A]    Discharge Diagnosis:   Principal Problem:    Severe episode of recurrent major depressive disorder, without psychotic features ([address])  Resolved Problems:    * No resolved hospital problems.  *        Lab results:  Admission on 10/04/2023   Component Date Value   • TSH 3RD GENERATON 10/05/2023 1.609    • Vitamin B-12 10/05/2023 301    • Folate 10/05/2023 18.7    • Vit D, 25-Hydroxy 10/05/2023 16.5 (L)    • Vit D, 1,25-Dihydroxy 10/05/2023 61.1    • Sodium 10/05/2023 138    • Potassium 10/05/2023 3.7    • Chloride 10/05/2023 106    • CO2 10/05/2023 24    • ANION GAP 10/05/2023 8    • BUN 10/05/2023 9    • Creatinine 10/05/2023 0.76    • Glucose 10/05/2023 103    • Glucose, Fasting 10/05/2023 103 (H)    • Calcium 10/05/2023 8.9    • AST 10/05/2023 24    • ALT 10/05/2023 32    • Alkaline Phosphatase 10/05/2023 70    • Total Protein 10/05/2023 6.9    • Albumin 10/05/2023 4.1    • Total Bilirubin 10/05/2023 0.48    • eGFR 10/05/2023 110    • WBC 10/05/2023 8.32    • RBC 10/05/2023 4.40    • Hemoglobin 10/05/2023 13.8    • Hematocrit 10/05/2023 41.4    • MCV 10/05/2023 94    • MCH 10/05/2023 31.4    • MCHC 10/05/2023 33.3    • RDW 10/05/2023 12.9    • MPV 10/05/2023 10.2    • Platelets 90/43/5668 237    • nRBC 10/05/2023 0    • Neutrophils Relative 10/05/2023 54    • Immat GRANS % 10/05/2023 0    • Lymphocytes Relative 10/05/2023 34    • Monocytes Relative 10/05/2023 6    • Eosinophils Relative 10/05/2023 5    • Basophils Relative 10/05/2023 1    • Neutrophils Absolute 10/05/2023 4.54    • Immature Grans Absolute 10/05/2023 0.01    • Lymphocytes Absolute 10/05/2023 2.80    • Monocytes Absolute 10/05/2023 0.49    • Eosinophils Absolute 10/05/2023 0.42    • Basophils Absolute 10/05/2023 0.06    • Ventricular Rate 10/04/2023 76    • Atrial Rate 10/04/2023 76    • NV Interval 10/04/2023 130    • QRSD Interval 10/04/2023 106    • QT Interval 10/04/2023 410    • QTC Interval 10/04/2023 461    • P Axis 10/04/2023 61    • QRS Axis 10/04/2023 85    • T Wave Axis 10/04/2023 43    • Ventricular Rate 10/04/2023 82    • Atrial Rate 10/04/2023 84    • NV Interval 10/04/2023 134    • QRSD Interval 10/04/2023 104    • QT Interval 10/04/2023 410    • QTC Interval 10/04/2023 479    • P Axis 10/04/2023 64    • QRS Axis 10/04/2023 82    • T Wave Axis 10/04/2023 40        Discharge Medications:  Current Discharge Medication List      START taking these medications    Details   cholecalciferol (VITAMIN D3) 1,000 units tablet Take 1 tablet (1,000 Units total) by mouth daily Do not start before November 26, 2023. Qty: 30 tablet, Refills: 0    Associated Diagnoses: Severe episode of recurrent major depressive disorder, without psychotic features (HCC)      cyanocobalamin (VITAMIN B-12) 500 MCG tablet Take 1 tablet (500 mcg total) by mouth daily Do not start before October 10, 2023. Qty: 30 tablet, Refills: 0    Associated Diagnoses: Severe episode of recurrent major depressive disorder, without psychotic features ([address])      ergocalciferol (VITAMIN D2) 50,000 units Take 1 capsule (50,000 Units total) by mouth once a week for 12 doses Do not start before October 14, 2023. Qty: 4 capsule, Refills: 2    Associated Diagnoses: Severe episode of recurrent major depressive disorder, without psychotic features (HCC)      lurasidone (LATUDA) 40 mg tablet Take 1 tablet (40 mg total) by mouth daily with breakfast Do not start before October 10, 2023. Qty: 30 tablet, Refills: 0    Associated Diagnoses: Severe episode of recurrent major depressive disorder, without psychotic features ([address])      nystatin (MYCOSTATIN) powder Apply topically 3 (three) times a day Do not start before October 10, 2023.   Qty: 15 g, Refills: 1    Associated Diagnoses: Candidiasis of breast            Current Discharge Medication List      STOP taking these medications       benzonatate (TESSALON PERLES) 100 mg capsule Comments:   Reason for Stopping:         Trellis Lord 0.15-0.03 &0.01 MG TABS Comments:   Reason for Stopping:         cariprazine (Vraylar) 1.5 MG capsule Comments:   Reason for Stopping:         clotrimazole (LOTRIMIN) 1 % cream Comments: Reason for Stopping:         hydrOXYzine HCL (ATARAX) 50 mg tablet Comments:   Reason for Stopping:              Current Discharge Medication List      CONTINUE these medications which have CHANGED    Details   fluticasone (FLOVENT HFA) 110 MCG/ACT inhaler Inhale 2 puffs 2 (two) times a day Rinse mouth after use. Do not start before October 10, 2023. Qty: 12 g, Refills: 1    Associated Diagnoses: Moderate persistent asthma without complication      sertraline (ZOLOFT) 50 mg tablet Take 3 tablets (150 mg total) by mouth daily Do not start before October 10, 2023. Qty: 90 tablet, Refills: 0    Associated Diagnoses: Severe episode of recurrent major depressive disorder, without psychotic features (HCC)      traZODone (DESYREL) 100 mg tablet Take 1 tablet (100 mg total) by mouth daily at bedtime  Qty: 30 tablet, Refills: 0    Associated Diagnoses: Severe episode of recurrent major depressive disorder, without psychotic features ([address])            Current Discharge Medication List      CONTINUE these medications which have NOT CHANGED    Details   albuterol (Proventil HFA) 90 mcg/act inhaler Inhale 2 puffs every 6 (six) hours as needed for wheezing  Qty: 6.7 g, Refills: 5    Comments: Substitution to a formulary equivalent within the same pharmaceutical class is authorized. Associated Diagnoses: Moderate persistent asthma without complication              Discharge instructions/Information to patient and family:   See after visit summary for information provided to patient and family. Provisions for Follow-Up Care:  See after visit summary for information related to follow-up care and any pertinent home health orders. Discharge Statement   I spent 30 minutes discharging the patient. This time was spent on the day of discharge. I had direct contact with the patient on the day of discharge. Additional documentation is required if more than 30 minutes were spent on discharge.

## 2023-10-10 NOTE — NURSING NOTE
Pt is gong home with the following items   Home meds from the [facility]  Markers Sweat shirt PJ Top and bottom 3 under ware sweat pants tops x 3 sweat pants book 2 stretch pants    Contrband    Headband sneakers purse     Nurses station   Keys cell phone and ear buds    Bag # [id number] Home meds from [facility]

## 2023-10-10 NOTE — PROGRESS NOTES
Progress Note - Rhys León 24 y.o. female MRN: [id number]    Unit/Bed#: [facility] 247-01 Encounter: [id number]        Subjective:   Patient seen and examined at bedside after reviewing the chart and discussing the case with the caring staff. Patient examined at bedside. Patient reports no acute symptoms. Patient is being discharged today. Patient is requesting her prescriptions. I reviewed and reconciled patient's problem list and medications. Physical Exam   Vitals: Blood pressure 106/53, pulse 105, temperature 99 °F (37.2 °C), temperature source Temporal, resp. rate 18, height 5' 4" (1.626 m), weight 109 kg (240 lb 6.4 oz), last menstrual period 09/20/2023, SpO2 98 %. ,Body mass index is 41.26 kg/m². Constitutional: Patient in no acute distress. HEENT: PERR, EOMI, MMM. Cardiovascular: Normal rate and regular rhythm. Pulmonary/Chest: Effort normal and breath sounds normal.   Abdomen: Soft, + BS, NT. Assessment/Plan:  Rhys León is a(n) 24 y.o. female with depression. Medical clearance. Patient is medically cleared for discharge. All scripts will be sent out for the patient.     1. Asthma. Continue home Flovent HFA, albuterol inhaler as needed. 2.  Tobacco use. Declines nicotine patch. Nicotine gum as needed. 3.  GERD. Stable. Maalox as needed. 4.  Seasonal allergies. Stable at this time. 5.  Vitamin B12 deficiency. Patient started on vitamin B12 supplement. 6.  Vitamin D deficiency. Patient started on vitamin D2 50,000 units weekly for 12 weeks followed by vitamin D3 1000 units daily. 7. Intertrigo involving left breast undersurface. I will put the patient on nystatin powder 3 times daily along with dry gauze to keep the area dry.

## 2023-10-10 NOTE — BH TRANSITION RECORD
Contact Information: If you have any questions, concerns, pended studies, tests and/or procedures, or emergencies regarding your inpatient behavioral health visit. Please contact [address] [place] [address] behavioral health [facility] [phone] and ask to speak to a , nurse or physician. A contact is available 24 hours/ 7 days a week at this number. Summary of Procedures Performed During your Stay:  Below is a list of major procedures performed during your hospital stay and a summary of results:  - Cardiac Procedures/Studies: ECG 12 lead. Normal sinus rhythm with sinus arrhythmia  Nonspecific ST abnormality  Long QTc    Pending Studies (From admission, onward)    None        Please follow up on the above pending studies with your PCP and/or referring provider.

## 2023-10-11 ENCOUNTER — TELEMEDICINE (OUTPATIENT)
Dept: PSYCHIATRY | Facility: CLINIC | Age: 23
End: 2023-10-11
Payer: COMMERCIAL

## 2023-10-11 DIAGNOSIS — F12.90 MARIJUANA USE, CONTINUOUS: ICD-10-CM

## 2023-10-11 DIAGNOSIS — F41.9 ANXIETY: ICD-10-CM

## 2023-10-11 DIAGNOSIS — F33.2 SEVERE EPISODE OF RECURRENT MAJOR DEPRESSIVE DISORDER, WITHOUT PSYCHOTIC FEATURES (HCC): Primary | ICD-10-CM

## 2023-10-11 PROCEDURE — 90791 PSYCH DIAGNOSTIC EVALUATION: CPT | Performed by: SOCIAL WORKER
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
## 2023-10-17 ENCOUNTER — TELEMEDICINE (OUTPATIENT)
Dept: PSYCHIATRY | Facility: CLINIC | Age: 23
End: 2023-10-17
Payer: COMMERCIAL

## 2023-10-17 DIAGNOSIS — F41.9 ANXIETY: ICD-10-CM

## 2023-10-17 DIAGNOSIS — F33.2 SEVERE EPISODE OF RECURRENT MAJOR DEPRESSIVE DISORDER, WITHOUT PSYCHOTIC FEATURES (HCC): Primary | ICD-10-CM

## 2023-10-17 DIAGNOSIS — F12.90 MARIJUANA USE, CONTINUOUS: ICD-10-CM

## 2023-10-17 PROCEDURE — 99214 OFFICE O/P EST MOD 30 MIN: CPT

## 2023-10-17 RX ORDER — HYDROXYZINE 50 MG/1
50 TABLET, FILM COATED ORAL 2 TIMES DAILY
Qty: 60 TABLET | Refills: 1 | Status: SHIPPED | OUTPATIENT
Start: 2023-10-17

## 2023-10-17 NOTE — PSYCH
Regular Visit    Problem List Items Addressed This Visit          Other    Anxiety    Relevant Medications    hydrOXYzine HCL (ATARAX) 50 mg tablet    Severe episode of recurrent major depressive disorder, without psychotic features ([address]) - Primary    Relevant Medications    hydrOXYzine HCL (ATARAX) 50 mg tablet    Other Relevant Orders    Ambulatory Referral to Sleep Medicine    Marijuana use, continuous          Encounter provider TRE Schneider    Provider located at    [address] [id number] [facility] [address] [phone]    Recent Visits  No visits were found meeting these conditions. Showing recent visits within past 7 days and meeting all other requirements  Today's Visits  Date Type Provider Dept   10/17/23 Telemedicine TRE Schneider  [facility]   Showing today's visits and meeting all other requirements  Future Appointments  No visits were found meeting these conditions. Showing future appointments within next 150 days and meeting all other requirements       HPI     Current Outpatient Medications   Medication Sig Dispense Refill    albuterol (Proventil HFA) 90 mcg/act inhaler Inhale 2 puffs every 6 (six) hours as needed for wheezing 6.7 g 5    [START ON 11/26/2023] cholecalciferol (VITAMIN D3) 1,000 units tablet Take 1 tablet (1,000 Units total) by mouth daily Do not start before November 26, 2023. 30 tablet 0    cyanocobalamin (VITAMIN B-12) 500 MCG tablet Take 1 tablet (500 mcg total) by mouth daily Do not start before October 10, 2023. 30 tablet 0    ergocalciferol (VITAMIN D2) 50,000 units Take 1 capsule (50,000 Units total) by mouth once a week for 12 doses Do not start before October 14, 2023. 4 capsule 2    fluticasone (FLOVENT HFA) 110 MCG/ACT inhaler Inhale 2 puffs 2 (two) times a day Rinse mouth after use.  Do not start before October 10, 2023. 12 g 1    hydrOXYzine HCL (ATARAX) 50 mg tablet Take 1 tablet (50 mg total) by mouth 2 (two) times a day 60 tablet 1    lurasidone (LATUDA) 40 mg tablet Take 1 tablet (40 mg total) by mouth daily with breakfast Do not start before October 10, 2023. 30 tablet 0    nystatin (MYCOSTATIN) powder Apply topically 3 (three) times a day Do not start before October 10, 2023. 15 g 1    sertraline (ZOLOFT) 50 mg tablet Take 3 tablets (150 mg total) by mouth daily Do not start before October 10, 2023. 90 tablet 0    traZODone (DESYREL) 100 mg tablet Take 1 tablet (100 mg total) by mouth daily at bedtime 30 tablet 0     No current facility-administered medications for this visit. Review of Systems        MEDICATION MANAGEMENT NOTE        [facility]    Name and Date of Birth:  Jose Luis Burgos 24 y.o. 2000 MRN: [id number]    Date of Visit: October 17, 2023    No Known Allergies  SUBJECTIVE:    Pietro Olson is seen today for a follow up for Major Depressive Disorder and anxiety. She continues to improve slowly since the last visit. Patient discharged from inpatient psychiatric unit 1 week ago after a 6-day stay for severe depression, passive suicidal ideation. States she is slowly improving, states more than anything it helped to get away from her external stressors and reevaluate her career path going forward with the help of support during hospitalization. 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 She also has an upcoming interview at [address] [place] [address] and appears motivated to find a more suitable job. 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   Reports mild improvement in depression symptoms, went from previous 10/10 to a current 7/10 depression with no thoughts of self-harm. Continues to struggle with dysphoria, negative thinking, catastrophizing, low energy. We are hopeful and new job will help minimize symptoms and  increase socialization. Initiated Sumit Hernandez less than 2 weeks ago, hopeful to see continued improvement and educated about medication compliance, taking medication with enough caloric intake. She continue Zoloft 150 mg daily, trazodone 100 mg as needed, Atarax 50 mg as needed for anxiety. Continues to educate patient will need to cut down on daily marijuana use, occasional drinking habits on the weekend. Recently initiated psychotherapy which she appears motivated to attend on a weekly basis for emotional regulation, coping skills. Follow-up in 3 weeks. Denies SI. She denies any side effects from medications.     PLAN:    -Continue Latuda 40mg for tx resistant MDD and mood stabilization   Patient education completed for Latuda, risks include:  NMS, leukopenia, neutropenia, tardive dyskinesia, angioedema, suicidality, diabetes, orthostatic hypotension, somnolence or insomnia, GI upset, hyperprolactinemia, akathisia, and possible weight gain (less than other SGA's), etc.     -Continue Zoloft to 150 mg daily for depression  PARQ completed including serotonin syndrome, SIADH, worsening depression, suicidality, induction of sabrina, GI upset, headaches, activation, sexual side effects, sedation, potential drug interactions, and others.    -Continue prn Trazodone 100mg daily for insomnia, uses very rarely   -Continue prn atarax 50mg daily for breakthrough anxiety  -Referred to sleep medicine for sleep apnea workup-reports chronic fatigue, morning headache, snoring    Aware of 24 hour and weekend coverage for urgent situations accessed by calling [facility] main practice number  Referral for individual psychotherapy    Diagnoses and all orders for this visit:    Severe episode of recurrent major depressive disorder, without psychotic features [facility])  -     Ambulatory Referral to Sleep Medicine; Future    Anxiety  -     hydrOXYzine HCL (ATARAX) 50 mg tablet; Take 1 tablet (50 mg total) by mouth 2 (two) times a day    Marijuana use, continuous        Current Outpatient Medications on File Prior to Visit   Medication Sig Dispense Refill    albuterol (Proventil HFA) 90 mcg/act inhaler Inhale 2 puffs every 6 (six) hours as needed for wheezing 6.7 g 5    [START ON 11/26/2023] cholecalciferol (VITAMIN D3) 1,000 units tablet Take 1 tablet (1,000 Units total) by mouth daily Do not start before November 26, 2023. 30 tablet 0    cyanocobalamin (VITAMIN B-12) 500 MCG tablet Take 1 tablet (500 mcg total) by mouth daily Do not start before October 10, 2023. 30 tablet 0    ergocalciferol (VITAMIN D2) 50,000 units Take 1 capsule (50,000 Units total) by mouth once a week for 12 doses Do not start before October 14, 2023. 4 capsule 2    fluticasone (FLOVENT HFA) 110 MCG/ACT inhaler Inhale 2 puffs 2 (two) times a day Rinse mouth after use. Do not start before October 10, 2023. 12 g 1    lurasidone (LATUDA) 40 mg tablet Take 1 tablet (40 mg total) by mouth daily with breakfast Do not start before October 10, 2023. 30 tablet 0    nystatin (MYCOSTATIN) powder Apply topically 3 (three) times a day Do not start before October 10, 2023. 15 g 1    sertraline (ZOLOFT) 50 mg tablet Take 3 tablets (150 mg total) by mouth daily Do not start before October 10, 2023. 90 tablet 0    traZODone (DESYREL) 100 mg tablet Take 1 tablet (100 mg total) by mouth daily at bedtime 30 tablet 0     No current facility-administered medications on file prior to visit. HPI ROS Appetite Changes and Sleep:     She reports hypersomnia, decreased appetite, low energy.  Denies homicidal ideation, denies suicidal ideation    Review Of Systems:      HPI ROS:               Medication Side Effects:  denies    Depression (10 worst): 7/10    Anxiety (10 worst): 710    Safety concerns (SI, HI, etc): Denies si and hi    Sleep: 10 hrs refer to sleep apnea    Energy: low    Appetite: 2 meals    Weight Change: denies        Mental Status Evaluation:    Appearance Adequate hygiene and grooming   Behavior restless and fidgety   Mood depressed  Depression Scale - 7 of 10 (0 = No depression)  Anxiety Scale - 7 of 10 (0 = No anxiety)   Speech Normal rate and volume   Affect constricted   Thought Processes Goal directed and coherent   Thought Content Does not verbalize delusional material   Associations Tightly connected   Perceptual Disturbances Denies hallucinations and does not appear to be responding to internal stimuli   Risk Potential Suicidal/Homicidal Ideation - No evidence of suicidal or homicidal ideation and patient does not verbalize suicidal or homicidal ideation  Risk of Violence - No evidence of risk for violence found on assessment  Risk of Self Mutilation - No evidence of risk for self mutilation found on assessment   Orientation oriented to person, place, time/date and situation   Memory recent and remote memory grossly intact   Consciousness alert and awake   Attention/Concentration attention span and concentration are age appropriate   Insight intact   Judgement intact   Muscle Strength and Gait normal muscle strength and normal muscle tone, normal gait/station and normal balance   Motor Activity no abnormal movements   Language no difficulty naming common objects, no difficulty repeating a phrase, no difficulty writing a sentence   Fund of Knowledge adequate knowledge of current events  adequate fund of knowledge regarding past history  adequate fund of knowledge regarding vocabulary      Traumatic History Update:     New Onset of Abuse Since Last Encounter:   no  Traumatic Events Since Last Encounter:   no    Past Medical History:    Past Medical History:   Diagnosis Date    Anxiety     Asthma     Depression     Environmental allergies     Last assessed: 1/18/17    GERD (gastroesophageal reflux disease)     Hypoglycemia     Hypothyroidism         Past Surgical History:   Procedure Laterality Date    COLONOSCOPY  2016 & 2018    Fiberoptic    EGD  2016    EGD  06/2016    MYRINGOTOMY      TONSILLECTOMY      TOOTH EXTRACTION       No Known Allergies  Substance Abuse History:    Social History     Substance and Sexual Activity   Alcohol Use Yes    Alcohol/week: 10.0 standard drinks of alcohol    Types: 10 Glasses of wine per week    Comment: every 2-3 days, more on weekends     Social History     Substance and Sexual Activity   Drug Use Yes    Frequency: 4.0 times per week    Types: Marijuana    Comment: recreational      Social History:    Social History     Socioeconomic History    Marital status: Single     Spouse name: Not on file    Number of children: 0    Years of education: Not on file    Highest education level: Bachelor's degree (e.g., BA, AB, BS)   Occupational History    Occupation: UNEMPLOYED   Tobacco Use    Smoking status: Every Day     Types: Cigarettes    Smokeless tobacco: Never    Tobacco comments:     1/2 pack per month   Vaping Use    Vaping Use: Former    Substances: Nicotine   Substance and Sexual Activity    Alcohol use:  Yes     Alcohol/week: 10.0 standard drinks of alcohol     Types: 10 Glasses of wine per week     Comment: every 2-3 days, more on weekends    Drug use: Yes     Frequency: 4.0 times per week     Types: Marijuana     Comment: recreational     Sexual activity: Not Currently   Other Topics Concern    Not on file   Social History Narrative    Not on file     Social Determinants of Health     Financial Resource Strain: Not on file   Food Insecurity: Not on file   Transportation Needs: Not on file   Physical Activity: Not on file   Stress: Not on file   Social Connections: Not on file   Intimate Partner Violence: Not on file   Housing Stability: Not on file     Family Psychiatric History:     Family History   Problem Relation Age of Onset    Anxiety disorder Mother     Diabetes Mother         due to underlying condition with both eyes affected by proliferative retinopathy and traction retinal detatchments involving maculae, without long term use of insulin    Depression Father     Anxiety disorder Father     Alcohol abuse Father     Sleep apnea Father     Suicide Attempts Maternal Grandfather     Suicide Attempts Maternal Grandmother     Crohn's disease Paternal Grandfather      History Review: The following portions of the patient's history were reviewed and updated as appropriate: allergies, current medications, past family history, past medical history, past social history, past surgical history and problem list     OBJECTIVE:     Vital signs in last 24 hours: There were no vitals filed for this visit. Laboratory Results:   Recent Labs (last 2 months):    Admission on 10/04/2023, Discharged on 10/10/2023   Component Date Value    TSH 3RD GENERATON 10/05/2023 1.609     Vitamin B-12 10/05/2023 301     Folate 10/05/2023 18.7     Vit D, 25-Hydroxy 10/05/2023 16.5 (L)     Vit D, 1,25-Dihydroxy 10/05/2023 61.1     Sodium 10/05/2023 138     Potassium 10/05/2023 3.7     Chloride 10/05/2023 106     CO2 10/05/2023 24     ANION GAP 10/05/2023 8     BUN 10/05/2023 9     Creatinine 10/05/2023 0.76     Glucose 10/05/2023 103     Glucose, Fasting 10/05/2023 103 (H)     Calcium 10/05/2023 8.9     AST 10/05/2023 24     ALT 10/05/2023 32     Alkaline Phosphatase 10/05/2023 70     Total Protein 10/05/2023 6.9     Albumin 10/05/2023 4.1     Total Bilirubin 10/05/2023 0.48     eGFR 10/05/2023 110     WBC 10/05/2023 8.32     RBC 10/05/2023 4.40     Hemoglobin 10/05/2023 13.8     Hematocrit 10/05/2023 41.4     MCV 10/05/2023 94     MCH 10/05/2023 31.4     MCHC 10/05/2023 33.3     RDW 10/05/2023 12.9     MPV 10/05/2023 10.2     Platelets 91/65/2765 237     nRBC 10/05/2023 0     Neutrophils Relative 10/05/2023 54     Immat GRANS % 10/05/2023 0     Lymphocytes Relative 10/05/2023 34     Monocytes Relative 10/05/2023 6     Eosinophils Relative 10/05/2023 5     Basophils Relative 10/05/2023 1     Neutrophils Absolute 10/05/2023 4.54     Immature Grans Absolute 10/05/2023 0.01     Lymphocytes Absolute 10/05/2023 2.80     Monocytes Absolute 10/05/2023 0.49     Eosinophils Absolute 10/05/2023 0.42     Basophils Absolute 10/05/2023 0.06     Ventricular Rate 10/04/2023 76     Atrial Rate 10/04/2023 76     SD Interval 10/04/2023 130     QRSD Interval 10/04/2023 106     QT Interval 10/04/2023 410     QTC Interval 10/04/2023 461     P Axis 10/04/2023 61     QRS [place] 10/04/2023 85     T Wave [place] 10/04/2023 43     Ventricular Rate 10/04/2023 82     Atrial Rate 10/04/2023 84     SD Interval 10/04/2023 134     QRSD Interval 10/04/2023 104     QT Interval 10/04/2023 410     QTC Interval 10/04/2023 479     P Axis 10/04/2023 64     QRS [place] 10/04/2023 82     T Wave [place] 10/04/2023 40    Admission on 10/04/2023, Discharged on 10/04/2023   Component Date Value    Amph/Meth UR 10/04/2023 Negative     Barbiturate Ur 10/04/2023 Negative     Benzodiazepine Urine 10/04/2023 Negative     Cocaine Urine 10/04/2023 Negative     Methadone Urine 10/04/2023      Opiate Urine 10/04/2023 Negative     PCP Ur 10/04/2023 Negative     THC Urine 10/04/2023 Positive (A)     Oxycodone Urine 10/04/2023 Negative     EXTBreath Alcohol 10/04/2023 0.000     EXT Preg Test, Ur 10/04/2023 Negative     Control 10/04/2023 Valid     EXTBreath Alcohol 10/04/2023 0.000     Color, UA 10/04/2023 Yellow     Clarity, UA 10/04/2023 Clear     Specific Gravity, UA 10/04/2023 1.020     pH, UA 10/04/2023 6.0     Leukocytes, UA 10/04/2023 1+ (A)     Nitrite, UA 10/04/2023 Negative     Protein, UA 10/04/2023 Negative     Glucose, UA 10/04/2023 Negative     Ketones, UA 10/04/2023 Negative     Urobilinogen, UA 10/04/2023 0.2     Bilirubin, UA 10/04/2023 Negative     Occult Blood, UA 10/04/2023 Negative     RBC, UA 10/04/2023 1-2     WBC, UA 10/04/2023 None Seen     Epithelial Cells 10/04/2023 Occasional     Bacteria, UA 10/04/2023 None Seen    Clinical Support on 09/22/2023   Component Date Value    TB Skin Test 09/25/2023 Negative     Induration 09/25/2023 0    Office Visit on 08/24/2023   Component Date Value    POCT SARS-CoV-2 Ag 08/24/2023 Negative     VALID CONTROL 08/24/2023 Valid      I have personally reviewed all pertinent laboratory/tests results. Suicide/Homicide Risk Assessment:    Risk of Harm to Self:  Protective Factors: no current suicidal ideation, access to mental health treatment, compliant with medications, compliant with mental health treatment, medical compliance  Based on today's assessment, Marlena presents the following risk of harm to self: moderate Referred for immediate inpatient stay, mother driving pt to ER now. Risk of Harm to Others:  Based on today's assessment, Marlena presents the following risk of harm to others: minimal    The following interventions are recommended: referral for psychotherapy, referral for inpatient admission    Medications Risks/Benefits:      Risks, Benefits And Possible Side Effects Of Medications:    Discussed risks and benefits of treatment with patient including risk of suicidality, serotonin syndrome, increased QTc interval and SIADH related to treatment with antidepressants; Risk of induction of manic symptoms in certain patient populations and risk of parkinsonian symptoms, metabolic syndrome, tardive dyskinesia and neuroleptic malignant syndrome related to treatment with antipsychotic medications     Controlled Medication Discussion:     Not applicable    Treatment Plan:    Due for update/Updated:   no  Last treatment plan done 6/20 . Treatment Plan due on 12/20/24.     TRE Chester 10/17/23    Visit Time    Visit Start Time: 9am  Visit Stop Time: 9:25  Total Visit Duration:  25 minutes

## 2023-10-30 ENCOUNTER — TELEPHONE (OUTPATIENT)
Age: 23
End: 2023-10-30

## 2023-10-30 DIAGNOSIS — B37.89 CANDIDIASIS OF BREAST: Primary | ICD-10-CM

## 2023-10-30 RX ORDER — CLOTRIMAZOLE 1 %
CREAM (GRAM) TOPICAL 2 TIMES DAILY
Qty: 14 G | Refills: 2 | Status: SHIPPED | OUTPATIENT
Start: 2023-10-30

## 2023-10-30 NOTE — TELEPHONE ENCOUNTER
Patient calling stating she has yeast infection under both breasts. Asking provider to call in Clotrimalzole cream 1%. Pharmacy [facility] in [place].      Patient can be reached at [phone]

## 2023-11-02 ENCOUNTER — TELEPHONE (OUTPATIENT)
Dept: PSYCHIATRY | Facility: CLINIC | Age: 23
End: 2023-11-02

## 2023-11-02 NOTE — TELEPHONE ENCOUNTER
Patient mother called asking to speak with Dr Janessa Giraldo said that she recived a certified letter from insurance. Solange Cortes (mother) said patient was in inpatient in [facility]. It was explained to her that she was calling [facility] clinic number that she was calling was not the correct number, The correct number was given and the main billing number was provided. Solange Cortes fax the certified letter to our office it was scanned into patients chart.

## 2023-11-03 ENCOUNTER — TELEPHONE (OUTPATIENT)
Dept: PSYCHIATRY | Facility: CLINIC | Age: 23
End: 2023-11-03

## 2023-11-03 NOTE — TELEPHONE ENCOUNTER
Lauren Nolan requested a call back to discuss scheduling an appt with [facility].    -They can be reached at P# . [phone]      Thank you.

## 2023-11-07 ENCOUNTER — TELEPHONE (OUTPATIENT)
Dept: PSYCHIATRY | Facility: CLINIC | Age: 23
End: 2023-11-07

## 2023-11-07 DIAGNOSIS — J45.40 MODERATE PERSISTENT ASTHMA WITHOUT COMPLICATION: ICD-10-CM

## 2023-11-07 RX ORDER — FLUTICASONE PROPIONATE 110 UG/1
2 AEROSOL, METERED RESPIRATORY (INHALATION)
Qty: 12 G | Refills: 1 | Status: CANCELLED | OUTPATIENT
Start: 2023-11-07

## 2023-11-07 RX ORDER — MOMETASONE FUROATE 100 UG/1
2 AEROSOL RESPIRATORY (INHALATION) 2 TIMES DAILY
Qty: 13 G | Refills: 3 | Status: SHIPPED | OUTPATIENT
Start: 2023-11-07 | End: 2023-11-08

## 2023-11-07 NOTE — TELEPHONE ENCOUNTER
Patient called requesting a refill of       fluticasone (FLOVENT HFA) 110 MCG/ACT inhaler Inhale 2 puffs 2 (two) times a day Rinse mouth after use    Please send to [facility]

## 2023-11-08 ENCOUNTER — TELEPHONE (OUTPATIENT)
Age: 23
End: 2023-11-08

## 2023-11-08 DIAGNOSIS — J45.40 MODERATE PERSISTENT ASTHMA WITHOUT COMPLICATION: Primary | ICD-10-CM

## 2023-11-08 RX ORDER — MOMETASONE FUROATE 200 UG/1
1 AEROSOL RESPIRATORY (INHALATION) 2 TIMES DAILY
Qty: 13 G | Refills: 2 | Status: SHIPPED | OUTPATIENT
Start: 2023-11-08

## 2023-11-08 NOTE — TELEPHONE ENCOUNTER
Patient called Asmanex HFA 100mcg/act inhalor is on back order, she called around to a couple different pharmacies and no one has it available. She called stating that [facility] has a 200mcg inhalor in stock and is requesting a script for that be sent in.

## 2023-11-10 ENCOUNTER — OFFICE VISIT (OUTPATIENT)
Age: 23
End: 2023-11-10
Payer: COMMERCIAL

## 2023-11-10 VITALS
SYSTOLIC BLOOD PRESSURE: 150 MMHG | HEIGHT: 64 IN | TEMPERATURE: 97.4 F | DIASTOLIC BLOOD PRESSURE: 93 MMHG | WEIGHT: 236.77 LBS | RESPIRATION RATE: 18 BRPM | BODY MASS INDEX: 40.42 KG/M2 | OXYGEN SATURATION: 95 % | HEART RATE: 112 BPM

## 2023-11-10 DIAGNOSIS — J06.9 VIRAL URI WITH COUGH: Primary | ICD-10-CM

## 2023-11-10 LAB
SARS-COV-2 AG UPPER RESP QL IA: NEGATIVE
VALID CONTROL: NORMAL

## 2023-11-10 PROCEDURE — 87811 SARS-COV-2 COVID19 W/OPTIC: CPT | Performed by: EMERGENCY MEDICINE

## 2023-11-10 PROCEDURE — 99213 OFFICE O/P EST LOW 20 MIN: CPT | Performed by: EMERGENCY MEDICINE

## 2023-11-10 RX ORDER — PREDNISONE 10 MG/1
TABLET ORAL
Qty: 27 TABLET | Refills: 0 | Status: SHIPPED | OUTPATIENT
Start: 2023-11-10

## 2023-11-10 RX ORDER — BENZONATATE 200 MG/1
200 CAPSULE ORAL
Qty: 12 CAPSULE | Refills: 0 | Status: SHIPPED | OUTPATIENT
Start: 2023-11-10

## 2023-11-10 NOTE — PATIENT INSTRUCTIONS
You have been diagnosed with a Viral Upper Respiratory infection and your symptoms should resolve over the next 7 to 10 days with the treatments recommended today. If they do not, it is possible that you have developed a bacterial infection and you should return. If you were to take an antibiotic while you are still in the viral stage, you will not get better any faster, but could kill off the good germs in your body as well as make the germs in you resistant to the antibiotic. Take an expectorant - guaifenesin should be the only ingredient - during the day, and the cough suppressant (ex. Robitussin DM or Tessalon) if needed at night only. Take Zinc 50 mg every 12 hours for the next week (the dose is important so do not just take a multivitamin with zinc or an over-the-counter cold med with zinc such as Airborne or Zicam, as that will not give you the sufficient dose). You should also take Vitamin D3 5000 i.u.s per day for the next 1 week, and Vitamin-C 1 g twice daily (and again dosages are important, do not think you are getting enough vitamin C just by drinking extra orange juice). You may use Flonase as discussed. You may also take a decongestant like Sudafed, unless you have hypertension or cardiac disease. Avoid nasal sprays like Afrin or other vasoconstrictors. If you are diabetic you should adhere strictly to your diabetic diet and monitor blood sugar closely while on prednisone and you should discontinue the prednisone if blood sugar becomes significantly elevated. Avoid nonsteroidal anti-inflammatories like Advil or Aleve while on prednisone. Upper Respiratory Infection   AMBULATORY CARE:   An upper respiratory infection  is also called a cold. Your nose, throat, ears, and sinuses may be affected. You are more likely to get a cold in the winter. Your risk of getting a cold may be increased if you smoke cigarettes or have allergies, such as hay fever. What causes a cold?   A cold is caused by a virus. Many viruses can cause a cold, and each is contagious. This means the virus can be easily spread to another person when the sick person coughs or sneezes. The virus can also be spread if you touch an object the virus is on and then touch your eyes, mouth, or nose. Cold symptoms  are usually worst for the first 3 to 5 days. You may have any of the following:  Runny or stuffy nose    Sneezing and coughing    Sore throat or hoarseness    Red, watery, and sore eyes    Fatigue (you feel more tired than usual)    Chills and fever    Headache, body aches, or sore muscles    Call your local emergency number (911 in the [address]) if:   You have chest pain or trouble breathing. Seek care immediately if:   You have a fever over 102ºF (39ºC). Call your doctor if:   You have a low fever. Your sore throat gets worse or you see white or yellow spots in your throat. Your symptoms get worse after 3 to 5 days or are not better in 14 days. You have a rash anywhere on your skin. You have large, tender lumps in your neck. You have thick, green, or yellow drainage from your nose. You cough up thick yellow, green, or bloody mucus. You have a bad earache. You have questions or concerns about your condition or care. Treatment:  Colds are caused by viruses and do not get better with antibiotics. Most people get better in 7 to 14 days. You may continue to cough for 2 to 3 weeks. The following may help decrease your symptoms:  Decongestants  help reduce nasal congestion and help you breathe more easily. If you take decongestant pills, they may make you feel restless or not able to sleep. Do not use decongestant sprays for more than a few days. Cough suppressants  help reduce coughing. Ask your healthcare provider which type of cough medicine is best for you. NSAIDs , such as ibuprofen, help decrease swelling, pain, and fever. NSAIDs can cause stomach bleeding or kidney problems in certain people. If you take blood thinner medicine, always ask your healthcare provider if NSAIDs are safe for you. Always read the medicine label and follow directions. Acetaminophen  decreases pain and fever. It is available without a doctor's order. Ask how much to take and how often to take it. Follow directions. Read the labels of all other medicines you are using to see if they also contain acetaminophen, or ask your doctor or pharmacist. Acetaminophen can cause liver damage if not taken correctly. Do not use more than 4 grams (4,000 milligrams) total of acetaminophen in one day. Manage a cold:   Rest as much as possible. Slowly start to do more each day. Drink more liquids as directed. Liquids will help thin and loosen mucus so you can cough it up. Liquids will also help prevent dehydration. Liquids that help prevent dehydration include water, fruit juice, and broth. Do not drink liquids that contain caffeine. Caffeine can increase your risk for dehydration. Ask your healthcare provider how much liquid to drink each day. Soothe a sore throat. Gargle with warm salt water. Make salt water by dissolving ¼ teaspoon salt in 1 cup warm water. You may also suck on hard candy or throat lozenges. You may use a sore throat spray. Use a humidifier or vaporizer. Use a cool mist humidifier or a vaporizer to increase air moisture in your home. This may make it easier for you to breathe and help decrease your cough. Use saline nasal drops as directed. These help relieve congestion. Apply petroleum-based jelly around the outside of your nostrils. This can decrease irritation from blowing your nose. Do not smoke. Nicotine and other chemicals in cigarettes and cigars can make your symptoms worse. They can also cause infections such as bronchitis or pneumonia. Ask your healthcare provider for information if you currently smoke and need help to quit. E-cigarettes or smokeless tobacco still contain nicotine.  Talk to your healthcare provider before you use these products. Prevent a cold: Wash your hands often. Use soap and water every time you wash your hands. Rub your soapy hands together, lacing your fingers. Use the fingers of one hand to scrub under the nails of the other hand. Wash for at least 20 seconds. Rinse with warm, running water for several seconds. Then dry your hands. Use germ-killing gel if soap and water are not available. Do not touch your eyes or mouth without washing your hands first.     Cover a sneeze or cough. Use a tissue that covers your mouth and nose. Put the used tissue in the trash right away. Use the bend of your arm if a tissue is not available. Wash your hands well with soap and water or use a hand . Do not stand close to anyone who is sneezing or coughing. Try to stay away from others while you are sick. This is especially important during the first 2 to 3 days when the virus is more easily spread. Wait until a fever, cough, or other symptoms are gone before you return to work or other regular activities. Do not share items while you are sick. This includes food, drinks, eating utensils, and dishes. Follow up with your doctor as directed:  Write down your questions so you remember to ask them during your visits. © Copyright [employer] 2022 Information is for End User's use only and may not be sold, redistributed or otherwise used for commercial purposes. All illustrations and images included in CareNotes® are the copyrighted property of [employer] or [address] [place] [address]  The above information is an  only. It is not intended as medical advice for individual conditions or treatments. Talk to your doctor, nurse or pharmacist before following any medical regimen to see if it is safe and effective for you.

## 2023-11-10 NOTE — PROGRESS NOTES
[facility]        NAME: Colton Holman is a 24 y.o. female  : 2000    MRN: [id number]  DATE: November 10, 2023  TIME: 4:00 PM    Assessment and Plan   Viral URI with cough [J06.9]  1. Viral URI with cough  Poct Covid 19 Rapid Antigen Test    predniSONE 10 mg tablet    benzonatate (TESSALON) 200 MG capsule            Patient Instructions     Patient Instructions   You have been diagnosed with a Viral Upper Respiratory infection and your symptoms should resolve over the next 7 to 10 days with the treatments recommended today. If they do not, it is possible that you have developed a bacterial infection and you should return. If you were to take an antibiotic while you are still in the viral stage, you will not get better any faster, but could kill off the good germs in your body as well as make the germs in you resistant to the antibiotic. Take an expectorant - guaifenesin should be the only ingredient - during the day, and the cough suppressant (ex. Robitussin DM or Tessalon) if needed at night only. Take Zinc 50 mg every 12 hours for the next week (the dose is important so do not just take a multivitamin with zinc or an over-the-counter cold med with zinc such as Airborne or Zicam, as that will not give you the sufficient dose). You should also take Vitamin D3 5000 i.u.s per day for the next 1 week, and Vitamin-C 1 g twice daily (and again dosages are important, do not think you are getting enough vitamin C just by drinking extra orange juice). You may use Flonase as discussed. You may also take a decongestant like Sudafed, unless you have hypertension or cardiac disease. Avoid nasal sprays like Afrin or other vasoconstrictors. If you are diabetic you should adhere strictly to your diabetic diet and monitor blood sugar closely while on prednisone and you should discontinue the prednisone if blood sugar becomes significantly elevated.   Avoid nonsteroidal anti-inflammatories like Advil or Aleve while on prednisone. Upper Respiratory Infection   AMBULATORY CARE:   An upper respiratory infection  is also called a cold. Your nose, throat, ears, and sinuses may be affected. You are more likely to get a cold in the winter. Your risk of getting a cold may be increased if you smoke cigarettes or have allergies, such as hay fever. What causes a cold? A cold is caused by a virus. Many viruses can cause a cold, and each is contagious. This means the virus can be easily spread to another person when the sick person coughs or sneezes. The virus can also be spread if you touch an object the virus is on and then touch your eyes, mouth, or nose. Cold symptoms  are usually worst for the first 3 to 5 days. You may have any of the following:  Runny or stuffy nose    Sneezing and coughing    Sore throat or hoarseness    Red, watery, and sore eyes    Fatigue (you feel more tired than usual)    Chills and fever    Headache, body aches, or sore muscles    Call your local emergency number (911 in the [address]) if:   You have chest pain or trouble breathing. Seek care immediately if:   You have a fever over 102ºF (39ºC). Call your doctor if:   You have a low fever. Your sore throat gets worse or you see white or yellow spots in your throat. Your symptoms get worse after 3 to 5 days or are not better in 14 days. You have a rash anywhere on your skin. You have large, tender lumps in your neck. You have thick, green, or yellow drainage from your nose. You cough up thick yellow, green, or bloody mucus. You have a bad earache. You have questions or concerns about your condition or care. Treatment:  Colds are caused by viruses and do not get better with antibiotics. Most people get better in 7 to 14 days. You may continue to cough for 2 to 3 weeks. The following may help decrease your symptoms:  Decongestants  help reduce nasal congestion and help you breathe more easily.  If you take decongestant pills, they may make you feel restless or not able to sleep. Do not use decongestant sprays for more than a few days. Cough suppressants  help reduce coughing. Ask your healthcare provider which type of cough medicine is best for you. NSAIDs , such as ibuprofen, help decrease swelling, pain, and fever. NSAIDs can cause stomach bleeding or kidney problems in certain people. If you take blood thinner medicine, always ask your healthcare provider if NSAIDs are safe for you. Always read the medicine label and follow directions. Acetaminophen  decreases pain and fever. It is available without a doctor's order. Ask how much to take and how often to take it. Follow directions. Read the labels of all other medicines you are using to see if they also contain acetaminophen, or ask your doctor or pharmacist. Acetaminophen can cause liver damage if not taken correctly. Do not use more than 4 grams (4,000 milligrams) total of acetaminophen in one day. Manage a cold:   Rest as much as possible. Slowly start to do more each day. Drink more liquids as directed. Liquids will help thin and loosen mucus so you can cough it up. Liquids will also help prevent dehydration. Liquids that help prevent dehydration include water, fruit juice, and broth. Do not drink liquids that contain caffeine. Caffeine can increase your risk for dehydration. Ask your healthcare provider how much liquid to drink each day. Soothe a sore throat. Gargle with warm salt water. Make salt water by dissolving ¼ teaspoon salt in 1 cup warm water. You may also suck on hard candy or throat lozenges. You may use a sore throat spray. Use a humidifier or vaporizer. Use a cool mist humidifier or a vaporizer to increase air moisture in your home. This may make it easier for you to breathe and help decrease your cough. Use saline nasal drops as directed. These help relieve congestion.     Apply petroleum-based jelly around the outside of your nostrils. This can decrease irritation from blowing your nose. Do not smoke. Nicotine and other chemicals in cigarettes and cigars can make your symptoms worse. They can also cause infections such as bronchitis or pneumonia. Ask your healthcare provider for information if you currently smoke and need help to quit. E-cigarettes or smokeless tobacco still contain nicotine. Talk to your healthcare provider before you use these products. Prevent a cold: Wash your hands often. Use soap and water every time you wash your hands. Rub your soapy hands together, lacing your fingers. Use the fingers of one hand to scrub under the nails of the other hand. Wash for at least 20 seconds. Rinse with warm, running water for several seconds. Then dry your hands. Use germ-killing gel if soap and water are not available. Do not touch your eyes or mouth without washing your hands first.     Cover a sneeze or cough. Use a tissue that covers your mouth and nose. Put the used tissue in the trash right away. Use the bend of your arm if a tissue is not available. Wash your hands well with soap and water or use a hand . Do not stand close to anyone who is sneezing or coughing. Try to stay away from others while you are sick. This is especially important during the first 2 to 3 days when the virus is more easily spread. Wait until a fever, cough, or other symptoms are gone before you return to work or other regular activities. Do not share items while you are sick. This includes food, drinks, eating utensils, and dishes. Follow up with your doctor as directed:  Write down your questions so you remember to ask them during your visits. © Copyright [employer] 2022 Information is for End User's use only and may not be sold, redistributed or otherwise used for commercial purposes.  All illustrations and images included in CareNotes® are the copyrighted property of [employer] or [address]  The above information is an  only. It is not intended as medical advice for individual conditions or treatments. Talk to your doctor, nurse or pharmacist before following any medical regimen to see if it is safe and effective for you. Follow up with PCP in 3-5 days. Proceed to  ER if symptoms worsen. Chief Complaint     Chief Complaint   Patient presents with    Cold Like Symptoms     Starting 4-5 days ago - Tightness in chest, cough, nasal congestion, SOB, SEO. Taken inhaler and tylenol at home. No home covid test.         History of Present Illness       Patient complains of cough, congestion, chest tightness, for the past 4 to 5 days. Patient has been using her albuterol inhaler as prescribed. Review of Systems   Review of Systems   Constitutional:  Negative for appetite change, chills, fatigue and fever. HENT:  Positive for congestion, rhinorrhea, sinus pressure and sore throat. Negative for trouble swallowing and voice change. Respiratory:  Positive for cough and chest tightness. Negative for shortness of breath and wheezing. Cardiovascular:  Negative for chest pain. Gastrointestinal:  Negative for nausea. Musculoskeletal:  Negative for myalgias.          Current Medications       Current Outpatient Medications:     albuterol (Proventil HFA) 90 mcg/act inhaler, Inhale 2 puffs every 6 (six) hours as needed for wheezing, Disp: 6.7 g, Rfl: 5    benzonatate (TESSALON) 200 MG capsule, Take 1 capsule (200 mg total) by mouth daily at bedtime as needed for cough, Disp: 12 capsule, Rfl: 0    [START ON 11/26/2023] cholecalciferol (VITAMIN D3) 1,000 units tablet, Take 1 tablet (1,000 Units total) by mouth daily Do not start before November 26, 2023., Disp: 30 tablet, Rfl: 0    clotrimazole (LOTRIMIN) 1 % cream, Apply topically 2 (two) times a day, Disp: 14 g, Rfl: 2    ergocalciferol (VITAMIN D2) 50,000 units, Take 1 capsule (50,000 Units total) by mouth once a week for 12 doses Do not start before October 14, 2023., Disp: 4 capsule, Rfl: 2    hydrOXYzine HCL (ATARAX) 50 mg tablet, Take 1 tablet (50 mg total) by mouth 2 (two) times a day, Disp: 60 tablet, Rfl: 1    Mometasone Furoate (Asmanex HFA) 200 MCG/ACT AERO, Inhale 1 puff (200 mcg total) 2 (two) times a day Rinse mouth after use., Disp: 13 g, Rfl: 2    nystatin (MYCOSTATIN) powder, Apply topically 3 (three) times a day Do not start before October 10, 2023., Disp: 15 g, Rfl: 1    predniSONE 10 mg tablet, Take once daily all days pills on this schedule 6- 6- 5- 4- 3- 2- 1, Disp: 27 tablet, Rfl: 0    cyanocobalamin (VITAMIN B-12) 500 MCG tablet, Take 1 tablet (500 mcg total) by mouth daily Do not start before October 10, 2023., Disp: 30 tablet, Rfl: 0    lurasidone (LATUDA) 40 mg tablet, Take 1 tablet (40 mg total) by mouth daily with breakfast Do not start before October 10, 2023., Disp: 30 tablet, Rfl: 0    sertraline (ZOLOFT) 50 mg tablet, Take 3 tablets (150 mg total) by mouth daily Do not start before October 10, 2023., Disp: 90 tablet, Rfl: 0    traZODone (DESYREL) 100 mg tablet, Take 1 tablet (100 mg total) by mouth daily at bedtime, Disp: 30 tablet, Rfl: 0    Current Allergies     Allergies as of 11/10/2023    (No Known Allergies)            The following portions of the patient's history were reviewed and updated as appropriate: allergies, current medications, past family history, past medical history, past social history, past surgical history and problem list.     Past Medical History:   Diagnosis Date    Anxiety     Asthma     Depression     Environmental allergies     Last assessed: 1/18/17    GERD (gastroesophageal reflux disease)     Hypoglycemia     Hypothyroidism        Past Surgical History:   Procedure Laterality Date    COLONOSCOPY  2016 & 2018    Fiberoptic    EGD  2016    EGD  06/2016    MYRINGOTOMY      TONSILLECTOMY      TOOTH EXTRACTION         Family History   Problem Relation Age of Onset    Anxiety disorder Mother Diabetes Mother         due to underlying condition with both eyes affected by proliferative retinopathy and traction retinal detatchments involving maculae, without long term use of insulin    Depression Father     Anxiety disorder Father     Alcohol abuse Father     Sleep apnea Father     Suicide Attempts Maternal Grandfather     Suicide Attempts Maternal Grandmother     Crohn's disease Paternal Grandfather          Medications have been verified. Objective   /93 (BP Location: Left arm, Patient Position: Sitting, Cuff Size: Large)   Pulse (!) 112   Temp (!) 97.4 °F (36.3 °C) (Tympanic)   Resp 18   Ht 5' 4" (1.626 m)   Wt 107 kg (236 lb 12.4 oz)   LMP 11/09/2023   SpO2 95%   BMI 40.64 kg/m²        Physical Exam     Physical Exam  Vitals and nursing note reviewed. Constitutional:       General: She is not in acute distress. Appearance: She is well-developed. She is not ill-appearing. HENT:      Head: Normocephalic and atraumatic. Right Ear: External ear normal.      Left Ear: External ear normal.      Nose: Mucosal edema and congestion present. Mouth/Throat:      Pharynx: Posterior oropharyngeal erythema present. No oropharyngeal exudate. Tonsils: No tonsillar abscesses. Cardiovascular:      Rate and Rhythm: Normal rate and regular rhythm. Pulmonary:      Effort: Pulmonary effort is normal. No respiratory distress. Breath sounds: No wheezing, rhonchi or rales. Musculoskeletal:      Cervical back: Neck supple. Skin:     General: Skin is warm and dry. Neurological:      Mental Status: She is alert and oriented to person, place, and time. Psychiatric:         Mood and Affect: Mood normal.         Behavior: Behavior normal.         Thought Content:  Thought content normal.         Judgment: Judgment normal.

## 2023-11-14 ENCOUNTER — TELEMEDICINE (OUTPATIENT)
Dept: PSYCHIATRY | Facility: CLINIC | Age: 23
End: 2023-11-14
Payer: COMMERCIAL

## 2023-11-14 DIAGNOSIS — F41.9 ANXIETY: ICD-10-CM

## 2023-11-14 DIAGNOSIS — F12.90 MARIJUANA USE, CONTINUOUS: ICD-10-CM

## 2023-11-14 DIAGNOSIS — F33.41 RECURRENT MAJOR DEPRESSIVE DISORDER, IN PARTIAL REMISSION (HCC): Primary | ICD-10-CM

## 2023-11-14 PROCEDURE — 99214 OFFICE O/P EST MOD 30 MIN: CPT

## 2023-11-14 RX ORDER — SERTRALINE HYDROCHLORIDE 100 MG/1
150 TABLET, FILM COATED ORAL DAILY
Qty: 45 TABLET | Refills: 2 | Status: SHIPPED | OUTPATIENT
Start: 2023-11-14 | End: 2024-02-12

## 2023-11-14 RX ORDER — HYDROXYZINE 50 MG/1
50 TABLET, FILM COATED ORAL 2 TIMES DAILY
Qty: 90 TABLET | Refills: 1 | Status: SHIPPED | OUTPATIENT
Start: 2023-11-14

## 2023-11-14 RX ORDER — TRAZODONE HYDROCHLORIDE 100 MG/1
100 TABLET ORAL
Qty: 30 TABLET | Refills: 2 | Status: SHIPPED | OUTPATIENT
Start: 2023-11-14 | End: 2024-02-12

## 2023-11-14 RX ORDER — LURASIDONE HYDROCHLORIDE 40 MG/1
40 TABLET, FILM COATED ORAL
Qty: 30 TABLET | Refills: 2 | Status: SHIPPED | OUTPATIENT
Start: 2023-11-14 | End: 2024-02-12

## 2023-11-16 ENCOUNTER — TELEPHONE (OUTPATIENT)
Dept: PSYCHIATRY | Facility: CLINIC | Age: 23
End: 2023-11-16

## 2023-11-21 ENCOUNTER — TELEMEDICINE (OUTPATIENT)
Dept: PSYCHIATRY | Facility: CLINIC | Age: 23
End: 2023-11-21
Payer: COMMERCIAL

## 2023-11-21 DIAGNOSIS — F41.9 ANXIETY: Primary | ICD-10-CM

## 2023-11-21 DIAGNOSIS — F12.90 MARIJUANA USE, CONTINUOUS: ICD-10-CM

## 2023-11-21 PROCEDURE — 90832 PSYTX W PT 30 MINUTES: CPT | Performed by: SOCIAL WORKER

## 2023-11-21 NOTE — BH CRISIS PLAN
Client Name: Isha Postal       Client YOB: 2000  : 2000    Treatment Team (include name and contact information):     Psychotherapist: Gina Lemus        [phone]  Psychiatrist: RTE Martin   Release of information completed: yes    : N/A   Release of information completed: no    Other (Specify Role): N/A    Release of information completed: no    Other (Specify Role): N/A   Release of information completed: no    Healthcare Provider  Braden Hutton, [address] [facility] [address] [facility]  [facility] [address]  [phone]    Type of Plan   * Child plans (children 15 yo and younger) must be completed and signed by the child's legal guardian   * Plans for all individuals 15 yo and above must be signed by the client. Plan Type: adolescent/adult (14 and over) Initial      My Personal Strengths are (in the client's own words):  "I'm a driven individual and I'm also intelligent."    The stressors and triggers that may put me at risk are:  Unexpected outside events in life and fear of abandonment. Coping skills I can use to keep myself calm and safe: Other (describe) "Remind myself to not take things personally. I often find comfort in art and music."    Coping skills/supports I can use to maintain abstinence from substance use:   Not Applicable    The people that provide me with help and support: (Include name, contact, and how they can help)   Support person #1: Mom    * Phone number: in cell phone    * How can they help me? "I can call her to complain and she'll listen."   Support person #2:Dad    * Phone number: in cell phone    * How can they help me? "He's very good about helping me when I'm financially struggling."   Support person #3: Friend, Malou    * Phone number: in cell phone    * How can they help me? "She's a good support outside of my family.  If I'm having a hard time with my family I can hang out with her and relax."    In the past, the following has helped me in times of crisis:    Listening to music and Other: Art      If it is an emergency and you need immediate help, call 9-1-1    If there is a possibility of danger to yourself or others, call the following crisis hotline resources:     Adult Crisis Numbers  Suicide Prevention Hotline - Dial 9-8-8  [facility]: [address]: [address]: [address]: [phone]  [address] [place] [address] Street: [phone]  [facility]: [address]: [address]: [phone] (daytime). [phone] (after hours, weekends, holidays)     Child/Adolescent Crisis Numbers   [facility]: [address]: [phone]   Daniel Huger: [phone]   [address] [place] [address] Street: [phone]    Please note: Some [facility] do not have a separate number for Child/Adolescent specific crisis. If your county is not listed under Child/Adolescent, please call the adult number for your county     National Talk to Text Line   All Ages - 146-618    In the event your feelings become unmanageable, and you cannot reach your support system, you will call 911 immediately or go to the nearest hospital emergency room.

## 2023-11-21 NOTE — BH TREATMENT PLAN
Outpatient Behavioral Health Psychotherapy Treatment Plan    Te Baltazar  2000     Date of Initial Psychotherapy Assessment: 10/11/2023   Date of Current Treatment Plan: 11/21/23  Treatment Plan Target Date: 5/21/2024  Treatment Plan Expiration Date: 5/21/2024    Diagnosis:   1. Anxiety        2. Marijuana use, continuous            Area(s) of Need: Rashad Fischer reports that she would like to work on improving her self-esteem and catastrophic thinking. Long Term Goal 1 (in the client's own words): "I want to have higher self-esteem."    Stage of Change: Preparation    Target Date for completion: 5/21/2024     Anticipated therapeutic modalities: Insight-oriented therapy, talk therapy, person-centered approach, strengths-based approach, task-centered approach, CBT techniques, and mindfulness. People identified to complete this goal: Client and Therapist       Objective 1: (identify the means of measuring success in meeting the objective): Rashad Fischer will increase insight into the historical and current sources of low self-esteem. Rashad Fischer will identify and challenge negative self-talk messages used to reinforce low self-esteem. Objective 2: (identify the means of measuring success in meeting the objective): Rashad Fischer will identify accomplishments that would improve self-image and verbalize a plan to achieve those goals. Objective 3: (identify the means of measuring success in meeting the objective): Rashad Fischer will increase the frequency of assertive behaviors. Objective 4: (identify the means of measuring success in meeting the objective): Rashad Fischer will use positive self-talk messages to build self-esteem. Long Term Goal 2 (in the client's own words): "I have catastrophic thinking.  I want to work on improving that."    Stage of Change: Preparation    Target Date for completion: 5/21/2024     Anticipated therapeutic modalities: Insight-oriented therapy, talk therapy, person-centered approach, task-centered approach, CBT techniques, and mindfulness. People identified to complete this goal: Client and Therapist       Objective 1: (identify the means of measuring success in meeting the objective): Peewee Manzanares will learn to identify and challenge areas of cognitive distortion that lead to feelings of depression, anxiety, and low self-esteem. Objective 2: (identify the means of measuring success in meeting the objective): Peewee Manzanares will demonstrate the ability to form balanced thoughts in response to unpleasant experiences. I am currently under the care of a [facility] psychiatric provider: yes    My [facility] psychiatric provider is: TRE Munroe    I am currently taking psychiatric medications: Yes, as prescribed    I feel that I will be ready for discharge from mental health care when I reach the following (measurable goal/objective): "I think progress would be me losing weight and feeling more confident in day-to-day functioning."    For children and adults who have a legal guardian:   Has there been any change to custody orders and/or guardianship status? NA. If yes, attach updated documentation. I have created my Crisis Plan and have been offered a copy of this plan    [address]: Diagnosis and Treatment Plan explained to [employer] acknowledges an understanding of their diagnosis. Blair Bailey agrees to this treatment plan. I have been offered a copy of this Treatment Plan. yes      Blair Lynn, 2000, actively participated in the creation of this treatment plan during a virtual session, using the [employer]. Blair Bailey  provided verbal consent on 11/21/2023 at 1:10 PM. The treatment plan was transcribed into the [facility] at a later time.

## 2023-11-21 NOTE — PSYCH
DATA: Met with Marlena for scheduled individual session. Topics of discussion included work-related stress. During this session, this clinician used the following therapeutic modalities: engagement strategies, supportive psychotherapy, client-centered therapy, and solution-focused therapy. Amadeo Guy reports that she has been doing okay since her initial assessment. She tells Therapist that she started a new job a little over a month ago and that's why she was a no show for her last scheduled sessions. Therapist and Marlena create her treatment plan and crisis plan before exploring her presenting concerns. Amadeo Guy says she has been dealing with some stress at work because she has been working more hours than expected. She says she told her job that she had open availability prior to starting. However, she says she finds herself working 2-3 hours over her scheduled time. Amadeo Guy says she is starting to feel like it's too much for her. She says the money is good, but it is taking a toll on her. Therapist and Marlena explore ways for her to advocate for herself. She questions what her parents would say or how they would feel about her working less days. She expresses that she feels like they'd think she's lazy. After processing her stress further, Amadeo Guy says she is going to talk to the appropriate staff to request less days. Therapist encourages Amadeo Guy to make a pros and cons list to aid in her decision making process. She is receptive. ASSESSMENT: Amadeo Guy presents with a normal mood. Her affect is normal range and intensity, appropriate. Amadeo Guy exhibits growing therapeutic rapport with this clinician. Amadeo Guy continues to exhibit willingness to work on treatment goals and objectives. Amadeo Guy presents with a minimal risk of suicide, minimal risk of self-harm, and minimal risk of harm to others. PLAN: Amadeo Guy will return in 2 weeks for the next scheduled session.  Between sessions, Amadeo Guy will reate a pros and cons list and request less days at work and will report back during the next session re: successes and barriers. At the next session, this clinician will use engagement strategies, supportive psychotherapy, client-centered therapy, mindfulness-based strategies, and solution-focused therapy to address Marlena's stressors, in an effort to assist Marlena with meeting treatment goals. Virtual Regular Visit    Verification of patient location:    Patient is located at Home in the following state in which I hold an active license [place]      Assessment/Plan:    Problem List Items Addressed This Visit          Other    Anxiety - Primary    Marijuana use, continuous       Goals addressed in session: Goal 1          Reason for visit is No chief complaint on file. Encounter provider Ollie Arreguin LCSW    Provider located at [address] [place] [id number] [phone]      Recent Visits  Date Type Provider Dept   11/16/23 Telephone Jose E Becerra recent visits within past 7 days and meeting all other requirements  Future Appointments  No visits were found meeting these conditions. Showing future appointments within next 150 days and meeting all other requirements       The patient was identified by name and date of birth. Shayla Acosta was informed that this is a telemedicine visit and [facility]. She agrees to proceed. .  My office door was closed. No one else was in the room. She acknowledged consent and understanding of privacy and security of the video platform. The patient has agreed to participate and understands they can discontinue the visit at any time. Patient is aware this is a billable service. Carroll Varela is a 24 y.o. female who presents for an individual therapy session today .       HPI     Past Medical History:   Diagnosis Date    Anxiety     Asthma Depression     Environmental allergies     Last assessed: 1/18/17    GERD (gastroesophageal reflux disease)     Hypoglycemia     Hypothyroidism        Past Surgical History:   Procedure Laterality Date    COLONOSCOPY  2016 & 2018    Fiberoptic    EGD  2016    EGD  06/2016    MYRINGOTOMY      TONSILLECTOMY      TOOTH EXTRACTION         Current Outpatient Medications   Medication Sig Dispense Refill    albuterol (Proventil HFA) 90 mcg/act inhaler Inhale 2 puffs every 6 (six) hours as needed for wheezing 6.7 g 5    benzonatate (TESSALON) 200 MG capsule Take 1 capsule (200 mg total) by mouth daily at bedtime as needed for cough 12 capsule 0    [START ON 11/26/2023] cholecalciferol (VITAMIN D3) 1,000 units tablet Take 1 tablet (1,000 Units total) by mouth daily Do not start before November 26, 2023. 30 tablet 0    clotrimazole (LOTRIMIN) 1 % cream Apply topically 2 (two) times a day 14 g 2    cyanocobalamin (VITAMIN B-12) 500 MCG tablet Take 1 tablet (500 mcg total) by mouth daily Do not start before October 10, 2023. 30 tablet 0    ergocalciferol (VITAMIN D2) 50,000 units Take 1 capsule (50,000 Units total) by mouth once a week for 12 doses Do not start before October 14, 2023. 4 capsule 2    hydrOXYzine HCL (ATARAX) 50 mg tablet Take 1 tablet (50 mg total) by mouth 2 (two) times a day 90 tablet 1    lurasidone (LATUDA) 40 mg tablet Take 1 tablet (40 mg total) by mouth daily with breakfast 30 tablet 2    Mometasone Furoate (Asmanex HFA) 200 MCG/ACT AERO Inhale 1 puff (200 mcg total) 2 (two) times a day Rinse mouth after use.  13 g 2    nystatin (MYCOSTATIN) powder Apply topically 3 (three) times a day Do not start before October 10, 2023. 15 g 1    predniSONE 10 mg tablet Take once daily all days pills on this schedule 6- 6- 5- 4- 3- 2- 1 27 tablet 0    sertraline (ZOLOFT) 100 mg tablet Take 1.5 tablets (150 mg total) by mouth daily 45 tablet 2    traZODone (DESYREL) 100 mg tablet Take 1 tablet (100 mg total) by mouth daily at bedtime 30 tablet 2     No current facility-administered medications for this visit. No Known Allergies    Review of Systems    Video Exam    There were no vitals filed for this visit.     Physical Exam     Visit Time    11/21/23  Start Time: 6209  Stop Time: 4074  Total Visit Time: 32 minutes

## 2023-11-22 ENCOUNTER — TELEPHONE (OUTPATIENT)
Age: 23
End: 2023-11-22

## 2023-11-22 DIAGNOSIS — J45.40 MODERATE PERSISTENT ASTHMA WITHOUT COMPLICATION: ICD-10-CM

## 2023-11-22 DIAGNOSIS — J45.901 EXACERBATION OF ASTHMA, UNSPECIFIED ASTHMA SEVERITY, UNSPECIFIED WHETHER PERSISTENT: Primary | ICD-10-CM

## 2023-11-22 RX ORDER — PREDNISONE 10 MG/1
TABLET ORAL
Qty: 21 TABLET | Refills: 0 | Status: SHIPPED | OUTPATIENT
Start: 2023-11-22 | End: 2023-11-27

## 2023-11-22 RX ORDER — ALBUTEROL SULFATE 90 UG/1
2 AEROSOL, METERED RESPIRATORY (INHALATION) EVERY 6 HOURS PRN
Qty: 6.7 G | Refills: 5 | Status: SHIPPED | OUTPATIENT
Start: 2023-11-22

## 2023-11-22 NOTE — TELEPHONE ENCOUNTER
Patient called stating that her current asthma regimen does not seem to be working for her anymore. Patient states she has been wheezing and SOB, she has been using her Asmanex inhaler with little relief. She states she has not used her Albuterol inhaler at all. Patient was on Prednisone a few days ago for the same symptoms, did have some relief with that. She is wondering what she can do, to help with the current wheezing and SOB. Please call patient back at [phone]. [address] [facility] pharmacy. Patient does not have a nebulizer at home, but has had a nebulizer treatment in the past and felt it helped.

## 2023-11-22 NOTE — TELEPHONE ENCOUNTER
I sent in a refill of the albuterol inhaler. I also sent in a prednisone taper prescription. If her condition worsens or fails to improve, she should seek care at our office or at urgent care/ED. Due to the frequency of exacerbations, I also placed a referral to pulmonology. Please have her call central scheduling to get established.

## 2023-12-18 ENCOUNTER — TELEPHONE (OUTPATIENT)
Age: 23
End: 2023-12-18

## 2023-12-18 NOTE — TELEPHONE ENCOUNTER
Patient called to verify referral to Pulmonary was placed, I confirmed it was and central scheduling phone number given.

## 2023-12-19 ENCOUNTER — CONSULT (OUTPATIENT)
Dept: PULMONOLOGY | Facility: CLINIC | Age: 23
End: 2023-12-19
Payer: COMMERCIAL

## 2023-12-19 ENCOUNTER — APPOINTMENT (OUTPATIENT)
Dept: LAB | Facility: HOSPITAL | Age: 23
End: 2023-12-19
Payer: COMMERCIAL

## 2023-12-19 ENCOUNTER — DOCUMENTATION (OUTPATIENT)
Dept: PULMONOLOGY | Facility: CLINIC | Age: 23
End: 2023-12-19

## 2023-12-19 VITALS
DIASTOLIC BLOOD PRESSURE: 88 MMHG | OXYGEN SATURATION: 96 % | WEIGHT: 244 LBS | SYSTOLIC BLOOD PRESSURE: 124 MMHG | BODY MASS INDEX: 41.66 KG/M2 | HEART RATE: 104 BPM | HEIGHT: 64 IN | TEMPERATURE: 98.4 F

## 2023-12-19 DIAGNOSIS — J45.40 MODERATE PERSISTENT ASTHMA WITHOUT COMPLICATION: ICD-10-CM

## 2023-12-19 DIAGNOSIS — E66.01 MORBID (SEVERE) OBESITY DUE TO EXCESS CALORIES (HCC): ICD-10-CM

## 2023-12-19 DIAGNOSIS — F12.90 MARIJUANA USE, CONTINUOUS: ICD-10-CM

## 2023-12-19 DIAGNOSIS — F17.210 CIGARETTE SMOKER: ICD-10-CM

## 2023-12-19 DIAGNOSIS — J45.41 MODERATE PERSISTENT ASTHMA WITH ACUTE EXACERBATION: ICD-10-CM

## 2023-12-19 DIAGNOSIS — J30.9 ALLERGIC RHINITIS, UNSPECIFIED SEASONALITY, UNSPECIFIED TRIGGER: Primary | ICD-10-CM

## 2023-12-19 DIAGNOSIS — J45.51 SEVERE PERSISTENT ASTHMA WITH ACUTE EXACERBATION: ICD-10-CM

## 2023-12-19 DIAGNOSIS — J30.9 ALLERGIC RHINITIS, UNSPECIFIED SEASONALITY, UNSPECIFIED TRIGGER: ICD-10-CM

## 2023-12-19 PROCEDURE — 36415 COLL VENOUS BLD VENIPUNCTURE: CPT

## 2023-12-19 PROCEDURE — 86003 ALLG SPEC IGE CRUDE XTRC EA: CPT

## 2023-12-19 PROCEDURE — 82785 ASSAY OF IGE: CPT

## 2023-12-19 PROCEDURE — 99244 OFF/OP CNSLTJ NEW/EST MOD 40: CPT | Performed by: INTERNAL MEDICINE

## 2023-12-19 RX ORDER — MOMETASONE FUROATE AND FORMOTEROL FUMARATE DIHYDRATE 200; 5 UG/1; UG/1
2 AEROSOL RESPIRATORY (INHALATION) 2 TIMES DAILY
Qty: 13 G | Refills: 6 | Status: SHIPPED | OUTPATIENT
Start: 2023-12-19

## 2023-12-19 RX ORDER — ALBUTEROL SULFATE 2.5 MG/3ML
2.5 SOLUTION RESPIRATORY (INHALATION) 2 TIMES DAILY
Qty: 180 ML | Refills: 3 | Status: SHIPPED | OUTPATIENT
Start: 2023-12-19 | End: 2024-04-17

## 2023-12-19 RX ORDER — LORATADINE 10 MG/1
10 TABLET ORAL DAILY
COMMUNITY

## 2023-12-19 RX ORDER — PREDNISONE 10 MG/1
TABLET ORAL
Qty: 20 TABLET | Refills: 0 | Status: SHIPPED | OUTPATIENT
Start: 2023-12-19

## 2023-12-20 PROBLEM — J45.41 MODERATE PERSISTENT ASTHMA WITH ACUTE EXACERBATION: Status: ACTIVE | Noted: 2023-09-12

## 2023-12-20 NOTE — ASSESSMENT & PLAN NOTE
Due to obesity, may also have concomitant component of restrictive lung disease contributing to respiratory difficulty

## 2023-12-20 NOTE — ASSESSMENT & PLAN NOTE
Significant component of allergic rhinitis which does improve with steroids  Takes loratadine  Also has had over-the-counter nose spray with steroid but does not use this consistently.

## 2023-12-20 NOTE — ASSESSMENT & PLAN NOTE
Completed steroid taper and has recurrent symptoms  Has multiple potential triggers but most concerning is the presence of the rabbit and hutch with hay in her bedroom  Additionally has an intermittent smoker and has daily marijuana use which are contributing to airway irritation  Has had several emergency department visits and steroid courses.  Currently is off steroids for the last several days  Has had mild eosinophilia on prior CBCs with differential  Recommended [facility] allergen testing as well as rabbit IgE testing  Change mometasone ICS to Dulera, 100/5, 2 puffs twice daily.  This will add LABA to the high-dose ICS  Continue albuterol and albuterol nebulizer  Prescription given for prednisone given severity of wheezing and shortness of breath.  She will start this after obtaining the allergen testing  Allergen testing may be suboptimal in regard to timing since she only recently came off steroids

## 2023-12-20 NOTE — PROGRESS NOTES
Consultation - Pulmonary Medicine   Marlena Wade 23 y.o. female MRN: [id number]    Physician Requesting Consult: Gary Howard  Reason for Consult: Persistent asthma    Moderate persistent asthma with acute exacerbation  Completed steroid taper and has recurrent symptoms  Has multiple potential triggers but most concerning is the presence of the rabbit and hutch with hay in her bedroom  Additionally has an intermittent smoker and has daily marijuana use which are contributing to airway irritation  Has had several emergency department visits and steroid courses.  Currently is off steroids for the last several days  Has had mild eosinophilia on prior CBCs with differential  Recommended [facility] allergen testing as well as rabbit IgE testing  Change mometasone ICS to Dulera, 100/5, 2 puffs twice daily.  This will add LABA to the high-dose ICS  Continue albuterol and albuterol nebulizer  Prescription given for prednisone given severity of wheezing and shortness of breath.  She will start this after obtaining the allergen testing  Allergen testing may be suboptimal in regard to timing since she only recently came off steroids    Allergic rhinitis  Significant component of allergic rhinitis which does improve with steroids  Takes loratadine  Also has had over-the-counter nose spray with steroid but does not use this consistently.    Cigarette smoker  Discussed her smoking with her, complete cessation is advised  Complete cessation of inhalational marijuana use also advised since this would be an airway irritant  She does not have a medical marijuana card, marijuana use is recreational at this point    Morbid (severe) obesity due to excess calories (HCC)  Due to obesity, may also have concomitant component of restrictive lung disease contributing to respiratory difficulty      Return for Result- dependent follow-up.  I will contact her with the results of the testing and further recommendations will be dependent  upon these results  ______________________________________________________________________    HPI:    Marlena Wade is a 23 y.o. female who presents for evaluation of asthma.  She has had very poorly controlled symptoms recently.  She has required multiple steroid courses and urgent care visits.  She has not been hospitalized fortunately.  She has multiple potential triggers.  She does smoke cigarettes intermittently but not currently since she has had exacerbated symptoms.  She recently completed a steroid taper.  She does smoke marijuana daily as well.  This is not medically prescribed but on a recreational basis.  Finally, she lives in a small apartment and has a rabbit.  The rabbit Hutch is in her bedroom and the rabbit is allowed at times to roam free in the bedroom.  Additionally the rabbit has hay in the touch that is the bedding for the rabbit.  She does report that she cleans this frequently but likely is a major contributor to her persistent symptomatology.  She is not allowed to have the rabbit outside of her bedroom based on her apartment lease agreement since the space is shared with other tenants.    She does have nasal congestion and allergies.  She does not have chest pain.  She occasionally gets reflux symptoms.  No abdominal pain or alarm symptoms.  She is obese.  She is not aware of any snoring or sleep apnea symptoms however.    Review of Systems:    Aside from what is mentioned in the HPI, the review of systems otherwise negative.    Current Medications:    Current Outpatient Medications:     albuterol (2.5 mg/3 mL) 0.083 % nebulizer solution, Take 3 mL (2.5 mg total) by nebulization 2 (two) times a day, Disp: 180 mL, Rfl: 3    albuterol (Proventil HFA) 90 mcg/act inhaler, Inhale 2 puffs every 6 (six) hours as needed for wheezing, Disp: 6.7 g, Rfl: 5    clotrimazole (LOTRIMIN) 1 % cream, Apply topically 2 (two) times a day, Disp: 14 g, Rfl: 2    cyanocobalamin (VITAMIN B-12) 500 MCG tablet, Take 1  tablet (500 mcg total) by mouth daily Do not start before October 10, 2023., Disp: 30 tablet, Rfl: 0    ergocalciferol (VITAMIN D2) 50,000 units, Take 1 capsule (50,000 Units total) by mouth once a week for 12 doses Do not start before October 14, 2023., Disp: 4 capsule, Rfl: 2    hydrOXYzine HCL (ATARAX) 50 mg tablet, Take 1 tablet (50 mg total) by mouth 2 (two) times a day, Disp: 90 tablet, Rfl: 1    loratadine (CLARITIN) 10 mg tablet, Take 10 mg by mouth daily, Disp: , Rfl:     lurasidone (LATUDA) 40 mg tablet, Take 1 tablet (40 mg total) by mouth daily with breakfast, Disp: 30 tablet, Rfl: 2    mometasone-formoterol (Dulera) 200-5 MCG/ACT inhaler, Inhale 2 puffs 2 (two) times a day Rinse mouth after use., Disp: 13 g, Rfl: 6    nystatin (MYCOSTATIN) powder, Apply topically 3 (three) times a day Do not start before October 10, 2023., Disp: 15 g, Rfl: 1    predniSONE 10 mg tablet, Take 4 tablets daily for 2 days, then 3 tablets daily for 2 days, then 2 tablets daily for 2 days, then 1 tablet daily for 2 days, then stop, Disp: 20 tablet, Rfl: 0    sertraline (ZOLOFT) 100 mg tablet, Take 1.5 tablets (150 mg total) by mouth daily, Disp: 45 tablet, Rfl: 2    traZODone (DESYREL) 100 mg tablet, Take 1 tablet (100 mg total) by mouth daily at bedtime, Disp: 30 tablet, Rfl: 2    Historical Information   Past Medical History:   Diagnosis Date    Anxiety     Asthma     Depression     Environmental allergies     Last assessed: 1/18/17    GERD (gastroesophageal reflux disease)     Hypoglycemia     Hypothyroidism      Past Surgical History:   Procedure Laterality Date    COLONOSCOPY  2016 & 2018    Fiberoptic    EGD  2016    EGD  06/2016    MYRINGOTOMY      TONSILLECTOMY      TOOTH EXTRACTION       Social History   Social History     Tobacco Use   Smoking Status Every Day    Current packs/day: 0.25    Average packs/day: 0.3 packs/day for 0.1 years    Types: Cigarettes    Start date: 11/1/2023   Smokeless Tobacco Never   Tobacco  "Comments    1/2 pack per month         Family History:   Family History   Problem Relation Age of Onset    Anxiety disorder Mother     Diabetes Mother         due to underlying condition with both eyes affected by proliferative retinopathy and traction retinal detatchments involving maculae, without long term use of insulin    Depression Father     Anxiety disorder Father     Alcohol abuse Father     Sleep apnea Father     Suicide Attempts Maternal Grandfather     Suicide Attempts Maternal Grandmother     Crohn's disease Paternal Grandfather          PhysicalExamination:  Vitals:   /88 (BP Location: Left arm, Patient Position: Sitting, Cuff Size: Large)   Pulse 104   Temp 98.4 °F (36.9 °C) (Tympanic)   Ht 5' 4\" (1.626 m)   Wt 111 kg (244 lb)   SpO2 96%   BMI 41.88 kg/m²     Gen: Morbidly obese.  Comfortable on room air.  No conversational dyspnea  HEENT:  Conjugate gaze.  sclerae anicteric.  Oropharynx moist  Neck: Trachea is midline. No JVD. No adenopathy  Chest: Excursion slightly limited due to obesity.  There is diffuse expiratory wheezing  Cardiac: Regular with slightly distant heart tones. no murmur  Abdomen:  benign  Extremities: No edema  Neuro:  Normal speech and mentation      Diagnostic Data:  Labs:  I personally reviewed the most recent laboratory data pertinent to today's visit    Lab Results   Component Value Date    WBC 8.32 10/05/2023    HGB 13.8 10/05/2023    HCT 41.4 10/05/2023    MCV 94 10/05/2023     10/05/2023     Lab Results   Component Value Date    CALCIUM 8.9 10/05/2023    K 3.7 10/05/2023    CO2 24 10/05/2023     10/05/2023    BUN 9 10/05/2023    CREATININE 0.76 10/05/2023       Lab Results   Component Value Date    ALT 32 10/05/2023    AST 24 10/05/2023    ALKPHOS 70 10/05/2023       PFT results:  The most recent pulmonary function tests were reviewed.  Patient had normal baseline spirometry however had brisk response to albuterol which likely indicates asthmatic " component.  Demonstrate hyperinflation and air trapping which would also suggest asthmatic issue.  Effusion normal    Imaging:  I personally reviewed the images on the PAC system pertinent to today's visit  Recent chest x-ray was from May which showed clear lung fields        Jose Guadalupe Gross MD

## 2023-12-20 NOTE — ASSESSMENT & PLAN NOTE
Discussed her smoking with her, complete cessation is advised  Complete cessation of inhalational marijuana use also advised since this would be an airway irritant  She does not have a medical marijuana card, marijuana use is recreational at this point

## 2023-12-21 LAB
DME PARACHUTE DELIVERY DATE ACTUAL: NORMAL
DME PARACHUTE DELIVERY DATE REQUESTED: NORMAL
DME PARACHUTE ITEM DESCRIPTION: NORMAL
DME PARACHUTE ORDER STATUS: NORMAL
DME PARACHUTE SUPPLIER NAME: NORMAL
DME PARACHUTE SUPPLIER PHONE: NORMAL

## 2023-12-22 LAB — RABBIT EPITHELIA IGE: 1.97 KU/L

## 2023-12-23 LAB
A ALTERNATA IGE QN: <0.1 KUA/I
A FUMIGATUS IGE QN: <0.1 KUA/I
BERMUDA GRASS IGE QN: 0.43 KUA/I
BOXELDER IGE QN: 0.73 KUA/I
C HERBARUM IGE QN: <0.1 KUA/I
CAT DANDER IGE QN: 2.2 KUA/I
CMN PIGWEED IGE QN: 0.74 KUA/I
COMMON RAGWEED IGE QN: 10.1 KUA/I
COTTONWOOD IGE QN: 0.37 KUA/I
D FARINAE IGE QN: 1.37 KUA/I
D PTERONYSS IGE QN: <0.1 KUA/I
DOG DANDER IGE QN: 2.17 KUA/I
LONDON PLANE IGE QN: 1.01 KUA/I
MOUSE URINE PROT IGE QN: <0.1 KUA/I
MT JUNIPER IGE QN: 0.44 KUA/I
MUGWORT IGE QN: 0.85 KUA/I
P NOTATUM IGE QN: <0.1 KUA/I
ROACH IGE QN: 0.15 KUA/I
SHEEP SORREL IGE QN: 0.47 KUA/I
SILVER BIRCH IGE QN: 9.89 KUA/I
TIMOTHY IGE QN: 40.5 KUA/I
TOTAL IGE SMQN RAST: 270 KU/L (ref 0–113)
WALNUT IGE QN: 0.73 KUA/I
WHITE ASH IGE QN: 0.59 KUA/I
WHITE ELM IGE QN: 1.81 KUA/I
WHITE MULBERRY IGE QN: 0.44 KUA/I
WHITE OAK IGE QN: 5.86 KUA/I

## 2023-12-29 ENCOUNTER — OFFICE VISIT (OUTPATIENT)
Age: 23
End: 2023-12-29
Payer: COMMERCIAL

## 2023-12-29 VITALS
DIASTOLIC BLOOD PRESSURE: 76 MMHG | BODY MASS INDEX: 40.97 KG/M2 | SYSTOLIC BLOOD PRESSURE: 114 MMHG | TEMPERATURE: 98.5 F | HEART RATE: 112 BPM | HEIGHT: 64 IN | OXYGEN SATURATION: 97 % | RESPIRATION RATE: 16 BRPM | WEIGHT: 240 LBS

## 2023-12-29 DIAGNOSIS — U07.1 COVID-19: Primary | ICD-10-CM

## 2023-12-29 LAB
SARS-COV-2 AG UPPER RESP QL IA: POSITIVE
VALID CONTROL: ABNORMAL

## 2023-12-29 PROCEDURE — 87811 SARS-COV-2 COVID19 W/OPTIC: CPT

## 2023-12-29 PROCEDURE — 99213 OFFICE O/P EST LOW 20 MIN: CPT

## 2023-12-29 RX ORDER — NIRMATRELVIR AND RITONAVIR 300-100 MG
3 KIT ORAL 2 TIMES DAILY
Qty: 30 TABLET | Refills: 0 | Status: SHIPPED | OUTPATIENT
Start: 2023-12-29 | End: 2024-01-03

## 2023-12-29 NOTE — PATIENT INSTRUCTIONS
Your COVID test was positive in the office today.  Based on your symptoms, vital signs, and physical exam- Antiviral medication Was sent to the pharmacy however keep in mind that if you symptoms are improving, would advise not starting antiviral medication.  Paxlovid can potential have interactions with Latuda and can affect the levels of the latuda in your system.  For viral illnesses, the recommendation is for supportive care which would consists of the following:  For decongestion, Over The Counter medications:  2nd Generation antihistamines with a decongestant such as:   Claritin-D (Loratadine with Pseudoephedrine)  Zyrtec-D (Cetirizine with Pseudoephedrine)  Allegra-D (Fexofenadine with Pseudoephedrine)  Decongestant alone: Pseudoephedrine 60mg PO every 4-6 hours for nasal congestion. Max 240mg in 24 hour period  Claritin or Zyrtec plus -Coricidin HBP (Safe for when you have high blood pressure)  Nasal corticosteroid: examples are Flonase or Nasacort.  For Sore throat:  Salt water gurgle  Honey  Chloraseptic spray  Throat lozenges  Over the Counter Tylenol or Ibuprofen  Take an expectorant - guaifenesin should be the only ingredient - during the day, and the cough suppressant (ex. Robitussin DM) if needed at night only.   Take Zinc 50 mg every 12 hours for the next week (the dose is important so do not just take a multivitamin with zinc or an over-the-counter cold med with zinc such as Airborne or Zicam, as that will not give you the sufficient dose).    You should also take vitamin D3 5000 i.u.s per day for the next 1 week, and vitamin-C 1 g twice daily (and again dosages are important, do not think you are getting enough vitamin C just by drinking extra orange juice).    Proceed to ER if symptoms worsen.

## 2023-12-29 NOTE — LETTER
[facility]  [address]  [address]  [facility] [address]  Dept: [phone]    December 29, 2023    Patient: Marlena Wade  YOB: 2000    Marlena Wade was seen and evaluated at our [facility]. Please note if Covid and Flu tests are negative, they may return to work when fever free for 24 hours without the use of a fever reducing agent. If Covid or Flu test is positive, they may return to work on 1/3/2024, as this is 5 days from the onset of symptoms. Upon return, they must then adhere to strict masking for an additional 5 days.    Sincerely,    TRE Almendarez

## 2023-12-29 NOTE — LETTER
[facility]  [address]  [address]  [facility] [address]  Dept: [phone]    December 29, 2023    Patient: Marlena Wade  YOB: 2000    Marlena Wade was seen and evaluated at our [facility]. Covid test is positive, they may return to work on 1/3/2024, as this is 5 days from the onset of symptoms. Upon return, they must then adhere to strict masking for an additional 5 days.    Sincerely,    TRE Almendarez

## 2023-12-29 NOTE — PROGRESS NOTES
[facility]        NAME: Marlena Wade is a 23 y.o. female  : 2000    MRN: [id number]  DATE: 2023  TIME: 4:26 PM    Assessment and Plan   COVID-19 [U07.1]  1. COVID-19  Poct Covid 19 Rapid Antigen Test    nirmatrelvir & ritonavir (Paxlovid, 300/100,) tablet therapy pack            Patient Instructions   Your COVID test was positive in the office today.  Based on your symptoms, vital signs, and physical exam- Antiviral medication Was sent to the pharmacy however keep in mind that if you symptoms are improving, would advise not starting antiviral medication.  Paxlovid can potential have interactions with Latuda and can affect the levels of the latuda in your system.  For viral illnesses, the recommendation is for supportive care which would consists of the following:  For decongestion, Over The Counter medications:  2nd Generation antihistamines with a decongestant such as:   Claritin-D (Loratadine with Pseudoephedrine)  Zyrtec-D (Cetirizine with Pseudoephedrine)  Allegra-D (Fexofenadine with Pseudoephedrine)  Decongestant alone: Pseudoephedrine 60mg PO every 4-6 hours for nasal congestion. Max 240mg in 24 hour period  Claritin or Zyrtec plus -Coricidin HBP (Safe for when you have high blood pressure)  Nasal corticosteroid: examples are Flonase or Nasacort.  For Sore throat:  Salt water gurgle  Honey  Chloraseptic spray  Throat lozenges  Over the Counter Tylenol or Ibuprofen  Take an expectorant - guaifenesin should be the only ingredient - during the day, and the cough suppressant (ex. Robitussin DM) if needed at night only.   Take Zinc 50 mg every 12 hours for the next week (the dose is important so do not just take a multivitamin with zinc or an over-the-counter cold med with zinc such as Airborne or Zicam, as that will not give you the sufficient dose).    You should also take vitamin D3 5000 i.u.s per day for the next 1 week, and vitamin-C 1 g twice daily (and again dosages are  important, do not think you are getting enough vitamin C just by drinking extra orange juice).    Proceed to ER if symptoms worsen.      Chief Complaint     Chief Complaint   Patient presents with   • Cold Like Symptoms     Started last night. Sweating, sore throat, ear fullness in left ear, headache, body aches especially back. Took tylenol last night.          History of Present Illness       Seating last night, sore throat, ear fullness- better after taking Tylenol last night. People at work with COVID and so wants to be checked. Recently finished prednisone from Pulmonary doctor.        Review of Systems   Review of Systems   Constitutional:  Positive for chills, fatigue and fever.   HENT:  Positive for congestion and sore throat.    Respiratory:  Positive for cough. Negative for shortness of breath.    Cardiovascular:  Negative for chest pain and palpitations.   Neurological:  Positive for weakness. Negative for dizziness and light-headedness.         Current Medications       Current Outpatient Medications:   •  clotrimazole (LOTRIMIN) 1 % cream, Apply topically 2 (two) times a day, Disp: 14 g, Rfl: 2  •  cyanocobalamin (VITAMIN B-12) 500 MCG tablet, Take 1 tablet (500 mcg total) by mouth daily Do not start before October 10, 2023., Disp: 30 tablet, Rfl: 0  •  ergocalciferol (VITAMIN D2) 50,000 units, Take 1 capsule (50,000 Units total) by mouth once a week for 12 doses Do not start before October 14, 2023., Disp: 4 capsule, Rfl: 2  •  hydrOXYzine HCL (ATARAX) 50 mg tablet, Take 1 tablet (50 mg total) by mouth 2 (two) times a day, Disp: 90 tablet, Rfl: 1  •  lurasidone (LATUDA) 40 mg tablet, Take 1 tablet (40 mg total) by mouth daily with breakfast, Disp: 30 tablet, Rfl: 2  •  mometasone-formoterol (Dulera) 200-5 MCG/ACT inhaler, Inhale 2 puffs 2 (two) times a day Rinse mouth after use., Disp: 13 g, Rfl: 6  •  nirmatrelvir & ritonavir (Paxlovid, 300/100,) tablet therapy pack, Take 3 tablets by mouth 2 (two)  times a day for 5 days Take 2 nirmatrelvir tablets + 1 ritonavir tablet together per dose, Disp: 30 tablet, Rfl: 0  •  nystatin (MYCOSTATIN) powder, Apply topically 3 (three) times a day Do not start before October 10, 2023., Disp: 15 g, Rfl: 1  •  sertraline (ZOLOFT) 100 mg tablet, Take 1.5 tablets (150 mg total) by mouth daily, Disp: 45 tablet, Rfl: 2  •  traZODone (DESYREL) 100 mg tablet, Take 1 tablet (100 mg total) by mouth daily at bedtime, Disp: 30 tablet, Rfl: 2  •  albuterol (2.5 mg/3 mL) 0.083 % nebulizer solution, Take 3 mL (2.5 mg total) by nebulization 2 (two) times a day (Patient not taking: Reported on 12/29/2023), Disp: 180 mL, Rfl: 3  •  albuterol (Proventil HFA) 90 mcg/act inhaler, Inhale 2 puffs every 6 (six) hours as needed for wheezing (Patient not taking: Reported on 12/29/2023), Disp: 6.7 g, Rfl: 5  •  loratadine (CLARITIN) 10 mg tablet, Take 10 mg by mouth daily (Patient not taking: Reported on 12/29/2023), Disp: , Rfl:   •  predniSONE 10 mg tablet, Take 4 tablets daily for 2 days, then 3 tablets daily for 2 days, then 2 tablets daily for 2 days, then 1 tablet daily for 2 days, then stop (Patient not taking: Reported on 12/29/2023), Disp: 20 tablet, Rfl: 0    Current Allergies     Allergies as of 12/29/2023   • (No Known Allergies)            The following portions of the patient's history were reviewed and updated as appropriate: allergies, current medications, past family history, past medical history, past social history, past surgical history and problem list.     Past Medical History:   Diagnosis Date   • Anxiety    • Asthma    • Depression    • Environmental allergies     Last assessed: 1/18/17   • GERD (gastroesophageal reflux disease)    • Hypoglycemia    • Hypothyroidism        Past Surgical History:   Procedure Laterality Date   • COLONOSCOPY  2016 & 2018    Fiberoptic   • EGD  2016   • EGD  06/2016   • MYRINGOTOMY     • TONSILLECTOMY     • TOOTH EXTRACTION         Family History  "  Problem Relation Age of Onset   • Anxiety disorder Mother    • Diabetes Mother         due to underlying condition with both eyes affected by proliferative retinopathy and traction retinal detatchments involving maculae, without long term use of insulin   • Depression Father    • Anxiety disorder Father    • Alcohol abuse Father    • Sleep apnea Father    • Suicide Attempts Maternal Grandmother    • Suicide Attempts Maternal Grandfather    • Crohn's disease Paternal Grandfather          Medications have been verified.        Objective   /76   Pulse (!) 112   Temp 98.5 °F (36.9 °C)   Resp 16   Ht 5' 4\" (1.626 m)   Wt 109 kg (240 lb)   LMP 12/01/2023 (Exact Date)   SpO2 97%   BMI 41.20 kg/m²   Patient's last menstrual period was 12/01/2023 (exact date).       Physical Exam     Physical Exam  Vitals and nursing note reviewed.   Constitutional:       Appearance: Normal appearance.   HENT:      Head: Normocephalic and atraumatic.      Right Ear: Tympanic membrane normal.      Left Ear: Tympanic membrane normal.      Nose: Congestion present.      Mouth/Throat:      Mouth: Mucous membranes are moist.   Eyes:      Pupils: Pupils are equal, round, and reactive to light.   Cardiovascular:      Rate and Rhythm: Normal rate.      Pulses: Normal pulses.      Heart sounds: Normal heart sounds.   Pulmonary:      Effort: Pulmonary effort is normal.      Breath sounds: Normal breath sounds. No stridor. No wheezing, rhonchi or rales.   Chest:      Chest wall: No tenderness.   Skin:     General: Skin is warm and dry.      Capillary Refill: Capillary refill takes less than 2 seconds.   Neurological:      General: No focal deficit present.      Mental Status: She is alert and oriented to person, place, and time. Mental status is at baseline.      Sensory: No sensory deficit.      Motor: No weakness.   Psychiatric:         Mood and Affect: Mood normal.         Behavior: Behavior normal.         Thought Content: Thought " content normal.

## 2024-01-11 ENCOUNTER — TELEMEDICINE (OUTPATIENT)
Dept: PSYCHIATRY | Facility: CLINIC | Age: 24
End: 2024-01-11
Payer: COMMERCIAL

## 2024-01-11 DIAGNOSIS — F12.90 MARIJUANA USE, CONTINUOUS: ICD-10-CM

## 2024-01-11 DIAGNOSIS — F41.9 ANXIETY: ICD-10-CM

## 2024-01-11 DIAGNOSIS — F33.41 RECURRENT MAJOR DEPRESSIVE DISORDER, IN PARTIAL REMISSION (HCC): Primary | ICD-10-CM

## 2024-01-11 PROCEDURE — 99214 OFFICE O/P EST MOD 30 MIN: CPT

## 2024-01-11 RX ORDER — HYDROXYZINE 50 MG/1
50 TABLET, FILM COATED ORAL 2 TIMES DAILY
Qty: 90 TABLET | Refills: 1 | Status: SHIPPED | OUTPATIENT
Start: 2024-01-11

## 2024-01-11 RX ORDER — SERTRALINE HYDROCHLORIDE 100 MG/1
150 TABLET, FILM COATED ORAL DAILY
Qty: 45 TABLET | Refills: 2 | Status: SHIPPED | OUTPATIENT
Start: 2024-01-11 | End: 2024-04-10

## 2024-01-11 RX ORDER — LURASIDONE HYDROCHLORIDE 40 MG/1
40 TABLET, FILM COATED ORAL
Qty: 30 TABLET | Refills: 2 | Status: SHIPPED | OUTPATIENT
Start: 2024-01-11 | End: 2024-04-10

## 2024-01-11 RX ORDER — TRAZODONE HYDROCHLORIDE 100 MG/1
100 TABLET ORAL
Qty: 30 TABLET | Refills: 2 | Status: SHIPPED | OUTPATIENT
Start: 2024-01-11 | End: 2024-04-10

## 2024-01-11 NOTE — PSYCH
Regular Visit    Problem List Items Addressed This Visit          Other    Anxiety - Primary    Relevant Medications    hydrOXYzine HCL (ATARAX) 50 mg tablet    Marijuana use, continuous     Other Visit Diagnoses       Recurrent major depressive disorder, in partial remission (HCC)        Relevant Medications    traZODone (DESYREL) 100 mg tablet    sertraline (ZOLOFT) 100 mg tablet    lurasidone (LATUDA) 40 mg tablet    hydrOXYzine HCL (ATARAX) 50 mg tablet               Encounter provider TRE Ho    Provider located at    [facility]  [address] [facility] [place] [address] [phone]    Recent Visits  Date Type Provider Dept   01/11/24 Telemedicine TRE Ho [facility]   Showing recent visits within past 7 days and meeting all other requirements  Future Appointments  No visits were found meeting these conditions.  Showing future appointments within next 150 days and meeting all other requirements       HPI     Current Outpatient Medications   Medication Sig Dispense Refill    clotrimazole (LOTRIMIN) 1 % cream Apply topically 2 (two) times a day 14 g 2    hydrOXYzine HCL (ATARAX) 50 mg tablet Take 1 tablet (50 mg total) by mouth 2 (two) times a day 90 tablet 1    lurasidone (LATUDA) 40 mg tablet Take 1 tablet (40 mg total) by mouth daily with breakfast 30 tablet 2    mometasone-formoterol (Dulera) 200-5 MCG/ACT inhaler Inhale 2 puffs 2 (two) times a day Rinse mouth after use. 13 g 6    nystatin (MYCOSTATIN) powder Apply topically 3 (three) times a day Do not start before October 10, 2023. 15 g 1    sertraline (ZOLOFT) 100 mg tablet Take 1.5 tablets (150 mg total) by mouth daily 45 tablet 2    traZODone (DESYREL) 100 mg tablet Take 1 tablet (100 mg total) by mouth daily at bedtime 30 tablet 2    albuterol (2.5 mg/3 mL) 0.083 % nebulizer solution Take 3 mL (2.5 mg total) by nebulization 2 (two) times a day  (Patient not taking: Reported on 12/29/2023) 180 mL 3    albuterol (Proventil HFA) 90 mcg/act inhaler Inhale 2 puffs every 6 (six) hours as needed for wheezing (Patient not taking: Reported on 12/29/2023) 6.7 g 5    cyanocobalamin (VITAMIN B-12) 500 MCG tablet Take 1 tablet (500 mcg total) by mouth daily Do not start before October 10, 2023. 30 tablet 0    ergocalciferol (VITAMIN D2) 50,000 units Take 1 capsule (50,000 Units total) by mouth once a week for 12 doses Do not start before October 14, 2023. 4 capsule 2    loratadine (CLARITIN) 10 mg tablet Take 10 mg by mouth daily (Patient not taking: Reported on 12/29/2023)      predniSONE 10 mg tablet Take 4 tablets daily for 2 days, then 3 tablets daily for 2 days, then 2 tablets daily for 2 days, then 1 tablet daily for 2 days, then stop (Patient not taking: Reported on 12/29/2023) 20 tablet 0     No current facility-administered medications for this visit.       Review of Systems        MEDICATION MANAGEMENT NOTE        [facility]    Name and Date of Birth:  Marlena Wade 23 y.o. 2000 MRN: [id number]    Date of Visit: January 12, 2024    No Known Allergies  SUBJECTIVE:    Marlena is seen today for a follow up for Major Depressive Disorder and anxiety. She continues to do relatively well since the last visit. Marlena reports no significant changes over the past 2 months, continues to remain relatively stable with depression and anxiety symptoms with no severe exacerbations.  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  States she cannot afford to engage in fun activities and therefore has been home a lot, contributing to moderate dysphoria.  Denies any significant symptoms of depression, no suicidal ideation, completing ADLs, she is optimistic she will find a better job and improve her future.  Declines any need for  medication adjustments, mood appears stable with Latuda, Zoloft.  Sleep is adequate with trazodone 7 to 9 hours nightly.  Happy to report that she continues to remain alcohol free which has been a contributing factor to her depression in the past.  Continues to occasionally use marijuana, continue to decrease.  Encouraged patient to set up an appointment with her psychotherapist that she has not seen since November.  Continue therapy for emotional regulation, coping skills.  Denies SI        She denies any side effects from medications.    PLAN:    -Continue Latuda 40mg for tx resistant MDD and mood stabilization   Patient education completed for Latuda, risks include:  NMS, leukopenia, neutropenia, tardive dyskinesia, angioedema, suicidality, diabetes, orthostatic hypotension, somnolence or insomnia, GI upset, hyperprolactinemia, akathisia, and possible weight gain (less than other SGA's), etc.     -Continue Zoloft to 150 mg daily for depression  PARQ completed including serotonin syndrome, SIADH, worsening depression, suicidality, induction of sabrina, GI upset, headaches, activation, sexual side effects, sedation, potential drug interactions, and others.    -Continue prn Trazodone 100mg daily for insomnia, uses  rarely   -Continue prn atarax 50mg daily for breakthrough anxiety        Aware of 24 hour and weekend coverage for urgent situations accessed by calling [facility] main practice number  Referral for individual psychotherapy    Diagnoses and all orders for this visit:    Anxiety  -     hydrOXYzine HCL (ATARAX) 50 mg tablet; Take 1 tablet (50 mg total) by mouth 2 (two) times a day    Recurrent major depressive disorder, in partial remission (HCC)  -     traZODone (DESYREL) 100 mg tablet; Take 1 tablet (100 mg total) by mouth daily at bedtime  -     sertraline (ZOLOFT) 100 mg tablet; Take 1.5 tablets (150 mg total) by mouth daily  -     lurasidone (LATUDA) 40 mg tablet; Take 1 tablet (40  mg total) by mouth daily with breakfast    Marijuana use, continuous        Current Outpatient Medications on File Prior to Visit   Medication Sig Dispense Refill    clotrimazole (LOTRIMIN) 1 % cream Apply topically 2 (two) times a day 14 g 2    mometasone-formoterol (Dulera) 200-5 MCG/ACT inhaler Inhale 2 puffs 2 (two) times a day Rinse mouth after use. 13 g 6    nystatin (MYCOSTATIN) powder Apply topically 3 (three) times a day Do not start before October 10, 2023. 15 g 1    albuterol (2.5 mg/3 mL) 0.083 % nebulizer solution Take 3 mL (2.5 mg total) by nebulization 2 (two) times a day (Patient not taking: Reported on 12/29/2023) 180 mL 3    albuterol (Proventil HFA) 90 mcg/act inhaler Inhale 2 puffs every 6 (six) hours as needed for wheezing (Patient not taking: Reported on 12/29/2023) 6.7 g 5    cyanocobalamin (VITAMIN B-12) 500 MCG tablet Take 1 tablet (500 mcg total) by mouth daily Do not start before October 10, 2023. 30 tablet 0    ergocalciferol (VITAMIN D2) 50,000 units Take 1 capsule (50,000 Units total) by mouth once a week for 12 doses Do not start before October 14, 2023. 4 capsule 2    loratadine (CLARITIN) 10 mg tablet Take 10 mg by mouth daily (Patient not taking: Reported on 12/29/2023)      predniSONE 10 mg tablet Take 4 tablets daily for 2 days, then 3 tablets daily for 2 days, then 2 tablets daily for 2 days, then 1 tablet daily for 2 days, then stop (Patient not taking: Reported on 12/29/2023) 20 tablet 0     No current facility-administered medications on file prior to visit.         HPI ROS Appetite Changes and Sleep:     She reports hypersomnia, decreased appetite, low energy. Denies homicidal ideation, denies suicidal ideation    Review Of Systems:      HPI ROS:               Medication Side Effects:  denies    Depression (10 worst): 4/10    Anxiety (10 worst): 4/10    Safety concerns (SI, HI, etc): Denies si and hi    Sleep: 8-9 hrs     Energy: low    Appetite: 2 meals    Weight Change:  denies        Mental Status Evaluation:    Appearance Adequate hygiene and grooming   Behavior calm and cooperative   Mood depressed  Depression Scale - 4 of 10 (0 = No depression)  Anxiety Scale - 4 of 10 (0 = No anxiety)   Speech Normal rate and volume   Affect constricted   Thought Processes Goal directed and coherent   Thought Content Does not verbalize delusional material   Associations Tightly connected   Perceptual Disturbances Denies hallucinations and does not appear to be responding to internal stimuli   Risk Potential Suicidal/Homicidal Ideation - No evidence of suicidal or homicidal ideation and patient does not verbalize suicidal or homicidal ideation  Risk of Violence - No evidence of risk for violence found on assessment  Risk of Self Mutilation - No evidence of risk for self mutilation found on assessment   Orientation oriented to person, place, time/date and situation   Memory recent and remote memory grossly intact   Consciousness alert and awake   Attention/Concentration attention span and concentration are age appropriate   Insight intact   Judgement intact   Muscle Strength and Gait normal muscle strength and normal muscle tone, normal gait/station and normal balance   Motor Activity no abnormal movements   Language no difficulty naming common objects, no difficulty repeating a phrase, no difficulty writing a sentence   Fund of Knowledge adequate knowledge of current events  adequate fund of knowledge regarding past history  adequate fund of knowledge regarding vocabulary      Traumatic History Update:     New Onset of Abuse Since Last Encounter:   no  Traumatic Events Since Last Encounter:   no    Past Medical History:    Past Medical History:   Diagnosis Date    Anxiety     Asthma     Depression     Environmental allergies     Last assessed: 1/18/17    GERD (gastroesophageal reflux disease)     Hypoglycemia     Hypothyroidism         Past Surgical History:   Procedure Laterality Date     COLONOSCOPY  2016 & 2018    Fiberoptic    EGD  2016    EGD  06/2016    MYRINGOTOMY      TONSILLECTOMY      TOOTH EXTRACTION       No Known Allergies  Substance Abuse History:    Social History     Substance and Sexual Activity   Alcohol Use Yes    Alcohol/week: 10.0 standard drinks of alcohol    Types: 10 Glasses of wine per week    Comment: every 2-3 days, more on weekends     Social History     Substance and Sexual Activity   Drug Use Yes    Frequency: 4.0 times per week    Types: Marijuana    Comment: recreational      Social History:    Social History     Socioeconomic History    Marital status: Single     Spouse name: Not on file    Number of children: 0    Years of education: Not on file    Highest education level: Bachelor's degree (e.g., BA, AB, BS)   Occupational History    Occupation: UNEMPLOYED   Tobacco Use    Smoking status: Every Day     Current packs/day: 0.25     Average packs/day: 0.3 packs/day for 0.2 years     Types: Cigarettes     Start date: 11/1/2023    Smokeless tobacco: Never    Tobacco comments:     1/2 pack per month   Vaping Use    Vaping status: Former    Substances: Nicotine   Substance and Sexual Activity    Alcohol use: Yes     Alcohol/week: 10.0 standard drinks of alcohol     Types: 10 Glasses of wine per week     Comment: every 2-3 days, more on weekends    Drug use: Yes     Frequency: 4.0 times per week     Types: Marijuana     Comment: recreational     Sexual activity: Not Currently   Other Topics Concern    Not on file   Social History Narrative    Not on file     Social Determinants of Health     Financial Resource Strain: Not on file   Food Insecurity: Not on file   Transportation Needs: Not on file   Physical Activity: Not on file   Stress: Not on file   Social Connections: Not on file   Intimate Partner Violence: Not on file   Housing Stability: Not on file     Family Psychiatric History:     Family History   Problem Relation Age of Onset    Anxiety disorder Mother      Diabetes Mother         due to underlying condition with both eyes affected by proliferative retinopathy and traction retinal detatchments involving maculae, without long term use of insulin    Depression Father     Anxiety disorder Father     Alcohol abuse Father     Sleep apnea Father     Suicide Attempts Maternal Grandmother     Suicide Attempts Maternal Grandfather     Crohn's disease Paternal Grandfather      History Review:The following portions of the patient's history were reviewed and updated as appropriate: allergies, current medications, past family history, past medical history, past social history, past surgical history and problem list     OBJECTIVE:     Vital signs in last 24 hours:    There were no vitals filed for this visit.  Laboratory Results:   Recent Labs (last 2 months):   Office Visit on 12/29/2023   Component Date Value    POCT SARS-CoV-2 Ag 12/29/2023 Positive (A)     VALID CONTROL 12/29/2023 Valid    Appointment on 12/19/2023   Component Date Value    A.ALTERNATA 12/19/2023 <0.10     A.FUMIGATUS 12/19/2023 <0.10     [place] Grass 12/19/2023 0.43 (H)     [place]  12/19/2023 0.73 (H)     Cat Epithellium-Dander 12/19/2023 2.20 (H)     C.HERBARUM 12/19/2023 <0.10     Cockroach 12/19/2023 0.15 (H)     Common Silver Birch 12/19/2023 9.89 (H)     Crisp 12/19/2023 0.37 (H)     D. farinae 12/19/2023 1.37 (H)     D. pteronyssinus 12/19/2023 <0.10     Dog Dander 12/19/2023 2.17 (H)     Elm IgE 12/19/2023 1.81 (H)     [place] Tree 12/19/2023 0.44 (H)     Mugwort 12/19/2023 0.85 (H)     [place] Tree 12/19/2023 0.44 (H)     Oak 12/19/2023 5.86 (H)     P.CHRYSOGENUM 12/19/2023 <0.10     Rough Pigweed  IgE 12/19/2023 0.74 (H)     Common Ragweed 12/19/2023 10.10 (H)     [place] IgE 12/19/2023 0.47 (H)     [place] Tree 12/19/2023 1.01 (H)     [place] Grass 12/19/2023 40.50 (H)     [place] Tree 12/19/2023 0.73 (H)     White [place] Tree 12/19/2023 0.59 (H)     IgE 12/19/2023 270 (H)     MOUSE  URINE 12/19/2023 <0.10     RABBIT EPITHELIA IGE 12/19/2023 1.97 (A)    Consult on 12/19/2023   Component Date Value    Supplier Name 12/19/2023 [employer]     Supplier Phone Number 12/19/2023 [phone]     Order Status 12/19/2023 Delivery Successful     Delivery Request Date 12/19/2023 12/19/2023     Date Delivered  12/19/2023 12/21/2023     Item Description 12/19/2023 Nebulizer Compressor with Mouthpiece Only     Item Description 12/19/2023 Nebulizer Set, Reusable     Item Description 12/19/2023 Mouthpiece Only, N/A      I have personally reviewed all pertinent laboratory/tests results.    Suicide/Homicide Risk Assessment:    Risk of Harm to Self:  Protective Factors: no current suicidal ideation, access to mental health treatment, compliant with medications, compliant with mental health treatment, medical compliance  Based on today's assessment, Marlena presents the following risk of harm to self: moderate Referred for immediate inpatient stay, mother driving pt to ER now.    Risk of Harm to Others:  Based on today's assessment, Marlena presents the following risk of harm to others: minimal    The following interventions are recommended: referral for psychotherapy, referral for inpatient admission    Medications Risks/Benefits:      Risks, Benefits And Possible Side Effects Of Medications:    Discussed risks and benefits of treatment with patient including risk of suicidality, serotonin syndrome, increased QTc interval and SIADH related to treatment with antidepressants; Risk of induction of manic symptoms in certain patient populations and risk of parkinsonian symptoms, metabolic syndrome, tardive dyskinesia and neuroleptic malignant syndrome related to treatment with antipsychotic medications     Controlled Medication Discussion:     Not applicable    Treatment Plan:    Due for update/Updated:   no  Last treatment plan done 11/21, due 6/21/24    TRE Ho 01/12/24    Visit  Time    Visit Start Time: 2  Visit Stop Time: 220  Total Visit Duration:  20 minutes

## 2024-02-21 ENCOUNTER — TELEPHONE (OUTPATIENT)
Dept: PSYCHIATRY | Facility: CLINIC | Age: 24
End: 2024-02-21

## 2024-02-22 ENCOUNTER — OFFICE VISIT (OUTPATIENT)
Dept: GASTROENTEROLOGY | Facility: CLINIC | Age: 24
End: 2024-02-22
Payer: COMMERCIAL

## 2024-02-22 VITALS
BODY MASS INDEX: 43.19 KG/M2 | SYSTOLIC BLOOD PRESSURE: 124 MMHG | HEIGHT: 64 IN | OXYGEN SATURATION: 98 % | TEMPERATURE: 97.6 F | WEIGHT: 253 LBS | DIASTOLIC BLOOD PRESSURE: 81 MMHG | HEART RATE: 96 BPM

## 2024-02-22 DIAGNOSIS — R10.30 LOWER ABDOMINAL PAIN: ICD-10-CM

## 2024-02-22 DIAGNOSIS — K62.5 RECTAL BLEEDING: Primary | ICD-10-CM

## 2024-02-22 DIAGNOSIS — R11.0 NAUSEA: ICD-10-CM

## 2024-02-22 DIAGNOSIS — R19.7 DIARRHEA, UNSPECIFIED TYPE: ICD-10-CM

## 2024-02-22 DIAGNOSIS — K64.9 HEMORRHOIDS, UNSPECIFIED HEMORRHOID TYPE: ICD-10-CM

## 2024-02-22 PROCEDURE — 99204 OFFICE O/P NEW MOD 45 MIN: CPT | Performed by: DIETITIAN, REGISTERED

## 2024-02-22 RX ORDER — DOXYCYCLINE HYCLATE 100 MG
100 TABLET ORAL 2 TIMES DAILY
COMMUNITY
Start: 2024-02-05

## 2024-02-22 RX ORDER — HYDROCORTISONE ACETATE 25 MG/1
25 SUPPOSITORY RECTAL 2 TIMES DAILY
Qty: 14 SUPPOSITORY | Refills: 0 | Status: SHIPPED | OUTPATIENT
Start: 2024-02-22

## 2024-02-22 RX ORDER — LEVONORGESTREL AND ETHINYL ESTRADIOL AND ETHINYL ESTRADIOL 150-30(84)
1 KIT ORAL DAILY
COMMUNITY
Start: 2024-01-31

## 2024-02-22 NOTE — PROGRESS NOTES
[facility] - Outpatient Consultation New Patient  Marlena Wade 23 y.o. female MRN: [id number]  Encounter: [id number]          ASSESSMENT AND PLAN:    1.  Lower abdominal pain  2.  Diarrhea   3.  Rectal bleeding  4.  Hemorrhoids  Patient reports she has had irregular bowel habits since she was 15 years old with frequent diarrhea, and intermittent blood in the stool.  She reports she has 3-4 urgent BMs daily, often either watery or narrow and formed.  There is a lot of lower abdominal pain before bowel movement which improves afterwards.  The symptoms have been ongoing for years.  The other day, she passed a dark blood clot in her stool, which is different from her baseline intermittent bright red blood she sometimes sees on the toilet tissue or in the toilet water.  She also feels a small lump at the rectal area.  She reports she was recently diagnosed with syphilis and is currently completing treatment with doxycycline.  Family history is significant for paternal grandfather with Crohn's disease.  Rectal exam with tender, prolapsed internal hemorrhoid, no visible blood.  Patient had a prior colonoscopy 8/3/2018 which was normal to the terminal ileum.    -Check CBC, CMP, TSH, CRP.  -Start Anusol suppository twice daily x 1 week.  -Schedule colonoscopy. Discussed bowel preparation, what to expect in procedure, and risks/benefits, including risk of bleeding, infection, and bowel perforation. Patient understands and agrees with plan for procedure. No further questions/concerns at present.       5.  Nausea  Patient states she has chronic nausea and early satiety, but overall endorses a good appetite with no issues such as heartburn, reflux, or dietary intolerance.  She states she eats 2 large meals per day on average.  EGD performed 6/16/2020 with normal esophagus, large food bolus in the stomach, mild antral erythema, normal duodenum.  Gastric emptying study 6/30/2020 decreased early rate of  gastric emptying but normalization at 4 hours.    -Patient does not meet criteria for gastroparesis, however she did have some mild abnormalities in the first 2 hours of her gastric emptying study several years ago.    -Advised patient to eat 5-6 small/frequent meals daily to see if this helps with her chronic/intermittent nausea.    Follow up 3 months.    ________________________________________________________    HPI:  Marlena Wade is a 23-year-old female with history of hypothyroidism, dyslipidemia, dyspepsia, cholelithiasis, asthma, and anxiety who presents for evaluation.  Patient was previously seen in our office in August 2020 for dyspepsia and prior diarrhea which had then improved on its own.    EGD performed 6/16/2020 with normal esophagus, large food bolus in the stomach, mild antral erythema, normal duodenum.  Gastric emptying study 6/30/2020 decreased early rate of gastric emptying but normalization at 4 hours.  Patient had a prior colonoscopy 8/3/2018 which was normal to the terminal ileum.  2/2020 negative for celiac  4/2020 fecal elastase normal    Patient reports she has had irregular bowel habits since she was 15 years old with frequent diarrhea, and intermittent blood in the stool.  She reports she has 3-4 urgent BMs daily, often either watery or narrow and formed.  There is a lot of lower abdominal pain before bowel movement which improves afterwards.  The symptoms have been ongoing for years.  The other day, she passed a dark blood clot in her stool, which is different from her baseline intermittent bright red blood she sometimes sees on the toilet tissue or in the toilet water.  She also feels a small lump at the rectal area.  She reports she was recently diagnosed with syphilis and is currently completing treatment with doxycycline.  Family history is significant for paternal grandfather with Crohn's disease.    She also endorses chronic nausea and early satiety.  She denies any heartburn,  reflux, food intolerance.  States she has a good appetite.       REVIEW OF SYSTEMS:  10 point ROS reviewed and negative, except as above    Historical Information   Past Medical History:   Diagnosis Date    Anxiety     Asthma     Depression     Environmental allergies     Last assessed: 1/18/17    GERD (gastroesophageal reflux disease)     Hypoglycemia     Hypothyroidism      Past Surgical History:   Procedure Laterality Date    COLONOSCOPY  2016 & 2018    Fiberoptic    EGD  2016    EGD  06/2016    MYRINGOTOMY      TONSILLECTOMY      TOOTH EXTRACTION       Social History   Social History     Substance and Sexual Activity   Alcohol Use Yes    Alcohol/week: 10.0 standard drinks of alcohol    Types: 10 Glasses of wine per week    Comment: every 2-3 days, more on weekends     Social History     Substance and Sexual Activity   Drug Use Yes    Frequency: 4.0 times per week    Types: Marijuana    Comment: recreational      Social History     Tobacco Use   Smoking Status Every Day    Current packs/day: 0.25    Average packs/day: 0.3 packs/day for 0.3 years (0.1 ttl pk-yrs)    Types: Cigarettes    Start date: 11/1/2023   Smokeless Tobacco Never   Tobacco Comments    1/2 pack per month     Family History   Problem Relation Age of Onset    Anxiety disorder Mother     Diabetes Mother         due to underlying condition with both eyes affected by proliferative retinopathy and traction retinal detatchments involving maculae, without long term use of insulin    Depression Father     Anxiety disorder Father     Alcohol abuse Father     Sleep apnea Father     Suicide Attempts Maternal Grandmother     Suicide Attempts Maternal Grandfather     Crohn's disease Paternal Grandfather        Meds/Allergies       Current Outpatient Medications:     albuterol (2.5 mg/3 mL) 0.083 % nebulizer solution    albuterol (Proventil HFA) 90 mcg/act inhaler    clotrimazole (LOTRIMIN) 1 % cream    cyanocobalamin (VITAMIN B-12) 500 MCG tablet     ergocalciferol (VITAMIN D2) 50,000 units    hydrOXYzine HCL (ATARAX) 50 mg tablet    loratadine (CLARITIN) 10 mg tablet    lurasidone (LATUDA) 40 mg tablet    mometasone-formoterol (Dulera) 200-5 MCG/ACT inhaler    nystatin (MYCOSTATIN) powder    predniSONE 10 mg tablet    sertraline (ZOLOFT) 100 mg tablet    traZODone (DESYREL) 100 mg tablet    No Known Allergies        Objective   Wt Readings from Last 3 Encounters:   12/29/23 109 kg (240 lb)   12/19/23 111 kg (244 lb)   11/10/23 107 kg (236 lb 12.4 oz)     Temp Readings from Last 3 Encounters:   12/29/23 98.5 °F (36.9 °C)   12/19/23 98.4 °F (36.9 °C) (Tympanic)   11/10/23 (!) 97.4 °F (36.3 °C) (Tympanic)     BP Readings from Last 3 Encounters:   12/29/23 114/76   12/19/23 124/88   11/10/23 150/93     Pulse Readings from Last 3 Encounters:   12/29/23 (!) 112   12/19/23 104   11/10/23 (!) 112        PHYSICAL EXAM:     Physical Exam  Vitals reviewed.   Constitutional:       General: She is not in acute distress.     Appearance: She is well-developed.   HENT:      Head: Normocephalic and atraumatic.   Eyes:      Conjunctiva/sclera: Conjunctivae normal.   Cardiovascular:      Rate and Rhythm: Normal rate and regular rhythm.      Heart sounds: No murmur heard.  Pulmonary:      Effort: Pulmonary effort is normal. No respiratory distress.      Breath sounds: Normal breath sounds.   Abdominal:      General: Bowel sounds are normal. There is no distension.      Palpations: Abdomen is soft.      Tenderness: There is abdominal tenderness in the right upper quadrant. There is no guarding.   Genitourinary:     Rectum: Tenderness and internal hemorrhoid (Prolapsed, no blood visible) present.      Comments: Tamanna Falcon MA present for rectal exam.  Musculoskeletal:         General: No swelling.      Cervical back: Neck supple.   Skin:     General: Skin is warm and dry.   Neurological:      Mental Status: She is alert.   Psychiatric:         Mood and Affect: Mood normal.              Lab Results:   No visits with results within 1 Day(s) from this visit.   Latest known visit with results is:   Office Visit on 12/29/2023   Component Date Value    POCT SARS-CoV-2 Ag 12/29/2023 Positive (A)     VALID CONTROL 12/29/2023 Valid        Lab Results   Component Value Date    WBC 8.32 10/05/2023    HGB 13.8 10/05/2023    HCT 41.4 10/05/2023    MCV 94 10/05/2023     10/05/2023       Lab Results   Component Value Date    SODIUM 138 10/05/2023    K 3.7 10/05/2023     10/05/2023    CO2 24 10/05/2023    AGAP 8 10/05/2023    BUN 9 10/05/2023    CREATININE 0.76 10/05/2023    GLUC 103 10/05/2023    GLUF 103 (H) 10/05/2023    CALCIUM 8.9 10/05/2023    AST 24 10/05/2023    ALT 32 10/05/2023    ALKPHOS 70 10/05/2023    TP 6.9 10/05/2023    TBILI 0.48 10/05/2023    EGFR 110 10/05/2023         Radiology Results:   No results found.

## 2024-03-06 ENCOUNTER — TELEPHONE (OUTPATIENT)
Dept: PSYCHIATRY | Facility: CLINIC | Age: 24
End: 2024-03-06

## 2024-03-07 ENCOUNTER — TELEMEDICINE (OUTPATIENT)
Dept: PSYCHIATRY | Facility: CLINIC | Age: 24
End: 2024-03-07
Payer: COMMERCIAL

## 2024-03-07 DIAGNOSIS — F33.41 RECURRENT MAJOR DEPRESSIVE DISORDER, IN PARTIAL REMISSION (HCC): Primary | ICD-10-CM

## 2024-03-07 DIAGNOSIS — F12.90 MARIJUANA USE, CONTINUOUS: ICD-10-CM

## 2024-03-07 DIAGNOSIS — F41.9 ANXIETY: ICD-10-CM

## 2024-03-07 PROCEDURE — 99214 OFFICE O/P EST MOD 30 MIN: CPT

## 2024-03-07 RX ORDER — LURASIDONE HYDROCHLORIDE 40 MG/1
40 TABLET, FILM COATED ORAL
Qty: 30 TABLET | Refills: 0 | Status: SHIPPED | OUTPATIENT
Start: 2024-03-07 | End: 2024-04-06

## 2024-03-07 NOTE — PSYCH
Regular Visit    Problem List Items Addressed This Visit          Other    Anxiety    Marijuana use, continuous     Other Visit Diagnoses       Recurrent major depressive disorder, in partial remission (HCC)    -  Primary    Relevant Medications    lurasidone (LATUDA) 40 mg tablet               Encounter provider TRE Ho    Provider located at    [facility]  [address] [facility] [address] [phone]    Recent Visits  No visits were found meeting these conditions.  Showing recent visits within past 7 days and meeting all other requirements  Today's Visits  Date Type Provider Dept   03/07/24 Telemedicine TRE Ho [facility]   Showing today's visits and meeting all other requirements  Future Appointments  No visits were found meeting these conditions.  Showing future appointments within next 150 days and meeting all other requirements       HPI     Current Outpatient Medications   Medication Sig Dispense Refill    albuterol (Proventil HFA) 90 mcg/act inhaler Inhale 2 puffs every 6 (six) hours as needed for wheezing 6.7 g 5    Ashlyna 0.15-0.03 &0.01 MG TABS Take 1 tablet by mouth daily      clotrimazole (LOTRIMIN) 1 % cream Apply topically 2 (two) times a day 14 g 2    doxycycline hyclate (VIBRA-TABS) 100 mg tablet Take 100 mg by mouth 2 (two) times a day      hydrocortisone (ANUSOL-HC) 25 mg suppository Insert 1 suppository (25 mg total) into the rectum 2 (two) times a day 14 suppository 0    hydrOXYzine HCL (ATARAX) 50 mg tablet Take 1 tablet (50 mg total) by mouth 2 (two) times a day 90 tablet 1    lurasidone (LATUDA) 40 mg tablet Take 1 tablet (40 mg total) by mouth daily with breakfast 30 tablet 0    mometasone-formoterol (Dulera) 200-5 MCG/ACT inhaler Inhale 2 puffs 2 (two) times a day Rinse mouth after use. 13 g 6    sertraline (ZOLOFT) 100 mg tablet Take 1.5 tablets (150 mg total) by mouth  daily 45 tablet 2    traZODone (DESYREL) 100 mg tablet Take 1 tablet (100 mg total) by mouth daily at bedtime 30 tablet 2    albuterol (2.5 mg/3 mL) 0.083 % nebulizer solution Take 3 mL (2.5 mg total) by nebulization 2 (two) times a day (Patient not taking: Reported on 3/7/2024) 180 mL 3    cyanocobalamin (VITAMIN B-12) 500 MCG tablet Take 1 tablet (500 mcg total) by mouth daily Do not start before October 10, 2023. 30 tablet 0    ergocalciferol (VITAMIN D2) 50,000 units Take 1 capsule (50,000 Units total) by mouth once a week for 12 doses Do not start before October 14, 2023. 4 capsule 2    loratadine (CLARITIN) 10 mg tablet Take 10 mg by mouth daily (Patient not taking: Reported on 12/29/2023)      nystatin (MYCOSTATIN) powder Apply topically 3 (three) times a day Do not start before October 10, 2023. (Patient not taking: Reported on 2/22/2024) 15 g 1    predniSONE 10 mg tablet Take 4 tablets daily for 2 days, then 3 tablets daily for 2 days, then 2 tablets daily for 2 days, then 1 tablet daily for 2 days, then stop (Patient not taking: Reported on 12/29/2023) 20 tablet 0     No current facility-administered medications for this visit.       Review of Systems        MEDICATION MANAGEMENT NOTE        [facility]    Name and Date of Birth:  Marlena Wade 23 y.o. 2000 MRN: [id number]    Date of Visit: March 7, 2024    No Known Allergies  SUBJECTIVE:    Marlena is seen today for a follow up for Major Depressive Disorder and anxiety. She continues to do relatively well since the last visit. Marlena reports an upcoming end to her lease, trying to determine where she is going next, hopes to move to [facility] but unsure if she can afford. Main stressor continues to be financial strain, still paying off prior DUI, states this is the last monthly payment, looking forward to improving her finances after that.  Continues to work as a local , past few weeks have been  good in terms of hours.  Chronic depression 5/10 periods of  dysphoria, low energy, sleeping in, limited motivation.  Denies thoughts of self-harm or suicide ideation.  She is hopeful her upcoming move will be a better living situation and improve her overall mood.  Sleep has been adequate, rarely needs to use as needed trazodone.  Mood appears controlled with current Latuda, Zoloft-tolerating with no adverse effects.  Continues to avoid alcohol consumption, continues to use marijuana 3-5x week, educated the need to decrease which may improve her energy levels.  Patient recently stopped going to psychotherapy due to not being a great fit and therapy appointments costing too much. Reach out if she would like to be transferred to another therapist. Denies SI        She denies any side effects from medications.    PLAN:    -Continue Latuda 40mg for tx resistant MDD and mood stabilization   Patient education completed for Latuda, risks include:  NMS, leukopenia, neutropenia, tardive dyskinesia, angioedema, suicidality, diabetes, orthostatic hypotension, somnolence or insomnia, GI upset, hyperprolactinemia, akathisia, and possible weight gain (less than other SGA's), etc.     -Continue Zoloft to 150 mg daily for depression  PARQ completed including serotonin syndrome, SIADH, worsening depression, suicidality, induction of sabrina, GI upset, headaches, activation, sexual side effects, sedation, potential drug interactions, and others.    -Continue prn Trazodone 100mg daily for insomnia  -Continue prn atarax 50mg daily for breakthrough anxiety        Aware of 24 hour and weekend coverage for urgent situations accessed by calling [facility] main practice number  Referral for individual psychotherapy    Diagnoses and all orders for this visit:    Recurrent major depressive disorder, in partial remission (HCC)  -     lurasidone (LATUDA) 40 mg tablet; Take 1 tablet (40 mg total) by mouth daily with  breakfast    Anxiety    Marijuana use, continuous        Current Outpatient Medications on File Prior to Visit   Medication Sig Dispense Refill    albuterol (Proventil HFA) 90 mcg/act inhaler Inhale 2 puffs every 6 (six) hours as needed for wheezing 6.7 g 5    Ashlyna 0.15-0.03 &0.01 MG TABS Take 1 tablet by mouth daily      clotrimazole (LOTRIMIN) 1 % cream Apply topically 2 (two) times a day 14 g 2    doxycycline hyclate (VIBRA-TABS) 100 mg tablet Take 100 mg by mouth 2 (two) times a day      hydrocortisone (ANUSOL-HC) 25 mg suppository Insert 1 suppository (25 mg total) into the rectum 2 (two) times a day 14 suppository 0    hydrOXYzine HCL (ATARAX) 50 mg tablet Take 1 tablet (50 mg total) by mouth 2 (two) times a day 90 tablet 1    mometasone-formoterol (Dulera) 200-5 MCG/ACT inhaler Inhale 2 puffs 2 (two) times a day Rinse mouth after use. 13 g 6    sertraline (ZOLOFT) 100 mg tablet Take 1.5 tablets (150 mg total) by mouth daily 45 tablet 2    traZODone (DESYREL) 100 mg tablet Take 1 tablet (100 mg total) by mouth daily at bedtime 30 tablet 2    [DISCONTINUED] lurasidone (LATUDA) 40 mg tablet Take 1 tablet (40 mg total) by mouth daily with breakfast 30 tablet 2    albuterol (2.5 mg/3 mL) 0.083 % nebulizer solution Take 3 mL (2.5 mg total) by nebulization 2 (two) times a day (Patient not taking: Reported on 3/7/2024) 180 mL 3    cyanocobalamin (VITAMIN B-12) 500 MCG tablet Take 1 tablet (500 mcg total) by mouth daily Do not start before October 10, 2023. 30 tablet 0    ergocalciferol (VITAMIN D2) 50,000 units Take 1 capsule (50,000 Units total) by mouth once a week for 12 doses Do not start before October 14, 2023. 4 capsule 2    loratadine (CLARITIN) 10 mg tablet Take 10 mg by mouth daily (Patient not taking: Reported on 12/29/2023)      nystatin (MYCOSTATIN) powder Apply topically 3 (three) times a day Do not start before October 10, 2023. (Patient not taking: Reported on 2/22/2024) 15 g 1    predniSONE 10 mg  tablet Take 4 tablets daily for 2 days, then 3 tablets daily for 2 days, then 2 tablets daily for 2 days, then 1 tablet daily for 2 days, then stop (Patient not taking: Reported on 12/29/2023) 20 tablet 0     No current facility-administered medications on file prior to visit.         HPI ROS Appetite Changes and Sleep:     She reports hypersomnia, decreased appetite, low energy. Denies homicidal ideation, denies suicidal ideation    Review Of Systems:      HPI ROS:               Medication Side Effects:  denies    Depression (10 worst): 5/10    Anxiety (10 worst): 3/10    Safety concerns (SI, HI, etc): Denies si and hi    Sleep: 8hrs     Energy: low    Appetite: 2 meals    Weight Change: denies        Mental Status Evaluation:    Appearance Adequate hygiene and grooming   Behavior calm and cooperative   Mood depressed  Depression Scale - 5 of 10 (0 = No depression)  Anxiety Scale - 3 of 10 (0 = No anxiety)   Speech Normal rate and volume   Affect constricted   Thought Processes Goal directed and coherent   Thought Content Does not verbalize delusional material   Associations Tightly connected   Perceptual Disturbances Denies hallucinations and does not appear to be responding to internal stimuli   Risk Potential Suicidal/Homicidal Ideation - No evidence of suicidal or homicidal ideation and patient does not verbalize suicidal or homicidal ideation  Risk of Violence - No evidence of risk for violence found on assessment  Risk of Self Mutilation - No evidence of risk for self mutilation found on assessment   Orientation oriented to person, place, time/date and situation   Memory recent and remote memory grossly intact   Consciousness alert and awake   Attention/Concentration attention span and concentration are age appropriate   Insight intact   Judgement intact   Muscle Strength and Gait normal muscle strength and normal muscle tone, normal gait/station and normal balance   Motor Activity no abnormal movements    Language no difficulty naming common objects, no difficulty repeating a phrase, no difficulty writing a sentence   Fund of Knowledge adequate knowledge of current events  adequate fund of knowledge regarding past history  adequate fund of knowledge regarding vocabulary      Traumatic History Update:     New Onset of Abuse Since Last Encounter:   no  Traumatic Events Since Last Encounter:   no    Past Medical History:    Past Medical History:   Diagnosis Date    Anxiety     Asthma     Depression     Environmental allergies     Last assessed: 1/18/17    GERD (gastroesophageal reflux disease)     Hypoglycemia     Hypothyroidism         Past Surgical History:   Procedure Laterality Date    COLONOSCOPY  2016 & 2018    Fiberoptic    EGD  2016    EGD  06/2016    MYRINGOTOMY      TONSILLECTOMY      TOOTH EXTRACTION       No Known Allergies  Substance Abuse History:    Social History     Substance and Sexual Activity   Alcohol Use Yes    Alcohol/week: 10.0 standard drinks of alcohol    Types: 10 Glasses of wine per week    Comment: every 2-3 days, more on weekends     Social History     Substance and Sexual Activity   Drug Use Yes    Frequency: 4.0 times per week    Types: Marijuana    Comment: recreational      Social History:    Social History     Socioeconomic History    Marital status: Single     Spouse name: Not on file    Number of children: 0    Years of education: Not on file    Highest education level: Bachelor's degree (e.g., BA, AB, BS)   Occupational History    Occupation: UNEMPLOYED   Tobacco Use    Smoking status: Every Day     Current packs/day: 0.25     Average packs/day: 0.3 packs/day for 0.3 years (0.1 ttl pk-yrs)     Types: Cigarettes     Start date: 11/1/2023    Smokeless tobacco: Never    Tobacco comments:     1/2 pack per month   Vaping Use    Vaping status: Former    Substances: Nicotine   Substance and Sexual Activity    Alcohol use: Yes     Alcohol/week: 10.0 standard drinks of alcohol     Types:  10 Glasses of wine per week     Comment: every 2-3 days, more on weekends    Drug use: Yes     Frequency: 4.0 times per week     Types: Marijuana     Comment: recreational     Sexual activity: Yes     Partners: Male   Other Topics Concern    Not on file   Social History Narrative    Not on file     Social Determinants of Health     Financial Resource Strain: Not on file   Food Insecurity: Not on file   Transportation Needs: Not on file   Physical Activity: Not on file   Stress: Not on file   Social Connections: Not on file   Intimate Partner Violence: Not on file   Housing Stability: Not on file     Family Psychiatric History:     Family History   Problem Relation Age of Onset    Anxiety disorder Mother     Diabetes Mother         due to underlying condition with both eyes affected by proliferative retinopathy and traction retinal detatchments involving maculae, without long term use of insulin    Depression Father     Anxiety disorder Father     Alcohol abuse Father     Sleep apnea Father     Suicide Attempts Maternal Grandmother     Suicide Attempts Maternal Grandfather     Crohn's disease Paternal Grandfather      History Review:The following portions of the patient's history were reviewed and updated as appropriate: allergies, current medications, past family history, past medical history, past social history, past surgical history and problem list     OBJECTIVE:     Vital signs in last 24 hours:    There were no vitals filed for this visit.  Laboratory Results:   Recent Labs (last 2 months):   No visits with results within 2 Month(s) from this visit.   Latest known visit with results is:   Office Visit on 12/29/2023   Component Date Value    POCT SARS-CoV-2 Ag 12/29/2023 Positive (A)     VALID CONTROL 12/29/2023 Valid      I have personally reviewed all pertinent laboratory/tests results.    Suicide/Homicide Risk Assessment:    Risk of Harm to Self:  Protective Factors: no current suicidal ideation, access to  mental health treatment, compliant with medications, compliant with mental health treatment, having a desire to be alive, medical compliance, resiliency  Based on today's assessment, Marlena presents the following risk of harm to self: moderate Referred for immediate inpatient stay, mother driving pt to ER now.    Risk of Harm to Others:  Based on today's assessment, Marlena presents the following risk of harm to others: minimal    The following interventions are recommended: referral for psychotherapy, referral for inpatient admission    Medications Risks/Benefits:      Risks, Benefits And Possible Side Effects Of Medications:    Discussed risks and benefits of treatment with patient including risk of suicidality, serotonin syndrome, increased QTc interval and SIADH related to treatment with antidepressants; Risk of induction of manic symptoms in certain patient populations and risk of parkinsonian symptoms, metabolic syndrome, tardive dyskinesia and neuroleptic malignant syndrome related to treatment with antipsychotic medications     Controlled Medication Discussion:     Not applicable    Treatment Plan:    Due for update/Updated:   no  Last treatment plan done 11/21, due 6/21/24    TRE Ho 03/07/24    Visit Time    Visit Start Time: 930  Visit Stop Time: 950  Total Visit Duration:  20 minutes

## 2024-03-10 ENCOUNTER — ANESTHESIA EVENT (OUTPATIENT)
Dept: ANESTHESIOLOGY | Facility: HOSPITAL | Age: 24
End: 2024-03-10

## 2024-03-10 ENCOUNTER — ANESTHESIA (OUTPATIENT)
Dept: ANESTHESIOLOGY | Facility: HOSPITAL | Age: 24
End: 2024-03-10

## 2024-03-15 ENCOUNTER — TELEPHONE (OUTPATIENT)
Dept: GASTROENTEROLOGY | Facility: MEDICAL CENTER | Age: 24
End: 2024-03-15

## 2024-03-15 NOTE — TELEPHONE ENCOUNTER
Confirming Upcoming Procedure: Colonoscopy on 03/26/2024  Physician performing: Dr. Alexandre  Location of procedure:  [facility]  Prep: Miralax    Confirmed

## 2024-03-25 RX ORDER — ALBUTEROL SULFATE 2.5 MG/3ML
2.5 SOLUTION RESPIRATORY (INHALATION) ONCE AS NEEDED
Status: CANCELLED | OUTPATIENT
Start: 2024-03-25

## 2024-03-25 RX ORDER — ONDANSETRON 2 MG/ML
4 INJECTION INTRAMUSCULAR; INTRAVENOUS ONCE AS NEEDED
Status: CANCELLED | OUTPATIENT
Start: 2024-03-25

## 2024-03-25 RX ORDER — SODIUM CHLORIDE 9 MG/ML
125 INJECTION, SOLUTION INTRAVENOUS CONTINUOUS
Status: CANCELLED | OUTPATIENT
Start: 2024-03-25

## 2024-03-26 ENCOUNTER — HOSPITAL ENCOUNTER (OUTPATIENT)
Dept: GASTROENTEROLOGY | Facility: MEDICAL CENTER | Age: 24
Setting detail: OUTPATIENT SURGERY
Discharge: HOME | End: 2024-03-26
Payer: COMMERCIAL

## 2024-03-26 ENCOUNTER — ANESTHESIA (OUTPATIENT)
Dept: GASTROENTEROLOGY | Facility: MEDICAL CENTER | Age: 24
End: 2024-03-26

## 2024-03-26 ENCOUNTER — ANESTHESIA EVENT (OUTPATIENT)
Dept: GASTROENTEROLOGY | Facility: MEDICAL CENTER | Age: 24
End: 2024-03-26

## 2024-03-26 VITALS
DIASTOLIC BLOOD PRESSURE: 59 MMHG | TEMPERATURE: 97.6 F | HEART RATE: 82 BPM | SYSTOLIC BLOOD PRESSURE: 102 MMHG | RESPIRATION RATE: 18 BRPM | OXYGEN SATURATION: 98 %

## 2024-03-26 DIAGNOSIS — K62.5 RECTAL BLEEDING: ICD-10-CM

## 2024-03-26 DIAGNOSIS — R10.30 LOWER ABDOMINAL PAIN: ICD-10-CM

## 2024-03-26 DIAGNOSIS — R19.7 DIARRHEA, UNSPECIFIED TYPE: ICD-10-CM

## 2024-03-26 LAB
EXT PREGNANCY TEST URINE: NEGATIVE
EXT. CONTROL: NORMAL

## 2024-03-26 PROCEDURE — 81025 URINE PREGNANCY TEST: CPT | Performed by: ANESTHESIOLOGY

## 2024-03-26 PROCEDURE — 88305 TISSUE EXAM BY PATHOLOGIST: CPT | Performed by: PATHOLOGY

## 2024-03-26 RX ORDER — SODIUM CHLORIDE 9 MG/ML
125 INJECTION, SOLUTION INTRAVENOUS CONTINUOUS
Status: DISCONTINUED | OUTPATIENT
Start: 2024-03-26 | End: 2024-03-30 | Stop reason: HOSPADM

## 2024-03-26 RX ORDER — LIDOCAINE HYDROCHLORIDE 20 MG/ML
INJECTION, SOLUTION EPIDURAL; INFILTRATION; INTRACAUDAL; PERINEURAL AS NEEDED
Status: DISCONTINUED | OUTPATIENT
Start: 2024-03-26 | End: 2024-03-26

## 2024-03-26 RX ORDER — ALBUTEROL SULFATE 2.5 MG/3ML
2.5 SOLUTION RESPIRATORY (INHALATION) ONCE AS NEEDED
Status: DISCONTINUED | OUTPATIENT
Start: 2024-03-26 | End: 2024-03-30 | Stop reason: HOSPADM

## 2024-03-26 RX ORDER — ONDANSETRON 2 MG/ML
4 INJECTION INTRAMUSCULAR; INTRAVENOUS ONCE AS NEEDED
Status: DISCONTINUED | OUTPATIENT
Start: 2024-03-26 | End: 2024-03-30 | Stop reason: HOSPADM

## 2024-03-26 RX ORDER — PROPOFOL 10 MG/ML
INJECTION, EMULSION INTRAVENOUS AS NEEDED
Status: DISCONTINUED | OUTPATIENT
Start: 2024-03-26 | End: 2024-03-26

## 2024-03-26 RX ADMIN — SODIUM CHLORIDE 125 ML/HR: 0.9 INJECTION, SOLUTION INTRAVENOUS at 13:24

## 2024-03-26 RX ADMIN — PROPOFOL 50 MG: 10 INJECTION, EMULSION INTRAVENOUS at 13:53

## 2024-03-26 RX ADMIN — PROPOFOL 50 MG: 10 INJECTION, EMULSION INTRAVENOUS at 13:55

## 2024-03-26 RX ADMIN — PROPOFOL 100 MG: 10 INJECTION, EMULSION INTRAVENOUS at 13:50

## 2024-03-26 RX ADMIN — PROPOFOL 50 MG: 10 INJECTION, EMULSION INTRAVENOUS at 13:57

## 2024-03-26 RX ADMIN — PROPOFOL 50 MG: 10 INJECTION, EMULSION INTRAVENOUS at 14:01

## 2024-03-26 RX ADMIN — LIDOCAINE HYDROCHLORIDE 60 MG: 20 INJECTION, SOLUTION EPIDURAL; INFILTRATION; INTRACAUDAL at 13:50

## 2024-03-26 RX ADMIN — PROPOFOL 50 MG: 10 INJECTION, EMULSION INTRAVENOUS at 14:05

## 2024-03-26 RX ADMIN — PROPOFOL 50 MG: 10 INJECTION, EMULSION INTRAVENOUS at 13:51

## 2024-03-26 RX ADMIN — PROPOFOL 50 MG: 10 INJECTION, EMULSION INTRAVENOUS at 13:59

## 2024-03-26 RX ADMIN — PROPOFOL 50 MG: 10 INJECTION, EMULSION INTRAVENOUS at 14:03

## 2024-03-26 NOTE — H&P
History and Physical - [facility]  Marlena Wade 23 y.o. female MRN: [id number]          HPI: Marlena Wade is a 23 y.o. year old female who presents for colonoscopy to evaluate chronic diarrhea and hematochezia.      REVIEW OF SYSTEMS: Per the HPI, and otherwise unremarkable.    Historical Information   Past Medical History:   Diagnosis Date    Anxiety     Asthma     Depression     Environmental allergies     Last assessed: 1/18/17    GERD (gastroesophageal reflux disease)     Hypoglycemia     Hypothyroidism      Past Surgical History:   Procedure Laterality Date    COLONOSCOPY  2016 & 2018    Fiberoptic    EGD  2016    EGD  06/2016    MYRINGOTOMY      TONSILLECTOMY      TOOTH EXTRACTION       Social History   Social History     Substance and Sexual Activity   Alcohol Use Yes    Alcohol/week: 10.0 standard drinks of alcohol    Types: 10 Glasses of wine per week    Comment: every 2-3 days, more on weekends     Social History     Substance and Sexual Activity   Drug Use Yes    Frequency: 4.0 times per week    Types: Marijuana    Comment: recreational      Social History     Tobacco Use   Smoking Status Every Day    Current packs/day: 0.25    Average packs/day: 0.3 packs/day for 0.4 years (0.1 ttl pk-yrs)    Types: Cigarettes    Start date: 11/1/2023   Smokeless Tobacco Never   Tobacco Comments    1/2 pack per month     Family History   Problem Relation Age of Onset    Anxiety disorder Mother     Diabetes Mother         due to underlying condition with both eyes affected by proliferative retinopathy and traction retinal detatchments involving maculae, without long term use of insulin    Depression Father     Anxiety disorder Father     Alcohol abuse Father     Sleep apnea Father     Suicide Attempts Maternal Grandmother     Suicide Attempts Maternal Grandfather     Crohn's disease Paternal Grandfather        Meds/Allergies       Current Outpatient Medications:     albuterol (2.5 mg/3 mL) 0.083 %  nebulizer solution    albuterol (Proventil HFA) 90 mcg/act inhaler    Ashlyna 0.15-0.03 &0.01 MG TABS    clotrimazole (LOTRIMIN) 1 % cream    cyanocobalamin (VITAMIN B-12) 500 MCG tablet    doxycycline hyclate (VIBRA-TABS) 100 mg tablet    ergocalciferol (VITAMIN D2) 50,000 units    hydrocortisone (ANUSOL-HC) 25 mg suppository    hydrOXYzine HCL (ATARAX) 50 mg tablet    loratadine (CLARITIN) 10 mg tablet    lurasidone (LATUDA) 40 mg tablet    mometasone-formoterol (Dulera) 200-5 MCG/ACT inhaler    nystatin (MYCOSTATIN) powder    predniSONE 10 mg tablet    sertraline (ZOLOFT) 100 mg tablet    traZODone (DESYREL) 100 mg tablet    Current Facility-Administered Medications:     sodium chloride 0.9 % infusion, 125 mL/hr, Intravenous, Continuous    No Known Allergies    Objective     There were no vitals taken for this visit.      PHYSICAL EXAM    GEN: NAD  CARDIO: RRR  PULM: CTA bilaterally  ABD: soft, non-tender, non-distended  EXT: no lower extremity edema  NEURO: AAOx3      ASSESSMENT/PLAN:  23 y.o. year old female here for colonoscopy; she is stable and optimized for her procedure.

## 2024-03-26 NOTE — ANESTHESIA PREPROCEDURE EVALUATION
Procedure:  COLONOSCOPY    Relevant Problems   ANESTHESIA (within normal limits)      ENDO   (+) Hypothyroidism      GI/HEPATIC   (+) GERD (gastroesophageal reflux disease)      HEMATOLOGY   (+) Iron deficiency anemia      NEURO/PSYCH   (+) Anxiety   (+) Major depressive disorder with single episode, in partial remission (HCC)   (+) Severe episode of recurrent major depressive disorder, without psychotic features (HCC)      PULMONARY   (+) Cigarette smoker   (+) Moderate persistent asthma with acute exacerbation      Other   (+) Morbid (severe) obesity due to excess calories (HCC)        Physical Exam    Airway    Mallampati score: II  TM Distance: >3 FB  Neck ROM: full     Dental       Cardiovascular  Rhythm: regular, Rate: normal    Pulmonary   Breath sounds clear to auscultation    Other Findings  post-pubertal.      Anesthesia Plan  ASA Score- 3     Anesthesia Type- IV sedation with anesthesia with ASA Monitors.         Additional Monitors:     Airway Plan:            Plan Factors-Exercise tolerance (METS): >4 METS.    Chart reviewed.   Existing labs reviewed. Patient summary reviewed.    Patient is not a current smoker.              Induction- intravenous.    Postoperative Plan-     Informed Consent- Anesthetic plan and risks discussed with patient.

## 2024-03-26 NOTE — ANESTHESIA POSTPROCEDURE EVALUATION
Post-Op Assessment Note    CV Status:  Stable    Pain management: adequate       Mental Status:  Alert and awake   Hydration Status:  Euvolemic   PONV Controlled:  Controlled   Airway Patency:  Patent     Post Op Vitals Reviewed: Yes    No anethesia notable event occurred.    Staff: Anesthesiologist               /59 (03/26/24 1422)    Temp      Pulse 82 (03/26/24 1422)   Resp 18 (03/26/24 1422)    SpO2 98 % (03/26/24 1422)

## 2024-03-28 PROCEDURE — 88305 TISSUE EXAM BY PATHOLOGIST: CPT | Performed by: PATHOLOGY

## 2024-03-31 DIAGNOSIS — F33.41 RECURRENT MAJOR DEPRESSIVE DISORDER, IN PARTIAL REMISSION (HCC): ICD-10-CM

## 2024-04-01 RX ORDER — LURASIDONE HYDROCHLORIDE 40 MG/1
40 TABLET, FILM COATED ORAL
Qty: 30 TABLET | Refills: 0 | Status: SHIPPED | OUTPATIENT
Start: 2024-04-01

## 2024-04-12 ENCOUNTER — TELEPHONE (OUTPATIENT)
Age: 24
End: 2024-04-12

## 2024-04-12 NOTE — TELEPHONE ENCOUNTER
Patient called stating that she had previously been seen for a gallstone and has recently been having a lot of nausea and dry heaving . Patient was just sent home from work because of it . She's asking if Dr Howard would order some testing/imaging to check on possible gallstone . Made her aware he may want to see her but patient denied scheduling appt at this time . Please advise .

## 2024-04-26 ENCOUNTER — DOCUMENTATION (OUTPATIENT)
Dept: PSYCHIATRY | Facility: CLINIC | Age: 24
End: 2024-04-26

## 2024-04-26 DIAGNOSIS — F41.9 ANXIETY: Primary | ICD-10-CM

## 2024-04-26 NOTE — PROGRESS NOTES
Psychotherapy Discharge Summary    Preferred Name: Marlena Wade  YOB: 2000    Admission date to psychotherapy: 10/11/2023    Referred by: Unknown    Presenting Problem: Anxiety    Course of treatment included : individual therapy     Progress/Outcome of Treatment Goals (brief summary of course of treatment): Therapist met with patient once after completing initial assessment. Could not track progress due to limited time spent with patient.    Treatment Complications (if any): N/A    Treatment Progress:  N/A    Current [facility] Provider: TRE Ho     Discharge Medications include: Latuda, Trazodone, Zoloft, and Hydroxyzine    Discharge Date: 4/26/2024    Discharge Diagnosis:   1. Anxiety            Criteria for Discharge: two or more unexcused absences for services    Aftercare recommendations include (include specific referral names and phone numbers, if appropriate): N/A    Prognosis:  N/A

## 2024-05-02 ENCOUNTER — TELEMEDICINE (OUTPATIENT)
Dept: PSYCHIATRY | Facility: CLINIC | Age: 24
End: 2024-05-02
Payer: COMMERCIAL

## 2024-05-02 DIAGNOSIS — F33.41 RECURRENT MAJOR DEPRESSIVE DISORDER, IN PARTIAL REMISSION (HCC): Primary | ICD-10-CM

## 2024-05-02 DIAGNOSIS — F41.9 ANXIETY: ICD-10-CM

## 2024-05-02 DIAGNOSIS — F12.90 MARIJUANA USE, CONTINUOUS: ICD-10-CM

## 2024-05-02 PROCEDURE — 99214 OFFICE O/P EST MOD 30 MIN: CPT

## 2024-05-02 RX ORDER — HYDROXYZINE 50 MG/1
50 TABLET, FILM COATED ORAL 2 TIMES DAILY
Qty: 90 TABLET | Refills: 0 | Status: SHIPPED | OUTPATIENT
Start: 2024-05-02

## 2024-05-02 RX ORDER — TRAZODONE HYDROCHLORIDE 100 MG/1
100 TABLET ORAL
Qty: 30 TABLET | Refills: 2 | Status: SHIPPED | OUTPATIENT
Start: 2024-05-02 | End: 2024-07-31

## 2024-05-02 RX ORDER — SERTRALINE HYDROCHLORIDE 100 MG/1
150 TABLET, FILM COATED ORAL DAILY
Qty: 45 TABLET | Refills: 2 | Status: SHIPPED | OUTPATIENT
Start: 2024-05-02 | End: 2024-07-31

## 2024-05-02 RX ORDER — LURASIDONE HYDROCHLORIDE 40 MG/1
40 TABLET, FILM COATED ORAL
Qty: 30 TABLET | Refills: 2 | Status: SHIPPED | OUTPATIENT
Start: 2024-05-02

## 2024-05-02 NOTE — PSYCH
Regular Visit    Problem List Items Addressed This Visit          Behavioral Health    Anxiety    Marijuana use, continuous     Other Visit Diagnoses       Recurrent major depressive disorder, in partial remission ([facility])    -  Primary               Encounter provider TRE Ho    Provider located at    [address] [facility] [address] [phone]    Recent Visits  No visits were found meeting these conditions.  Showing recent visits within past 7 days and meeting all other requirements  Future Appointments  No visits were found meeting these conditions.  Showing future appointments within next 150 days and meeting all other requirements       HPI     Current Outpatient Medications   Medication Sig Dispense Refill    albuterol (Proventil HFA) 90 mcg/act inhaler Inhale 2 puffs every 6 (six) hours as needed for wheezing 6.7 g 5    Ashlyna 0.15-0.03 &0.01 MG TABS Take 1 tablet by mouth daily      clotrimazole (LOTRIMIN) 1 % cream Apply topically 2 (two) times a day 14 g 2    cyanocobalamin (VITAMIN B-12) 500 MCG tablet Take 1 tablet (500 mcg total) by mouth daily Do not start before October 10, 2023. 30 tablet 0    doxycycline hyclate (VIBRA-TABS) 100 mg tablet Take 100 mg by mouth 2 (two) times a day (Patient not taking: Reported on 3/26/2024)      ergocalciferol (VITAMIN D2) 50,000 units Take 1 capsule (50,000 Units total) by mouth once a week for 12 doses Do not start before October 14, 2023. 4 capsule 2    hydrocortisone (ANUSOL-HC) 25 mg suppository Insert 1 suppository (25 mg total) into the rectum 2 (two) times a day 14 suppository 0    hydrOXYzine HCL (ATARAX) 50 mg tablet Take 1 tablet (50 mg total) by mouth 2 (two) times a day 90 tablet 1    loratadine (CLARITIN) 10 mg tablet Take 10 mg by mouth daily (Patient not taking: Reported on 12/29/2023)      lurasidone (LATUDA) 40 mg tablet take 1 tablet by mouth once daily  with breakfast 30 tablet 0    mometasone-formoterol (Dulera) 200-5 MCG/ACT inhaler Inhale 2 puffs 2 (two) times a day Rinse mouth after use. 13 g 6    nystatin (MYCOSTATIN) powder Apply topically 3 (three) times a day Do not start before October 10, 2023. (Patient not taking: Reported on 2/22/2024) 15 g 1    predniSONE 10 mg tablet Take 4 tablets daily for 2 days, then 3 tablets daily for 2 days, then 2 tablets daily for 2 days, then 1 tablet daily for 2 days, then stop (Patient not taking: Reported on 12/29/2023) 20 tablet 0    sertraline (ZOLOFT) 100 mg tablet Take 1.5 tablets (150 mg total) by mouth daily 45 tablet 2    traZODone (DESYREL) 100 mg tablet Take 1 tablet (100 mg total) by mouth daily at bedtime 30 tablet 2     No current facility-administered medications for this visit.       Review of Systems        MEDICATION MANAGEMENT NOTE        [facility]    Name and Date of Birth:  Marlena Wade 23 y.o. 2000 MRN: [id number]    Date of Visit: May 2, 2024    No Known Allergies  SUBJECTIVE:    Marlena is seen today for a follow up for Major Depressive Disorder and anxiety. She continues to do fairly well since the last visit. Marlena describes a stressful situation in terms of her grandfather struggling with his medical health, recent stroke, has dementia, they are unsure of his prognosis as of now.  Patient spent time processing this news, states she is already preparing for his possible death, appears to be coping appropriately at this time.  Patient reports recently moving to [place] to a new apartment, mild increase in stress and anxiety, overall feels it will be in improved living situation compared to her current apartment.  Patient continues to work at [employer], going well, working hard to improve her financial situation.  Overall mood and depression appear controlled with no exacerbation of symptoms.  Reports chronic 3/10 depression with periods of  anhedonia, decreased energy motivation, dysphoria.  Symptoms wax and wane depending on her finances and support.  Anxiety controlled with no recent panic attacks.  Continues to utilize marijuana frequently, denies any alcohol or substance abuse.  Continue to educate about the need to decrease marijuana usage which may help with energy and motivation symptoms.  Patient declines therapy at this time.  Mood is appropriate for situation, pleasant denies SI.      She denies any side effects from medications.    PLAN:    -Continue Latuda 40mg for tx resistant MDD and mood stabilization   Patient education completed for Latuda, risks include:  NMS, leukopenia, neutropenia, tardive dyskinesia, angioedema, suicidality, diabetes, orthostatic hypotension, somnolence or insomnia, GI upset, hyperprolactinemia, akathisia, and possible weight gain (less than other SGA's), etc.     -Continue Zoloft to 150 mg daily for depression  PARQ completed including serotonin syndrome, SIADH, worsening depression, suicidality, induction of sabrina, GI upset, headaches, activation, sexual side effects, sedation, potential drug interactions, and others.    -Continue prn Trazodone 100mg daily for insomnia  -Continue prn atarax 50mg daily for breakthrough anxiety        Aware of 24 hour and weekend coverage for urgent situations accessed by calling [facility] main practice number  Referral for individual psychotherapy    Diagnoses and all orders for this visit:    Recurrent major depressive disorder, in partial remission (HCC)    Anxiety    Marijuana use, continuous        Current Outpatient Medications on File Prior to Visit   Medication Sig Dispense Refill    albuterol (Proventil HFA) 90 mcg/act inhaler Inhale 2 puffs every 6 (six) hours as needed for wheezing 6.7 g 5    Ashlyna 0.15-0.03 &0.01 MG TABS Take 1 tablet by mouth daily      clotrimazole (LOTRIMIN) 1 % cream Apply topically 2 (two) times a day 14 g 2     cyanocobalamin (VITAMIN B-12) 500 MCG tablet Take 1 tablet (500 mcg total) by mouth daily Do not start before October 10, 2023. 30 tablet 0    doxycycline hyclate (VIBRA-TABS) 100 mg tablet Take 100 mg by mouth 2 (two) times a day (Patient not taking: Reported on 3/26/2024)      ergocalciferol (VITAMIN D2) 50,000 units Take 1 capsule (50,000 Units total) by mouth once a week for 12 doses Do not start before October 14, 2023. 4 capsule 2    hydrocortisone (ANUSOL-HC) 25 mg suppository Insert 1 suppository (25 mg total) into the rectum 2 (two) times a day 14 suppository 0    hydrOXYzine HCL (ATARAX) 50 mg tablet Take 1 tablet (50 mg total) by mouth 2 (two) times a day 90 tablet 1    loratadine (CLARITIN) 10 mg tablet Take 10 mg by mouth daily (Patient not taking: Reported on 12/29/2023)      lurasidone (LATUDA) 40 mg tablet take 1 tablet by mouth once daily with breakfast 30 tablet 0    mometasone-formoterol (Dulera) 200-5 MCG/ACT inhaler Inhale 2 puffs 2 (two) times a day Rinse mouth after use. 13 g 6    nystatin (MYCOSTATIN) powder Apply topically 3 (three) times a day Do not start before October 10, 2023. (Patient not taking: Reported on 2/22/2024) 15 g 1    predniSONE 10 mg tablet Take 4 tablets daily for 2 days, then 3 tablets daily for 2 days, then 2 tablets daily for 2 days, then 1 tablet daily for 2 days, then stop (Patient not taking: Reported on 12/29/2023) 20 tablet 0    sertraline (ZOLOFT) 100 mg tablet Take 1.5 tablets (150 mg total) by mouth daily 45 tablet 2    traZODone (DESYREL) 100 mg tablet Take 1 tablet (100 mg total) by mouth daily at bedtime 30 tablet 2     No current facility-administered medications on file prior to visit.         HPI ROS Appetite Changes and Sleep:     She reports hypersomnia, decreased appetite, low energy. Denies homicidal ideation, denies suicidal ideation    Review Of Systems:      HPI ROS:               Medication Side Effects:  denies    Depression (10 worst): 3/10     Anxiety (10 worst): 6/10    Safety concerns (SI, HI, etc): Denies si and hi    Sleep: 8hrs     Energy: low    Appetite: 2 meals    Weight Change: denies        Mental Status Evaluation:    Appearance Adequate hygiene and grooming   Behavior calm and cooperative   Mood anxious  Depression Scale - 3 of 10 (0 = No depression)  Anxiety Scale - 6 of 10 (0 = No anxiety)   Speech Normal rate and volume   Affect constricted   Thought Processes Goal directed and coherent   Thought Content Does not verbalize delusional material   Associations Tightly connected   Perceptual Disturbances Denies hallucinations and does not appear to be responding to internal stimuli   Risk Potential Suicidal/Homicidal Ideation - No evidence of suicidal or homicidal ideation and patient does not verbalize suicidal or homicidal ideation  Risk of Violence - No evidence of risk for violence found on assessment  Risk of Self Mutilation - No evidence of risk for self mutilation found on assessment   Orientation oriented to person, place, time/date and situation   Memory recent and remote memory grossly intact   Consciousness alert and awake   Attention/Concentration attention span and concentration are age appropriate   Insight intact   Judgement intact   Muscle Strength and Gait normal muscle strength and normal muscle tone, normal gait/station and normal balance   Motor Activity no abnormal movements   Language no difficulty naming common objects, no difficulty repeating a phrase, no difficulty writing a sentence   Fund of Knowledge adequate knowledge of current events  adequate fund of knowledge regarding past history  adequate fund of knowledge regarding vocabulary      Traumatic History Update:     New Onset of Abuse Since Last Encounter:   no  Traumatic Events Since Last Encounter:   no    Past Medical History:    Past Medical History:   Diagnosis Date    Anxiety     Asthma     Depression     Environmental allergies     Last assessed: 1/18/17     GERD (gastroesophageal reflux disease)     Hypoglycemia     Hypothyroidism         Past Surgical History:   Procedure Laterality Date    COLONOSCOPY  2016 & 2018    Fiberoptic    EGD  2016    EGD  06/2016    MYRINGOTOMY      TONSILLECTOMY      TOOTH EXTRACTION       No Known Allergies  Substance Abuse History:    Social History     Substance and Sexual Activity   Alcohol Use Yes    Alcohol/week: 10.0 standard drinks of alcohol    Types: 10 Glasses of wine per week    Comment: every 2-3 days, more on weekends     Social History     Substance and Sexual Activity   Drug Use Yes    Frequency: 4.0 times per week    Types: Marijuana    Comment: recreational      Social History:    Social History     Socioeconomic History    Marital status: Single     Spouse name: Not on file    Number of children: 0    Years of education: Not on file    Highest education level: Bachelor's degree (e.g., BA, AB, BS)   Occupational History    Occupation: UNEMPLOYED   Tobacco Use    Smoking status: Every Day     Current packs/day: 0.25     Average packs/day: 0.3 packs/day for 0.5 years (0.1 ttl pk-yrs)     Types: Cigarettes     Start date: 11/1/2023    Smokeless tobacco: Never    Tobacco comments:     1/2 pack per month   Vaping Use    Vaping status: Former    Substances: Nicotine   Substance and Sexual Activity    Alcohol use: Yes     Alcohol/week: 10.0 standard drinks of alcohol     Types: 10 Glasses of wine per week     Comment: every 2-3 days, more on weekends    Drug use: Yes     Frequency: 4.0 times per week     Types: Marijuana     Comment: recreational     Sexual activity: Yes     Partners: Male   Other Topics Concern    Not on file   Social History Narrative    Not on file     Social Determinants of Health     Financial Resource Strain: Not on file   Food Insecurity: Not on file   Transportation Needs: Not on file   Physical Activity: Not on file   Stress: Not on file   Social Connections: Not on file   Intimate Partner Violence:  Not on file   Housing Stability: Not on file     Family Psychiatric History:     Family History   Problem Relation Age of Onset    Anxiety disorder Mother     Diabetes Mother         due to underlying condition with both eyes affected by proliferative retinopathy and traction retinal detatchments involving maculae, without long term use of insulin    Depression Father     Anxiety disorder Father     Alcohol abuse Father     Sleep apnea Father     Suicide Attempts Maternal Grandmother     Suicide Attempts Maternal Grandfather     Crohn's disease Paternal Grandfather      History Review:The following portions of the patient's history were reviewed and updated as appropriate: allergies, current medications, past family history, past medical history, past social history, past surgical history and problem list     OBJECTIVE:     Vital signs in last 24 hours:    There were no vitals filed for this visit.  Laboratory Results:   Recent Labs (last 2 months):   Hospital Outpatient Visit on 03/26/2024   Component Date Value    EXT Preg Test, Ur 03/26/2024 Negative     Control 03/26/2024 Valid     Case Report 03/26/2024                      Value:Surgical Pathology Report                         Case: [id number]                                  Authorizing Provider:  Darwin Alexandre MD       Collected:           03/26/2024 1400              Ordering Location:     [facility]        Received:            03/26/2024 [address] [place] [address] Endoscopy                                                     Pathologist:           Abdi Jasso MD                                                           Specimen:    Colon, random colon bx's, r/o ibd and microscopic colitis                                  Final Diagnosis 03/26/2024                      Value:This result contains rich text formatting which cannot be displayed here.    Additional Information 03/26/2024                       Value:This result contains rich text formatting which cannot be displayed here.    Gross Description 03/26/2024                      Value:This result contains rich text formatting which cannot be displayed here.     I have personally reviewed all pertinent laboratory/tests results.    Suicide/Homicide Risk Assessment:    Risk of Harm to Self:  Protective Factors: no current suicidal ideation, access to mental health treatment, compliant with medications, compliant with mental health treatment, having a desire to be alive, medical compliance, resiliency  Based on today's assessment, Marlena presents the following risk of harm to self: moderate Referred for immediate inpatient stay, mother driving pt to ER now.    Risk of Harm to Others:  Based on today's assessment, Marlena presents the following risk of harm to others: minimal    The following interventions are recommended: referral for psychotherapy, referral for inpatient admission    Medications Risks/Benefits:      Risks, Benefits And Possible Side Effects Of Medications:    Discussed risks and benefits of treatment with patient including risk of suicidality, serotonin syndrome, increased QTc interval and SIADH related to treatment with antidepressants; Risk of induction of manic symptoms in certain patient populations and risk of parkinsonian symptoms, metabolic syndrome, tardive dyskinesia and neuroleptic malignant syndrome related to treatment with antipsychotic medications     Controlled Medication Discussion:     Not applicable    Treatment Plan:    Due for update/Updated:   no  Last treatment plan done 11/21, due 6/21/24    TRE Ho 05/02/24    Visit Time    Visit Start Time: 130  Visit Stop Time: 154  Total Visit Duration:  24 minutes

## 2024-07-11 ENCOUNTER — TELEMEDICINE (OUTPATIENT)
Dept: PSYCHIATRY | Facility: CLINIC | Age: 24
End: 2024-07-11
Payer: COMMERCIAL

## 2024-07-11 DIAGNOSIS — F33.41 RECURRENT MAJOR DEPRESSIVE DISORDER, IN PARTIAL REMISSION (HCC): Primary | ICD-10-CM

## 2024-07-11 DIAGNOSIS — F41.9 ANXIETY: ICD-10-CM

## 2024-07-11 PROCEDURE — 99214 OFFICE O/P EST MOD 30 MIN: CPT

## 2024-07-11 RX ORDER — HYDROXYZINE 50 MG/1
50 TABLET, FILM COATED ORAL 2 TIMES DAILY
Qty: 90 TABLET | Refills: 0 | Status: SHIPPED | OUTPATIENT
Start: 2024-07-11

## 2024-07-11 RX ORDER — SERTRALINE HYDROCHLORIDE 100 MG/1
200 TABLET, FILM COATED ORAL DAILY
Qty: 60 TABLET | Refills: 2 | Status: SHIPPED | OUTPATIENT
Start: 2024-07-11 | End: 2024-10-09

## 2024-07-11 RX ORDER — LURASIDONE HYDROCHLORIDE 40 MG/1
40 TABLET, FILM COATED ORAL
Qty: 30 TABLET | Refills: 2 | Status: SHIPPED | OUTPATIENT
Start: 2024-07-11

## 2024-07-11 NOTE — BH TREATMENT PLAN
TREATMENT PLAN (Medication Management Only)        [facility]    Name and Date of Birth:  Marlena Wade 23 y.o. 2000  Date of Treatment Plan: July 11, 2024  Diagnosis/Diagnoses:    1. Recurrent major depressive disorder, in partial remission (HCC)    2. Anxiety      Strengths/Personal Resources for Self-Care: supportive family, supportive friends, ability to communicate needs, ability to communicate well, ability to listen, ability to reason, independence.  Area/Areas of need (in own words): anxiety symptoms, depressive symptoms, mood instability  1. Long Term Goal: maintain control of acceptable anxiety level.  Target Date:6 months - 1/11/2025  Person/Persons responsible for completion of goal: Marlena  2.  Short Term Objective (s) - How will we reach this goal?:   A. Provider new recommended medication/dosage changes and/or continue medication(s): Continues current meds.  B. Take psychiatric medications responsibly.  C. Attend medication management appointments regularly.  Target Date:6 months - 1/11/2025  Person/Persons Responsible for Completion of Goal: Marlena  Progress Towards Goals: continuing treatment  Treatment Modality: medication management every 3 months  Review due 180 days from date of this plan: 6 months - 1/11/2025  Expected length of service: ongoing treatment  My Physician/PA/NP and I have developed this plan together and I agree to work on the goals and objectives. I understand the treatment goals that were developed for my treatment.

## 2024-07-11 NOTE — PSYCH
Regular Visit    Problem List Items Addressed This Visit          Behavioral Health    Anxiety    Relevant Medications    hydrOXYzine HCL (ATARAX) 50 mg tablet     Other Visit Diagnoses       Recurrent major depressive disorder, in partial remission (HCC)    -  Primary    Relevant Medications    sertraline (ZOLOFT) 100 mg tablet    lurasidone (LATUDA) 40 mg tablet    hydrOXYzine HCL (ATARAX) 50 mg tablet               Encounter provider TRE Ho    Provider located at    [address] [facility] [address] [phone]    Recent Visits  No visits were found meeting these conditions.  Showing recent visits within past 7 days and meeting all other requirements  Today's Visits  Date Type Provider Dept   07/11/24 Telemedicine TRE Ho [facility]   Showing today's visits and meeting all other requirements  Future Appointments  No visits were found meeting these conditions.  Showing future appointments within next 150 days and meeting all other requirements       HPI     Current Outpatient Medications   Medication Sig Dispense Refill    albuterol (Proventil HFA) 90 mcg/act inhaler Inhale 2 puffs every 6 (six) hours as needed for wheezing 6.7 g 5    Ashlyna 0.15-0.03 &0.01 MG TABS Take 1 tablet by mouth daily      clotrimazole (LOTRIMIN) 1 % cream Apply topically 2 (two) times a day 14 g 2    hydrocortisone (ANUSOL-HC) 25 mg suppository Insert 1 suppository (25 mg total) into the rectum 2 (two) times a day 14 suppository 0    hydrOXYzine HCL (ATARAX) 50 mg tablet Take 1 tablet (50 mg total) by mouth 2 (two) times a day 90 tablet 0    lurasidone (LATUDA) 40 mg tablet Take 1 tablet (40 mg total) by mouth daily with breakfast 30 tablet 2    mometasone-formoterol (Dulera) 200-5 MCG/ACT inhaler Inhale 2 puffs 2 (two) times a day Rinse mouth after use. 13 g 6    sertraline (ZOLOFT) 100 mg tablet Take 2  tablets (200 mg total) by mouth daily 60 tablet 2    traZODone (DESYREL) 100 mg tablet Take 1 tablet (100 mg total) by mouth daily at bedtime 30 tablet 2    cyanocobalamin (VITAMIN B-12) 500 MCG tablet Take 1 tablet (500 mcg total) by mouth daily Do not start before October 10, 2023. 30 tablet 0    doxycycline hyclate (VIBRA-TABS) 100 mg tablet Take 100 mg by mouth 2 (two) times a day (Patient not taking: Reported on 3/26/2024)      ergocalciferol (VITAMIN D2) 50,000 units Take 1 capsule (50,000 Units total) by mouth once a week for 12 doses Do not start before October 14, 2023. 4 capsule 2    loratadine (CLARITIN) 10 mg tablet Take 10 mg by mouth daily (Patient not taking: Reported on 12/29/2023)      nystatin (MYCOSTATIN) powder Apply topically 3 (three) times a day Do not start before October 10, 2023. (Patient not taking: Reported on 2/22/2024) 15 g 1    predniSONE 10 mg tablet Take 4 tablets daily for 2 days, then 3 tablets daily for 2 days, then 2 tablets daily for 2 days, then 1 tablet daily for 2 days, then stop (Patient not taking: Reported on 12/29/2023) 20 tablet 0     No current facility-administered medications for this visit.       Review of Systems        MEDICATION MANAGEMENT NOTE        [facility]    Name and Date of Birth:  Marlena Wade 23 y.o. 2000 MRN: [id number]    Date of Visit: July 11, 2024    No Known Allergies  SUBJECTIVE:    Marlena is seen today for a follow up for Major Depressive Disorder and anxiety. She continues to do fairly well since the last visit. Marlena reports her grandfather passed away a month ago and she continues to process his loss, supportive counseling provided, appears to be handling the loss adequately at the time.  Describes a close she was to her grandfather and how she watched him passed away which is extremely difficult on her.  Experiencing increased periods of dysphoria, low energy motivation since his passing.  She  is trying to stay active and busy as much as he can but often finds her self relaxing in bed when she is home doing nothing.  Encouraged her to increase socialization, spend time out with others, talk to family members.  States she is limited financially which causes her to stay home on the weekends lately.  She is also having medical issue in terms of gallbladder flareup, may require removal in the near future and working closely with medical team.  Patient wants to get her gallbladder issue taken care of and then reassess where she is psychiatrically with the current medications.  She declines a need for any medication change at this time. declines need for partial program at this time.  Will continue Latuda 40 mg daily, Zoloft 200 mg daily, and trazodone for sleep.  Reports she recently moved into a new apartment in [place] and is more comfortable in this living situation.  She continues to work at [employer], not always enough hours, continues to look for other avenues of employment and eventually settled into a career which is her long-term goal.  Patient is constricted, appropriate for situation.  She reports adequate sleep appetite.  She denies SI.      She denies any side effects from medications.    PLAN:    -Continue Latuda 40mg for tx resistant MDD and mood stabilization   Patient education completed for Latuda, risks include:  NMS, leukopenia, neutropenia, tardive dyskinesia, angioedema, suicidality, diabetes, orthostatic hypotension, somnolence or insomnia, GI upset, hyperprolactinemia, akathisia, and possible weight gain (less than other SGA's), etc.     -Titrate Zoloft to 200 mg daily for depression  PARQ completed including serotonin syndrome, SIADH, worsening depression, suicidality, induction of sabrina, GI upset, headaches, activation, sexual side effects, sedation, potential drug interactions, and others.    -Continue prn Trazodone 100mg daily for insomnia  -Continue prn atarax 50mg daily for  breakthrough anxiety        Aware of 24 hour and weekend coverage for urgent situations accessed by calling [facility] main practice number  Referral for individual psychotherapy    Diagnoses and all orders for this visit:    Recurrent major depressive disorder, in partial remission (HCC)  -     sertraline (ZOLOFT) 100 mg tablet; Take 2 tablets (200 mg total) by mouth daily  -     lurasidone (LATUDA) 40 mg tablet; Take 1 tablet (40 mg total) by mouth daily with breakfast    Anxiety  -     hydrOXYzine HCL (ATARAX) 50 mg tablet; Take 1 tablet (50 mg total) by mouth 2 (two) times a day        Current Outpatient Medications on File Prior to Visit   Medication Sig Dispense Refill    albuterol (Proventil HFA) 90 mcg/act inhaler Inhale 2 puffs every 6 (six) hours as needed for wheezing 6.7 g 5    Ashlyna 0.15-0.03 &0.01 MG TABS Take 1 tablet by mouth daily      clotrimazole (LOTRIMIN) 1 % cream Apply topically 2 (two) times a day 14 g 2    hydrocortisone (ANUSOL-HC) 25 mg suppository Insert 1 suppository (25 mg total) into the rectum 2 (two) times a day 14 suppository 0    mometasone-formoterol (Dulera) 200-5 MCG/ACT inhaler Inhale 2 puffs 2 (two) times a day Rinse mouth after use. 13 g 6    traZODone (DESYREL) 100 mg tablet Take 1 tablet (100 mg total) by mouth daily at bedtime 30 tablet 2    cyanocobalamin (VITAMIN B-12) 500 MCG tablet Take 1 tablet (500 mcg total) by mouth daily Do not start before October 10, 2023. 30 tablet 0    doxycycline hyclate (VIBRA-TABS) 100 mg tablet Take 100 mg by mouth 2 (two) times a day (Patient not taking: Reported on 3/26/2024)      ergocalciferol (VITAMIN D2) 50,000 units Take 1 capsule (50,000 Units total) by mouth once a week for 12 doses Do not start before October 14, 2023. 4 capsule 2    loratadine (CLARITIN) 10 mg tablet Take 10 mg by mouth daily (Patient not taking: Reported on 12/29/2023)      nystatin (MYCOSTATIN) powder Apply topically 3 (three) times a  day Do not start before October 10, 2023. (Patient not taking: Reported on 2/22/2024) 15 g 1    predniSONE 10 mg tablet Take 4 tablets daily for 2 days, then 3 tablets daily for 2 days, then 2 tablets daily for 2 days, then 1 tablet daily for 2 days, then stop (Patient not taking: Reported on 12/29/2023) 20 tablet 0    [DISCONTINUED] hydrOXYzine HCL (ATARAX) 50 mg tablet Take 1 tablet (50 mg total) by mouth 2 (two) times a day 90 tablet 0    [DISCONTINUED] lurasidone (LATUDA) 40 mg tablet Take 1 tablet (40 mg total) by mouth daily with breakfast 30 tablet 2    [DISCONTINUED] sertraline (ZOLOFT) 100 mg tablet Take 1.5 tablets (150 mg total) by mouth daily 45 tablet 2     No current facility-administered medications on file prior to visit.         HPI ROS Appetite Changes and Sleep:     She reports hypersomnia, decreased appetite, low energy. Denies homicidal ideation, denies suicidal ideation    Review Of Systems:      HPI ROS:               Medication Side Effects:  denies    Depression (10 worst): 5/10    Anxiety (10 worst): 4/10    Safety concerns (SI, HI, etc): Denies si and hi    Sleep: 8hrs     Energy: low    Appetite: 2 meals    Weight Change: denies        Mental Status Evaluation:    Appearance Adequate hygiene and grooming   Behavior calm and cooperative   Mood depressed  Depression Scale - 5 of 10 (0 = No depression)  Anxiety Scale - 4 of 10 (0 = No anxiety)   Speech Normal rate and volume   Affect constricted   Thought Processes Goal directed and coherent   Thought Content Does not verbalize delusional material   Associations Tightly connected   Perceptual Disturbances Denies hallucinations and does not appear to be responding to internal stimuli   Risk Potential Suicidal/Homicidal Ideation - No evidence of suicidal or homicidal ideation and patient does not verbalize suicidal or homicidal ideation  Risk of Violence - No evidence of risk for violence found on assessment  Risk of Self Mutilation - No  evidence of risk for self mutilation found on assessment   Orientation oriented to person, place, time/date and situation   Memory recent and remote memory grossly intact   Consciousness alert and awake   Attention/Concentration attention span and concentration are age appropriate   Insight intact   Judgement intact   Muscle Strength and Gait normal muscle strength and normal muscle tone, normal gait/station and normal balance   Motor Activity no abnormal movements   Language no difficulty naming common objects, no difficulty repeating a phrase, no difficulty writing a sentence   Fund of Knowledge adequate knowledge of current events  adequate fund of knowledge regarding past history  adequate fund of knowledge regarding vocabulary      Traumatic History Update:     New Onset of Abuse Since Last Encounter:   no  Traumatic Events Since Last Encounter:   no    Past Medical History:    Past Medical History:   Diagnosis Date    Anxiety     Asthma     Depression     Environmental allergies     Last assessed: 1/18/17    GERD (gastroesophageal reflux disease)     Hypoglycemia     Hypothyroidism         Past Surgical History:   Procedure Laterality Date    COLONOSCOPY  2016 & 2018    Fiberoptic    EGD  2016    EGD  06/2016    MYRINGOTOMY      TONSILLECTOMY      TOOTH EXTRACTION       No Known Allergies  Substance Abuse History:    Social History     Substance and Sexual Activity   Alcohol Use Yes    Alcohol/week: 10.0 standard drinks of alcohol    Types: 10 Glasses of wine per week    Comment: every 2-3 days, more on weekends     Social History     Substance and Sexual Activity   Drug Use Yes    Frequency: 4.0 times per week    Types: Marijuana    Comment: recreational      Social History:    Social History     Socioeconomic History    Marital status: Single     Spouse name: Not on file    Number of children: 0    Years of education: Not on file    Highest education level: Bachelor's degree (e.g., BA, AB, BS)   Occupational  History    Occupation: UNEMPLOYED   Tobacco Use    Smoking status: Every Day     Current packs/day: 0.25     Average packs/day: 0.3 packs/day for 0.7 years (0.2 ttl pk-yrs)     Types: Cigarettes     Start date: 11/1/2023    Smokeless tobacco: Never    Tobacco comments:     1/2 pack per month   Vaping Use    Vaping status: Former    Substances: Nicotine   Substance and Sexual Activity    Alcohol use: Yes     Alcohol/week: 10.0 standard drinks of alcohol     Types: 10 Glasses of wine per week     Comment: every 2-3 days, more on weekends    Drug use: Yes     Frequency: 4.0 times per week     Types: Marijuana     Comment: recreational     Sexual activity: Yes     Partners: Male   Other Topics Concern    Not on file   Social History Narrative    Not on file     Social Determinants of Health     Financial Resource Strain: Not on file   Food Insecurity: Not on file   Transportation Needs: Not on file   Physical Activity: Not on file   Stress: Not on file   Social Connections: Unknown (6/18/2024)    Received from [facility]     How often do you feel lonely or isolated from those around you? (Adult - for ages 18 years and over): Not on file   Intimate Partner Violence: Not on file   Housing Stability: Not on file     Family Psychiatric History:     Family History   Problem Relation Age of Onset    Anxiety disorder Mother     Diabetes Mother         due to underlying condition with both eyes affected by proliferative retinopathy and traction retinal detatchments involving maculae, without long term use of insulin    Depression Father     Anxiety disorder Father     Alcohol abuse Father     Sleep apnea Father     Suicide Attempts Maternal Grandmother     Suicide Attempts Maternal Grandfather     Crohn's disease Paternal Grandfather      History Review:The following portions of the patient's history were reviewed and updated as appropriate: allergies, current medications, past family history, past medical  "history, past social history, past surgical history and problem list     OBJECTIVE:     Vital signs in last 24 hours:    There were no vitals filed for this visit.  Laboratory Results:   Recent Labs (last 2 months):   No visits with results within 2 Month(s) from this visit.   Latest known visit with results is:   Hospital Outpatient Visit on 03/26/2024   Component Date Value    EXT Preg Test, Ur 03/26/2024 Negative     Control 03/26/2024 Valid     Case Report 03/26/2024                      Value:Surgical Pathology Report                         Case: [id number]                                  Authorizing Provider:  Darwin Alexandre MD       Collected:           03/26/2024 1400              Ordering Location:     [facility]        Received:            03/26/2024 [address] [place] [address] Endoscopy                                                     Pathologist:           Abdi Jasso MD                                                           Specimen:    Colon, random colon bx's, r/o ibd and microscopic colitis                                  Final Diagnosis 03/26/2024                      Value:A. Colon, \"Random colon,\" Biopsy:                          - Benign colonic mucosa with intact glandular architecture                          - Negative for lymphocytic, collagenous, or active colitis                          - Negative for acute colitis                          - Negative for granulomas, dysplasia, or carcinoma                              Additional Information 03/26/2024                      Value:All reported additional testing was performed with appropriately reactive                           controls.  These tests were developed and their performance                           characteristics determined by [facility] or                           appropriate performing facility, though some tests may be performed on                    " "       tissues which have not been validated for performance characteristics                           (such as staining performed on alcohol exposed cell blocks and decalcified                           tissues).  Results should be interpreted with caution and in the context                           of the patients’ clinical condition. These tests may not be cleared or                           approved by the U.S. Food and Drug Administration, though the FDA has                           determined that such clearance or approval is not necessary. These tests                           are used for clinical purposes and they should not be regarded as                           investigational or for research. This laboratory has been approved by CLIA                           88, designated as a high-complexity laboratory and is qualified to perform                           these tests.                          .Interpretation performed at [facility], [address] [place] [address]                           [id number]                              Gross Description 03/26/2024                      Value:A. The specimen is received in formalin, labeled with the patient's name                           and hospital number, and is designated \" random colon\".  The specimen                           consists of multiple tan soft tissue fragments measuring in aggregate of                           0.6 x 0.5 x 0.1 cm.  Entirely submitted. One screened cassette.                                                    Note: The estimated total formalin fixation time based upon information                           provided by the submitting clinician and the standard processing schedule                           is under 72 hours.                                                                              Rachel WESLEY have personally reviewed all pertinent laboratory/tests results.    Suicide/Homicide Risk Assessment:    Risk of " Harm to Self:  Protective Factors: no current suicidal ideation, access to mental health treatment, compliant with medications, compliant with mental health treatment, having a desire to be alive, medical compliance, resiliency  Based on today's assessment, Marlena presents the following risk of harm to self: moderate Referred for immediate inpatient stay, mother driving pt to ER now.    Risk of Harm to Others:  Based on today's assessment, Marlena presents the following risk of harm to others: minimal    The following interventions are recommended: referral for psychotherapy, referral for inpatient admission    Medications Risks/Benefits:      Risks, Benefits And Possible Side Effects Of Medications:    Discussed risks and benefits of treatment with patient including risk of suicidality, serotonin syndrome, increased QTc interval and SIADH related to treatment with antidepressants; Risk of induction of manic symptoms in certain patient populations and risk of parkinsonian symptoms, metabolic syndrome, tardive dyskinesia and neuroleptic malignant syndrome related to treatment with antipsychotic medications     Controlled Medication Discussion:     Not applicable    Treatment Plan:    Due for update/Updated:   no  Last treatment plan done 7/11, due 1/11/25    TRE Ho 07/11/24    Visit Time    Visit Start Time: 230  Visit Stop Time: 255  Total Visit Duration:  25 minutes

## 2024-10-15 ENCOUNTER — TELEPHONE (OUTPATIENT)
Age: 24
End: 2024-10-15

## 2024-10-15 NOTE — TELEPHONE ENCOUNTER
Patient contacted the office to schedule a follow up visit with provider. Patient is now scheduled for 10/16  at 1pm virtually.

## 2024-10-16 ENCOUNTER — TELEMEDICINE (OUTPATIENT)
Dept: PSYCHIATRY | Facility: CLINIC | Age: 24
End: 2024-10-16
Payer: COMMERCIAL

## 2024-10-16 ENCOUNTER — TELEPHONE (OUTPATIENT)
Age: 24
End: 2024-10-16

## 2024-10-16 DIAGNOSIS — Z72.820 POOR SLEEP: ICD-10-CM

## 2024-10-16 DIAGNOSIS — F41.9 ANXIETY: ICD-10-CM

## 2024-10-16 DIAGNOSIS — F12.90 MARIJUANA USE, CONTINUOUS: ICD-10-CM

## 2024-10-16 DIAGNOSIS — F33.41 RECURRENT MAJOR DEPRESSIVE DISORDER, IN PARTIAL REMISSION (HCC): Primary | ICD-10-CM

## 2024-10-16 PROBLEM — K80.00 CALCULUS OF GALLBLADDER WITH ACUTE CHOLECYSTITIS WITHOUT OBSTRUCTION: Status: ACTIVE | Noted: 2024-07-25

## 2024-10-16 PROBLEM — A53.9 SYPHILIS: Status: ACTIVE | Noted: 2024-02-05

## 2024-10-16 PROCEDURE — 99214 OFFICE O/P EST MOD 30 MIN: CPT

## 2024-10-16 RX ORDER — SERTRALINE HYDROCHLORIDE 100 MG/1
150 TABLET, FILM COATED ORAL DAILY
Qty: 45 TABLET | Refills: 1 | Status: SHIPPED | OUTPATIENT
Start: 2024-10-16 | End: 2024-12-15

## 2024-10-16 RX ORDER — LURASIDONE HYDROCHLORIDE 40 MG/1
40 TABLET, FILM COATED ORAL
Qty: 30 TABLET | Refills: 2 | Status: SHIPPED | OUTPATIENT
Start: 2024-10-16

## 2024-10-16 NOTE — TELEPHONE ENCOUNTER
Received [employer] from [facility] provider to add pt to talk therapy wait list. Pt added at this time.

## 2024-10-16 NOTE — PSYCH
Regular Visit    Assessment & Plan  Recurrent major depressive disorder, in partial remission (HCC)  Decreased Zoloft to 150 mg daily due to increased fatigue with higher dose  Continue Latuda 40 mg daily for augmentation  Anxiety    Marijuana use, continuous  Educated to decrease marijuana usage to avoid fatigue  Poor sleep  Referral to sleep medicine made today             Encounter provider TRE Ho    Provider located at    [facility]  [address] [facility] [address] [phone]    Recent Visits  No visits were found meeting these conditions.  Showing recent visits within past 7 days and meeting all other requirements  Future Appointments  No visits were found meeting these conditions.  Showing future appointments within next 150 days and meeting all other requirements       HPI     Current Outpatient Medications   Medication Sig Dispense Refill    albuterol (Proventil HFA) 90 mcg/act inhaler Inhale 2 puffs every 6 (six) hours as needed for wheezing 6.7 g 5    Ashlyna 0.15-0.03 &0.01 MG TABS Take 1 tablet by mouth daily      clotrimazole (LOTRIMIN) 1 % cream Apply topically 2 (two) times a day 14 g 2    cyanocobalamin (VITAMIN B-12) 500 MCG tablet Take 1 tablet (500 mcg total) by mouth daily Do not start before October 10, 2023. 30 tablet 0    doxycycline hyclate (VIBRA-TABS) 100 mg tablet Take 100 mg by mouth 2 (two) times a day (Patient not taking: Reported on 3/26/2024)      ergocalciferol (VITAMIN D2) 50,000 units Take 1 capsule (50,000 Units total) by mouth once a week for 12 doses Do not start before October 14, 2023. 4 capsule 2    hydrocortisone (ANUSOL-HC) 25 mg suppository Insert 1 suppository (25 mg total) into the rectum 2 (two) times a day 14 suppository 0    hydrOXYzine HCL (ATARAX) 50 mg tablet Take 1 tablet (50 mg total) by mouth 2 (two) times a day 90 tablet 0    loratadine (CLARITIN) 10 mg tablet Take 10 mg by  mouth daily (Patient not taking: Reported on 12/29/2023)      lurasidone (LATUDA) 40 mg tablet Take 1 tablet (40 mg total) by mouth daily with breakfast 30 tablet 2    mometasone-formoterol (Dulera) 200-5 MCG/ACT inhaler Inhale 2 puffs 2 (two) times a day Rinse mouth after use. 13 g 6    nystatin (MYCOSTATIN) powder Apply topically 3 (three) times a day Do not start before October 10, 2023. (Patient not taking: Reported on 2/22/2024) 15 g 1    predniSONE 10 mg tablet Take 4 tablets daily for 2 days, then 3 tablets daily for 2 days, then 2 tablets daily for 2 days, then 1 tablet daily for 2 days, then stop (Patient not taking: Reported on 12/29/2023) 20 tablet 0    sertraline (ZOLOFT) 100 mg tablet Take 2 tablets (200 mg total) by mouth daily 60 tablet 2    traZODone (DESYREL) 100 mg tablet Take 1 tablet (100 mg total) by mouth daily at bedtime 30 tablet 2     No current facility-administered medications for this visit.       Review of Systems        MEDICATION MANAGEMENT NOTE        [facility]    Name and Date of Birth:  Marlena Wade 24 y.o. 2000 MRN: [id number]    Date of Visit: October 16, 2024    No Known Allergies  SUBJECTIVE:    Marlena is seen today for a follow up for Major Depressive Disorder and anxiety. She continues to do fairly well since the last visit. Marlena reports a mild increase in depression and fatigue over the past month, attributes it to possibly grieving her late grandfather who passed away earlier this year.  She reports the recent increase in Zoloft has increased her fatigue, will reduce Zoloft back to 150 mg daily, the patient agreed to plan.  Continue to educate the patient regarding frequent marijuana use and how that may be contributing to her fatigue, patient agreed, patient is going to cut back.  She also discussed her history of snoring, waking up with a headache and not feeling rested, she was referral to sleep medicine today for  possible sleep study.  Depression 6/10 and chronic with symptoms of dysphoria, anhedonia, lack energy motivation.  Denies any debilitating symptoms or suicidal ideation at this time. She declines a need for higher level of care today.  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  States she is making more money compared to the past and is happy with this improvement.  States she still struggles with financial strain which limits her ability to have fun with friends.  She is going to look to increase her socialization and relationships in the future.  We will continue Latuda 40 mg daily, as needed Atarax, as needed trazodone.  She is pleasant, constricted, appropriate for situation.  Sleep adequate with trazodone, appetite within normal notes.  Added to psychotherapy list for further support today.  Denies SI and HI        She denies any side effects from medications.    PLAN:    -Continue Latuda 40mg for tx resistant MDD and mood stabilization   Patient education completed for Latuda, risks include:  NMS, leukopenia, neutropenia, tardive dyskinesia, angioedema, suicidality, diabetes, orthostatic hypotension, somnolence or insomnia, GI upset, hyperprolactinemia, akathisia, and possible weight gain (less than other SGA's), etc.     -Decrease Zoloft to 150 mg daily for depression due to increased fatigue with increase dose  PARQ completed including serotonin syndrome, SIADH, worsening depression, suicidality, induction of sabrina, GI upset, headaches, activation, sexual side effects, sedation, potential drug interactions, and others.    -Continue prn Trazodone 100mg daily for insomnia  -Continue prn atarax 50mg daily for breakthrough anxiety        Aware of 24 hour and weekend coverage for urgent situations accessed by calling [facility] main practice number  Referral for individual psychotherapy    Diagnoses  and all orders for this visit:    Recurrent major depressive disorder, in partial remission (HCC)    Anxiety    Marijuana use, continuous        Current Outpatient Medications on File Prior to Visit   Medication Sig Dispense Refill    albuterol (Proventil HFA) 90 mcg/act inhaler Inhale 2 puffs every 6 (six) hours as needed for wheezing 6.7 g 5    Ashlyna 0.15-0.03 &0.01 MG TABS Take 1 tablet by mouth daily      clotrimazole (LOTRIMIN) 1 % cream Apply topically 2 (two) times a day 14 g 2    cyanocobalamin (VITAMIN B-12) 500 MCG tablet Take 1 tablet (500 mcg total) by mouth daily Do not start before October 10, 2023. 30 tablet 0    doxycycline hyclate (VIBRA-TABS) 100 mg tablet Take 100 mg by mouth 2 (two) times a day (Patient not taking: Reported on 3/26/2024)      ergocalciferol (VITAMIN D2) 50,000 units Take 1 capsule (50,000 Units total) by mouth once a week for 12 doses Do not start before October 14, 2023. 4 capsule 2    hydrocortisone (ANUSOL-HC) 25 mg suppository Insert 1 suppository (25 mg total) into the rectum 2 (two) times a day 14 suppository 0    hydrOXYzine HCL (ATARAX) 50 mg tablet Take 1 tablet (50 mg total) by mouth 2 (two) times a day 90 tablet 0    loratadine (CLARITIN) 10 mg tablet Take 10 mg by mouth daily (Patient not taking: Reported on 12/29/2023)      lurasidone (LATUDA) 40 mg tablet Take 1 tablet (40 mg total) by mouth daily with breakfast 30 tablet 2    mometasone-formoterol (Dulera) 200-5 MCG/ACT inhaler Inhale 2 puffs 2 (two) times a day Rinse mouth after use. 13 g 6    nystatin (MYCOSTATIN) powder Apply topically 3 (three) times a day Do not start before October 10, 2023. (Patient not taking: Reported on 2/22/2024) 15 g 1    predniSONE 10 mg tablet Take 4 tablets daily for 2 days, then 3 tablets daily for 2 days, then 2 tablets daily for 2 days, then 1 tablet daily for 2 days, then stop (Patient not taking: Reported on 12/29/2023) 20 tablet 0    sertraline (ZOLOFT) 100 mg tablet Take 2  tablets (200 mg total) by mouth daily 60 tablet 2    traZODone (DESYREL) 100 mg tablet Take 1 tablet (100 mg total) by mouth daily at bedtime 30 tablet 2     No current facility-administered medications on file prior to visit.         HPI ROS Appetite Changes and Sleep:     She reports hypersomnia, decreased appetite, low energy. Denies homicidal ideation, denies suicidal ideation    Review Of Systems:      HPI ROS:               Medication Side Effects:  denies    Depression (10 worst): 6/10    Anxiety (10 worst): 4/10    Safety concerns (SI, HI, etc): Denies si and hi    Sleep: 8hrs     Energy: Remains low    Appetite: 2 meals    Weight Change: denies        Mental Status Evaluation:    Appearance Adequate hygiene and grooming   Behavior calm and cooperative   Mood depressed  Depression Scale - 6 of 10 (0 = No depression)  Anxiety Scale - 4 of 10 (0 = No anxiety)   Speech Normal rate and volume   Affect constricted   Thought Processes Goal directed and coherent   Thought Content Does not verbalize delusional material   Associations Tightly connected   Perceptual Disturbances Denies hallucinations and does not appear to be responding to internal stimuli   Risk Potential Suicidal/Homicidal Ideation - No evidence of suicidal or homicidal ideation and patient does not verbalize suicidal or homicidal ideation  Risk of Violence - No evidence of risk for violence found on assessment  Risk of Self Mutilation - No evidence of risk for self mutilation found on assessment   Orientation oriented to person, place, time/date and situation   Memory recent and remote memory grossly intact   Consciousness alert and awake   Attention/Concentration attention span and concentration are age appropriate   Insight intact   Judgement intact   Muscle Strength and Gait normal muscle strength and normal muscle tone, normal gait/station and normal balance   Motor Activity no abnormal movements   Language no difficulty naming common objects,  no difficulty repeating a phrase, no difficulty writing a sentence   Fund of Knowledge adequate knowledge of current events  adequate fund of knowledge regarding past history  adequate fund of knowledge regarding vocabulary      Traumatic History Update:     New Onset of Abuse Since Last Encounter:   no  Traumatic Events Since Last Encounter:   no    Past Medical History:    Past Medical History:   Diagnosis Date    Anxiety     Asthma     Depression     Environmental allergies     Last assessed: 1/18/17    GERD (gastroesophageal reflux disease)     Hypoglycemia     Hypothyroidism         Past Surgical History:   Procedure Laterality Date    COLONOSCOPY  2016 & 2018    Fiberoptic    EGD  2016    EGD  06/2016    MYRINGOTOMY      TONSILLECTOMY      TOOTH EXTRACTION       No Known Allergies  Substance Abuse History:    Social History     Substance and Sexual Activity   Alcohol Use Yes    Alcohol/week: 10.0 standard drinks of alcohol    Types: 10 Glasses of wine per week    Comment: every 2-3 days, more on weekends     Social History     Substance and Sexual Activity   Drug Use Yes    Frequency: 4.0 times per week    Types: Marijuana    Comment: recreational      Social History:    Social History     Socioeconomic History    Marital status: Single     Spouse name: Not on file    Number of children: 0    Years of education: Not on file    Highest education level: Bachelor's degree (e.g., BA, AB, BS)   Occupational History    Occupation: UNEMPLOYED   Tobacco Use    Smoking status: Every Day     Current packs/day: 0.25     Average packs/day: 0.3 packs/day for 1 year (0.2 ttl pk-yrs)     Types: Cigarettes     Start date: 11/1/2023    Smokeless tobacco: Never    Tobacco comments:     1/2 pack per month   Vaping Use    Vaping status: Former    Substances: Nicotine   Substance and Sexual Activity    Alcohol use: Yes     Alcohol/week: 10.0 standard drinks of alcohol     Types: 10 Glasses of wine per week     Comment: every 2-3  days, more on weekends    Drug use: Yes     Frequency: 4.0 times per week     Types: Marijuana     Comment: recreational     Sexual activity: Yes     Partners: Male   Other Topics Concern    Not on file   Social History Narrative    Not on file     Social Determinants of Health     Financial Resource Strain: Not on file   Food Insecurity: Not on file   Transportation Needs: Not on file   Physical Activity: Not on file   Stress: Not on file   Social Connections: Unknown (6/18/2024)    Received from [employer]     How often do you feel lonely or isolated from those around you? (Adult - for ages 18 years and over): Not on file   Intimate Partner Violence: Not on file   Housing Stability: Not on file     Family Psychiatric History:     Family History   Problem Relation Age of Onset    Anxiety disorder Mother     Diabetes Mother         due to underlying condition with both eyes affected by proliferative retinopathy and traction retinal detatchments involving maculae, without long term use of insulin    Depression Father     Anxiety disorder Father     Alcohol abuse Father     Sleep apnea Father     Suicide Attempts Maternal Grandmother     Suicide Attempts Maternal Grandfather     Crohn's disease Paternal Grandfather      History Review:The following portions of the patient's history were reviewed and updated as appropriate: allergies, current medications, past family history, past medical history, past social history, past surgical history and problem list     OBJECTIVE:     Vital signs in last 24 hours:    There were no vitals filed for this visit.  Laboratory Results:   Recent Labs (last 2 months):   No visits with results within 2 Month(s) from this visit.   Latest known visit with results is:   Hospital Outpatient Visit on 03/26/2024   Component Date Value    EXT Preg Test, Ur 03/26/2024 Negative     Control 03/26/2024 Valid     Case Report 03/26/2024                      Value:Surgical  "Pathology Report                         Case: [id number]                                  Authorizing Provider:  Darwin Alexandre MD       Collected:           03/26/2024 1400              Ordering Location:     [facility]        Received:            03/26/2024 [address] [place] [address] Endoscopy                                                     Pathologist:           Abdi Jasso MD                                                           Specimen:    Colon, random colon bx's, r/o ibd and microscopic colitis                                  Final Diagnosis 03/26/2024                      Value:A. Colon, \"Random colon,\" Biopsy:                          - Benign colonic mucosa with intact glandular architecture                          - Negative for lymphocytic, collagenous, or active colitis                          - Negative for acute colitis                          - Negative for granulomas, dysplasia, or carcinoma                              Additional Information 03/26/2024                      Value:All reported additional testing was performed with appropriately reactive                           controls.  These tests were developed and their performance                           characteristics determined by [facility] or                           appropriate performing facility, though some tests may be performed on                           tissues which have not been validated for performance characteristics                           (such as staining performed on alcohol exposed cell blocks and decalcified                           tissues).  Results should be interpreted with caution and in the context                           of the patients’ clinical condition. These tests may not be cleared or                           approved by the U.S. Food and Drug Administration, though the FDA has                           determined that " "such clearance or approval is not necessary. These tests                           are used for clinical purposes and they should not be regarded as                           investigational or for research. This laboratory has been approved by CLIA                           88, designated as a high-complexity laboratory and is qualified to perform                           these tests.                          .Interpretation performed at [facility], [address] [facility]                           [id number]                              Gross Description 03/26/2024                      Value:A. The specimen is received in formalin, labeled with the patient's name                           and hospital number, and is designated \" random colon\".  The specimen                           consists of multiple tan soft tissue fragments measuring in aggregate of                           0.6 x 0.5 x 0.1 cm.  Entirely submitted. One screened cassette.                                                    Note: The estimated total formalin fixation time based upon information                           provided by the submitting clinician and the standard processing schedule                           is under 72 hours.                                                                              Rachel WESLEY have personally reviewed all pertinent laboratory/tests results.    Suicide/Homicide Risk Assessment:    Risk of Harm to Self:  Protective Factors: no current suicidal ideation, access to mental health treatment, compliant with medications, compliant with mental health treatment, having a desire to be alive, medical compliance, resiliency  Based on today's assessment, Marlena presents the following risk of harm to self: moderate     Risk of Harm to Others:  Based on today's assessment, Marlena presents the following risk of harm to others: minimal    The following interventions are recommended: referral for " psychotherapy    Medications Risks/Benefits:      Risks, Benefits And Possible Side Effects Of Medications:    Discussed risks and benefits of treatment with patient including risk of suicidality, serotonin syndrome, increased QTc interval and SIADH related to treatment with antidepressants; Risk of induction of manic symptoms in certain patient populations and risk of parkinsonian symptoms, metabolic syndrome, tardive dyskinesia and neuroleptic malignant syndrome related to treatment with antipsychotic medications     Controlled Medication Discussion:     Not applicable    Treatment Plan:    Due for update/Updated:   no  Last treatment plan done 7/11, due 1/11/25    TRE Ho 10/16/24    Visit Time    Visit Start Time: 1  Visit Stop Time: 122  Total Visit Duration:  22 minutes

## 2024-11-26 ENCOUNTER — TELEMEDICINE (OUTPATIENT)
Dept: PSYCHIATRY | Facility: CLINIC | Age: 24
End: 2024-11-26

## 2024-11-26 DIAGNOSIS — Z91.199 NO-SHOW FOR APPOINTMENT: Primary | ICD-10-CM

## 2024-11-26 NOTE — PSYCH
No Call. No Show. No Charge    Marlena Wade no showed 11/26/24 appointment , staff called and left message to reschedule appointment     Treatment Plan not due at this session.

## 2024-12-22 DIAGNOSIS — F33.41 RECURRENT MAJOR DEPRESSIVE DISORDER, IN PARTIAL REMISSION (HCC): ICD-10-CM

## 2024-12-23 RX ORDER — SERTRALINE HYDROCHLORIDE 100 MG/1
150 TABLET, FILM COATED ORAL DAILY
Qty: 45 TABLET | Refills: 1 | Status: SHIPPED | OUTPATIENT
Start: 2024-12-23

## 2024-12-27 NOTE — TELEPHONE ENCOUNTER
Patient called to schedule a f/u med management appt.     Patient is now scheduled for 1/2 @3:30pm virtually.

## 2025-01-03 ENCOUNTER — TELEMEDICINE (OUTPATIENT)
Dept: PSYCHIATRY | Facility: CLINIC | Age: 25
End: 2025-01-03
Payer: COMMERCIAL

## 2025-01-03 DIAGNOSIS — F41.9 ANXIETY: ICD-10-CM

## 2025-01-03 DIAGNOSIS — F33.41 RECURRENT MAJOR DEPRESSIVE DISORDER, IN PARTIAL REMISSION (HCC): Primary | ICD-10-CM

## 2025-01-03 PROBLEM — R20.0 NUMBNESS AND TINGLING OF BOTH UPPER EXTREMITIES: Status: ACTIVE | Noted: 2024-12-31

## 2025-01-03 PROBLEM — M54.50 ACUTE BILATERAL LOW BACK PAIN WITHOUT SCIATICA: Status: ACTIVE | Noted: 2024-12-31

## 2025-01-03 PROBLEM — R20.2 NUMBNESS AND TINGLING OF BOTH UPPER EXTREMITIES: Status: ACTIVE | Noted: 2024-12-31

## 2025-01-03 PROCEDURE — 99214 OFFICE O/P EST MOD 30 MIN: CPT

## 2025-01-03 RX ORDER — MICONAZOLE NITRATE 20 MG/G
1 CREAM TOPICAL 2 TIMES DAILY
COMMUNITY
Start: 2024-12-31 | End: 2025-12-31

## 2025-01-03 RX ORDER — HYDROXYZINE HYDROCHLORIDE 50 MG/1
50 TABLET, FILM COATED ORAL 2 TIMES DAILY
Qty: 90 TABLET | Refills: 0 | Status: SHIPPED | OUTPATIENT
Start: 2025-01-03

## 2025-01-03 RX ORDER — LURASIDONE HYDROCHLORIDE 40 MG/1
40 TABLET, FILM COATED ORAL
Qty: 30 TABLET | Refills: 2 | Status: SHIPPED | OUTPATIENT
Start: 2025-01-03

## 2025-01-03 NOTE — PSYCH
Regular Visit    Assessment & Plan  Recurrent major depressive disorder, in partial remission (HCC)  -Continue Latuda 40mg for tx resistant MDD and mood stabilization   Patient education completed for Latuda, risks include:  NMS, leukopenia, neutropenia, tardive dyskinesia, angioedema, suicidality, diabetes, orthostatic hypotension, somnolence or insomnia, GI upset, hyperprolactinemia, akathisia, and possible weight gain (less than other SGA's), etc.     -Continue Zoloft to 150 mg daily for depression due to increased fatigue with increase dose  PARQ completed including serotonin syndrome, SIADH, worsening depression, suicidality, induction of sabrina, GI upset, headaches, activation, sexual side effects, sedation, potential drug interactions, and others.    -Continue prn Trazodone 100mg daily for insomnia    Anxiety  -Continue prn atarax 50mg daily for breakthrough anxiety             Encounter provider TRE Ho    Provider located at    [address] [address] [phone]    Recent Visits  Date Type Provider Dept   01/03/25 Telemedicine TRE Ho  [facility]   Showing recent visits within past 7 days and meeting all other requirements  Future Appointments  No visits were found meeting these conditions.  Showing future appointments within next 150 days and meeting all other requirements       HPI     Current Outpatient Medications   Medication Sig Dispense Refill   • albuterol (Proventil HFA) 90 mcg/act inhaler Inhale 2 puffs every 6 (six) hours as needed for wheezing 6.7 g 5   • Ashlyna 0.15-0.03 &0.01 MG TABS Take 1 tablet by mouth daily     • clotrimazole (LOTRIMIN) 1 % cream Apply topically 2 (two) times a day 14 g 2   • hydrocortisone (ANUSOL-HC) 25 mg suppository Insert 1 suppository (25 mg total) into the rectum 2 (two) times a day 14 suppository 0   • hydrOXYzine HCL  (ATARAX) 50 mg tablet Take 1 tablet (50 mg total) by mouth 2 (two) times a day 90 tablet 0   • lurasidone (LATUDA) 40 mg tablet Take 1 tablet (40 mg total) by mouth daily with breakfast 30 tablet 2   • miconazole 2 % cream Apply 1 application. topically 2 (two) times a day     • mometasone-formoterol (Dulera) 200-5 MCG/ACT inhaler Inhale 2 puffs 2 (two) times a day Rinse mouth after use. 13 g 6   • omeprazole (PriLOSEC) 20 mg delayed release capsule      • sertraline (ZOLOFT) 100 mg tablet take 1 AND 1/2 tablets by mouth daily 45 tablet 1   • traZODone (DESYREL) 100 mg tablet Take 1 tablet (100 mg total) by mouth daily at bedtime 30 tablet 2   • cyanocobalamin (VITAMIN B-12) 500 MCG tablet Take 1 tablet (500 mcg total) by mouth daily Do not start before October 10, 2023. 30 tablet 0   • doxycycline hyclate (VIBRA-TABS) 100 mg tablet Take 100 mg by mouth 2 (two) times a day (Patient not taking: Reported on 3/26/2024)     • ergocalciferol (VITAMIN D2) 50,000 units Take 1 capsule (50,000 Units total) by mouth once a week for 12 doses Do not start before October 14, 2023. 4 capsule 2   • loratadine (CLARITIN) 10 mg tablet Take 10 mg by mouth daily (Patient not taking: Reported on 12/29/2023)     • nystatin (MYCOSTATIN) powder Apply topically 3 (three) times a day Do not start before October 10, 2023. (Patient not taking: Reported on 2/22/2024) 15 g 1   • predniSONE 10 mg tablet Take 4 tablets daily for 2 days, then 3 tablets daily for 2 days, then 2 tablets daily for 2 days, then 1 tablet daily for 2 days, then stop (Patient not taking: Reported on 12/29/2023) 20 tablet 0     No current facility-administered medications for this visit.       Review of Systems        MEDICATION MANAGEMENT NOTE        [facility]    Name and Date of Birth:  Marlena Wade 24 y.o. 2000 MRN: [id number]    Date of Visit: January 6, 2025    No Known Allergies  SUBJECTIVE:    Marlena is seen today  for a follow up for Major Depressive Disorder and anxiety. She continues to do fairly well since the last visit. Marlena reports she is doing fairly well with no acute mental health exacerbations over the past month, describes having a good holiday, somewhat difficult due to being first holiday without her late grandfather, supportive counseling provided.  States she continues to busy working full-time at [employer], getting along well with employees, making friends, increasing her socialization which has improved her depression.  Depression appears to be at baseline 4/10 with no acute symptoms reported today.  Mood is pleasant, euthymic, talkative.  Anxiety currently controlled with no panic attacks reported.  Financial strain continues to be a stressor but patient appears to making progress in this time.  No medication change required today.  Denies SI and HI.  No evidence of mood elevation.          She denies any side effects from medications.    PLAN:    -Continue Latuda 40mg for tx resistant MDD and mood stabilization   Patient education completed for Latuda, risks include:  NMS, leukopenia, neutropenia, tardive dyskinesia, angioedema, suicidality, diabetes, orthostatic hypotension, somnolence or insomnia, GI upset, hyperprolactinemia, akathisia, and possible weight gain (less than other SGA's), etc.     -Continue Zoloft to 150 mg daily for depression due to increased fatigue with increase dose  PARQ completed including serotonin syndrome, SIADH, worsening depression, suicidality, induction of sabrina, GI upset, headaches, activation, sexual side effects, sedation, potential drug interactions, and others.    -Continue prn Trazodone 100mg daily for insomnia  -Continue prn atarax 50mg daily for breakthrough anxiety        Aware of 24 hour and weekend coverage for urgent situations accessed by calling [facility] main practice number  Referral for individual psychotherapy    Diagnoses and all  orders for this visit:    Recurrent major depressive disorder, in partial remission (HCC)  -     lurasidone (LATUDA) 40 mg tablet; Take 1 tablet (40 mg total) by mouth daily with breakfast    Anxiety  -     hydrOXYzine HCL (ATARAX) 50 mg tablet; Take 1 tablet (50 mg total) by mouth 2 (two) times a day    Other orders  -     omeprazole (PriLOSEC) 20 mg delayed release capsule  -     miconazole 2 % cream; Apply 1 application. topically 2 (two) times a day        Current Outpatient Medications on File Prior to Visit   Medication Sig Dispense Refill   • albuterol (Proventil HFA) 90 mcg/act inhaler Inhale 2 puffs every 6 (six) hours as needed for wheezing 6.7 g 5   • Ashlyna 0.15-0.03 &0.01 MG TABS Take 1 tablet by mouth daily     • clotrimazole (LOTRIMIN) 1 % cream Apply topically 2 (two) times a day 14 g 2   • hydrocortisone (ANUSOL-HC) 25 mg suppository Insert 1 suppository (25 mg total) into the rectum 2 (two) times a day 14 suppository 0   • miconazole 2 % cream Apply 1 application. topically 2 (two) times a day     • mometasone-formoterol (Dulera) 200-5 MCG/ACT inhaler Inhale 2 puffs 2 (two) times a day Rinse mouth after use. 13 g 6   • omeprazole (PriLOSEC) 20 mg delayed release capsule      • sertraline (ZOLOFT) 100 mg tablet take 1 AND 1/2 tablets by mouth daily 45 tablet 1   • traZODone (DESYREL) 100 mg tablet Take 1 tablet (100 mg total) by mouth daily at bedtime 30 tablet 2   • cyanocobalamin (VITAMIN B-12) 500 MCG tablet Take 1 tablet (500 mcg total) by mouth daily Do not start before October 10, 2023. 30 tablet 0   • doxycycline hyclate (VIBRA-TABS) 100 mg tablet Take 100 mg by mouth 2 (two) times a day (Patient not taking: Reported on 3/26/2024)     • ergocalciferol (VITAMIN D2) 50,000 units Take 1 capsule (50,000 Units total) by mouth once a week for 12 doses Do not start before October 14, 2023. 4 capsule 2   • loratadine (CLARITIN) 10 mg tablet Take 10 mg by mouth daily (Patient not taking: Reported on  12/29/2023)     • nystatin (MYCOSTATIN) powder Apply topically 3 (three) times a day Do not start before October 10, 2023. (Patient not taking: Reported on 2/22/2024) 15 g 1   • predniSONE 10 mg tablet Take 4 tablets daily for 2 days, then 3 tablets daily for 2 days, then 2 tablets daily for 2 days, then 1 tablet daily for 2 days, then stop (Patient not taking: Reported on 12/29/2023) 20 tablet 0     No current facility-administered medications on file prior to visit.         HPI ROS Appetite Changes and Sleep:     She reports hypersomnia, decreased appetite, low energy. Denies homicidal ideation, denies suicidal ideation    Review Of Systems:      HPI ROS:               Medication Side Effects:  denies    Depression (10 worst): 4/10    Anxiety (10 worst): 4/10    Safety concerns (SI, HI, etc): Denies si and hi    Sleep: 8hrs     Energy: Remains low    Appetite: 2 meals    Weight Change: denies        Mental Status Evaluation:    Appearance Adequate hygiene and grooming   Behavior calm and cooperative   Mood depressed  Depression Scale - 4 of 10 (0 = No depression)  Anxiety Scale - 4 of 10 (0 = No anxiety)   Speech Normal rate and volume   Affect constricted   Thought Processes Goal directed and coherent   Thought Content Does not verbalize delusional material   Associations Tightly connected   Perceptual Disturbances Denies hallucinations and does not appear to be responding to internal stimuli   Risk Potential Suicidal/Homicidal Ideation - No evidence of suicidal or homicidal ideation and patient does not verbalize suicidal or homicidal ideation  Risk of Violence - No evidence of risk for violence found on assessment  Risk of Self Mutilation - No evidence of risk for self mutilation found on assessment   Orientation oriented to person, place, time/date and situation   Memory recent and remote memory grossly intact   Consciousness alert and awake   Attention/Concentration attention span and concentration are age  appropriate   Insight intact   Judgement intact   Muscle Strength and Gait normal muscle strength and normal muscle tone, normal gait/station and normal balance   Motor Activity no abnormal movements   Language no difficulty naming common objects, no difficulty repeating a phrase, no difficulty writing a sentence   Fund of Knowledge adequate knowledge of current events  adequate fund of knowledge regarding past history  adequate fund of knowledge regarding vocabulary      Traumatic History Update:     New Onset of Abuse Since Last Encounter:   no  Traumatic Events Since Last Encounter:   no    Past Medical History:    Past Medical History:   Diagnosis Date   • Anxiety    • Asthma    • Depression    • Environmental allergies     Last assessed: 1/18/17   • GERD (gastroesophageal reflux disease)    • Hypoglycemia    • Hypothyroidism         Past Surgical History:   Procedure Laterality Date   • COLONOSCOPY  2016 & 2018    Fiberoptic   • EGD  2016   • EGD  06/2016   • MYRINGOTOMY     • TONSILLECTOMY     • TOOTH EXTRACTION       No Known Allergies  Substance Abuse History:    Social History     Substance and Sexual Activity   Alcohol Use Yes   • Alcohol/week: 10.0 standard drinks of alcohol   • Types: 10 Glasses of wine per week    Comment: every 2-3 days, more on weekends     Social History     Substance and Sexual Activity   Drug Use Yes   • Frequency: 4.0 times per week   • Types: Marijuana    Comment: recreational      Social History:    Social History     Socioeconomic History   • Marital status: Single     Spouse name: Not on file   • Number of children: 0   • Years of education: Not on file   • Highest education level: Bachelor's degree (e.g., BA, AB, BS)   Occupational History   • Occupation: UNEMPLOYED   Tobacco Use   • Smoking status: Every Day     Current packs/day: 0.25     Average packs/day: 0.3 packs/day for 1.2 years (0.3 ttl pk-yrs)     Types: Cigarettes     Start date: 11/1/2023   • Smokeless tobacco:  Never   • Tobacco comments:     1/2 pack per month   Vaping Use   • Vaping status: Former   • Substances: Nicotine   Substance and Sexual Activity   • Alcohol use: Yes     Alcohol/week: 10.0 standard drinks of alcohol     Types: 10 Glasses of wine per week     Comment: every 2-3 days, more on weekends   • Drug use: Yes     Frequency: 4.0 times per week     Types: Marijuana     Comment: recreational    • Sexual activity: Yes     Partners: Male   Other Topics Concern   • Not on file   Social History Narrative   • Not on file     Social Drivers of Health     Financial Resource Strain: Not on file   Food Insecurity: Not on file   Transportation Needs: Not on file   Physical Activity: Not on file   Stress: Not on file   Social Connections: Unknown (6/18/2024)    Received from [facility]    • How often do you feel lonely or isolated from those around you? (Adult - for ages 18 years and over): Not on file   Intimate Partner Violence: Not on file   Housing Stability: Not on file     Family Psychiatric History:     Family History   Problem Relation Age of Onset   • Anxiety disorder Mother    • Diabetes Mother         due to underlying condition with both eyes affected by proliferative retinopathy and traction retinal detatchments involving maculae, without long term use of insulin   • Depression Father    • Anxiety disorder Father    • Alcohol abuse Father    • Sleep apnea Father    • Suicide Attempts Maternal Grandmother    • Suicide Attempts Maternal Grandfather    • Crohn's disease Paternal Grandfather      History Review:The following portions of the patient's history were reviewed and updated as appropriate: allergies, current medications, past family history, past medical history, past social history, past surgical history and problem list     OBJECTIVE:     Vital signs in last 24 hours:    There were no vitals filed for this visit.  Laboratory Results:   Recent Labs (last 2 months):   No visits  "with results within 2 Month(s) from this visit.   Latest known visit with results is:   Hospital Outpatient Visit on 03/26/2024   Component Date Value   • EXT Preg Test, Ur 03/26/2024 Negative    • Control 03/26/2024 Valid    • Case Report 03/26/2024                      Value:Surgical Pathology Report                         Case: [id number]                                  Authorizing Provider:  Darwin Alexandre MD       Collected:           03/26/2024 1400              Ordering Location:     [facility]        Received:            03/26/2024 [address] [place] [address] Endoscopy                                                     Pathologist:           Abdi Jasso MD                                                           Specimen:    Colon, random colon bx's, r/o ibd and microscopic colitis                                 • Final Diagnosis 03/26/2024                      Value:A. Colon, \"Random colon,\" Biopsy:                          - Benign colonic mucosa with intact glandular architecture                          - Negative for lymphocytic, collagenous, or active colitis                          - Negative for acute colitis                          - Negative for granulomas, dysplasia, or carcinoma                             • Additional Information 03/26/2024                      Value:All reported additional testing was performed with appropriately reactive                           controls.  These tests were developed and their performance                           characteristics determined by [facility] or                           appropriate performing facility, though some tests may be performed on                           tissues which have not been validated for performance characteristics                           (such as staining performed on alcohol exposed cell blocks and decalcified                           tissues).  Results " "should be interpreted with caution and in the context                           of the patients’ clinical condition. These tests may not be cleared or                           approved by the U.S. Food and Drug Administration, though the FDA has                           determined that such clearance or approval is not necessary. These tests                           are used for clinical purposes and they should not be regarded as                           investigational or for research. This laboratory has been approved by CLIA                           88, designated as a high-complexity laboratory and is qualified to perform                           these tests.                          .Interpretation performed at [facility], [address] [place] [address]                           [id number]                             • Gross Description 03/26/2024                      Value:A. The specimen is received in formalin, labeled with the patient's name                           and hospital number, and is designated \" random colon\".  The specimen                           consists of multiple tan soft tissue fragments measuring in aggregate of                           0.6 x 0.5 x 0.1 cm.  Entirely submitted. One screened cassette.                                                    Note: The estimated total formalin fixation time based upon information                           provided by the submitting clinician and the standard processing schedule                           is under 72 hours.                                                                              Rachel WESLEY have personally reviewed all pertinent laboratory/tests results.    Suicide/Homicide Risk Assessment:    Risk of Harm to Self:  Protective Factors: no current suicidal ideation, access to mental health treatment, compliant with medications, compliant with mental health treatment, having a desire to be alive, medical compliance, " resiliency  Based on today's assessment, Marlena presents the following risk of harm to self: moderate     Risk of Harm to Others:  Based on today's assessment, Marlena presents the following risk of harm to others: minimal    The following interventions are recommended: referral for psychotherapy    Medications Risks/Benefits:      Risks, Benefits And Possible Side Effects Of Medications:    Discussed risks and benefits of treatment with patient including risk of suicidality, serotonin syndrome, increased QTc interval and SIADH related to treatment with antidepressants; Risk of induction of manic symptoms in certain patient populations and risk of parkinsonian symptoms, metabolic syndrome, tardive dyskinesia and neuroleptic malignant syndrome related to treatment with antipsychotic medications     Controlled Medication Discussion:     Not applicable    Treatment Plan:    Due for update/Updated:   no  Last treatment plan done 7/11, due 1/11/25    TRE Ho 01/06/25    Visit Time    Visit Start Time: 325  Visit Stop Time: 342  Total Visit Duration:  17 minutes

## 2025-02-18 ENCOUNTER — TELEMEDICINE (OUTPATIENT)
Dept: PSYCHIATRY | Facility: CLINIC | Age: 25
End: 2025-02-18
Payer: COMMERCIAL

## 2025-02-18 DIAGNOSIS — F33.41 RECURRENT MAJOR DEPRESSIVE DISORDER, IN PARTIAL REMISSION (HCC): Primary | ICD-10-CM

## 2025-02-18 DIAGNOSIS — F41.9 ANXIETY: ICD-10-CM

## 2025-02-18 PROCEDURE — 99214 OFFICE O/P EST MOD 30 MIN: CPT

## 2025-02-18 RX ORDER — ALBUTEROL SULFATE 0.83 MG/ML
SOLUTION RESPIRATORY (INHALATION)
COMMUNITY
Start: 2024-11-14

## 2025-02-18 RX ORDER — FLUCONAZOLE 150 MG/1
150 TABLET ORAL
COMMUNITY
Start: 2025-01-24 | End: 2025-02-21

## 2025-02-18 NOTE — ASSESSMENT & PLAN NOTE
-Continue prn Trazodone 100mg daily for insomnia  -Continue prn atarax 50mg daily for breakthrough anxiety

## 2025-02-18 NOTE — PSYCH
Virtual Visit    Assessment & Plan  Recurrent major depressive disorder, in partial remission (HCC)  -Continue Latuda 40mg for tx resistant MDD and mood stabilization   Patient education completed for Latuda, risks include:  NMS, leukopenia, neutropenia, tardive dyskinesia, angioedema, suicidality, diabetes, orthostatic hypotension, somnolence or insomnia, GI upset, hyperprolactinemia, akathisia, and possible weight gain (less than other SGA's), etc.     -Continue Zoloft to 150 mg daily for depression due to increased fatigue with increase dose  PARQ completed including serotonin syndrome, SIADH, worsening depression, suicidality, induction of sabrina, GI upset, headaches, activation, sexual side effects, sedation, potential drug interactions, and others.      Anxiety  -Continue prn Trazodone 100mg daily for insomnia  -Continue prn atarax 50mg daily for breakthrough anxiety      Administrative Statements   Encounter provider TRE Ho    The Patient is located at Home and in the following state in which I hold an active license [place].    The patient was identified by name and date of birth. Marlena Wade was informed that this is a telemedicine visit and that the visit is being conducted through the Epic Embedded platform. She agrees to proceed..  My office door was closed. No one else was in the room.  She acknowledged consent and understanding of privacy and security of the video platform. The patient has agreed to participate and understands they can discontinue the visit at any time.             Encounter provider TRE Ho    Provider located at    [address] [facility] [address] [phone]    Recent Visits  Date Type Provider Dept   02/18/25 Telemedicine TRE Ho  [facility]   Showing recent visits within past 7 days and meeting all other requirements  Future Appointments  No  visits were found meeting these conditions.  Showing future appointments within next 150 days and meeting all other requirements       HPI     Current Outpatient Medications   Medication Sig Dispense Refill   • albuterol (2.5 mg/3 mL) 0.083 % nebulizer solution take 3 MILLILITERS BY NEBULIZATION EVERY 6 HOURS AS NEEDED FOR WHEEZING     • albuterol (Proventil HFA) 90 mcg/act inhaler Inhale 2 puffs every 6 (six) hours as needed for wheezing 6.7 g 5   • Ashlyna 0.15-0.03 &0.01 MG TABS Take 1 tablet by mouth daily     • clotrimazole (LOTRIMIN) 1 % cream Apply topically 2 (two) times a day 14 g 2   • fluconazole (DIFLUCAN) 150 mg tablet Take 150 mg by mouth     • hydrocortisone (ANUSOL-HC) 25 mg suppository Insert 1 suppository (25 mg total) into the rectum 2 (two) times a day 14 suppository 0   • hydrOXYzine HCL (ATARAX) 50 mg tablet Take 1 tablet (50 mg total) by mouth 2 (two) times a day 90 tablet 0   • lurasidone (LATUDA) 40 mg tablet Take 1 tablet (40 mg total) by mouth daily with breakfast 30 tablet 2   • miconazole 2 % cream Apply 1 application. topically 2 (two) times a day     • omeprazole (PriLOSEC) 20 mg delayed release capsule      • sertraline (ZOLOFT) 100 mg tablet take 1 AND 1/2 tablets by mouth daily 45 tablet 1   • traZODone (DESYREL) 100 mg tablet Take 1 tablet (100 mg total) by mouth daily at bedtime 30 tablet 2   • cyanocobalamin (VITAMIN B-12) 500 MCG tablet Take 1 tablet (500 mcg total) by mouth daily Do not start before October 10, 2023. 30 tablet 0   • doxycycline hyclate (VIBRA-TABS) 100 mg tablet Take 100 mg by mouth 2 (two) times a day (Patient not taking: Reported on 3/26/2024)     • ergocalciferol (VITAMIN D2) 50,000 units Take 1 capsule (50,000 Units total) by mouth once a week for 12 doses Do not start before October 14, 2023. 4 capsule 2   • loratadine (CLARITIN) 10 mg tablet Take 10 mg by mouth daily (Patient not taking: Reported on 12/29/2023)     • mometasone-formoterol (Dulera) 200-5  MCG/ACT inhaler Inhale 2 puffs 2 (two) times a day Rinse mouth after use. 13 g 6   • nystatin (MYCOSTATIN) powder Apply topically 3 (three) times a day Do not start before October 10, 2023. (Patient not taking: Reported on 2/22/2024) 15 g 1   • predniSONE 10 mg tablet Take 4 tablets daily for 2 days, then 3 tablets daily for 2 days, then 2 tablets daily for 2 days, then 1 tablet daily for 2 days, then stop (Patient not taking: Reported on 12/29/2023) 20 tablet 0     No current facility-administered medications for this visit.       Review of Systems        MEDICATION MANAGEMENT NOTE        [facility]    Name and Date of Birth:  Marlena Wade 24 y.o. 2000 MRN: [id number]    Date of Visit: February 21, 2025    No Known Allergies  SUBJECTIVE:    Marlena is seen today for a follow up for Major Depressive Disorder and anxiety. She continues to do fairly well since the last visit. Marlena is reporting a moderate increase in anxiety related to external circumstances of figuring out her next move in terms of living situation.  States she currently rents a place in [place] with her friend but her friend will be moving [facility] of [facility], cannot afford this had to situation alone on her salary.  She is debating moving back to her parents in the [place] area or finding of cheaper place in [place], prefers to stay in [place], feels a bit of shame going back to her parents.  Working to improve her financial situation, has 2 job interviews tomorrow, hopeful and optimistic.  Anxiety 5/10 with frequent worry about the future, racing thoughts, difficulty relaxing.  Encouraged her to utilize coping skills, does report the as needed Atarax prescribed is helpful.  Depression mild to moderate with no severe or significant symptoms reported.  She appears at baseline, is pleasant, appropriate, no symptoms of mood elevation.  Denies SI and HI.              She denies any side effects from  medications.    PLAN:    -Continue Latuda 40mg for tx resistant MDD and mood stabilization   Patient education completed for Latuda, risks include:  NMS, leukopenia, neutropenia, tardive dyskinesia, angioedema, suicidality, diabetes, orthostatic hypotension, somnolence or insomnia, GI upset, hyperprolactinemia, akathisia, and possible weight gain (less than other SGA's), etc.     -Continue Zoloft to 150 mg daily for depression due to increased fatigue with increase dose  PARQ completed including serotonin syndrome, SIADH, worsening depression, suicidality, induction of sabrina, GI upset, headaches, activation, sexual side effects, sedation, potential drug interactions, and others.    -Continue prn Trazodone 100mg daily for insomnia  -Continue prn atarax 50mg daily for breakthrough anxiety        Aware of 24 hour and weekend coverage for urgent situations accessed by calling [facility] main practice number  Referral for individual psychotherapy    Diagnoses and all orders for this visit:    Recurrent major depressive disorder, in partial remission (HCC)    Anxiety    Other orders  -     fluconazole (DIFLUCAN) 150 mg tablet; Take 150 mg by mouth  -     albuterol (2.5 mg/3 mL) 0.083 % nebulizer solution; take 3 MILLILITERS BY NEBULIZATION EVERY 6 HOURS AS NEEDED FOR WHEEZING        Current Outpatient Medications on File Prior to Visit   Medication Sig Dispense Refill   • albuterol (2.5 mg/3 mL) 0.083 % nebulizer solution take 3 MILLILITERS BY NEBULIZATION EVERY 6 HOURS AS NEEDED FOR WHEEZING     • albuterol (Proventil HFA) 90 mcg/act inhaler Inhale 2 puffs every 6 (six) hours as needed for wheezing 6.7 g 5   • Ashlyna 0.15-0.03 &0.01 MG TABS Take 1 tablet by mouth daily     • clotrimazole (LOTRIMIN) 1 % cream Apply topically 2 (two) times a day 14 g 2   • fluconazole (DIFLUCAN) 150 mg tablet Take 150 mg by mouth     • hydrocortisone (ANUSOL-HC) 25 mg suppository Insert 1 suppository (25 mg total)  into the rectum 2 (two) times a day 14 suppository 0   • hydrOXYzine HCL (ATARAX) 50 mg tablet Take 1 tablet (50 mg total) by mouth 2 (two) times a day 90 tablet 0   • lurasidone (LATUDA) 40 mg tablet Take 1 tablet (40 mg total) by mouth daily with breakfast 30 tablet 2   • miconazole 2 % cream Apply 1 application. topically 2 (two) times a day     • omeprazole (PriLOSEC) 20 mg delayed release capsule      • sertraline (ZOLOFT) 100 mg tablet take 1 AND 1/2 tablets by mouth daily 45 tablet 1   • traZODone (DESYREL) 100 mg tablet Take 1 tablet (100 mg total) by mouth daily at bedtime 30 tablet 2   • cyanocobalamin (VITAMIN B-12) 500 MCG tablet Take 1 tablet (500 mcg total) by mouth daily Do not start before October 10, 2023. 30 tablet 0   • doxycycline hyclate (VIBRA-TABS) 100 mg tablet Take 100 mg by mouth 2 (two) times a day (Patient not taking: Reported on 3/26/2024)     • ergocalciferol (VITAMIN D2) 50,000 units Take 1 capsule (50,000 Units total) by mouth once a week for 12 doses Do not start before October 14, 2023. 4 capsule 2   • loratadine (CLARITIN) 10 mg tablet Take 10 mg by mouth daily (Patient not taking: Reported on 12/29/2023)     • mometasone-formoterol (Dulera) 200-5 MCG/ACT inhaler Inhale 2 puffs 2 (two) times a day Rinse mouth after use. 13 g 6   • nystatin (MYCOSTATIN) powder Apply topically 3 (three) times a day Do not start before October 10, 2023. (Patient not taking: Reported on 2/22/2024) 15 g 1   • predniSONE 10 mg tablet Take 4 tablets daily for 2 days, then 3 tablets daily for 2 days, then 2 tablets daily for 2 days, then 1 tablet daily for 2 days, then stop (Patient not taking: Reported on 12/29/2023) 20 tablet 0     No current facility-administered medications on file prior to visit.         HPI ROS Appetite Changes and Sleep:     She reports hypersomnia, decreased appetite, low energy. Denies homicidal ideation, denies suicidal ideation    Review Of Systems:      HPI ROS:                Medication Side Effects:  denies    Depression (10 worst): 4/10    Anxiety (10 worst): 5/10    Safety concerns (SI, HI, etc): Denies si and hi    Sleep: 8hrs     Energy: Remains low    Appetite: 2 meals    Weight Change: denies        Mental Status Evaluation:    Appearance Adequate hygiene and grooming   Behavior calm and cooperative   Mood depressed  Depression Scale - 4 of 10 (0 = No depression)  Anxiety Scale - 5 of 10 (0 = No anxiety)   Speech Normal rate and volume   Affect constricted   Thought Processes Goal directed and coherent   Thought Content Does not verbalize delusional material   Associations Tightly connected   Perceptual Disturbances Denies hallucinations and does not appear to be responding to internal stimuli   Risk Potential Suicidal/Homicidal Ideation - No evidence of suicidal or homicidal ideation and patient does not verbalize suicidal or homicidal ideation  Risk of Violence - No evidence of risk for violence found on assessment  Risk of Self Mutilation - No evidence of risk for self mutilation found on assessment   Orientation oriented to person, place, time/date and situation   Memory recent and remote memory grossly intact   Consciousness alert and awake   Attention/Concentration attention span and concentration are age appropriate   Insight intact   Judgement intact   Muscle Strength and Gait normal muscle strength and normal muscle tone, normal gait/station and normal balance   Motor Activity no abnormal movements   Language no difficulty naming common objects, no difficulty repeating a phrase, no difficulty writing a sentence   Fund of Knowledge adequate knowledge of current events  adequate fund of knowledge regarding past history  adequate fund of knowledge regarding vocabulary      Traumatic History Update:     New Onset of Abuse Since Last Encounter:   no  Traumatic Events Since Last Encounter:   no    Past Medical History:    Past Medical History:   Diagnosis Date   • Anxiety     • Asthma    • Depression    • Environmental allergies     Last assessed: 1/18/17   • GERD (gastroesophageal reflux disease)    • Hypoglycemia    • Hypothyroidism         Past Surgical History:   Procedure Laterality Date   • COLONOSCOPY  2016 & 2018    Fiberoptic   • EGD  2016   • EGD  06/2016   • MYRINGOTOMY     • TONSILLECTOMY     • TOOTH EXTRACTION       No Known Allergies  Substance Abuse History:    Social History     Substance and Sexual Activity   Alcohol Use Yes   • Alcohol/week: 10.0 standard drinks of alcohol   • Types: 10 Glasses of wine per week    Comment: every 2-3 days, more on weekends     Social History     Substance and Sexual Activity   Drug Use Yes   • Frequency: 4.0 times per week   • Types: Marijuana    Comment: recreational      Social History:    Social History     Socioeconomic History   • Marital status: Single     Spouse name: Not on file   • Number of children: 0   • Years of education: Not on file   • Highest education level: Bachelor's degree (e.g., BA, AB, BS)   Occupational History   • Occupation: UNEMPLOYED   Tobacco Use   • Smoking status: Every Day     Current packs/day: 0.25     Average packs/day: 0.3 packs/day for 1.3 years (0.3 ttl pk-yrs)     Types: Cigarettes     Start date: 11/1/2023   • Smokeless tobacco: Never   • Tobacco comments:     1/2 pack per month   Vaping Use   • Vaping status: Former   • Substances: Nicotine   Substance and Sexual Activity   • Alcohol use: Yes     Alcohol/week: 10.0 standard drinks of alcohol     Types: 10 Glasses of wine per week     Comment: every 2-3 days, more on weekends   • Drug use: Yes     Frequency: 4.0 times per week     Types: Marijuana     Comment: recreational    • Sexual activity: Yes     Partners: Male   Other Topics Concern   • Not on file   Social History Narrative   • Not on file     Social Drivers of Health     Financial Resource Strain: Not on file   Food Insecurity: Not on file   Transportation Needs: Not on file   Physical  Activity: Not on file   Stress: Not on file   Social Connections: Unknown (6/18/2024)    Received from [facility]    Social Connections    • How often do you feel lonely or isolated from those around you? (Adult - for ages 18 years and over): Not on file   Intimate Partner Violence: Not on file   Housing Stability: Not on file     Family Psychiatric History:     Family History   Problem Relation Age of Onset   • Anxiety disorder Mother    • Diabetes Mother         due to underlying condition with both eyes affected by proliferative retinopathy and traction retinal detatchments involving maculae, without long term use of insulin   • Depression Father    • Anxiety disorder Father    • Alcohol abuse Father    • Sleep apnea Father    • Suicide Attempts Maternal Grandmother    • Suicide Attempts Maternal Grandfather    • Crohn's disease Paternal Grandfather      History Review:The following portions of the patient's history were reviewed and updated as appropriate: allergies, current medications, past family history, past medical history, past social history, past surgical history and problem list     OBJECTIVE:     Vital signs in last 24 hours:    There were no vitals filed for this visit.  Laboratory Results:   Recent Labs (last 2 months):   No visits with results within 2 Month(s) from this visit.   Latest known visit with results is:   Hospital Outpatient Visit on 03/26/2024   Component Date Value   • EXT Preg Test, Ur 03/26/2024 Negative    • Control 03/26/2024 Valid    • Case Report 03/26/2024                      Value:Surgical Pathology Report                         Case: [id number]                                  Authorizing Provider:  Darwin Alexandre MD       Collected:           03/26/2024 1400              Ordering Location:     [facility]        Received:            03/26/2024 [address] [place] [address] Endoscopy                                                    "  Pathologist:           Abdi Jasso MD                                                           Specimen:    Colon, random colon bx's, r/o ibd and microscopic colitis                                 • Final Diagnosis 03/26/2024                      Value:A. Colon, \"Random colon,\" Biopsy:                          - Benign colonic mucosa with intact glandular architecture                          - Negative for lymphocytic, collagenous, or active colitis                          - Negative for acute colitis                          - Negative for granulomas, dysplasia, or carcinoma                             • Additional Information 03/26/2024                      Value:All reported additional testing was performed with appropriately reactive                           controls.  These tests were developed and their performance                           characteristics determined by [facility] or                           appropriate performing facility, though some tests may be performed on                           tissues which have not been validated for performance characteristics                           (such as staining performed on alcohol exposed cell blocks and decalcified                           tissues).  Results should be interpreted with caution and in the context                           of the patients’ clinical condition. These tests may not be cleared or                           approved by the U.S. Food and Drug Administration, though the FDA has                           determined that such clearance or approval is not necessary. These tests                           are used for clinical purposes and they should not be regarded as                           investigational or for research. This laboratory has been approved by CLIA                           88, designated as a high-complexity laboratory and is qualified to perform                           these " "tests.                          .Interpretation performed at [facility], [address] [facility]                           [address]                             • Gross Description 03/26/2024                      Value:A. The specimen is received in formalin, labeled with the patient's name                           and hospital number, and is designated \" random colon\".  The specimen                           consists of multiple tan soft tissue fragments measuring in aggregate of                           0.6 x 0.5 x 0.1 cm.  Entirely submitted. One screened cassette.                                                    Note: The estimated total formalin fixation time based upon information                           provided by the submitting clinician and the standard processing schedule                           is under 72 hours.                                                                              Rachel WESLEY have personally reviewed all pertinent laboratory/tests results.    Suicide/Homicide Risk Assessment:    Risk of Harm to Self:  Protective Factors: no current suicidal ideation, access to mental health treatment, compliant with medications, compliant with mental health treatment, having a desire to be alive, medical compliance, resiliency  Based on today's assessment, Marlena presents the following risk of harm to self: moderate     Risk of Harm to Others:  Based on today's assessment, Marlena presents the following risk of harm to others: minimal    The following interventions are recommended: referral for psychotherapy    Medications Risks/Benefits:      Risks, Benefits And Possible Side Effects Of Medications:    Discussed risks and benefits of treatment with patient including risk of suicidality, serotonin syndrome, increased QTc interval and SIADH related to treatment with antidepressants; Risk of induction of manic symptoms in certain patient populations and risk of parkinsonian symptoms, " metabolic syndrome, tardive dyskinesia and neuroleptic malignant syndrome related to treatment with antipsychotic medications     Controlled Medication Discussion:     Not applicable    Treatment Plan:    Due for update/Updated:   no  Last treatment plan done 7/11, due 1/11/25    TRE Ho 02/21/25    Visit Time    Visit Start Time: 230  Visit Stop Time: 252  Total Visit Duration:  22 minutes

## 2025-02-25 DIAGNOSIS — F33.41 RECURRENT MAJOR DEPRESSIVE DISORDER, IN PARTIAL REMISSION (HCC): ICD-10-CM

## 2025-02-25 NOTE — TELEPHONE ENCOUNTER
Reason for call:   [x] Refill   [] Prior Auth  [] Other:     Office:   [] PCP/Provider -   [x] Specialty/Provider - psych/[facility]    Medication: Trazodone 100mg    Dose/Frequency: 1 tab hs    Quantity: 30    Pharmacy: [id number] - [place] - [address] [phone]     Does the patient have enough for 3 days?   [] Yes   [x] No - Send as HP to POD

## 2025-02-26 RX ORDER — TRAZODONE HYDROCHLORIDE 100 MG/1
100 TABLET ORAL
Qty: 90 TABLET | Refills: 0 | Status: SHIPPED | OUTPATIENT
Start: 2025-02-26 | End: 2025-05-27

## 2025-03-27 DIAGNOSIS — F33.41 RECURRENT MAJOR DEPRESSIVE DISORDER, IN PARTIAL REMISSION (HCC): ICD-10-CM

## 2025-03-27 RX ORDER — SERTRALINE HYDROCHLORIDE 100 MG/1
150 TABLET, FILM COATED ORAL DAILY
Qty: 45 TABLET | Refills: 0 | Status: SHIPPED | OUTPATIENT
Start: 2025-03-27

## 2025-04-01 ENCOUNTER — TELEMEDICINE (OUTPATIENT)
Dept: PSYCHIATRY | Facility: CLINIC | Age: 25
End: 2025-04-01
Payer: COMMERCIAL

## 2025-04-01 DIAGNOSIS — F41.9 ANXIETY: ICD-10-CM

## 2025-04-01 DIAGNOSIS — F33.41 RECURRENT MAJOR DEPRESSIVE DISORDER, IN PARTIAL REMISSION (HCC): Primary | ICD-10-CM

## 2025-04-01 PROBLEM — E88.810 METABOLIC SYNDROME: Status: ACTIVE | Noted: 2025-03-18

## 2025-04-01 PROBLEM — E88.819 INSULIN RESISTANCE: Status: ACTIVE | Noted: 2025-03-18

## 2025-04-01 PROBLEM — R73.03 PREDIABETES: Status: ACTIVE | Noted: 2025-03-18

## 2025-04-01 PROCEDURE — 99214 OFFICE O/P EST MOD 30 MIN: CPT

## 2025-04-01 RX ORDER — HYDROXYZINE HYDROCHLORIDE 50 MG/1
50 TABLET, FILM COATED ORAL 2 TIMES DAILY
Qty: 90 TABLET | Refills: 0 | Status: SHIPPED | OUTPATIENT
Start: 2025-04-01

## 2025-04-01 RX ORDER — TIRZEPATIDE 2.5 MG/.5ML
2.5 INJECTION, SOLUTION SUBCUTANEOUS WEEKLY
COMMUNITY
Start: 2025-03-18

## 2025-04-01 RX ORDER — HYDROCORTISONE 25 MG/G
CREAM TOPICAL
COMMUNITY
Start: 2025-02-20

## 2025-04-01 NOTE — PSYCH
MEDICATION MANAGEMENT NOTE    Name: Marlena Wade      : 2000      MRN: [id number]  Encounter Provider: TRE Ho  Encounter Date: 2025   Encounter department: [facility]    Insurance: Payor: [employer] CROSS / Plan: CAPITAL BC PLAN 361 / Product Type: Blue Fee for Service /      Reason for Visit: No chief complaint on file.  :  Assessment & Plan  Recurrent major depressive disorder, in partial remission (HCC)  -Continue Latuda 40mg for tx resistant MDD and mood stabilization   Patient education completed for Latuda, risks include:  NMS, leukopenia, neutropenia, tardive dyskinesia, angioedema, suicidality, diabetes, orthostatic hypotension, somnolence or insomnia, GI upset, hyperprolactinemia, akathisia, and possible weight gain (less than other SGA's), etc.      -Continue Zoloft to 150 mg daily for depression due to increased fatigue with increase dose  PARQ completed including serotonin syndrome, SIADH, worsening depression, suicidality, induction of sabrina, GI upset, headaches, activation, sexual side effects, sedation, potential drug interactions, and others.             Anxiety  -Continue prn Trazodone 100mg daily for insomnia  -Continue prn atarax 50mg daily for breakthrough anxiety  Orders:  •  hydrOXYzine HCL (ATARAX) 50 mg tablet; Take 1 tablet (50 mg total) by mouth 2 (two) times a day        Treatment Recommendations:    Educated about diagnosis and treatment modalities. Verbalizes understanding and agreement with the treatment plan.  Discussed self monitoring of symptoms, and symptom monitoring tools.  Discussed medications and if treatment adjustment was needed or desired.  Aware of 24 hour and weekend coverage for urgent situations accessed by calling [facility] main practice number  I am scheduling this patient out for greater than 3 months: No    Medications Risks/Benefits:      Risks, Benefits And Possible Side Effects Of  Medications:    Risks, benefits, and possible side effects of medications explained to Marlena and she (or legal representative) verbalizes understanding and agreement for treatment.    Controlled Medication Discussion:     Not applicable      History of Present Illness     CC: Marlena presents today for follow up on depression, anxiety        Marlena is noticeably brighter, more upbeat compared to previous visit.  Attributes her improved mood to a recent 10 pound weight loss with Zepbound initiation.  Describes how her weight and view of herself has contributed to depression in the past and is happy with the direction she is heading.  She is hopeful to continue losing weight and improve her body image.  Reports stability overall with no significant mental health symptoms reported.  Depression and anxiety mild and controllable at this time.  She continues to work on her future in terms of future living situation.  She is hopeful to secure an apartment in the [facility] vs having to go back to the area she grew up in [place] which is cheaper but not her ideal place to live.  She is appropriate, controlled mood, forward thinking.  Endorsed compliance with regimen of Latuda, Zoloft, trazodone, Atarax.  Denies any further issues or concerns.  Reports adequate sleep and appetite.  Denies SI.    Med Compliance: yes    Since our last visit, overall symptoms have been controlled.       HPI ROS:     Medication Side Effects: denies  Depression: 3 /10 (10 worst)  Anxiety: 3 /10 (10 worst)  Safety concerns (SI, HI, others): denies si and hi  Sleep: 6-7 hrs  Energy: fair to low  Appetite: 2-3 meals, decreased r/t zepbound  Weight Change: 10lb intentional weight ls    Marlena denies any side effects from medications unless noted above.    Review Of Systems: A review of systems is obtained and is negative except for the pertinent positives listed in HPI/Subjective above.      Current Rating Scores:     Current PHQ-9   PHQ-2/9 Depression  Screening    Little interest or pleasure in doing things: 1 - several days  Feeling down, depressed, or hopeless: 2 - more than half the days  Trouble falling or staying asleep, or sleeping too much: 1 - several days  Feeling tired or having little energy: 2 - more than half the days  Poor appetite or overeatin - several days  Feeling bad about yourself - or that you are a failure or have let yourself or your family down: 1 - several days  Trouble concentrating on things, such as reading the newspaper or watching television: 0 - not at all  Moving or speaking so slowly that other people could have noticed. Or the opposite - being so fidgety or restless that you have been moving around a lot more than usual: 0 - not at all  Thoughts that you would be better off dead, or of hurting yourself in some way: 0 - not at all  PHQ-9 Score: 8  PHQ-9 Interpretation: Mild depression         Areas of Improvement: reviewed in HPI/Subjective Section and reviewed in Assessment and Plan Section        Past Medical History:   Diagnosis Date   • Anxiety    • Asthma    • Depression    • Environmental allergies     Last assessed: 17   • GERD (gastroesophageal reflux disease)    • Hypoglycemia    • Hypothyroidism         Past Surgical History:   Procedure Laterality Date   • COLONOSCOPY   & 2018    Fiberoptic   • EGD     • EGD  2016   • MYRINGOTOMY     • TONSILLECTOMY     • TOOTH EXTRACTION       Allergies: No Known Allergies    Current Outpatient Medications   Medication Sig Dispense Refill   • albuterol (2.5 mg/3 mL) 0.083 % nebulizer solution take 3 MILLILITERS BY NEBULIZATION EVERY 6 HOURS AS NEEDED FOR WHEEZING     • albuterol (Proventil HFA) 90 mcg/act inhaler Inhale 2 puffs every 6 (six) hours as needed for wheezing 6.7 g 5   • Ashlyna 0.15-0.03 &0.01 MG TABS Take 1 tablet by mouth daily     • clotrimazole (LOTRIMIN) 1 % cream Apply topically 2 (two) times a day 14 g 2   • hydrocortisone (ANUSOL-HC) 25 mg  suppository Insert 1 suppository (25 mg total) into the rectum 2 (two) times a day 14 suppository 0   • hydrocortisone 2.5 % cream APPLY THIN LAYER TO BELLY BUTTON AND UNDER BREASTS TWICE A DAY FO...  (REFER TO PRESCRIPTION NOTES).     • hydrOXYzine HCL (ATARAX) 50 mg tablet Take 1 tablet (50 mg total) by mouth 2 (two) times a day 90 tablet 0   • lurasidone (LATUDA) 40 mg tablet Take 1 tablet (40 mg total) by mouth daily with breakfast 30 tablet 2   • metFORMIN (GLUCOPHAGE) 500 mg tablet Take 1 tablet by mouth 2 (two) times a day with meals     • miconazole 2 % cream Apply 1 application. topically 2 (two) times a day     • mometasone-formoterol (Dulera) 200-5 MCG/ACT inhaler Inhale 2 puffs 2 (two) times a day Rinse mouth after use. 13 g 6   • omeprazole (PriLOSEC) 20 mg delayed release capsule      • sertraline (ZOLOFT) 100 mg tablet take 1 AND 1/2 tablets by mouth daily 45 tablet 0   • traZODone (DESYREL) 100 mg tablet Take 1 tablet (100 mg total) by mouth daily at bedtime 90 tablet 0   • Zepbound 2.5 MG/0.5ML auto-injector Inject 2.5 mg under the skin Once a week     • cyanocobalamin (VITAMIN B-12) 500 MCG tablet Take 1 tablet (500 mcg total) by mouth daily Do not start before October 10, 2023. 30 tablet 0   • doxycycline hyclate (VIBRA-TABS) 100 mg tablet Take 100 mg by mouth 2 (two) times a day (Patient not taking: Reported on 3/26/2024)     • ergocalciferol (VITAMIN D2) 50,000 units Take 1 capsule (50,000 Units total) by mouth once a week for 12 doses Do not start before October 14, 2023. 4 capsule 2   • loratadine (CLARITIN) 10 mg tablet Take 10 mg by mouth daily (Patient not taking: Reported on 12/29/2023)     • nystatin (MYCOSTATIN) powder Apply topically 3 (three) times a day Do not start before October 10, 2023. (Patient not taking: Reported on 2/22/2024) 15 g 1   • predniSONE 10 mg tablet Take 4 tablets daily for 2 days, then 3 tablets daily for 2 days, then 2 tablets daily for 2 days, then 1 tablet daily  for 2 days, then stop (Patient not taking: Reported on 12/29/2023) 20 tablet 0     No current facility-administered medications for this visit.       Substance Abuse History:    Social History     Substance and Sexual Activity   Alcohol Use Yes   • Alcohol/week: 10.0 standard drinks of alcohol   • Types: 10 Glasses of wine per week    Comment: every 2-3 days, more on weekends     Social History     Substance and Sexual Activity   Drug Use Yes   • Frequency: 4.0 times per week   • Types: Marijuana    Comment: recreational        Social History:    Social History     Socioeconomic History   • Marital status: Single     Spouse name: Not on file   • Number of children: 0   • Years of education: Not on file   • Highest education level: Bachelor's degree (e.g., BA, AB, BS)   Occupational History   • Occupation: UNEMPLOYED   Tobacco Use   • Smoking status: Every Day     Current packs/day: 0.25     Average packs/day: 0.3 packs/day for 1.4 years (0.4 ttl pk-yrs)     Types: Cigarettes     Start date: 11/1/2023   • Smokeless tobacco: Never   • Tobacco comments:     1/2 pack per month   Vaping Use   • Vaping status: Former   • Substances: Nicotine   Substance and Sexual Activity   • Alcohol use: Yes     Alcohol/week: 10.0 standard drinks of alcohol     Types: 10 Glasses of wine per week     Comment: every 2-3 days, more on weekends   • Drug use: Yes     Frequency: 4.0 times per week     Types: Marijuana     Comment: recreational    • Sexual activity: Yes     Partners: Male   Other Topics Concern   • Not on file   Social History Narrative   • Not on file     Social Drivers of Health     Financial Resource Strain: Not on file   Food Insecurity: Not on file   Transportation Needs: Not on file   Physical Activity: Not on file   Stress: Not on file   Social Connections: Unknown (6/18/2024)    Received from [facility]    Social Connections    • How often do you feel lonely or isolated from those around you? (Adult - for ages 18 years  and over): Not on file   Intimate Partner Violence: Not on file   Housing Stability: Not on file       Family Psychiatric History:     Family History   Problem Relation Age of Onset   • Anxiety disorder Mother    • Diabetes Mother         due to underlying condition with both eyes affected by proliferative retinopathy and traction retinal detatchments involving maculae, without long term use of insulin   • Depression Father    • Anxiety disorder Father    • Alcohol abuse Father    • Sleep apnea Father    • Suicide Attempts Maternal Grandmother    • Suicide Attempts Maternal Grandfather    • Crohn's disease Paternal Grandfather        Medical History Reviewed by provider this encounter:  Tobacco  Allergies  Meds  Problems  Med Hx  Surg Hx  Fam Hx          Objective   There were no vitals taken for this visit.     Mental Status Evaluation:    Appearance age appropriate, casually dressed   Behavior cooperative, calm   Speech normal rate, normal volume, normal pitch, spontaneous   Mood improved   Affect normal range and intensity, appropriate   Thought Processes organized, goal directed   Thought Content no overt delusions   Perceptual Disturbances: no auditory hallucinations, no visual hallucinations   Abnormal Thoughts  Risk Potential Suicidal ideation - None  Homicidal ideation - None  Potential for aggression - No   Orientation oriented to person, place, time/date, and situation   Memory recent and remote memory grossly intact   Consciousness alert and awake   Attention Span Concentration Span attention span and concentration are age appropriate   Intellect appears to be of average intelligence   Insight intact   Judgement intact   Muscle Strength and  Gait normal muscle strength and normal muscle tone, normal gait and normal balance   Motor activity no abnormal movements   Language no difficulty naming common objects, no difficulty repeating a phrase, no difficulty writing a sentence   Fund of Knowledge  "adequate knowledge of current events  adequate fund of knowledge regarding past history  adequate fund of knowledge regarding vocabulary    Pain none   Pain Scale 0       Laboratory Results: I have personally reviewed all pertinent laboratory/tests results    Recent Labs (last 2 months):   No visits with results within 2 Month(s) from this visit.   Latest known visit with results is:   Hospital Outpatient Visit on 03/26/2024   Component Date Value   • EXT Preg Test, Ur 03/26/2024 Negative    • Control 03/26/2024 Valid    • Case Report 03/26/2024                      Value:Surgical Pathology Report                         Case: [id number]                                  Authorizing Provider:  Darwin Alexandre MD       Collected:           03/26/2024 1400              Ordering Location:     [facility]        Received:            03/26/2024 [address] [place] [address] Endoscopy                                                     Pathologist:           Abdi Jasso MD                                                           Specimen:    Colon, random colon bx's, r/o ibd and microscopic colitis                                 • Final Diagnosis 03/26/2024                      Value:A. Colon, \"Random colon,\" Biopsy:                          - Benign colonic mucosa with intact glandular architecture                          - Negative for lymphocytic, collagenous, or active colitis                          - Negative for acute colitis                          - Negative for granulomas, dysplasia, or carcinoma                             • Additional Information 03/26/2024                      Value:All reported additional testing was performed with appropriately reactive                           controls.  These tests were developed and their performance                           characteristics determined by [facility] or                           appropriate " "performing facility, though some tests may be performed on                           tissues which have not been validated for performance characteristics                           (such as staining performed on alcohol exposed cell blocks and decalcified                           tissues).  Results should be interpreted with caution and in the context                           of the patients’ clinical condition. These tests may not be cleared or                           approved by the U.S. Food and Drug Administration, though the FDA has                           determined that such clearance or approval is not necessary. These tests                           are used for clinical purposes and they should not be regarded as                           investigational or for research. This laboratory has been approved by CLIA                           88, designated as a high-complexity laboratory and is qualified to perform                           these tests.                          .Interpretation performed at [facility], [address] [place] [address]                           [id number]                             • Gross Description 03/26/2024                      Value:A. The specimen is received in formalin, labeled with the patient's name                           and hospital number, and is designated \" random colon\".  The specimen                           consists of multiple tan soft tissue fragments measuring in aggregate of                           0.6 x 0.5 x 0.1 cm.  Entirely submitted. One screened cassette.                                                    Note: The estimated total formalin fixation time based upon information                           provided by the submitting clinician and the standard processing schedule                           is under 72 hours.                                                                              ANNAMARIAelaya       Suicide/Homicide Risk " Assessment:    Risk of Harm to Self:  Protective Factors: no current suicidal ideation, access to mental health treatment, compliant with medications, compliant with mental health treatment, connection to community, future oriented, having a desire to be alive, medical compliance, personal beliefs, stable job  Based on today's assessment, Marlena presents the following risk of harm to self: minimal    Risk of Harm to Others:  Based on today's assessment, Marlena presents the following risk of harm to others: none    The following interventions are recommended: Continue medication management. No other intervention changes indicated at this time.    Psychotherapy Provided:     Individual psychotherapy provided: Yes    Medications, treatment progress and treatment plan reviewed with Marlena.  Medication changes discussed with Marlena.  Medication education provided to Marlena.    Treatment Plan:    Completed and signed during the session: Yes - with Marlena    Goals: Progress towards Treatment Plan goals - Yes, progressing, as evidenced by subjective findings in HPI/Subjective Section and in Assessment and Plan Section    Depression Follow-up Plan Completed: No    Note Share:    This note was shared with patient.    Administrative Statements   Administrative Statements   Encounter provider TRE Ho    The Patient is located at Home and in the following state in which I hold an active license [place].    The patient was identified by name and date of birth. Marlena Wade was informed that this is a telemedicine visit and that the visit is being conducted through the Epic Embedded platform. She agrees to proceed..  My office door was closed. No one else was in the room.  She acknowledged consent and understanding of privacy and security of the video platform. The patient has agreed to participate and understands they can discontinue the visit at any time.    I have spent a total time of 23 minutes in caring for this patient on the day of  the visit/encounter including Risks and benefits of tx options, not including the time spent for establishing the audio/video connection.    Visit Time  Visit Start Time: 1130 PM  Visit Stop Time: 1153 PM  Total Visit Duration:  23 minutes    TRE oH 04/02/25

## 2025-04-01 NOTE — ASSESSMENT & PLAN NOTE
-Continue prn Trazodone 100mg daily for insomnia  -Continue prn atarax 50mg daily for breakthrough anxiety  Orders:  •  hydrOXYzine HCL (ATARAX) 50 mg tablet; Take 1 tablet (50 mg total) by mouth 2 (two) times a day

## 2025-04-01 NOTE — BH TREATMENT PLAN
TREATMENT PLAN (Medication Management Only)        [facility]    Name and Date of Birth:  Marlena Wade 24 y.o. 2000  Date of Treatment Plan: April 1, 2025  Diagnosis/Diagnoses:    1. Recurrent major depressive disorder, in partial remission (HCC)    2. Anxiety      Strengths/Personal Resources for Self-Care: supportive family, supportive friends, taking medications as prescribed, ability to adapt to life changes, ability to communicate needs, ability to communicate well, ability to listen, ability to reason, ability to understand psychiatric illness, average or above intelligence, independence.  Area/Areas of need (in own words): anxiety symptoms, depressive symptoms  1. Long Term Goal: maintain control of acceptable anxiety level.  Target Date:6 months - 10/1/2025  Person/Persons responsible for completion of goal: Marlena  2.  Short Term Objective (s) - How will we reach this goal?:   A. Provider new recommended medication/dosage changes and/or continue medication(s): Continues current meds.  B. Take psychiatric medications responsibly.  C. Attend medication management appointments regularly.  Target Date:6 months - 10/1/2025  Person/Persons Responsible for Completion of Goal: Marlena  Progress Towards Goals: continuing treatment  Treatment Modality: medication management every 3 months  Review due 180 days from date of this plan: 6 months - 10/1/2025  Expected length of service: ongoing treatment  My Physician/PA/NP and I have developed this plan together and I agree to work on the goals and objectives. I understand the treatment goals that were developed for my treatment.

## 2025-04-30 DIAGNOSIS — F33.41 RECURRENT MAJOR DEPRESSIVE DISORDER, IN PARTIAL REMISSION (HCC): ICD-10-CM

## 2025-04-30 RX ORDER — SERTRALINE HYDROCHLORIDE 100 MG/1
150 TABLET, FILM COATED ORAL DAILY
Qty: 45 TABLET | Refills: 0 | Status: SHIPPED | OUTPATIENT
Start: 2025-04-30

## 2025-04-30 RX ORDER — LURASIDONE HYDROCHLORIDE 40 MG/1
40 TABLET, FILM COATED ORAL
Qty: 30 TABLET | Refills: 2 | Status: SHIPPED | OUTPATIENT
Start: 2025-04-30

## 2025-05-23 ENCOUNTER — TELEPHONE (OUTPATIENT)
Age: 25
End: 2025-05-23

## 2025-05-23 NOTE — TELEPHONE ENCOUNTER
Patient called and requested that provider write a letter saying that her rabbit could be her emotional support animal. Please mail letter to home address.

## 2025-06-05 DIAGNOSIS — F33.41 RECURRENT MAJOR DEPRESSIVE DISORDER, IN PARTIAL REMISSION (HCC): ICD-10-CM

## 2025-06-05 RX ORDER — SERTRALINE HYDROCHLORIDE 100 MG/1
150 TABLET, FILM COATED ORAL DAILY
Qty: 45 TABLET | Refills: 0 | Status: SHIPPED | OUTPATIENT
Start: 2025-06-05

## 2025-06-05 RX ORDER — TRAZODONE HYDROCHLORIDE 100 MG/1
100 TABLET ORAL
Qty: 90 TABLET | Refills: 0 | Status: SHIPPED | OUTPATIENT
Start: 2025-06-05

## 2025-06-10 ENCOUNTER — TELEPHONE (OUTPATIENT)
Age: 25
End: 2025-06-10

## 2025-06-10 NOTE — TELEPHONE ENCOUNTER
Patient called and requested to reschedule appt from 6/10 to 6/13 due to MyChart issues. Writer offered to giver her MyChart help desk number, but she declined.

## 2025-06-12 NOTE — TELEPHONE ENCOUNTER
Patient is calling regarding cancelling an appointment.    Date/Time: 6/13 @2:30pm virtually    Reason: Tech. Issues. Patient will call back once resolved. Patient states they called the [employer] Help Desk Number and they will return her call in 1-2 days.    Patient was rescheduled: YES [] NO [x]  If yes, when was Patient reschedule for:     Patient requesting call back to reschedule: YES [] NO [x]

## 2025-06-17 DIAGNOSIS — F41.9 ANXIETY: ICD-10-CM

## 2025-06-17 NOTE — TELEPHONE ENCOUNTER
Medication Refill Request     Name of Medication hydrOXYzine HCL (ATARAX) 50 mg tablet   Dose/Frequency Take 1 tablet (50 mg total) by mouth 2 (two) times a day   Quantity 90  Verified pharmacy   [x]  Verified ordering Provider   [x]  Does patient have enough for the next 3 days? Yes [] No [x]  Does patient have a follow-up appointment scheduled? Yes [x] No []   If so when is appointment: 6/24/25

## 2025-06-18 RX ORDER — HYDROXYZINE HYDROCHLORIDE 50 MG/1
50 TABLET, FILM COATED ORAL 2 TIMES DAILY
Qty: 90 TABLET | Refills: 0 | Status: SHIPPED | OUTPATIENT
Start: 2025-06-18

## 2025-06-24 ENCOUNTER — TELEMEDICINE (OUTPATIENT)
Dept: PSYCHIATRY | Facility: CLINIC | Age: 25
End: 2025-06-24
Payer: COMMERCIAL

## 2025-06-24 DIAGNOSIS — F41.9 ANXIETY: ICD-10-CM

## 2025-06-24 DIAGNOSIS — F33.41 RECURRENT MAJOR DEPRESSIVE DISORDER, IN PARTIAL REMISSION (HCC): Primary | ICD-10-CM

## 2025-06-24 PROCEDURE — 99214 OFFICE O/P EST MOD 30 MIN: CPT

## 2025-06-24 RX ORDER — METHOCARBAMOL 750 MG/1
750 TABLET, FILM COATED ORAL
COMMUNITY
Start: 2025-04-11

## 2025-06-24 NOTE — PSYCH
MEDICATION MANAGEMENT NOTE    Name: Marlena Wade      : 2000      MRN: [id number]  Encounter Provider: TRE Ho  Encounter Date: 2025   Encounter department: [facility]    Insurance: Payor: [employer] CROSS / Plan: [facility] PLAN 361 / Product Type: Blue Fee for Service /      Reason for Visit: No chief complaint on file.  :  Assessment & Plan  Recurrent major depressive disorder, in partial remission (HCC)  -Continue Latuda 40mg for tx resistant MDD and mood stabilization   Patient education completed for Latuda, risks include:  NMS, leukopenia, neutropenia, tardive dyskinesia, angioedema, suicidality, diabetes, orthostatic hypotension, somnolence or insomnia, GI upset, hyperprolactinemia, akathisia, and possible weight gain (less than other SGA's), etc.      -Continue Zoloft to 150 mg daily for depression due to increased fatigue with increase dose  PARQ completed including serotonin syndrome, SIADH, worsening depression, suicidality, induction of sabrina, GI upset, headaches, activation, sexual side effects, sedation, potential drug interactions, and others.       Anxiety  -Continue prn Trazodone 100mg daily for insomnia  -Continue prn atarax 50mg daily for breakthrough anxiety              Treatment Recommendations:    Educated about diagnosis and treatment modalities. Verbalizes understanding and agreement with the treatment plan.  Discussed self monitoring of symptoms, and symptom monitoring tools.  Discussed medications and if treatment adjustment was needed or desired.  Aware of 24 hour and weekend coverage for urgent situations accessed by calling [facility] main practice number  I am scheduling this patient out for greater than 3 months: No    Medications Risks/Benefits:      Risks, Benefits And Possible Side Effects Of Medications:    Risks, benefits, and possible side effects of medications explained to Marlena and she (or legal  representative) verbalizes understanding and agreement for treatment.    Controlled Medication Discussion:     Not applicable      History of Present Illness     CC: Marlena presents today for follow up on depression, anxiety        Marlena updates provider on recent life changes, recently moved into a new apartment alone in [place], continues to adjust.  More expensive but this is her desired location compared to moving back to her parents area, she is overall pleased with the move and is focused on healthy spending habits at this time.  Mood is overall controlled, euthymic, pleasant.  She is forward thinking and has an upcoming job interview to hopefully increase her salary.  PHQ 7 for mild depression, no significant symptoms of depressions at this time.  Anxiety is mild but controllable with current medications.  Continues to have intentional weight loss with Zepbound prescribed by PCP, going well overall.  No medication changes required today and she denies adverse effects.  No further concerns or needs expressed today.  She denies SI.      Med Compliance: yes    Since our last visit, overall symptoms have been controlled.       HPI ROS:     Medication Side Effects: denies  Depression: 2 /10 (10 worst)  Anxiety: 2 /10 (10 worst)  Safety concerns (SI, HI, others): denies si and hi  Sleep: 6 hrs  Energy: fair   Appetite: 2-3 meals, decreased r/t zepbound  Weight Change: 10l-15 intentional weight loss    Marlena denies any side effects from medications unless noted above.    Review Of Systems: A review of systems is obtained and is negative except for the pertinent positives listed in HPI/Subjective above.      Current Rating Scores:     Current PHQ-9   PHQ-2/9 Depression Screening    Little interest or pleasure in doing things: 1 - several days  Feeling down, depressed, or hopeless: 1 - several days  Trouble falling or staying asleep, or sleeping too much: 2 - more than half the days  Feeling tired or having little energy:  2 - more than half the days  Poor appetite or overeatin - not at all  Feeling bad about yourself - or that you are a failure or have let yourself or your family down: 1 - several days  Trouble concentrating on things, such as reading the newspaper or watching television: 0 - not at all  Moving or speaking so slowly that other people could have noticed. Or the opposite - being so fidgety or restless that you have been moving around a lot more than usual: 0 - not at all  Thoughts that you would be better off dead, or of hurting yourself in some way: 0 - not at all  PHQ-9 Score: 7  PHQ-9 Interpretation: Mild depression         Areas of Improvement: reviewed in HPI/Subjective Section and reviewed in Assessment and Plan Section        Past Medical History:   Diagnosis Date   • Anxiety    • Asthma    • Depression    • Environmental allergies     Last assessed: 17   • GERD (gastroesophageal reflux disease)    • Hypoglycemia    • Hypothyroidism         Past Surgical History:   Procedure Laterality Date   • COLONOSCOPY   &     Fiberoptic   • EGD     • EGD  2016   • MYRINGOTOMY     • TONSILLECTOMY     • TOOTH EXTRACTION       Allergies: No Known Allergies    Current Outpatient Medications   Medication Sig Dispense Refill   • albuterol (2.5 mg/3 mL) 0.083 % nebulizer solution      • albuterol (Proventil HFA) 90 mcg/act inhaler Inhale 2 puffs every 6 (six) hours as needed for wheezing 6.7 g 5   • Ashlyna 0.15-0.03 &0.01 MG TABS Take 1 tablet by mouth in the morning.     • clotrimazole (LOTRIMIN) 1 % cream Apply topically 2 (two) times a day 14 g 2   • hydrocortisone (ANUSOL-HC) 25 mg suppository Insert 1 suppository (25 mg total) into the rectum 2 (two) times a day 14 suppository 0   • hydrocortisone 2.5 % cream      • hydrOXYzine HCL (ATARAX) 50 mg tablet Take 1 tablet (50 mg total) by mouth 2 (two) times a day 90 tablet 0   • metFORMIN (GLUCOPHAGE) 500 mg tablet Take 1 tablet by mouth in the morning  and 1 tablet in the evening. Take with meals.     • methocarbamol (ROBAXIN) 750 mg tablet Take 750 mg by mouth     • miconazole 2 % cream Apply 1 application. topically in the morning and 1 application. in the evening.     • mometasone-formoterol (Dulera) 200-5 MCG/ACT inhaler Inhale 2 puffs 2 (two) times a day Rinse mouth after use. 13 g 6   • omeprazole (PriLOSEC) 20 mg delayed release capsule      • sertraline (ZOLOFT) 100 mg tablet take 1 AND 1/2 tablets by mouth daily 45 tablet 0   • traZODone (DESYREL) 100 mg tablet take 1 tablet by mouth daily at bedtime 90 tablet 0   • Zepbound 2.5 MG/0.5ML auto-injector Inject 2.5 mg under the skin Once a week     • cyanocobalamin (VITAMIN B-12) 500 MCG tablet Take 1 tablet (500 mcg total) by mouth daily Do not start before October 10, 2023. 30 tablet 0   • doxycycline hyclate (VIBRA-TABS) 100 mg tablet Take 100 mg by mouth 2 (two) times a day (Patient not taking: Reported on 3/26/2024)     • ergocalciferol (VITAMIN D2) 50,000 units Take 1 capsule (50,000 Units total) by mouth once a week for 12 doses Do not start before October 14, 2023. 4 capsule 2   • loratadine (CLARITIN) 10 mg tablet Take 10 mg by mouth daily (Patient not taking: Reported on 12/29/2023)     • lurasidone (LATUDA) 40 mg tablet take 1 tablet by mouth daily with breakfast 30 tablet 2   • nystatin (MYCOSTATIN) powder Apply topically 3 (three) times a day Do not start before October 10, 2023. (Patient not taking: Reported on 2/22/2024) 15 g 1   • predniSONE 10 mg tablet Take 4 tablets daily for 2 days, then 3 tablets daily for 2 days, then 2 tablets daily for 2 days, then 1 tablet daily for 2 days, then stop (Patient not taking: Reported on 12/29/2023) 20 tablet 0     No current facility-administered medications for this visit.       Substance Abuse History:    Social History     Substance and Sexual Activity   Alcohol Use Yes   • Alcohol/week: 10.0 standard drinks of alcohol   • Types: 10 Glasses of wine  per week    Comment: every 2-3 days, more on weekends     Social History     Substance and Sexual Activity   Drug Use Yes   • Frequency: 4.0 times per week   • Types: Marijuana    Comment: recreational        Social History:    Social History     Socioeconomic History   • Marital status: Single     Spouse name: Not on file   • Number of children: 0   • Years of education: Not on file   • Highest education level: Bachelor's degree (e.g., BA, AB, BS)   Occupational History   • Occupation: UNEMPLOYED   Tobacco Use   • Smoking status: Every Day     Current packs/day: 0.25     Average packs/day: 0.3 packs/day for 1.6 years (0.4 ttl pk-yrs)     Types: Cigarettes     Start date: 11/1/2023   • Smokeless tobacco: Never   • Tobacco comments:     1/2 pack per month   Vaping Use   • Vaping status: Former   • Substances: Nicotine   Substance and Sexual Activity   • Alcohol use: Yes     Alcohol/week: 10.0 standard drinks of alcohol     Types: 10 Glasses of wine per week     Comment: every 2-3 days, more on weekends   • Drug use: Yes     Frequency: 4.0 times per week     Types: Marijuana     Comment: recreational    • Sexual activity: Yes     Partners: Male   Other Topics Concern   • Not on file   Social History Narrative   • Not on file     Social Drivers of Health     Financial Resource Strain: At Risk (4/11/2025)    Received from [facility]    Financial Insecurity    • In the last 12 months did you skip medications to save money?: Decline to Answer    • In the last 12 months was there a time when you needed to see a doctor but could not because of cost?: Yes   Food Insecurity: Food Insecurity Present (4/11/2025)    Received from [facility]    Food Insecurity    • In the last 12 months did you ever eat less than you felt you should because there wasn't enough money for food?: Yes   Transportation Needs: No Transportation Needs (4/11/2025)    Received from [facility]     Transportation Needs    • In the last 12 months have you ever had to go without healthcare because you didn't have a way to get there?: No   Physical Activity: Not on file   Stress: Not on file   Social Connections: Socially Isolated (4/11/2025)    Received from [facility]    Social Connection    • Do you often feel lonely?: Yes   Intimate Partner Violence: Not on file   Housing Stability: At Risk (4/11/2025)    Received from [facility]    Housing Stability    • Are you worried that in the next 2 months you may not have stable housing?: Yes       Family Psychiatric History:     Family History   Problem Relation Name Age of Onset   • Anxiety disorder Mother     • Diabetes Mother          due to underlying condition with both eyes affected by proliferative retinopathy and traction retinal detatchments involving maculae, without long term use of insulin   • Depression Father     • Anxiety disorder Father     • Alcohol abuse Father     • Sleep apnea Father     • Suicide Attempts Maternal Grandmother     • Suicide Attempts Maternal Grandfather     • Crohn's disease Paternal Grandfather         Medical History Reviewed by provider this encounter:  Tobacco  Allergies  Meds  Problems  Med Hx  Surg Hx  Fam Hx          Objective   There were no vitals taken for this visit.     Mental Status Evaluation:    Appearance age appropriate, casually dressed   Behavior normal, pleasant, cooperative, calm   Speech normal rate, normal volume, normal pitch, spontaneous   Mood euthymic   Affect normal range and intensity, appropriate   Thought Processes organized, goal directed   Thought Content no overt delusions   Perceptual Disturbances: no auditory hallucinations, no visual hallucinations   Abnormal Thoughts  Risk Potential Suicidal ideation - None  Homicidal ideation - None  Potential for aggression - No   Orientation oriented to person, place, time/date, and situation   Memory recent and  "remote memory grossly intact   Consciousness alert and awake   Attention Span Concentration Span attention span and concentration are age appropriate   Intellect appears to be of average intelligence   Insight intact   Judgement intact   Muscle Strength and  Gait normal muscle strength and normal muscle tone, normal gait and normal balance   Motor activity no abnormal movements   Language no difficulty naming common objects, no difficulty repeating a phrase, no difficulty writing a sentence   Fund of Knowledge adequate knowledge of current events  adequate fund of knowledge regarding past history  adequate fund of knowledge regarding vocabulary    Pain none   Pain Scale 0       Laboratory Results: I have personally reviewed all pertinent laboratory/tests results    Recent Labs (last 2 months):   No visits with results within 2 Month(s) from this visit.   Latest known visit with results is:   Hospital Outpatient Visit on 03/26/2024   Component Date Value   • EXT Preg Test, Ur 03/26/2024 Negative    • Control 03/26/2024 Valid    • Case Report 03/26/2024                      Value:Surgical Pathology Report                         Case: [id number]                                  Authorizing Provider:  Darwin Alexandre MD       Collected:           03/26/2024 1400              Ordering Location:     [facility]        Received:            03/26/2024 [address] [place] [address] Endoscopy                                                     Pathologist:           Abdi Jasso MD                                                           Specimen:    Colon, random colon bx's, r/o ibd and microscopic colitis                                 • Final Diagnosis 03/26/2024                      Value:A. Colon, \"Random colon,\" Biopsy:                          - Benign colonic mucosa with intact glandular architecture                          - Negative for lymphocytic, collagenous, or active " "colitis                          - Negative for acute colitis                          - Negative for granulomas, dysplasia, or carcinoma                             • Additional Information 03/26/2024                      Value:All reported additional testing was performed with appropriately reactive                           controls.  These tests were developed and their performance                           characteristics determined by [facility] or                           appropriate performing facility, though some tests may be performed on                           tissues which have not been validated for performance characteristics                           (such as staining performed on alcohol exposed cell blocks and decalcified                           tissues).  Results should be interpreted with caution and in the context                           of the patients’ clinical condition. These tests may not be cleared or                           approved by the U.S. Food and Drug Administration, though the FDA has                           determined that such clearance or approval is not necessary. These tests                           are used for clinical purposes and they should not be regarded as                           investigational or for research. This laboratory has been approved by CLIA                           88, designated as a high-complexity laboratory and is qualified to perform                           these tests.                          .Interpretation performed at [facility], [address] [place] [address]                           [id number]                             • Gross Description 03/26/2024                      Value:A. The specimen is received in formalin, labeled with the patient's name                           and hospital number, and is designated \" random colon\".  The specimen                           consists of multiple tan soft tissue " fragments measuring in aggregate of                           0.6 x 0.5 x 0.1 cm.  Entirely submitted. One screened cassette.                                                    Note: The estimated total formalin fixation time based upon information                           provided by the submitting clinician and the standard processing schedule                           is under 72 hours.                                                                              Rachel       Suicide/Homicide Risk Assessment:    Risk of Harm to Self:  Protective Factors: no current suicidal ideation, access to mental health treatment, compliant with medications, compliant with mental health treatment, connection to community, future oriented, having a desire to be alive, medical compliance, personal beliefs, stable job  Based on today's assessment, Marlena presents the following risk of harm to self: minimal    Risk of Harm to Others:  Based on today's assessment, Marlena presents the following risk of harm to others: none    The following interventions are recommended: Continue medication management. No other intervention changes indicated at this time.    Psychotherapy Provided:     Individual psychotherapy provided: Yes    Medications, treatment progress and treatment plan reviewed with Marlena.  Medication changes discussed with Marlena.  Medication education provided to Marlena.    Treatment Plan:    Completed and signed during the session: Yes - with Marlena    Goals: Progress towards Treatment Plan goals - Yes, progressing, as evidenced by subjective findings in HPI/Subjective Section and in Assessment and Plan Section    Depression Follow-up Plan Completed: No    Note Share:    This note was shared with patient.    Administrative Statements   Administrative Statements   Encounter provider TRE Ho    The Patient is located at Home and in the following state in which I hold an active license [place].    The patient was identified by  name and date of birth. Marlena Wade was informed that this is a telemedicine visit and that the visit is being conducted through the Epic Embedded platform. She agrees to proceed..  My office door was closed. No one else was in the room.  She acknowledged consent and understanding of privacy and security of the video platform. The patient has agreed to participate and understands they can discontinue the visit at any time.    I have spent a total time of 20 minutes in caring for this patient on the day of the visit/encounter including Risks and benefits of tx options, not including the time spent for establishing the audio/video connection.    Visit Time  Visit Start Time: 830 PM  Visit Stop Time: 850 PM  Total Visit Duration: 20 minutes    TRE Ho 06/24/25

## 2025-07-09 DIAGNOSIS — F33.41 RECURRENT MAJOR DEPRESSIVE DISORDER, IN PARTIAL REMISSION (HCC): ICD-10-CM

## 2025-07-09 RX ORDER — SERTRALINE HYDROCHLORIDE 100 MG/1
150 TABLET, FILM COATED ORAL DAILY
Qty: 45 TABLET | Refills: 0 | Status: SHIPPED | OUTPATIENT
Start: 2025-07-09 | End: 2025-07-10 | Stop reason: SDUPTHER

## 2025-07-10 DIAGNOSIS — F41.9 ANXIETY: ICD-10-CM

## 2025-07-10 DIAGNOSIS — F33.41 RECURRENT MAJOR DEPRESSIVE DISORDER, IN PARTIAL REMISSION (HCC): ICD-10-CM

## 2025-07-10 RX ORDER — HYDROXYZINE HYDROCHLORIDE 50 MG/1
50 TABLET, FILM COATED ORAL 2 TIMES DAILY
Qty: 90 TABLET | Refills: 0 | Status: SHIPPED | OUTPATIENT
Start: 2025-07-10

## 2025-07-10 NOTE — TELEPHONE ENCOUNTER
Prescriptions need to be sent to a different pharmacy    Reason for call:   [x] Refill   [] Prior Auth  [] Other:     Office:   [] PCP/Provider -   [x] Specialty/Provider - [facility]AT[facility]     Medication: lurasidone (LATUDA) 40 mg tablet     Dose/Frequency: take 1 tablet by mouth daily with breakfast,     Quantity: 30    Pharmacy: [address]   Does the patient have enough for 3 days?   [] Yes   [] No - Send as HP to POD    Mail Away Pharmacy   Does the patient have enough for 10 days?   [] Yes   [] No - Send as HP to POD    Reason for call:   [x] Refill   [] Prior Auth  [] Other:     Office:   [] PCP/Provider -   [x] Specialty/Provider - [facility]     Medication:  sertraline (ZOLOFT) 100 mg tablet    Dose/Frequency:  take 1 AND 1/2 tablets by mouth daily,     Quantity: 45    Pharmacy: [address]   Does the patient have enough for 3 days?   [] Yes   [] No - Send as HP to POD    Mail Away Pharmacy   Does the patient have enough for 10 days?   [] Yes   [] No - Send as HP to POD    Reason for call:   [x] Refill   [] Prior Auth  [] Other:     Office:   [] PCP/Provider -   [x] Specialty/Provider - [facility]     Medication:  traZODone (DESYREL) 100 mg tablet    Dose/Frequency:  take 1 tablet by mouth daily at bedtime,     Quantity: 90    Pharmacy: [address] [facility]   Does the patient have enough for 3 days?   [] Yes   [] No - Send as HP to POD    Mail Away Pharmacy   Does the patient have enough for 10 days?   [] Yes   [] No - Send as HP to POD

## 2025-07-11 RX ORDER — TRAZODONE HYDROCHLORIDE 100 MG/1
100 TABLET ORAL
Qty: 90 TABLET | Refills: 0 | Status: SHIPPED | OUTPATIENT
Start: 2025-07-11

## 2025-07-11 RX ORDER — SERTRALINE HYDROCHLORIDE 100 MG/1
150 TABLET, FILM COATED ORAL DAILY
Qty: 45 TABLET | Refills: 0 | Status: SHIPPED | OUTPATIENT
Start: 2025-07-11

## 2025-07-11 RX ORDER — LURASIDONE HYDROCHLORIDE 40 MG/1
40 TABLET, FILM COATED ORAL
Qty: 30 TABLET | Refills: 0 | Status: SHIPPED | OUTPATIENT
Start: 2025-07-11

## 2025-07-11 NOTE — TELEPHONE ENCOUNTER
Pharmacy change      Detail Level: Detailed Ndc# For Kenalog Only: 2186-6383-49 Concentration Of Kenalog Solution Injected (Mg/Ml): 5.0 Administered By (Optional): Dr Dorado Include Z78.9 (Other Specified Conditions Influencing Health Status) As An Associated Diagnosis?: No Total Volume (Ccs): 1.0 Medical Necessity Clause: This procedure was medically necessary because the lesions that were treated were: Consent: The risks of atrophy were reviewed with the patient. X Size Of Lesion In Cm (Optional): 0 Lot # For Kenalog (Optional): [id number] Expiration Date For Kenalog (Optional): jan2021 Kenalog Preparation: Kenalog

## 2025-07-21 ENCOUNTER — TELEPHONE (OUTPATIENT)
Age: 25
End: 2025-07-21

## 2025-07-21 NOTE — TELEPHONE ENCOUNTER
Contacted patient in regard to Talk Therapy Wait List to schedule a VIRTUAL NP appointment with Luna Valdes in the [place] location. LVM for patient to contact [phone], option 3 in regard to scheduling.

## 2025-07-29 DIAGNOSIS — F33.41 RECURRENT MAJOR DEPRESSIVE DISORDER, IN PARTIAL REMISSION (HCC): ICD-10-CM

## 2025-07-30 RX ORDER — SERTRALINE HYDROCHLORIDE 100 MG/1
150 TABLET, FILM COATED ORAL DAILY
Qty: 45 TABLET | Refills: 0 | Status: SHIPPED | OUTPATIENT
Start: 2025-07-30

## 2025-08-04 RX ORDER — LURASIDONE HYDROCHLORIDE 40 MG/1
40 TABLET, FILM COATED ORAL
Qty: 30 TABLET | Refills: 0 | Status: SHIPPED | OUTPATIENT
Start: 2025-08-04

## 2025-08-19 ENCOUNTER — TELEMEDICINE (OUTPATIENT)
Dept: PSYCHIATRY | Facility: CLINIC | Age: 25
End: 2025-08-19
Payer: COMMERCIAL

## 2025-08-19 DIAGNOSIS — F33.41 RECURRENT MAJOR DEPRESSIVE DISORDER, IN PARTIAL REMISSION (HCC): Primary | ICD-10-CM

## 2025-08-19 DIAGNOSIS — F41.9 ANXIETY: ICD-10-CM

## 2025-08-19 PROBLEM — L73.2 HIDRADENITIS SUPPURATIVA: Status: ACTIVE | Noted: 2025-06-26

## 2025-08-19 PROBLEM — B37.2 SKIN YEAST INFECTION: Status: ACTIVE | Noted: 2022-03-03

## 2025-08-19 PROCEDURE — 99214 OFFICE O/P EST MOD 30 MIN: CPT

## 2025-08-19 RX ORDER — OMEPRAZOLE 40 MG/1
40 CAPSULE, DELAYED RELEASE ORAL
COMMUNITY
Start: 2025-06-05